# Patient Record
Sex: FEMALE | Race: WHITE | Employment: FULL TIME | URBAN - METROPOLITAN AREA
[De-identification: names, ages, dates, MRNs, and addresses within clinical notes are randomized per-mention and may not be internally consistent; named-entity substitution may affect disease eponyms.]

---

## 2017-01-17 ENCOUNTER — OFFICE VISIT (OUTPATIENT)
Dept: INTERNAL MEDICINE CLINIC | Age: 57
End: 2017-01-17

## 2017-01-17 VITALS
TEMPERATURE: 98.4 F | DIASTOLIC BLOOD PRESSURE: 80 MMHG | OXYGEN SATURATION: 98 % | BODY MASS INDEX: 29.62 KG/M2 | SYSTOLIC BLOOD PRESSURE: 132 MMHG | HEART RATE: 84 BPM | WEIGHT: 178 LBS

## 2017-01-17 DIAGNOSIS — J01.40 ACUTE NON-RECURRENT PANSINUSITIS: Primary | ICD-10-CM

## 2017-01-17 PROCEDURE — 99213 OFFICE O/P EST LOW 20 MIN: CPT | Performed by: NURSE PRACTITIONER

## 2017-01-17 RX ORDER — AMOXICILLIN 875 MG/1
875 TABLET, COATED ORAL 2 TIMES DAILY
Qty: 20 TABLET | Refills: 0 | Status: SHIPPED | OUTPATIENT
Start: 2017-01-17 | End: 2018-01-22

## 2017-01-17 ASSESSMENT — ENCOUNTER SYMPTOMS
CRAMPS: 1
RHINORRHEA: 1
SINUS PRESSURE: 1
SINUS COMPLAINT: 1

## 2017-01-27 ENCOUNTER — OFFICE VISIT (OUTPATIENT)
Dept: INTERNAL MEDICINE CLINIC | Age: 57
End: 2017-01-27

## 2017-01-27 VITALS
OXYGEN SATURATION: 98 % | HEART RATE: 85 BPM | DIASTOLIC BLOOD PRESSURE: 80 MMHG | SYSTOLIC BLOOD PRESSURE: 132 MMHG | HEIGHT: 65 IN | BODY MASS INDEX: 30.16 KG/M2 | WEIGHT: 181 LBS

## 2017-01-27 DIAGNOSIS — R30.0 DYSURIA: ICD-10-CM

## 2017-01-27 DIAGNOSIS — N30.01 ACUTE CYSTITIS WITH HEMATURIA: Primary | ICD-10-CM

## 2017-01-27 LAB
BACTERIA URINE, POC: ABNORMAL
BILIRUBIN URINE: 0 MG/DL
BLOOD, URINE: POSITIVE
CASTS URINE, POC: ABNORMAL
CLARITY: ABNORMAL
COLOR: YELLOW
CRYSTALS URINE, POC: ABNORMAL
EPI CELLS URINE, POC: ABNORMAL
GLUCOSE URINE: NEGATIVE
KETONES, URINE: NEGATIVE
LEUKOCYTE EST, POC: ABNORMAL
NITRITE, URINE: POSITIVE
PH UA: 6.5 (ref 4.5–8)
PROTEIN UA: POSITIVE
RBC URINE, POC: ABNORMAL
SPECIFIC GRAVITY UA: 1.02 (ref 1–1.03)
UROBILINOGEN, URINE: NORMAL
WBC URINE, POC: ABNORMAL
YEAST URINE, POC: ABNORMAL

## 2017-01-27 PROCEDURE — 99213 OFFICE O/P EST LOW 20 MIN: CPT | Performed by: INTERNAL MEDICINE

## 2017-01-27 PROCEDURE — 81000 URINALYSIS NONAUTO W/SCOPE: CPT | Performed by: INTERNAL MEDICINE

## 2017-01-27 RX ORDER — NITROFURANTOIN 25; 75 MG/1; MG/1
100 CAPSULE ORAL 2 TIMES DAILY
Qty: 14 CAPSULE | Refills: 0 | Status: SHIPPED | OUTPATIENT
Start: 2017-01-27 | End: 2019-02-07 | Stop reason: SDUPTHER

## 2017-01-27 RX ORDER — AMOXICILLIN 500 MG
CAPSULE ORAL
COMMUNITY
End: 2019-08-22

## 2017-01-27 RX ORDER — ASCORBIC ACID 100 MG
TABLET,CHEWABLE ORAL
COMMUNITY
End: 2019-08-22

## 2017-01-27 RX ORDER — FLUCONAZOLE 150 MG/1
150 TABLET ORAL ONCE
Qty: 3 TABLET | Refills: 0 | Status: SHIPPED | OUTPATIENT
Start: 2017-01-27 | End: 2017-01-27

## 2017-01-27 ASSESSMENT — ENCOUNTER SYMPTOMS
RESPIRATORY NEGATIVE: 1
NAUSEA: 0
CONSTIPATION: 0
DIARRHEA: 0

## 2017-01-30 LAB
ORGANISM: ABNORMAL
URINE CULTURE, ROUTINE: ABNORMAL

## 2017-04-29 ENCOUNTER — HOSPITAL ENCOUNTER (OUTPATIENT)
Dept: MAMMOGRAPHY | Age: 57
Discharge: HOME | End: 2017-04-29
Attending: FAMILY MEDICINE | Admitting: FAMILY MEDICINE

## 2017-04-29 DIAGNOSIS — Z12.31 ENCOUNTER FOR SCREENING MAMMOGRAM FOR BREAST CANCER: ICD-10-CM

## 2017-05-02 ENCOUNTER — TELEPHONE (OUTPATIENT)
Dept: INTERNAL MEDICINE CLINIC | Age: 57
End: 2017-05-02

## 2017-05-15 ENCOUNTER — TELEPHONE (OUTPATIENT)
Dept: INTERNAL MEDICINE CLINIC | Age: 57
End: 2017-05-15

## 2017-05-19 ENCOUNTER — TELEPHONE (OUTPATIENT)
Dept: INTERNAL MEDICINE CLINIC | Age: 57
End: 2017-05-19

## 2018-01-11 ENCOUNTER — OFFICE VISIT (OUTPATIENT)
Dept: ORTHOPEDIC SURGERY | Age: 58
End: 2018-01-11

## 2018-01-11 VITALS — BODY MASS INDEX: 30.16 KG/M2 | HEIGHT: 65 IN | WEIGHT: 181 LBS

## 2018-01-11 DIAGNOSIS — G89.29 CHRONIC PAIN OF RIGHT KNEE: Primary | ICD-10-CM

## 2018-01-11 DIAGNOSIS — M25.561 CHRONIC PAIN OF RIGHT KNEE: Primary | ICD-10-CM

## 2018-01-11 PROCEDURE — 73564 X-RAY EXAM KNEE 4 OR MORE: CPT | Performed by: ORTHOPAEDIC SURGERY

## 2018-01-11 PROCEDURE — 99204 OFFICE O/P NEW MOD 45 MIN: CPT | Performed by: ORTHOPAEDIC SURGERY

## 2018-01-18 ENCOUNTER — OFFICE VISIT (OUTPATIENT)
Dept: ORTHOPEDIC SURGERY | Age: 58
End: 2018-01-18

## 2018-01-18 ENCOUNTER — TELEPHONE (OUTPATIENT)
Dept: ORTHOPEDIC SURGERY | Age: 58
End: 2018-01-18

## 2018-01-18 VITALS — BODY MASS INDEX: 30.16 KG/M2 | HEIGHT: 65 IN | WEIGHT: 181 LBS

## 2018-01-18 DIAGNOSIS — S83.271D COMPLEX TEAR OF LATERAL MENISCUS OF RIGHT KNEE AS CURRENT INJURY, SUBSEQUENT ENCOUNTER: ICD-10-CM

## 2018-01-18 DIAGNOSIS — S83.231D COMPLEX TEAR OF MEDIAL MENISCUS OF RIGHT KNEE AS CURRENT INJURY, SUBSEQUENT ENCOUNTER: ICD-10-CM

## 2018-01-18 DIAGNOSIS — S83.231D COMPLEX TEAR OF MEDIAL MENISCUS OF RIGHT KNEE AS CURRENT INJURY, SUBSEQUENT ENCOUNTER: Primary | ICD-10-CM

## 2018-01-18 DIAGNOSIS — M25.561 CHRONIC PAIN OF RIGHT KNEE: Primary | ICD-10-CM

## 2018-01-18 DIAGNOSIS — G89.29 CHRONIC PAIN OF RIGHT KNEE: Primary | ICD-10-CM

## 2018-01-18 PROCEDURE — 99214 OFFICE O/P EST MOD 30 MIN: CPT | Performed by: ORTHOPAEDIC SURGERY

## 2018-01-18 RX ORDER — MELOXICAM 15 MG/1
15 TABLET ORAL DAILY
Qty: 30 TABLET | Refills: 3 | Status: SHIPPED | OUTPATIENT
Start: 2018-01-23 | End: 2018-06-04

## 2018-01-18 NOTE — PROGRESS NOTES
History of Present Illness:  Theresa Scott is a 65 y.o. female  recheck evaluation of right knee here today to discuss options    Location right Knee   Severity moderate  Duration several months  Associated sign/symptoms pain, swelling, catching, locking    Medical History  Patient's medications, allergies, past medical, surgical, social and family histories were reviewed and updated as appropriate. Review of Systems  Pertinent items are noted in HPI  Review of systems reviewed from Patient History Form dated on 1/11/18 and available in the patient's chart under the Media tab. No change in the patients medical history form. Examination:  General Exam:    Vitals: Height 5' 5\" (1.651 m), weight 181 lb (82.1 kg), not currently breastfeeding. Constitutional: Patient is adequately groomed with no evidence of malnutrition  Mental Status: The patient is alert and  oriented to time, place and person. The patient's mood and affect are appropriate. Lymphatic: The lymphatic examination bilaterally reveals all areas to be without enlargement or induration. Vascular: Examination reveals no swelling or calf tenderness. Peripheral pulses are palpable and 2+. Neurological: The patient has good coordination. There is no weakness or sensory deficit. Skin:    Head/Neck: inspection reveals no rashes, ulcerations or lesions. Trunk:  inspection reveals no rashes, ulcerations or lesions. Right Lower Extremity: inspection reveals no rashes, ulcerations or lesions. Left Lower Extremity: inspection reveals no rashes, ulcerations or lesions.                                           PHYSICAL EXAM:        Knee Examination  Inspection:  No abrasions no lacerations no signs of infection or obvious deformity mild obvious  swelling and joint effusion     Palpation:   Palpation reveals moderate  Pain medial joint line,   Mild lateral joint line pain,  mild joint effusion    Range of Motion:  0 - 150° flexion/ extension Examination of the lower back does not show any tenderness, deformity or injury. Range of motion is unremarkable. There is no gross instability. There are no rashes, ulcerations or lesions. Strength and tone are normal.    Past Surgical History:   Procedure Laterality Date     SECTION      breech    COLONOSCOPY      one polyp removed    HYSTERECTOMY      endometriosis    KNEE ARTHROSCOPY Right 13    RIGHT KNEE ARTHROSCOPE, PARTIAL MEDIAL AND LATERAL MENISCECTOMY, SYNOVECTOMY, CHONDROPLASTY OF MEDIAL FEMORAL CONDYLE    KNEE ARTHROSCOPY Left 2014    LEFT KNEE ARTHROSCOPY WITH PARTIAL MEDIAL MENISCECTOMY,    KNEE SURGERY  13    RIGHT KNEE ARTHROSCOPE, PARTIAL MEDIAL AND LATERAL    KNEE SURGERY  14     RIGHT KNEE ARTHROSCOPY WITH PARTIAL LATERAL MENISCECTOMY,    TUBAL LIGATION     . Radiology:     X-rays obtained and reviewed in office:  Views MRI multiple views  Body Part right knee  Impression medial meniscus tear with lateral meniscus tear and osteoarthritis      Assessment :  Medial meniscus tear, lateral meniscus tear, osteoarthritis    Impression: Same not improved with conservative therapy    Office Procedures:  No orders of the defined types were placed in this encounter. Previous Treatments:  Knee arthroscopy in the past, physical therapy, injections,    Possible other diagnoses:      Treatment Plan:  I spent 15+ minutes, face to face, with the patient discussing and answering questions regarding the risks, benefits, and complications of arthroscopic knee surgery. The patient realizes that there are concerns with this surgery with respect to infection, deep vein thrombosis, neurological injury, delayed  rehabilitation, the possibility of arthrofibrosis of the knee, and specifically  Hoffa's fat pad fibrosis that can potentially cause difficulties.  The patient realizes that there are also anesthetic concerns including cardiopulmonary issues, pulmonary issues,

## 2018-01-22 ENCOUNTER — TELEPHONE (OUTPATIENT)
Dept: ORTHOPEDIC SURGERY | Age: 58
End: 2018-01-22

## 2018-01-23 ENCOUNTER — HOSPITAL ENCOUNTER (OUTPATIENT)
Dept: SURGERY | Age: 58
Discharge: HOME | End: 2018-01-23
Attending: ORTHOPAEDIC SURGERY | Admitting: ORTHOPAEDIC SURGERY

## 2018-01-23 VITALS
TEMPERATURE: 98.6 F | OXYGEN SATURATION: 97 % | BODY MASS INDEX: 29.58 KG/M2 | SYSTOLIC BLOOD PRESSURE: 139 MMHG | HEART RATE: 65 BPM | DIASTOLIC BLOOD PRESSURE: 71 MMHG | RESPIRATION RATE: 16 BRPM | HEIGHT: 65 IN | WEIGHT: 177.56 LBS

## 2018-01-23 DIAGNOSIS — S83.231D COMPLEX TEAR OF MEDIAL MENISCUS OF RIGHT KNEE AS CURRENT INJURY, SUBSEQUENT ENCOUNTER: Primary | ICD-10-CM

## 2018-01-23 DIAGNOSIS — M65.9 SYNOVITIS OF KNEE: ICD-10-CM

## 2018-01-23 DIAGNOSIS — M25.461 EFFUSION OF RIGHT KNEE: ICD-10-CM

## 2018-01-23 RX ORDER — LIDOCAINE HYDROCHLORIDE 10 MG/ML
1 INJECTION, SOLUTION EPIDURAL; INFILTRATION; INTRACAUDAL; PERINEURAL
Status: CANCELLED | OUTPATIENT
Start: 2018-01-23 | End: 2018-01-23

## 2018-01-23 RX ORDER — SODIUM CHLORIDE 9 MG/ML
INJECTION, SOLUTION INTRAVENOUS CONTINUOUS
Status: DISCONTINUED | OUTPATIENT
Start: 2018-01-23 | End: 2018-01-24 | Stop reason: HOSPADM

## 2018-01-23 RX ORDER — HYDROCODONE BITARTRATE AND ACETAMINOPHEN 5; 325 MG/1; MG/1
1 TABLET ORAL EVERY 6 HOURS PRN
Qty: 28 TABLET | Refills: 0 | Status: SHIPPED | OUTPATIENT
Start: 2018-01-23 | End: 2018-01-26 | Stop reason: ALTCHOICE

## 2018-01-23 RX ORDER — CEFAZOLIN SODIUM 2 G/100ML
2 INJECTION, SOLUTION INTRAVENOUS
Status: COMPLETED | OUTPATIENT
Start: 2018-01-23 | End: 2018-01-23

## 2018-01-23 RX ORDER — HYDROMORPHONE HCL 110MG/55ML
0.25 PATIENT CONTROLLED ANALGESIA SYRINGE INTRAVENOUS EVERY 5 MIN PRN
Status: DISCONTINUED | OUTPATIENT
Start: 2018-01-23 | End: 2018-01-24 | Stop reason: HOSPADM

## 2018-01-23 RX ORDER — ACETAMINOPHEN 325 MG/1
650 TABLET ORAL EVERY 4 HOURS PRN
Status: DISCONTINUED | OUTPATIENT
Start: 2018-01-23 | End: 2018-01-24 | Stop reason: HOSPADM

## 2018-01-23 RX ORDER — HYDROCODONE BITARTRATE AND ACETAMINOPHEN 5; 325 MG/1; MG/1
1 TABLET ORAL
Status: ACTIVE | OUTPATIENT
Start: 2018-01-23 | End: 2018-01-23

## 2018-01-23 RX ORDER — DOCUSATE SODIUM 100 MG/1
100 CAPSULE, LIQUID FILLED ORAL 2 TIMES DAILY
Status: DISCONTINUED | OUTPATIENT
Start: 2018-01-23 | End: 2018-01-24 | Stop reason: HOSPADM

## 2018-01-23 RX ORDER — ONDANSETRON 2 MG/ML
4 INJECTION INTRAMUSCULAR; INTRAVENOUS EVERY 6 HOURS PRN
Status: DISCONTINUED | OUTPATIENT
Start: 2018-01-23 | End: 2018-01-24 | Stop reason: HOSPADM

## 2018-01-23 RX ORDER — HYDROMORPHONE HCL 110MG/55ML
0.5 PATIENT CONTROLLED ANALGESIA SYRINGE INTRAVENOUS EVERY 5 MIN PRN
Status: DISCONTINUED | OUTPATIENT
Start: 2018-01-23 | End: 2018-01-24 | Stop reason: HOSPADM

## 2018-01-23 RX ORDER — ONDANSETRON 2 MG/ML
4 INJECTION INTRAMUSCULAR; INTRAVENOUS
Status: ACTIVE | OUTPATIENT
Start: 2018-01-23 | End: 2018-01-23

## 2018-01-23 RX ORDER — MEPERIDINE HYDROCHLORIDE 25 MG/ML
12.5 INJECTION INTRAMUSCULAR; INTRAVENOUS; SUBCUTANEOUS EVERY 5 MIN PRN
Status: DISCONTINUED | OUTPATIENT
Start: 2018-01-23 | End: 2018-01-24 | Stop reason: HOSPADM

## 2018-01-23 RX ADMIN — CEFAZOLIN SODIUM 2 G: 2 INJECTION, SOLUTION INTRAVENOUS at 10:07

## 2018-01-23 RX ADMIN — SODIUM CHLORIDE: 9 INJECTION, SOLUTION INTRAVENOUS at 09:19

## 2018-01-23 ASSESSMENT — PAIN - FUNCTIONAL ASSESSMENT: PAIN_FUNCTIONAL_ASSESSMENT: 0-10

## 2018-01-23 ASSESSMENT — ACTIVITIES OF DAILY LIVING (ADL): EFFECT OF PAIN ON DAILY ACTIVITIES: WALKING LONG DISTANCES

## 2018-01-23 ASSESSMENT — PAIN DESCRIPTION - DESCRIPTORS: DESCRIPTORS: NAGGING;ACHING

## 2018-01-23 NOTE — ANESTHESIA POST-OP
Anesthesia Post-op Note    Patient: Jacki Raines  MRN: [id number]  YOB: 1960  Date of evaluation: 1/23/2018  Time:  3:19 PM     Procedure(s) Performed:     Last Vitals: /71   Pulse 65   Temp 98.6 °F (37 °C) (Temporal)   Resp 16   Ht 5' 5\" (1.651 m)   Wt 177 lb 9 oz (80.5 kg)   SpO2 97%   BMI 29.55 kg/m²     Elan Phase I: Elan Score: 10    Elan Phase II: Elan Score: 10    Anesthesia Post Evaluation    Final anesthesia type: MAC  Patient location during evaluation: PACU  Patient participation: complete - patient participated  Level of consciousness: awake and alert  Airway patency: patent  Nausea & Vomiting: no vomiting  Complications: no  Cardiovascular status: hemodynamically stable  Respiratory status: acceptable  Hydration status: euvolemic        Maranda Preciado MD  3:19 PM

## 2018-01-23 NOTE — ANESTHESIA PRE-OP
Department of Anesthesiology  Preprocedure Note       Name:  Husam Sanchez   Age:  65 y.o.  :  1960                                          MRN:  [id number]         Date:  2018      Surgeon:James    Procedure:knee scope  Medications prior to admission:   Prior to Admission medications    Medication Sig Start Date End Date Taking? Authorizing Provider   meloxicam (MOBIC) 15 MG tablet Take 1 tablet by mouth daily 18   Neda Sanches DO   Omega-3 Fatty Acids (FISH OIL) 1200 MG CAPS Take by mouth    Historical Provider, MD   Ascorbic Acid (VITAMIN C) 100 MG CHEW Take by mouth    Historical Provider, MD   Probiotic Product (PROBIOTIC DAILY PO) Take by mouth    Historical Provider, MD   omeprazole (PRILOSEC) 20 MG delayed release capsule Take 1 capsule by mouth 2 times daily for 14 days 16  Vijay Morris DO   Multiple Vitamins-Minerals (THERAPEUTIC MULTIVITAMIN-MINERALS) tablet Take 1 tablet by mouth daily    Historical Provider, MD   aspirin 81 MG EC tablet Take 81 mg by mouth daily. Historical Provider, MD       Current medications:    Current Outpatient Prescriptions   Medication Sig Dispense Refill    meloxicam (MOBIC) 15 MG tablet Take 1 tablet by mouth daily 30 tablet 3    Omega-3 Fatty Acids (FISH OIL) 1200 MG CAPS Take by mouth      Ascorbic Acid (VITAMIN C) 100 MG CHEW Take by mouth      Probiotic Product (PROBIOTIC DAILY PO) Take by mouth      omeprazole (PRILOSEC) 20 MG delayed release capsule Take 1 capsule by mouth 2 times daily for 14 days 28 capsule 0    Multiple Vitamins-Minerals (THERAPEUTIC MULTIVITAMIN-MINERALS) tablet Take 1 tablet by mouth daily      aspirin 81 MG EC tablet Take 81 mg by mouth daily.          Current Facility-Administered Medications   Medication Dose Route Frequency Provider Last Rate Last Dose    acetaminophen (TYLENOL) tablet 650 mg  650 mg Oral Q4H PRN Neda Sanches DO        docusate sodium (COLACE) capsule 100 mg  100 mg Oral PARTIAL MEDIAL AND LATERAL MENISCECTOMY, SYNOVECTOMY, CHONDROPLASTY OF MEDIAL FEMORAL CONDYLE    KNEE ARTHROSCOPY Left 11/28/2014    LEFT KNEE ARTHROSCOPY WITH PARTIAL MEDIAL MENISCECTOMY,    KNEE SURGERY  9-27-13    RIGHT KNEE ARTHROSCOPE, PARTIAL MEDIAL AND LATERAL    KNEE SURGERY  1-21-14     RIGHT KNEE ARTHROSCOPY WITH PARTIAL LATERAL MENISCECTOMY,    TUBAL LIGATION         Social History:    Social History   Substance Use Topics    Smoking status: Former Smoker     Packs/day: 0.25     Years: 7.00     Types: Cigarettes     Quit date: 9/24/1988    Smokeless tobacco: Never Used      Comment: quit 1988    Alcohol use No                                Counseling given: Not Answered      Vital Signs (Current):   Vitals:    01/23/18 0905 01/23/18 0913   BP:  (!) 146/86   Pulse:  78   Resp:  18   Temp:  98.5 °F (36.9 °C)   TempSrc:  Temporal   SpO2:  98%   Weight: 177 lb 9 oz (80.5 kg)    Height: 5' 5\" (1.651 m)                                               BP Readings from Last 3 Encounters:   01/23/18 (!) 146/86   01/27/17 132/80   01/17/17 132/80       NPO Status: Time of last liquid consumption: 2100                        Time of last solid consumption: 2100                        Date of last liquid consumption: 01/22/18                        Date of last solid food consumption: 01/22/18    BMI:   Wt Readings from Last 3 Encounters:   01/23/18 177 lb 9 oz (80.5 kg)   01/22/18 160 lb (72.6 kg)   01/18/18 181 lb (82.1 kg)     Body mass index is 29.55 kg/m².     Anesthesia Evaluation  Patient summary reviewed no history of anesthetic complications:   Airway: Mallampati: II  TM distance: >3 FB   Neck ROM: full  Mouth opening: > = 3 FB Dental: normal exam         Pulmonary: breath sounds clear to auscultation                            ROS comment: Former smoker   Cardiovascular:  Exercise tolerance: good (>4 METS),   (+) dysrhythmias (1990s cardiac ablation, no issues since):, hyperlipidemia    (-) CABG/stent

## 2018-01-26 ENCOUNTER — OFFICE VISIT (OUTPATIENT)
Dept: INTERNAL MEDICINE CLINIC | Age: 58
End: 2018-01-26

## 2018-01-26 VITALS
SYSTOLIC BLOOD PRESSURE: 128 MMHG | DIASTOLIC BLOOD PRESSURE: 80 MMHG | WEIGHT: 180.8 LBS | HEIGHT: 64 IN | HEART RATE: 74 BPM | BODY MASS INDEX: 30.87 KG/M2

## 2018-01-26 DIAGNOSIS — Z76.89 ENCOUNTER TO ESTABLISH CARE: Primary | ICD-10-CM

## 2018-01-26 DIAGNOSIS — Z23 NEED FOR TDAP VACCINATION: ICD-10-CM

## 2018-01-26 DIAGNOSIS — Z98.890 S/P RIGHT KNEE ARTHROSCOPY: ICD-10-CM

## 2018-01-26 DIAGNOSIS — E78.5 DYSLIPIDEMIA: ICD-10-CM

## 2018-01-26 DIAGNOSIS — S83.271A COMPLEX TEAR OF LATERAL MENISCUS OF RIGHT KNEE AS CURRENT INJURY, INITIAL ENCOUNTER: ICD-10-CM

## 2018-01-26 DIAGNOSIS — S83.231A COMPLEX TEAR OF MEDIAL MENISCUS OF RIGHT KNEE AS CURRENT INJURY, INITIAL ENCOUNTER: ICD-10-CM

## 2018-01-26 PROCEDURE — 90471 IMMUNIZATION ADMIN: CPT | Performed by: NURSE PRACTITIONER

## 2018-01-26 PROCEDURE — 99214 OFFICE O/P EST MOD 30 MIN: CPT | Performed by: NURSE PRACTITIONER

## 2018-01-26 PROCEDURE — 90715 TDAP VACCINE 7 YRS/> IM: CPT | Performed by: NURSE PRACTITIONER

## 2018-01-29 ENCOUNTER — TELEPHONE (OUTPATIENT)
Dept: ORTHOPEDIC SURGERY | Age: 58
End: 2018-01-29

## 2018-01-29 ENCOUNTER — OFFICE VISIT (OUTPATIENT)
Dept: ORTHOPEDIC SURGERY | Age: 58
End: 2018-01-29

## 2018-01-29 DIAGNOSIS — M25.561 CHRONIC PAIN OF RIGHT KNEE: Primary | ICD-10-CM

## 2018-01-29 DIAGNOSIS — M17.11 PRIMARY OSTEOARTHRITIS OF RIGHT KNEE: ICD-10-CM

## 2018-01-29 DIAGNOSIS — G89.29 CHRONIC PAIN OF RIGHT KNEE: Primary | ICD-10-CM

## 2018-01-29 PROCEDURE — 99024 POSTOP FOLLOW-UP VISIT: CPT | Performed by: ORTHOPAEDIC SURGERY

## 2018-01-29 ASSESSMENT — ENCOUNTER SYMPTOMS
GASTROINTESTINAL NEGATIVE: 1
RESPIRATORY NEGATIVE: 1

## 2018-01-29 NOTE — PROGRESS NOTES
Subjective:      Patient ID: Ramos Colón is a 65 y.o. female. CC: Establish new PCP    HPI: The patient presents to the office today to establish a new primary care provider. Her previous PCP left the system. Overall, the patient reports should that she enjoys good health. She has a history of some musculoskeletal issues, most recently medial meniscus issues with the right knee. On , she underwent knee arthroscopy per Dr. Mark Lynn. She reports she is recovering well. She is on meloxicam.    She also has a history of arthritis of other joints. She uses NSAIDs intermittently but is currently on meloxicam per orthopedics. She has a history of hyperlipidemia. She has a strong family history of this. She was placed on statin but did not tolerate it. She is currently managing this with diet. 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 She is . She cares for her 3 disabled siblings at her home. She is a former smoker. She denies use. She denies illicit drug use.       Past Medical History:   Diagnosis Date    Arthritis     RT TOES; established with podiatry    Hyperlipidemia     DIET CONTROL       Past Surgical History:   Procedure Laterality Date     SECTION      breech    COLONOSCOPY      one polyp removed    HYSTERECTOMY      endometriosis    KNEE ARTHROSCOPY Right 13    RIGHT KNEE ARTHROSCOPE, PARTIAL MEDIAL AND LATERAL MENISCECTOMY, SYNOVECTOMY, CHONDROPLASTY OF MEDIAL FEMORAL CONDYLE    KNEE ARTHROSCOPY Left 2014    LEFT KNEE ARTHROSCOPY WITH PARTIAL MEDIAL MENISCECTOMY,    KNEE ARTHROSCOPY Right 2018    ACTUAL PROCEDURE: RIGHT KNEE ARTHROSCOPY, PARTIAL MEDIAL MENISCECTOMY,CHONDROPLASTY, SYNOVECTOMY      KNEE SURGERY  13    RIGHT KNEE ARTHROSCOPE, PARTIAL MEDIAL AND LATERAL    KNEE SURGERY  14     RIGHT KNEE ARTHROSCOPY WITH PARTIAL LATERAL MENISCECTOMY,    TUBAL LIGATION         Family

## 2018-01-29 NOTE — ADDENDUM NOTE
Encounter addended by: Lili Cannon MA on: 1/29/2018 11:25 AM<BR>    Actions taken: Letter status changed

## 2018-01-31 ENCOUNTER — TELEPHONE (OUTPATIENT)
Dept: ORTHOPEDIC SURGERY | Age: 58
End: 2018-01-31

## 2018-02-05 ENCOUNTER — TELEPHONE (OUTPATIENT)
Dept: ORTHOPEDIC SURGERY | Age: 58
End: 2018-02-05

## 2018-02-13 ENCOUNTER — TELEPHONE (OUTPATIENT)
Dept: ORTHOPEDIC SURGERY | Age: 58
End: 2018-02-13

## 2018-02-15 ENCOUNTER — TELEPHONE (OUTPATIENT)
Dept: ORTHOPEDIC SURGERY | Age: 58
End: 2018-02-15

## 2018-02-21 DIAGNOSIS — G89.29 CHRONIC PAIN OF RIGHT KNEE: ICD-10-CM

## 2018-02-21 DIAGNOSIS — M25.561 CHRONIC PAIN OF RIGHT KNEE: ICD-10-CM

## 2018-02-21 DIAGNOSIS — M17.11 PRIMARY OSTEOARTHRITIS OF RIGHT KNEE: ICD-10-CM

## 2018-02-21 PROCEDURE — L1845 KO DOUBLE UPRIGHT PRE CST: HCPCS | Performed by: ORTHOPAEDIC SURGERY

## 2018-03-01 ENCOUNTER — TELEPHONE (OUTPATIENT)
Dept: ORTHOPEDIC SURGERY | Age: 58
End: 2018-03-01

## 2018-03-02 ENCOUNTER — TELEPHONE (OUTPATIENT)
Dept: ORTHOPEDIC SURGERY | Age: 58
End: 2018-03-02

## 2018-03-12 ENCOUNTER — OFFICE VISIT (OUTPATIENT)
Dept: ORTHOPEDIC SURGERY | Age: 58
End: 2018-03-12

## 2018-03-12 DIAGNOSIS — M17.11 PRIMARY OSTEOARTHRITIS OF RIGHT KNEE: ICD-10-CM

## 2018-03-12 DIAGNOSIS — S83.231D COMPLEX TEAR OF MEDIAL MENISCUS OF RIGHT KNEE AS CURRENT INJURY, SUBSEQUENT ENCOUNTER: Primary | ICD-10-CM

## 2018-03-12 PROCEDURE — 99024 POSTOP FOLLOW-UP VISIT: CPT | Performed by: ORTHOPAEDIC SURGERY

## 2018-06-04 ENCOUNTER — OFFICE VISIT (OUTPATIENT)
Dept: INTERNAL MEDICINE CLINIC | Age: 58
End: 2018-06-04

## 2018-06-04 VITALS
SYSTOLIC BLOOD PRESSURE: 136 MMHG | HEART RATE: 72 BPM | HEIGHT: 64 IN | DIASTOLIC BLOOD PRESSURE: 82 MMHG | BODY MASS INDEX: 30.9 KG/M2 | WEIGHT: 181 LBS

## 2018-06-04 DIAGNOSIS — R03.0 PRE-HYPERTENSION: Primary | ICD-10-CM

## 2018-06-04 DIAGNOSIS — T46.6X5A MYALGIA DUE TO STATIN: ICD-10-CM

## 2018-06-04 DIAGNOSIS — E78.00 PURE HYPERCHOLESTEROLEMIA: ICD-10-CM

## 2018-06-04 DIAGNOSIS — M79.10 MYALGIA DUE TO STATIN: ICD-10-CM

## 2018-06-04 PROCEDURE — 99213 OFFICE O/P EST LOW 20 MIN: CPT | Performed by: NURSE PRACTITIONER

## 2018-06-04 RX ORDER — PRAVASTATIN SODIUM 20 MG
10 TABLET ORAL EVERY EVENING
Qty: 30 TABLET | Refills: 5 | Status: SHIPPED | OUTPATIENT
Start: 2018-06-04 | End: 2019-08-22

## 2018-06-04 ASSESSMENT — ENCOUNTER SYMPTOMS
GASTROINTESTINAL NEGATIVE: 1
SHORTNESS OF BREATH: 0

## 2018-06-04 ASSESSMENT — PATIENT HEALTH QUESTIONNAIRE - PHQ9
SUM OF ALL RESPONSES TO PHQ QUESTIONS 1-9: 0
SUM OF ALL RESPONSES TO PHQ9 QUESTIONS 1 & 2: 0
2. FEELING DOWN, DEPRESSED OR HOPELESS: 0
1. LITTLE INTEREST OR PLEASURE IN DOING THINGS: 0

## 2018-08-18 ENCOUNTER — HOSPITAL ENCOUNTER (OUTPATIENT)
Dept: MAMMOGRAPHY | Age: 58
Discharge: HOME | End: 2018-08-18
Attending: NURSE PRACTITIONER | Admitting: NURSE PRACTITIONER

## 2018-08-18 DIAGNOSIS — Z12.31 ENCOUNTER FOR SCREENING MAMMOGRAM FOR BREAST CANCER: ICD-10-CM

## 2019-01-23 ENCOUNTER — TELEPHONE (OUTPATIENT)
Dept: INTERNAL MEDICINE CLINIC | Age: 59
End: 2019-01-23

## 2019-01-23 NOTE — TELEPHONE ENCOUNTER
Pt calling brought in quardianship papers back in November---for her being quardian to [address] --(also your pts) the court has contacted her and they have never received those papers --she needs to pick them up today to take to them or she will lose quardianship----please call pt cell. Thanks.

## 2019-02-06 ENCOUNTER — OFFICE VISIT (OUTPATIENT)
Dept: INTERNAL MEDICINE CLINIC | Age: 59
End: 2019-02-06
Payer: COMMERCIAL

## 2019-02-06 VITALS
WEIGHT: 180.6 LBS | HEART RATE: 96 BPM | DIASTOLIC BLOOD PRESSURE: 84 MMHG | BODY MASS INDEX: 30.83 KG/M2 | SYSTOLIC BLOOD PRESSURE: 128 MMHG | HEIGHT: 64 IN | OXYGEN SATURATION: 97 % | TEMPERATURE: 98.4 F

## 2019-02-06 DIAGNOSIS — R82.998 LEUKOCYTES IN URINE: ICD-10-CM

## 2019-02-06 DIAGNOSIS — E86.0 DEHYDRATION: ICD-10-CM

## 2019-02-06 DIAGNOSIS — R11.0 NAUSEA: ICD-10-CM

## 2019-02-06 DIAGNOSIS — R52 BODY ACHES: Primary | ICD-10-CM

## 2019-02-06 DIAGNOSIS — R05.9 COUGH: ICD-10-CM

## 2019-02-06 DIAGNOSIS — R00.0 TACHYCARDIA: ICD-10-CM

## 2019-02-06 LAB
A/G RATIO: 1.6 (ref 1.1–2.2)
ALBUMIN SERPL-MCNC: 4.2 G/DL (ref 3.4–5)
ALP BLD-CCNC: 81 U/L (ref 40–129)
ALT SERPL-CCNC: 21 U/L (ref 10–40)
ANION GAP SERPL CALCULATED.3IONS-SCNC: 15 MMOL/L (ref 3–16)
AST SERPL-CCNC: 38 U/L (ref 15–37)
BASOPHILS ABSOLUTE: 0 K/UL (ref 0–0.2)
BASOPHILS RELATIVE PERCENT: 0.4 %
BILIRUB SERPL-MCNC: 1 MG/DL (ref 0–1)
BILIRUBIN URINE: NEGATIVE
BLOOD, URINE: NEGATIVE
BUN BLDV-MCNC: 11 MG/DL (ref 7–20)
CALCIUM SERPL-MCNC: 9 MG/DL (ref 8.3–10.6)
CHLORIDE BLD-SCNC: 105 MMOL/L (ref 99–110)
CLARITY: ABNORMAL
CO2: 21 MMOL/L (ref 21–32)
COLOR: YELLOW
CREAT SERPL-MCNC: 0.5 MG/DL (ref 0.6–1.1)
EOSINOPHILS ABSOLUTE: 0 K/UL (ref 0–0.6)
EOSINOPHILS RELATIVE PERCENT: 0.4 %
EPITHELIAL CELLS, UA: 3 /HPF (ref 0–5)
GFR AFRICAN AMERICAN: >60
GFR NON-AFRICAN AMERICAN: >60
GLOBULIN: 2.6 G/DL
GLUCOSE BLD-MCNC: 106 MG/DL (ref 70–99)
GLUCOSE URINE: NEGATIVE MG/DL
HCT VFR BLD CALC: 41 % (ref 36–48)
HEMOGLOBIN: 13.7 G/DL (ref 12–16)
HYALINE CASTS: 7 /LPF (ref 0–8)
INFLUENZA A ANTIBODY: NORMAL
INFLUENZA B ANTIBODY: NORMAL
KETONES, URINE: NEGATIVE MG/DL
LEUKOCYTE ESTERASE, URINE: ABNORMAL
LIPASE: 27 U/L (ref 13–60)
LYMPHOCYTES ABSOLUTE: 0.7 K/UL (ref 1–5.1)
LYMPHOCYTES RELATIVE PERCENT: 6.4 %
MCH RBC QN AUTO: 29.2 PG (ref 26–34)
MCHC RBC AUTO-ENTMCNC: 33.3 G/DL (ref 31–36)
MCV RBC AUTO: 87.5 FL (ref 80–100)
MICROSCOPIC EXAMINATION: YES
MONOCYTES ABSOLUTE: 0.8 K/UL (ref 0–1.3)
MONOCYTES RELATIVE PERCENT: 7.2 %
NEUTROPHILS ABSOLUTE: 9.8 K/UL (ref 1.7–7.7)
NEUTROPHILS RELATIVE PERCENT: 85.6 %
NITRITE, URINE: NEGATIVE
PDW BLD-RTO: 13.3 % (ref 12.4–15.4)
PH UA: 6
PLATELET # BLD: 260 K/UL (ref 135–450)
PMV BLD AUTO: 8 FL (ref 5–10.5)
POTASSIUM SERPL-SCNC: 4.8 MMOL/L (ref 3.5–5.1)
PROTEIN UA: NEGATIVE MG/DL
RBC # BLD: 4.69 M/UL (ref 4–5.2)
RBC UA: 4 /HPF (ref 0–4)
SODIUM BLD-SCNC: 141 MMOL/L (ref 136–145)
SPECIFIC GRAVITY UA: 1.02
TOTAL PROTEIN: 6.8 G/DL (ref 6.4–8.2)
URINE TYPE: ABNORMAL
UROBILINOGEN, URINE: 0.2 E.U./DL
WBC # BLD: 11.4 K/UL (ref 4–11)
WBC UA: 11 /HPF (ref 0–5)

## 2019-02-06 PROCEDURE — 87804 INFLUENZA ASSAY W/OPTIC: CPT | Performed by: NURSE PRACTITIONER

## 2019-02-06 PROCEDURE — 96360 HYDRATION IV INFUSION INIT: CPT | Performed by: NURSE PRACTITIONER

## 2019-02-06 PROCEDURE — 96372 THER/PROPH/DIAG INJ SC/IM: CPT | Performed by: NURSE PRACTITIONER

## 2019-02-06 PROCEDURE — 81003 URINALYSIS AUTO W/O SCOPE: CPT | Performed by: NURSE PRACTITIONER

## 2019-02-06 PROCEDURE — 99214 OFFICE O/P EST MOD 30 MIN: CPT | Performed by: NURSE PRACTITIONER

## 2019-02-06 PROCEDURE — 93000 ELECTROCARDIOGRAM COMPLETE: CPT | Performed by: NURSE PRACTITIONER

## 2019-02-06 RX ORDER — ONDANSETRON 4 MG/1
4 TABLET, ORALLY DISINTEGRATING ORAL EVERY 8 HOURS PRN
Qty: 30 TABLET | Refills: 0 | Status: CANCELLED | OUTPATIENT
Start: 2019-02-06

## 2019-02-06 RX ORDER — PROMETHAZINE HYDROCHLORIDE 25 MG/ML
12.5 INJECTION, SOLUTION INTRAMUSCULAR; INTRAVENOUS ONCE
Status: CANCELLED | OUTPATIENT
Start: 2019-02-06 | End: 2019-02-06

## 2019-02-06 RX ADMIN — PROMETHAZINE HYDROCHLORIDE 12.5 MG: 25 INJECTION, SOLUTION INTRAMUSCULAR; INTRAVENOUS at 01:10

## 2019-02-06 ASSESSMENT — ENCOUNTER SYMPTOMS
SINUS PAIN: 0
ABDOMINAL PAIN: 0
WHEEZING: 0
EYE DISCHARGE: 0
EYE REDNESS: 0
SINUS PRESSURE: 1
EYE ITCHING: 0
DIARRHEA: 0
EYE PAIN: 0
VOMITING: 0
SORE THROAT: 0
RHINORRHEA: 0
CONSTIPATION: 0
TROUBLE SWALLOWING: 0
SHORTNESS OF BREATH: 0
PHOTOPHOBIA: 0
COUGH: 1
NAUSEA: 1

## 2019-02-06 ASSESSMENT — PATIENT HEALTH QUESTIONNAIRE - PHQ9
SUM OF ALL RESPONSES TO PHQ QUESTIONS 1-9: 0
SUM OF ALL RESPONSES TO PHQ9 QUESTIONS 1 & 2: 0
1. LITTLE INTEREST OR PLEASURE IN DOING THINGS: 0
SUM OF ALL RESPONSES TO PHQ QUESTIONS 1-9: 0
2. FEELING DOWN, DEPRESSED OR HOPELESS: 0

## 2019-02-07 ENCOUNTER — TELEPHONE (OUTPATIENT)
Dept: INTERNAL MEDICINE CLINIC | Age: 59
End: 2019-02-07

## 2019-02-07 RX ORDER — NITROFURANTOIN 25; 75 MG/1; MG/1
100 CAPSULE ORAL 2 TIMES DAILY
Qty: 10 CAPSULE | Refills: 0 | Status: SHIPPED | OUTPATIENT
Start: 2019-02-07 | End: 2019-02-12

## 2019-02-07 RX ORDER — ONDANSETRON 4 MG/1
4 TABLET, ORALLY DISINTEGRATING ORAL EVERY 8 HOURS PRN
Qty: 30 TABLET | Refills: 0 | Status: SHIPPED | OUTPATIENT
Start: 2019-02-07 | End: 2019-02-08

## 2019-02-07 RX ORDER — PROMETHAZINE HYDROCHLORIDE 25 MG/ML
12.5 INJECTION, SOLUTION INTRAMUSCULAR; INTRAVENOUS ONCE
Status: COMPLETED | OUTPATIENT
Start: 2019-02-06 | End: 2019-02-06

## 2019-02-08 ENCOUNTER — TELEPHONE (OUTPATIENT)
Dept: INTERNAL MEDICINE CLINIC | Age: 59
End: 2019-02-08

## 2019-02-08 ENCOUNTER — HOSPITAL ENCOUNTER (EMERGENCY)
Age: 59
Discharge: HOME | End: 2019-02-09
Attending: EMERGENCY MEDICINE
Payer: COMMERCIAL

## 2019-02-08 DIAGNOSIS — R11.2 NAUSEA VOMITING AND DIARRHEA: Primary | ICD-10-CM

## 2019-02-08 DIAGNOSIS — R19.7 NAUSEA VOMITING AND DIARRHEA: Primary | ICD-10-CM

## 2019-02-08 LAB
A/G RATIO: 1 (ref 1.1–2.2)
ALBUMIN SERPL-MCNC: 3.8 G/DL (ref 3.4–5)
ALP BLD-CCNC: 86 U/L (ref 40–129)
ALT SERPL-CCNC: 17 U/L (ref 10–40)
ANION GAP SERPL CALCULATED.3IONS-SCNC: 18 MMOL/L (ref 3–16)
AST SERPL-CCNC: 16 U/L (ref 15–37)
BASOPHILS ABSOLUTE: 0 K/UL (ref 0–0.2)
BASOPHILS RELATIVE PERCENT: 0.4 %
BILIRUB SERPL-MCNC: 1.1 MG/DL (ref 0–1)
BUN BLDV-MCNC: 10 MG/DL (ref 7–20)
CALCIUM SERPL-MCNC: 9.4 MG/DL (ref 8.3–10.6)
CHLORIDE BLD-SCNC: 96 MMOL/L (ref 99–110)
CO2: 21 MMOL/L (ref 21–32)
CREAT SERPL-MCNC: 0.6 MG/DL (ref 0.6–1.1)
EOSINOPHILS ABSOLUTE: 0.1 K/UL (ref 0–0.6)
EOSINOPHILS RELATIVE PERCENT: 1.1 %
GFR AFRICAN AMERICAN: >60
GFR NON-AFRICAN AMERICAN: >60
GLOBULIN: 4 G/DL
GLUCOSE BLD-MCNC: 109 MG/DL (ref 70–99)
HCT VFR BLD CALC: 41.5 % (ref 36–48)
HEMOGLOBIN: 14.2 G/DL (ref 12–16)
LYMPHOCYTES ABSOLUTE: 1.1 K/UL (ref 1–5.1)
LYMPHOCYTES RELATIVE PERCENT: 9.3 %
MCH RBC QN AUTO: 29.2 PG (ref 26–34)
MCHC RBC AUTO-ENTMCNC: 34.2 G/DL (ref 31–36)
MCV RBC AUTO: 85.3 FL (ref 80–100)
MONOCYTES ABSOLUTE: 1.1 K/UL (ref 0–1.3)
MONOCYTES RELATIVE PERCENT: 10 %
NEUTROPHILS ABSOLUTE: 9.1 K/UL (ref 1.7–7.7)
NEUTROPHILS RELATIVE PERCENT: 79.2 %
PDW BLD-RTO: 13.2 % (ref 12.4–15.4)
PLATELET # BLD: 245 K/UL (ref 135–450)
PMV BLD AUTO: 7.5 FL (ref 5–10.5)
POTASSIUM SERPL-SCNC: 3.4 MMOL/L (ref 3.5–5.1)
RBC # BLD: 4.86 M/UL (ref 4–5.2)
SODIUM BLD-SCNC: 135 MMOL/L (ref 136–145)
TOTAL PROTEIN: 7.8 G/DL (ref 6.4–8.2)
WBC # BLD: 11.4 K/UL (ref 4–11)

## 2019-02-08 PROCEDURE — 99283 EMERGENCY DEPT VISIT LOW MDM: CPT

## 2019-02-08 PROCEDURE — 85025 COMPLETE CBC W/AUTO DIFF WBC: CPT

## 2019-02-08 PROCEDURE — 80053 COMPREHEN METABOLIC PANEL: CPT

## 2019-02-08 RX ORDER — PROMETHAZINE HYDROCHLORIDE 12.5 MG/1
12.5 TABLET ORAL EVERY 8 HOURS PRN
Qty: 20 TABLET | Refills: 0 | Status: SHIPPED | OUTPATIENT
Start: 2019-02-08 | End: 2019-02-15

## 2019-02-08 RX ORDER — ONDANSETRON 4 MG/1
4 TABLET, FILM COATED ORAL EVERY 8 HOURS PRN
COMMUNITY
End: 2019-02-12 | Stop reason: ALTCHOICE

## 2019-02-09 VITALS
RESPIRATION RATE: 16 BRPM | SYSTOLIC BLOOD PRESSURE: 138 MMHG | TEMPERATURE: 97.5 F | OXYGEN SATURATION: 95 % | DIASTOLIC BLOOD PRESSURE: 73 MMHG | BODY MASS INDEX: 28.93 KG/M2 | WEIGHT: 180 LBS | HEIGHT: 66 IN | HEART RATE: 75 BPM

## 2019-02-09 PROCEDURE — 2580000003 HC RX 258: Performed by: EMERGENCY MEDICINE

## 2019-02-09 PROCEDURE — 96360 HYDRATION IV INFUSION INIT: CPT

## 2019-02-09 RX ORDER — 0.9 % SODIUM CHLORIDE 0.9 %
1000 INTRAVENOUS SOLUTION INTRAVENOUS ONCE
Status: COMPLETED | OUTPATIENT
Start: 2019-02-09 | End: 2019-02-09

## 2019-02-09 RX ADMIN — SODIUM CHLORIDE 1000 ML: 9 INJECTION, SOLUTION INTRAVENOUS at 00:34

## 2019-02-11 ENCOUNTER — TELEPHONE (OUTPATIENT)
Dept: INTERNAL MEDICINE CLINIC | Age: 59
End: 2019-02-11

## 2019-02-12 ENCOUNTER — OFFICE VISIT (OUTPATIENT)
Dept: INTERNAL MEDICINE CLINIC | Age: 59
End: 2019-02-12
Payer: COMMERCIAL

## 2019-02-12 VITALS
HEIGHT: 65 IN | SYSTOLIC BLOOD PRESSURE: 122 MMHG | BODY MASS INDEX: 29.02 KG/M2 | WEIGHT: 174.2 LBS | TEMPERATURE: 99.1 F | DIASTOLIC BLOOD PRESSURE: 78 MMHG | HEART RATE: 96 BPM | OXYGEN SATURATION: 95 %

## 2019-02-12 DIAGNOSIS — R06.2 WHEEZING: Primary | ICD-10-CM

## 2019-02-12 DIAGNOSIS — R05.9 COUGH: ICD-10-CM

## 2019-02-12 DIAGNOSIS — B37.0 THRUSH: ICD-10-CM

## 2019-02-12 PROCEDURE — 99213 OFFICE O/P EST LOW 20 MIN: CPT | Performed by: NURSE PRACTITIONER

## 2019-02-12 PROCEDURE — 94640 AIRWAY INHALATION TREATMENT: CPT | Performed by: NURSE PRACTITIONER

## 2019-02-12 RX ORDER — METHYLPREDNISOLONE 4 MG/1
TABLET ORAL
Qty: 1 KIT | Refills: 0 | Status: SHIPPED | OUTPATIENT
Start: 2019-02-12 | End: 2019-02-18

## 2019-02-12 RX ORDER — BENZONATATE 100 MG/1
100 CAPSULE ORAL 3 TIMES DAILY PRN
Qty: 30 CAPSULE | Refills: 0 | Status: SHIPPED | OUTPATIENT
Start: 2019-02-12 | End: 2019-02-19

## 2019-02-12 RX ORDER — ALBUTEROL SULFATE 2.5 MG/3ML
2.5 SOLUTION RESPIRATORY (INHALATION) ONCE
Status: COMPLETED | OUTPATIENT
Start: 2019-02-12 | End: 2019-02-12

## 2019-02-12 RX ORDER — ALBUTEROL SULFATE 90 UG/1
2 AEROSOL, METERED RESPIRATORY (INHALATION) EVERY 6 HOURS PRN
Qty: 1 INHALER | Refills: 3 | Status: SHIPPED | OUTPATIENT
Start: 2019-02-12 | End: 2019-08-22 | Stop reason: ALTCHOICE

## 2019-02-12 RX ADMIN — ALBUTEROL SULFATE 2.5 MG: 2.5 SOLUTION RESPIRATORY (INHALATION) at 07:44

## 2019-02-12 ASSESSMENT — ENCOUNTER SYMPTOMS
CONSTIPATION: 0
NAUSEA: 0
RHINORRHEA: 0
ABDOMINAL PAIN: 0
WHEEZING: 0
SINUS PAIN: 0
COUGH: 1
SINUS PRESSURE: 0
SHORTNESS OF BREATH: 0
SORE THROAT: 0
VOMITING: 0
BLOOD IN STOOL: 0
DIARRHEA: 0

## 2019-02-13 RX ORDER — CLOTRIMAZOLE 10 MG/1
10 LOZENGE ORAL; TOPICAL
Qty: 70 TABLET | Refills: 0 | Status: SHIPPED | OUTPATIENT
Start: 2019-02-13 | End: 2019-02-27

## 2019-02-19 ENCOUNTER — TELEPHONE (OUTPATIENT)
Dept: INTERNAL MEDICINE CLINIC | Age: 59
End: 2019-02-19

## 2019-04-04 ENCOUNTER — OFFICE VISIT (OUTPATIENT)
Dept: INTERNAL MEDICINE CLINIC | Age: 59
End: 2019-04-04
Payer: COMMERCIAL

## 2019-04-04 VITALS
SYSTOLIC BLOOD PRESSURE: 136 MMHG | HEIGHT: 65 IN | BODY MASS INDEX: 30.66 KG/M2 | DIASTOLIC BLOOD PRESSURE: 88 MMHG | HEART RATE: 88 BPM | WEIGHT: 184 LBS

## 2019-04-04 DIAGNOSIS — J02.9 ACUTE PHARYNGITIS, UNSPECIFIED ETIOLOGY: Primary | ICD-10-CM

## 2019-04-04 DIAGNOSIS — J02.9 SORE THROAT: ICD-10-CM

## 2019-04-04 LAB — S PYO AG THROAT QL: NORMAL

## 2019-04-04 PROCEDURE — 87880 STREP A ASSAY W/OPTIC: CPT | Performed by: NURSE PRACTITIONER

## 2019-04-04 PROCEDURE — 99213 OFFICE O/P EST LOW 20 MIN: CPT | Performed by: NURSE PRACTITIONER

## 2019-04-04 ASSESSMENT — ENCOUNTER SYMPTOMS
SORE THROAT: 1
COUGH: 1

## 2019-04-04 NOTE — PROGRESS NOTES
SUBJECTIVE:    Patient ID: [place] Mills is a 63 y.o. female. CC: sore throat, exposure to strep    HPI: The patient presents to the office for an acute visit. Sore throat for 3 days, worsening. She was exposed to strep at work. No fever. Treated with salt water gargles which help. Current Outpatient Medications   Medication Sig Dispense Refill    albuterol sulfate HFA (PROVENTIL HFA) 108 (90 Base) MCG/ACT inhaler Inhale 2 puffs into the lungs every 6 hours as needed for Wheezing or Shortness of Breath 1 Inhaler 3    pravastatin (PRAVACHOL) 20 MG tablet Take 0.5 tablets by mouth every evening 30 tablet 5    Omega-3 Fatty Acids (FISH OIL) 1200 MG CAPS Take by mouth      Ascorbic Acid (VITAMIN C) 100 MG CHEW Take by mouth      Probiotic Product (PROBIOTIC DAILY PO) Take by mouth      Multiple Vitamins-Minerals (THERAPEUTIC MULTIVITAMIN-MINERALS) tablet Take 1 tablet by mouth daily      aspirin 81 MG EC tablet Take 81 mg by mouth daily. No current facility-administered medications for this visit. Review of Systems   Constitutional: Negative for fever. HENT: Positive for sore throat. Respiratory: Positive for cough. OBJECTIVE:  Physical Exam   Constitutional: She appears well-developed and well-nourished. No distress. HENT:   Head: Normocephalic and atraumatic. Right Ear: Tympanic membrane normal.   Left Ear: Tympanic membrane normal.   Nose: Right sinus exhibits no maxillary sinus tenderness and no frontal sinus tenderness. Left sinus exhibits no maxillary sinus tenderness and no frontal sinus tenderness. Mouth/Throat: Oropharynx is clear and moist.   Skin: Skin is warm and dry. She is not diaphoretic.       /88   Pulse 88   Ht 5' 5\" (1.651 m)   Wt 184 lb (83.5 kg)   BMI 30.62 kg/m²      PHQ Scores 2/6/2019 6/4/2018   PHQ2 Score 0 0   PHQ9 Score 0 0     Interpretation of Total Score Depression Severity: 1-4 = Minimal depression, 5-9 = Mild depression, 10-14 = Moderate depression, 15-19 = Moderately severe depression, 20-27 =Severe depression        ASSESSMENT/PLAN:  Jacklyn Aiken was seen today for pharyngitis.   Diagnoses and all orders for this visit:    Acute pharyngitis, unspecified etiology  - 3 day history of sore throat  - Exposure to strep but strep test is negative, no fever, and examination consistent with strep  - Suspect acute viral illness  - Rest, fluids, Tylenol, saltwater gargles    Sore throat  -     POCT rapid strep A      BRISSA Burnham - CNP

## 2019-05-30 NOTE — PROGRESS NOTES
[facility]   Cardiac Consultation    Referring Provider:  BRISSA Philip CNP     Chief Complaint   Patient presents with    Chest Pain     chest pain on and off pain goes into left arm     Establish Cardiologist       History of Present Illness:   Ms Tamanna Dutta is seen today with complaints of chest pain. Her mother had CAD, had a CABG in her 50's(was a smoker)  Ms Tamanna Dutta is a nonsmoker. She has hyperlipidemia    For the past 3-4 weeks she has had chest pain, associated with sob, described as severe, burning quality radiating to her left arm, every time she walks, alleviated with rest. No N,V, palpitations or syncope. Lately sx have made her stop her activity in order to recover and occur at lower levels of activity. She has had this a couple times getting ready to go to bed, not  as severe, but occurs at rest. Lasting a few minutes. 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 Previously has  Been able to tolerate CT dye, but it did cause her to have panic attacks. She has allergy listed of prednisone, which she states causes her to be up all night. Patient is compliant  with medication and is tolerating them well without side effects    Past Medical History:   has a past medical history of Arthritis and Hyperlipidemia. Surgical History:   has a past surgical history that includes Hysterectomy;  section; Colonoscopy (); Knee arthroscopy (Right, 13); knee surgery (13); Tubal ligation; knee surgery (14); Knee arthroscopy (Left, 2014); and Knee arthroscopy (Right, 2018). Social History:   reports that she quit smoking about 30 years ago. Her smoking use included cigarettes. She has a 1.75 pack-year smoking history. She has never used smokeless tobacco. She reports that she does not drink alcohol or use drugs.      Family History:  family history includes Cancer in her brother; Diabetes in her father and mother; Heart Attack (age of onset: 48) in her mother; Heart Disease in her mother; High Blood Pressure in her mother; Stroke in her mother. Home Medications:  Outpatient Encounter Medications as of 5/31/2019   Medication Sig Dispense Refill    albuterol sulfate HFA (PROVENTIL HFA) 108 (90 Base) MCG/ACT inhaler Inhale 2 puffs into the lungs every 6 hours as needed for Wheezing or Shortness of Breath 1 Inhaler 3    pravastatin (PRAVACHOL) 20 MG tablet Take 0.5 tablets by mouth every evening 30 tablet 5    Omega-3 Fatty Acids (FISH OIL) 1200 MG CAPS Take by mouth      Ascorbic Acid (VITAMIN C) 100 MG CHEW Take by mouth      Probiotic Product (PROBIOTIC DAILY PO) Take by mouth      Multiple Vitamins-Minerals (THERAPEUTIC MULTIVITAMIN-MINERALS) tablet Take 1 tablet by mouth daily      aspirin 81 MG EC tablet Take 81 mg by mouth daily. No facility-administered encounter medications on file as of 5/31/2019. Allergies:  Latex; Codeine; Floxin [ofloxacin]; Prednisone; Statins [statins]; and Caffeine     Review of Systems:   · Constitutional: there has been no unanticipated weight loss. There's been no change in energy level, sleep pattern, or activity level. · Eyes: No visual changes or diplopia. No scleral icterus. · ENT: No Headaches, hearing loss or vertigo. No mouth sores or sore throat. · Cardiovascular: Reviewed in HPI  · Respiratory: No cough or wheezing, no sputum production. No hematemesis. · Gastrointestinal: No abdominal pain, appetite loss, blood in stools. No change in bowel or bladder habits. · Genitourinary: No dysuria, trouble voiding, or hematuria. · Musculoskeletal:  No gait disturbance, weakness or joint complaints. · Integumentary: No rash or pruritis. · Neurological: No headache, diplopia, change in muscle strength, numbness or tingling. No change in gait, balance, coordination, mood, affect, memory, mentation, behavior.   · Psychiatric: No anxiety, no depression. · Endocrine: No malaise, fatigue or temperature intolerance. No excessive thirst, fluid intake, or urination. No tremor. · Hematologic/Lymphatic: No abnormal bruising or bleeding, blood clots or swollen lymph nodes. · Allergic/Immunologic: No nasal congestion or hives. Physical Examination:    Vitals:    05/31/19 1414   BP: 136/78   Pulse:           Constitutional and General Appearance:   . NAD   SKIN:  .     Warm and dry  EYES:    .     EOMI  Neck:   . Normal carotid contour  Respiratory:  Normal excursion and expansion without use of accessory muscles  Resp Auscultation: Normal breath sounds without dullness  Cardiovascular: The apical impulses not displaced  -CWT  Heart tones are crisp and normal  Cervical veins are not engorged  Normal S1S2, No S3, No Murmur  Peripheral pulses are symmetrical and full  Extremities: There is no clubbing, cyanosis of the extremities. No edema  Femoral Arteries: 2+ and equal  Pedal Pulses: 2+ and equal   Abdomen:  No masses or tenderness  Liver/Spleen: No Abnormalities Noted  Neurological/Psychiatric:  Alert and oriented in all spheres  Moves all extremities well  Exhibits normal gait balance and coordination  No abnormalities of mood, affect, memory  ·   ·     All testing and labs listed below were personally reviewed. Assessment:   Unstable angina  Referred to cath lab for angiogram Procedure, risks, alternatives explained in detail    hchol  Tolerates pravachol  Other statins cause  myalgias      Plan: Will send to cath lab for urgent angiography with recent progression of sx that are reproducible with activity, now at low levels and at rest.    Thank you for allowing me to participate in the care of this individual.      Siobhan Garcia M.D., [facility] [place].     Scribe Attestation:  Lizbeth Liu am scribing for and in the presence of Jason Andino MD.   [facility] 05/31/19 2:44 PM   Provider Ronaldo Koenig is working as a scribe for and in the presence of susanne Messina MD). Working as a scribe, Asia Mckeon may have prepopulated components of this note with my historical  intellectual property under my direct supervision. Any additions to this intellectual property were performed in my presence and at my direction. Furthermore, the content and accuracy of this note have been reviewed by susanne Messina MD).

## 2019-05-31 ENCOUNTER — OFFICE VISIT (OUTPATIENT)
Dept: CARDIOLOGY CLINIC | Age: 59
End: 2019-05-31
Payer: COMMERCIAL

## 2019-05-31 ENCOUNTER — HOSPITAL ENCOUNTER (OUTPATIENT)
Dept: CARDIAC CATH/INVASIVE PROCEDURES | Age: 59
Discharge: HOME | End: 2019-05-31
Attending: INTERNAL MEDICINE | Admitting: INTERNAL MEDICINE
Payer: COMMERCIAL

## 2019-05-31 VITALS
SYSTOLIC BLOOD PRESSURE: 136 MMHG | HEIGHT: 65 IN | WEIGHT: 180 LBS | DIASTOLIC BLOOD PRESSURE: 78 MMHG | BODY MASS INDEX: 29.99 KG/M2 | HEART RATE: 76 BPM

## 2019-05-31 DIAGNOSIS — I20.0 UNSTABLE ANGINA (HCC): ICD-10-CM

## 2019-05-31 DIAGNOSIS — E78.5 HYPERLIPIDEMIA, UNSPECIFIED HYPERLIPIDEMIA TYPE: ICD-10-CM

## 2019-05-31 DIAGNOSIS — R07.9 CHEST PAIN, UNSPECIFIED TYPE: Primary | ICD-10-CM

## 2019-05-31 LAB
GFR AFRICAN AMERICAN: >60
GFR NON-AFRICAN AMERICAN: >60
GLUCOSE BLD-MCNC: 94 MG/DL (ref 70–99)
HEMOGLOBIN: 14.2 G/DL (ref 12–16)
LEFT VENTRICULAR EJECTION FRACTION HIGH VALUE: 60 %
LEFT VENTRICULAR EJECTION FRACTION MODE: NORMAL
PERFORMED ON: ABNORMAL
PLATELET # BLD: 271 K/UL (ref 135–450)
POC BUN: 20 MG/DL (ref 7–18)
POC CREATININE: 0.8 MG/DL (ref 0.6–1.1)
POC HEMATOCRIT: 41 % (ref 36–48)
POC POTASSIUM: 3.5 MMOL/L (ref 3.5–5.1)
POC SAMPLE TYPE: ABNORMAL
POC SODIUM: 145 MMOL/L (ref 136–145)
WBC # BLD: 6.9 K/UL (ref 4–11)

## 2019-05-31 PROCEDURE — 93005 ELECTROCARDIOGRAM TRACING: CPT | Performed by: INTERNAL MEDICINE

## 2019-05-31 PROCEDURE — 6360000004 HC RX CONTRAST MEDICATION: Performed by: INTERNAL MEDICINE

## 2019-05-31 PROCEDURE — C1769 GUIDE WIRE: HCPCS

## 2019-05-31 PROCEDURE — 84295 ASSAY OF SERUM SODIUM: CPT

## 2019-05-31 PROCEDURE — 85049 AUTOMATED PLATELET COUNT: CPT

## 2019-05-31 PROCEDURE — 99152 MOD SED SAME PHYS/QHP 5/>YRS: CPT

## 2019-05-31 PROCEDURE — 82947 ASSAY GLUCOSE BLOOD QUANT: CPT

## 2019-05-31 PROCEDURE — 85014 HEMATOCRIT: CPT

## 2019-05-31 PROCEDURE — 84132 ASSAY OF SERUM POTASSIUM: CPT

## 2019-05-31 PROCEDURE — 85048 AUTOMATED LEUKOCYTE COUNT: CPT

## 2019-05-31 PROCEDURE — 82565 ASSAY OF CREATININE: CPT

## 2019-05-31 PROCEDURE — 84520 ASSAY OF UREA NITROGEN: CPT

## 2019-05-31 PROCEDURE — 2500000003 HC RX 250 WO HCPCS

## 2019-05-31 PROCEDURE — 36415 COLL VENOUS BLD VENIPUNCTURE: CPT

## 2019-05-31 PROCEDURE — 85018 HEMOGLOBIN: CPT

## 2019-05-31 PROCEDURE — 6360000002 HC RX W HCPCS

## 2019-05-31 PROCEDURE — C1725 CATH, TRANSLUMIN NON-LASER: HCPCS

## 2019-05-31 PROCEDURE — 99244 OFF/OP CNSLTJ NEW/EST MOD 40: CPT | Performed by: INTERNAL MEDICINE

## 2019-05-31 PROCEDURE — 93458 L HRT ARTERY/VENTRICLE ANGIO: CPT

## 2019-05-31 PROCEDURE — C1894 INTRO/SHEATH, NON-LASER: HCPCS

## 2019-05-31 PROCEDURE — 99153 MOD SED SAME PHYS/QHP EA: CPT

## 2019-05-31 PROCEDURE — 93000 ELECTROCARDIOGRAM COMPLETE: CPT | Performed by: INTERNAL MEDICINE

## 2019-05-31 PROCEDURE — 2709999900 HC NON-CHARGEABLE SUPPLY

## 2019-05-31 RX ADMIN — IOPAMIDOL 69 ML: 755 INJECTION, SOLUTION INTRAVENOUS at 17:25

## 2019-05-31 NOTE — H&P
tablet Take 81 mg by mouth daily. Yes Historical Provider, MD       PHYSICAL EXAM     127/88   71    12     Gen Alert, coop, no distress Heart  RRR, no MRG, nl apical impulse   Head NC, AT, no abnorm Abd  Soft, NT, +BS, no mass, no OM   Eyes PERRLA, conj/corn clear Ext  Ext nl, AT, no C/C/E   Nose Nares nl, no drain, NT Pulse 2+ and symmetric   Throat Lips, mucosa, tongue nl Skin Color/text/turg nl, no rash/lesions   Neck S/S, TM, NT, no bruit/JVD Psych Nl mood and affect   Lung CTA-B, unlabored, no DTP Lymph   No cervical or axillary LA   Ch wall NT, no deform Neuro  Nl gross M/S exam     LABS     CBC:   Lab Results   Component Value Date    WBC 11.4 02/08/2019    RBC 4.86 02/08/2019    HGB 14.2 02/08/2019    HCT 41.5 02/08/2019    MCV 85.3 02/08/2019    RDW 13.2 02/08/2019     02/08/2019     CMP:  Lab Results   Component Value Date     02/08/2019    K 3.4 02/08/2019    CL 96 02/08/2019    CO2 21 02/08/2019    BUN 10 02/08/2019    CREATININE 0.6 02/08/2019    GFRAA >60 02/08/2019    GFRAA 115 10/04/2011    AGRATIO 1.0 02/08/2019    LABGLOM >60 02/08/2019    LABGLOM 88 11/09/2016    GLUCOSE 109 02/08/2019    GLUCOSE 82 10/04/2011    PROT 7.8 02/08/2019    PROT 6.8 10/04/2011    CALCIUM 9.4 02/08/2019    BILITOT 1.1 02/08/2019    ALKPHOS 86 02/08/2019    AST 16 02/08/2019    ALT 17 02/08/2019     PT/INR:  No results found for: PTINR  Lab Results   Component Value Date    CKTOTAL 279 (H) 10/04/2011     ASSESSMENT AND PLAN     ~CP  Referred from clinic for [facility] due to unstable angina  See Dr. Sosa Ours Note for details.

## 2019-06-01 LAB
EKG ATRIAL RATE: 76 BPM
EKG DIAGNOSIS: NORMAL
EKG P AXIS: 53 DEGREES
EKG P-R INTERVAL: 154 MS
EKG Q-T INTERVAL: 424 MS
EKG QRS DURATION: 88 MS
EKG QTC CALCULATION (BAZETT): 477 MS
EKG R AXIS: 55 DEGREES
EKG T AXIS: 58 DEGREES
EKG VENTRICULAR RATE: 76 BPM

## 2019-06-01 PROCEDURE — 93010 ELECTROCARDIOGRAM REPORT: CPT | Performed by: INTERNAL MEDICINE

## 2019-08-22 ENCOUNTER — OFFICE VISIT (OUTPATIENT)
Dept: CARDIOLOGY CLINIC | Age: 59
End: 2019-08-22
Payer: COMMERCIAL

## 2019-08-22 VITALS
WEIGHT: 185 LBS | HEIGHT: 65 IN | OXYGEN SATURATION: 94 % | DIASTOLIC BLOOD PRESSURE: 84 MMHG | HEART RATE: 75 BPM | SYSTOLIC BLOOD PRESSURE: 144 MMHG | BODY MASS INDEX: 30.82 KG/M2

## 2019-08-22 DIAGNOSIS — M79.10 MYALGIA DUE TO STATIN: ICD-10-CM

## 2019-08-22 DIAGNOSIS — R07.89 OTHER CHEST PAIN: ICD-10-CM

## 2019-08-22 DIAGNOSIS — T46.6X5A MYALGIA DUE TO STATIN: ICD-10-CM

## 2019-08-22 DIAGNOSIS — E78.00 PURE HYPERCHOLESTEROLEMIA: ICD-10-CM

## 2019-08-22 PROCEDURE — 99214 OFFICE O/P EST MOD 30 MIN: CPT | Performed by: NURSE PRACTITIONER

## 2019-08-22 RX ORDER — AMLODIPINE BESYLATE 5 MG/1
5 TABLET ORAL DAILY
Qty: 90 TABLET | Refills: 1 | Status: SHIPPED | OUTPATIENT
Start: 2019-08-22 | End: 2020-08-17

## 2019-08-22 RX ORDER — PRAVASTATIN SODIUM 20 MG
10 TABLET ORAL EVERY EVENING
Qty: 30 TABLET | Refills: 5 | Status: CANCELLED | OUTPATIENT
Start: 2019-08-22

## 2019-08-22 NOTE — PROGRESS NOTES
(THERAPEUTIC MULTIVITAMIN-MINERALS) tablet Take 1 tablet by mouth daily      aspirin 81 MG EC tablet Take 81 mg by mouth daily. No current facility-administered medications for this visit. REVIEW OF SYSTEMS:   CONSTITUTIONAL: No major weight gain or loss, fatigue, weakness, night sweats or fever. There's been no change in energy level, sleep pattern, or activity level. HEENT: No new vision difficulties or ringing in the ears. RESPIRATORY: No new SOB, PND, orthopnea or cough. CARDIOVASCULAR: See HPI  GI: No nausea, vomiting, diarrhea, constipation, abdominal pain or changes in bowel habits. : No urinary frequency, urgency, incontinence hematuria or dysuria. SKIN: No cyanosis or skin lesions. MUSCULOSKELETAL: No new muscle or joint pain. NEUROLOGICAL: No syncope or TIA-like symptoms. PSYCHIATRIC: + stressors  (takes care of 3 handicapped siblings which are her comfort people)    Objective:   PHYSICAL EXAM:    Vitals:    08/22/19 1443 08/22/19 1449 08/22/19 1521   BP: (!) 140/82 (!) 140/80 (!) 144/84   Site: Left Upper Arm Left Upper Arm    Position: Sitting Sitting    Cuff Size: Medium Adult Medium Adult    Pulse: 75     SpO2: 94%     Weight: 185 lb (83.9 kg)     Height: 5' 5\" (1.651 m)           VITALS:  BP (!) 140/80 (Site: Left Upper Arm, Position: Sitting, Cuff Size: Medium Adult)   Pulse 75   Ht 5' 5\" (1.651 m)   Wt 185 lb (83.9 kg)   SpO2 94%   BMI 30.79 kg/m²     CONSTITUTIONAL: Cooperative, no apparent distress, and appears well nourished / developed  NEUROLOGIC:  Awake and orientated to person, place and time. PSYCH: Calm affect. SKIN: Warm and dry. HEENT: Sclera non-icteric, normocephalic, neck supple, no elevation of JVP, normal carotid pulses with no bruits and thyroid normal size. LUNGS:  No increased work of breathing and clear to auscultation, no crackles or wheezing.   CARDIOVASCULAR:  Regular rate 68 and rhythm with no murmurs, gallops, rubs, or abnormal heart hypokinesis. Doppler parameters are consistent with abnormal     left ventricular relaxation (grade 1 diastolic dysfunction). 2. Mitral valve: There was mild regurgitation. 3. Right ventricle: The cavity size was normal. Wall thickness was     normal. Systolic function was normal.  4. Pulmonary arteries: Systolic pressure was at the upper limits of     normal, in the range of 30mm Hg to 35mm Hg assuming that the     right atrial pressure is 10-15 mmHg. Last Angiogram: 5/31/19  Findings:                      LM       Normal              LAD     Normal              Cx        OM1 trifurcation with superior and inferior branch 30% ostial lesions              RCA     40% mid X2               LVG     None              EDP     10 mmHg  Severe Ca++:None  Post Cath Dx:Nonobstructive CAD  Intervention:  None  Med Rec:        ASA, BB, statin      Assessment:      Diagnosis Orders   1. Other chest pain   ~unchanged   ~poss MCSK in nature as happened after lifting boxes  ~mild, non-obst cor disease by cath May '19  ~diffuse HK, EF 45-50% by echo '16  ~ASA / BB    2. Pure hypercholesterolemia   ~LDL elevated on profile from '16  ~states to had tried other statins and seen in a lipid clinic : intolerant with muscle aches; Zetia did not work effectively. Stopped taking pravastatin  Lipid Panel    Comprehensive Metabolic Panel   3. Myalgia due to statin   ~poss from low vitamin d level  ~has tried multiple agents   ~concerned taking an injectable med  VITAMIN D 25 HYDROXY       Patient  is stable since hospital discharge. Plan:  Fasting lipid profile/CMP/vitamin D levels as routine   ~will plan to start a PCSK-9 (discussed importance of lowering LDL)   Begin amlodipine 5 mg daily for BP control / chest discomfort   F/U 6-8 weeks      I have addressed the patient's cardiac risk factors and adjusted pharmacologic treatment as needed.  In addition, I have reinforced the need for patient directed risk factor

## 2019-08-28 LAB
A/G RATIO: 1.8 (CALC) (ref 1–2.5)
ALBUMIN SERPL-MCNC: 4.1 G/DL (ref 3.6–5.1)
ALP BLD-CCNC: 70 U/L (ref 33–130)
ALT SERPL-CCNC: 17 U/L (ref 6–29)
AST SERPL-CCNC: 17 U/L (ref 10–35)
BILIRUB SERPL-MCNC: 1 MG/DL (ref 0.2–1.2)
BUN / CREAT RATIO: NORMAL (CALC) (ref 6–22)
BUN BLDV-MCNC: 12 MG/DL (ref 7–25)
CALCIUM SERPL-MCNC: 9.2 MG/DL (ref 8.6–10.4)
CHLORIDE BLD-SCNC: 107 MMOL/L (ref 98–110)
CHOLESTEROL: 375 MG/DL (CALC)
CO2: 27 MMOL/L (ref 20–32)
CREAT SERPL-MCNC: 0.73 MG/DL (ref 0.5–1.05)
GFR AFRICAN AMERICAN: 105 ML/MIN/1.73M2
GFR, ESTIMATED: 91 ML/MIN/1.73M2
GLOBULIN: 2.3 G/DL (CALC) (ref 1.9–3.7)
GLUCOSE BLD-MCNC: 90 MG/DL (ref 65–99)
POTASSIUM SERPL-SCNC: 4.4 MMOL/L (ref 3.5–5.3)
SODIUM BLD-SCNC: 141 MMOL/L (ref 135–146)
TOTAL PROTEIN: 6.4 G/DL (ref 6.1–8.1)
VITAMIN D 25-HYDROXY: 26 NG/ML (ref 30–100)

## 2019-08-29 ENCOUNTER — OFFICE VISIT (OUTPATIENT)
Dept: ORTHOPEDIC SURGERY | Age: 59
End: 2019-08-29
Payer: COMMERCIAL

## 2019-08-29 VITALS
DIASTOLIC BLOOD PRESSURE: 71 MMHG | BODY MASS INDEX: 29.82 KG/M2 | SYSTOLIC BLOOD PRESSURE: 118 MMHG | RESPIRATION RATE: 16 BRPM | HEIGHT: 65 IN | WEIGHT: 179 LBS

## 2019-08-29 DIAGNOSIS — S62.639A MALLET FRACTURE, CLOSED, INITIAL ENCOUNTER: Primary | ICD-10-CM

## 2019-08-29 DIAGNOSIS — M20.019 MALLET FRACTURE, CLOSED, INITIAL ENCOUNTER: Primary | ICD-10-CM

## 2019-08-29 PROCEDURE — 99203 OFFICE O/P NEW LOW 30 MIN: CPT | Performed by: ORTHOPAEDIC SURGERY

## 2019-08-29 PROCEDURE — 26750 TREAT FINGER FRACTURE EACH: CPT | Performed by: ORTHOPAEDIC SURGERY

## 2019-08-31 ENCOUNTER — HOSPITAL ENCOUNTER (OUTPATIENT)
Dept: WOMENS IMAGING | Age: 59
Discharge: HOME | End: 2019-08-31
Payer: COMMERCIAL

## 2019-08-31 DIAGNOSIS — Z12.31 ENCOUNTER FOR SCREENING MAMMOGRAM FOR BREAST CANCER: ICD-10-CM

## 2019-08-31 PROCEDURE — 77067 SCR MAMMO BI INCL CAD: CPT

## 2019-09-03 ENCOUNTER — TELEPHONE (OUTPATIENT)
Dept: CARDIOLOGY CLINIC | Age: 59
End: 2019-09-03

## 2019-09-12 ENCOUNTER — OFFICE VISIT (OUTPATIENT)
Dept: ORTHOPEDIC SURGERY | Age: 59
End: 2019-09-12
Payer: COMMERCIAL

## 2019-09-12 VITALS — BODY MASS INDEX: 28.32 KG/M2 | WEIGHT: 170 LBS | RESPIRATION RATE: 16 BRPM | HEIGHT: 65 IN

## 2019-09-12 DIAGNOSIS — S62.639A MALLET FRACTURE, CLOSED, INITIAL ENCOUNTER: Primary | ICD-10-CM

## 2019-09-12 DIAGNOSIS — M20.019 MALLET FRACTURE, CLOSED, INITIAL ENCOUNTER: Primary | ICD-10-CM

## 2019-09-12 PROCEDURE — 99024 POSTOP FOLLOW-UP VISIT: CPT | Performed by: ORTHOPAEDIC SURGERY

## 2019-09-12 NOTE — PROGRESS NOTES
Patient returns to the office for evaluation of   Chief Complaint   Patient presents with    Finger Injury     right little mallet finger 4+wks from injury; x-ray check   The patient has had no difficulties and voices no complaints. The patient's social history, past medical history, family history, medications, allergies and review of systems have been reviewed, dated 8/29/19 and are recorded in the chart. The splint is removed. Skin is in good condition. There is mild swelling. There is no significant deformity and no tenderness is noted over the fracture site. Active extension is 95% with good strength. Range of motion is mildly limited. Xrays: AP and lateral Xrays of the right little finger done in the office today, demonstrate progressive healing of the fracture in satisfactory position. The patient is fully advised regarding activities, precautions, splint useand a home program of range of motion exercises, which is fully discussed and demonstrated. The patient is given a return appointment for 2 weeks for exam and possible Xrays and is instructed to call sooner if there are any questions or problems. That appointment may be cancelled only if the patient has regained full, painless function of their finger by that time.

## 2019-10-02 ENCOUNTER — HOSPITAL ENCOUNTER (EMERGENCY)
Age: 59
Discharge: HOME | End: 2019-10-02
Attending: EMERGENCY MEDICINE
Payer: COMMERCIAL

## 2019-10-02 ENCOUNTER — APPOINTMENT (OUTPATIENT)
Dept: CT IMAGING | Age: 59
End: 2019-10-02
Payer: COMMERCIAL

## 2019-10-02 VITALS
WEIGHT: 170 LBS | TEMPERATURE: 98.1 F | OXYGEN SATURATION: 94 % | RESPIRATION RATE: 17 BRPM | SYSTOLIC BLOOD PRESSURE: 125 MMHG | HEART RATE: 61 BPM | BODY MASS INDEX: 28.32 KG/M2 | DIASTOLIC BLOOD PRESSURE: 74 MMHG | HEIGHT: 65 IN

## 2019-10-02 DIAGNOSIS — H53.9 VISUAL DISTURBANCE: Primary | ICD-10-CM

## 2019-10-02 DIAGNOSIS — E04.1 THYROID NODULE: ICD-10-CM

## 2019-10-02 LAB
A/G RATIO: 1.4 (ref 1.1–2.2)
ALBUMIN SERPL-MCNC: 4.1 G/DL (ref 3.4–5)
ALP BLD-CCNC: 78 U/L (ref 40–129)
ALT SERPL-CCNC: 20 U/L (ref 10–40)
ANION GAP SERPL CALCULATED.3IONS-SCNC: 14 MMOL/L (ref 3–16)
AST SERPL-CCNC: 20 U/L (ref 15–37)
BASOPHILS ABSOLUTE: 0 K/UL (ref 0–0.2)
BASOPHILS RELATIVE PERCENT: 0.5 %
BILIRUB SERPL-MCNC: 0.8 MG/DL (ref 0–1)
BUN BLDV-MCNC: 20 MG/DL (ref 7–20)
CALCIUM SERPL-MCNC: 9.2 MG/DL (ref 8.3–10.6)
CHLORIDE BLD-SCNC: 103 MMOL/L (ref 99–110)
CO2: 22 MMOL/L (ref 21–32)
CREAT SERPL-MCNC: 0.6 MG/DL (ref 0.6–1.1)
EOSINOPHILS ABSOLUTE: 0.1 K/UL (ref 0–0.6)
EOSINOPHILS RELATIVE PERCENT: 2.1 %
GFR AFRICAN AMERICAN: >60
GFR NON-AFRICAN AMERICAN: >60
GLOBULIN: 3 G/DL
GLUCOSE BLD-MCNC: 102 MG/DL (ref 70–99)
HCT VFR BLD CALC: 41 % (ref 36–48)
HEMOGLOBIN: 13.7 G/DL (ref 12–16)
LYMPHOCYTES ABSOLUTE: 1.2 K/UL (ref 1–5.1)
LYMPHOCYTES RELATIVE PERCENT: 23.7 %
MCH RBC QN AUTO: 28.6 PG (ref 26–34)
MCHC RBC AUTO-ENTMCNC: 33.3 G/DL (ref 31–36)
MCV RBC AUTO: 85.8 FL (ref 80–100)
MONOCYTES ABSOLUTE: 0.5 K/UL (ref 0–1.3)
MONOCYTES RELATIVE PERCENT: 8.5 %
NEUTROPHILS ABSOLUTE: 3.4 K/UL (ref 1.7–7.7)
NEUTROPHILS RELATIVE PERCENT: 65.2 %
PDW BLD-RTO: 13.7 % (ref 12.4–15.4)
PLATELET # BLD: 254 K/UL (ref 135–450)
PMV BLD AUTO: 7.2 FL (ref 5–10.5)
POTASSIUM SERPL-SCNC: 3.8 MMOL/L (ref 3.5–5.1)
RBC # BLD: 4.78 M/UL (ref 4–5.2)
SODIUM BLD-SCNC: 139 MMOL/L (ref 136–145)
TOTAL PROTEIN: 7.1 G/DL (ref 6.4–8.2)
WBC # BLD: 5.3 K/UL (ref 4–11)

## 2019-10-02 PROCEDURE — 80053 COMPREHEN METABOLIC PANEL: CPT

## 2019-10-02 PROCEDURE — 99284 EMERGENCY DEPT VISIT MOD MDM: CPT

## 2019-10-02 PROCEDURE — 6360000004 HC RX CONTRAST MEDICATION: Performed by: NURSE PRACTITIONER

## 2019-10-02 PROCEDURE — 93005 ELECTROCARDIOGRAM TRACING: CPT | Performed by: EMERGENCY MEDICINE

## 2019-10-02 PROCEDURE — 70450 CT HEAD/BRAIN W/O DYE: CPT

## 2019-10-02 PROCEDURE — 70498 CT ANGIOGRAPHY NECK: CPT

## 2019-10-02 PROCEDURE — 85025 COMPLETE CBC W/AUTO DIFF WBC: CPT

## 2019-10-02 RX ADMIN — IOPAMIDOL 75 ML: 755 INJECTION, SOLUTION INTRAVENOUS at 07:17

## 2019-10-02 ASSESSMENT — VISUAL ACUITY
OS: 20/30
OU: 20/20
OD: 20/40

## 2019-10-02 ASSESSMENT — ENCOUNTER SYMPTOMS
NAUSEA: 1
CHEST TIGHTNESS: 0
ABDOMINAL PAIN: 0
VOMITING: 0
DIARRHEA: 0
SHORTNESS OF BREATH: 0

## 2019-10-03 LAB
EKG ATRIAL RATE: 70 BPM
EKG DIAGNOSIS: NORMAL
EKG P AXIS: 62 DEGREES
EKG P-R INTERVAL: 156 MS
EKG Q-T INTERVAL: 418 MS
EKG QRS DURATION: 84 MS
EKG QTC CALCULATION (BAZETT): 451 MS
EKG R AXIS: 23 DEGREES
EKG T AXIS: 44 DEGREES
EKG VENTRICULAR RATE: 70 BPM

## 2019-10-04 ENCOUNTER — OFFICE VISIT (OUTPATIENT)
Dept: INTERNAL MEDICINE CLINIC | Age: 59
End: 2019-10-04
Payer: COMMERCIAL

## 2019-10-04 VITALS
RESPIRATION RATE: 12 BRPM | WEIGHT: 184 LBS | HEART RATE: 70 BPM | BODY MASS INDEX: 30.62 KG/M2 | DIASTOLIC BLOOD PRESSURE: 72 MMHG | SYSTOLIC BLOOD PRESSURE: 124 MMHG

## 2019-10-04 DIAGNOSIS — E04.9 ENLARGEMENT OF THYROID: ICD-10-CM

## 2019-10-04 DIAGNOSIS — Z13.1 DIABETES MELLITUS SCREENING: ICD-10-CM

## 2019-10-04 DIAGNOSIS — Z00.00 ANNUAL PHYSICAL EXAM: Primary | ICD-10-CM

## 2019-10-04 DIAGNOSIS — H53.9 VISUAL DISTURBANCE: ICD-10-CM

## 2019-10-04 DIAGNOSIS — Z13.220 SCREENING CHOLESTEROL LEVEL: ICD-10-CM

## 2019-10-04 LAB
CHOLESTEROL, TOTAL: 432 MG/DL (ref 0–199)
HDLC SERPL-MCNC: 45 MG/DL (ref 40–60)
LDL CHOLESTEROL CALCULATED: 351 MG/DL
T3 TOTAL: 0.83 NG/ML (ref 0.8–2)
T4 FREE: 1.1 NG/DL (ref 0.9–1.8)
TRIGL SERPL-MCNC: 182 MG/DL (ref 0–150)
TSH SERPL DL<=0.05 MIU/L-ACNC: 1.34 UIU/ML (ref 0.27–4.2)
VLDLC SERPL CALC-MCNC: 36 MG/DL

## 2019-10-04 PROCEDURE — 99396 PREV VISIT EST AGE 40-64: CPT | Performed by: NURSE PRACTITIONER

## 2019-10-04 ASSESSMENT — ENCOUNTER SYMPTOMS
RESPIRATORY NEGATIVE: 1
GASTROINTESTINAL NEGATIVE: 1

## 2019-10-05 LAB
ESTIMATED AVERAGE GLUCOSE: 116.9 MG/DL
HBA1C MFR BLD: 5.7 %

## 2019-10-07 ENCOUNTER — HOSPITAL ENCOUNTER (OUTPATIENT)
Dept: ULTRASOUND IMAGING | Age: 59
Discharge: HOME | End: 2019-10-07
Payer: COMMERCIAL

## 2019-10-07 DIAGNOSIS — E04.9 ENLARGEMENT OF THYROID: ICD-10-CM

## 2019-10-07 PROCEDURE — 76536 US EXAM OF HEAD AND NECK: CPT

## 2019-10-09 DIAGNOSIS — E04.1 THYROID NODULE: Primary | ICD-10-CM

## 2019-10-10 ENCOUNTER — OFFICE VISIT (OUTPATIENT)
Dept: CARDIOLOGY CLINIC | Age: 59
End: 2019-10-10
Payer: COMMERCIAL

## 2019-10-10 VITALS
OXYGEN SATURATION: 96 % | HEIGHT: 65 IN | DIASTOLIC BLOOD PRESSURE: 72 MMHG | BODY MASS INDEX: 31.32 KG/M2 | HEART RATE: 76 BPM | SYSTOLIC BLOOD PRESSURE: 134 MMHG | WEIGHT: 188 LBS

## 2019-10-10 DIAGNOSIS — E78.00 PURE HYPERCHOLESTEROLEMIA: ICD-10-CM

## 2019-10-10 DIAGNOSIS — I25.10 CORONARY ARTERY DISEASE INVOLVING NATIVE CORONARY ARTERY OF NATIVE HEART WITHOUT ANGINA PECTORIS: ICD-10-CM

## 2019-10-10 DIAGNOSIS — I10 ESSENTIAL HYPERTENSION: Primary | ICD-10-CM

## 2019-10-10 DIAGNOSIS — E66.3 OVER WEIGHT: ICD-10-CM

## 2019-10-10 PROCEDURE — 99214 OFFICE O/P EST MOD 30 MIN: CPT | Performed by: NURSE PRACTITIONER

## 2019-10-24 ENCOUNTER — TELEPHONE (OUTPATIENT)
Dept: CARDIOLOGY CLINIC | Age: 59
End: 2019-10-24

## 2019-11-12 ENCOUNTER — OFFICE VISIT (OUTPATIENT)
Dept: ENT CLINIC | Age: 59
End: 2019-11-12
Payer: COMMERCIAL

## 2019-11-12 VITALS
BODY MASS INDEX: 31.65 KG/M2 | HEART RATE: 72 BPM | WEIGHT: 190 LBS | SYSTOLIC BLOOD PRESSURE: 135 MMHG | HEIGHT: 65 IN | DIASTOLIC BLOOD PRESSURE: 78 MMHG

## 2019-11-12 DIAGNOSIS — E04.1 THYROID NODULE: Primary | ICD-10-CM

## 2019-11-12 PROBLEM — H53.9 VISUAL DISTURBANCE: Status: ACTIVE | Noted: 2019-11-12

## 2019-11-12 PROBLEM — K63.5 COLON POLYP: Status: ACTIVE | Noted: 2017-12-27

## 2019-11-12 PROCEDURE — 99244 OFF/OP CNSLTJ NEW/EST MOD 40: CPT | Performed by: OTOLARYNGOLOGY

## 2019-11-22 ENCOUNTER — HOSPITAL ENCOUNTER (OUTPATIENT)
Dept: ULTRASOUND IMAGING | Age: 59
Discharge: HOME | End: 2019-11-22
Payer: COMMERCIAL

## 2019-11-22 VITALS
DIASTOLIC BLOOD PRESSURE: 77 MMHG | WEIGHT: 190 LBS | OXYGEN SATURATION: 97 % | TEMPERATURE: 97.4 F | SYSTOLIC BLOOD PRESSURE: 144 MMHG | RESPIRATION RATE: 16 BRPM | HEART RATE: 65 BPM | HEIGHT: 65 IN | BODY MASS INDEX: 31.65 KG/M2

## 2019-11-22 DIAGNOSIS — E04.1 THYROID NODULE: ICD-10-CM

## 2019-11-22 PROCEDURE — 10005 FNA BX W/US GDN 1ST LES: CPT

## 2019-11-22 PROCEDURE — 2500000003 HC RX 250 WO HCPCS: Performed by: OTOLARYNGOLOGY

## 2019-11-22 PROCEDURE — 88173 CYTOPATH EVAL FNA REPORT: CPT

## 2019-11-22 PROCEDURE — 88305 TISSUE EXAM BY PATHOLOGIST: CPT

## 2019-11-22 RX ORDER — LIDOCAINE HYDROCHLORIDE 10 MG/ML
5 INJECTION, SOLUTION EPIDURAL; INFILTRATION; INTRACAUDAL; PERINEURAL ONCE
Status: COMPLETED | OUTPATIENT
Start: 2019-11-22 | End: 2019-11-22

## 2019-11-22 RX ADMIN — LIDOCAINE HYDROCHLORIDE 5 ML: 10 INJECTION, SOLUTION EPIDURAL; INFILTRATION; INTRACAUDAL; PERINEURAL at 09:00

## 2019-12-10 ENCOUNTER — TELEPHONE (OUTPATIENT)
Dept: ENT CLINIC | Age: 59
End: 2019-12-10

## 2019-12-13 ENCOUNTER — TELEPHONE (OUTPATIENT)
Dept: ENT CLINIC | Age: 59
End: 2019-12-13

## 2019-12-13 ENCOUNTER — OFFICE VISIT (OUTPATIENT)
Dept: ENT CLINIC | Age: 59
End: 2019-12-13
Payer: COMMERCIAL

## 2019-12-13 VITALS
BODY MASS INDEX: 31.62 KG/M2 | HEIGHT: 65 IN | SYSTOLIC BLOOD PRESSURE: 143 MMHG | WEIGHT: 189.8 LBS | DIASTOLIC BLOOD PRESSURE: 87 MMHG | HEART RATE: 77 BPM

## 2019-12-13 DIAGNOSIS — E04.1 THYROID NODULE: Primary | ICD-10-CM

## 2019-12-13 PROCEDURE — 99214 OFFICE O/P EST MOD 30 MIN: CPT | Performed by: OTOLARYNGOLOGY

## 2020-02-24 NOTE — TELEPHONE ENCOUNTER
RX APPROVAL:      Refill:   Requested Prescriptions     Pending Prescriptions Disp Refills    metoprolol tartrate (LOPRESSOR) 25 MG tablet [Pharmacy Med Name: METOPROLOL TARTRATE 25 MG TAB] 60 tablet 6     Sig: TAKE ONE TABLET BY MOUTH TWICE A DAY      Last OV: 10/10/2019   Last EKG:   Last Labs:   Lab Results   Component Value Date    GLUCOSE 102 10/02/2019    GLUCOSE 82 10/04/2011    BUN 20 10/02/2019    CREATININE 0.6 10/02/2019    LABGLOM >60 10/02/2019    LABGLOM 88 49/66/9021    BCR NOT APPLICABLE 46/73/5815     10/02/2019    K 3.8 10/02/2019     10/02/2019    CO2 22 10/02/2019    CALCIUM 9.2 10/02/2019     Lab Results   Component Value Date     10/02/2019     10/02/2019    CO2 22 10/02/2019    ANIONGAP 14 10/02/2019    GLUCOSE 102 10/02/2019    GLUCOSE 82 10/04/2011    BUN 20 10/02/2019    CREATININE 0.6 10/02/2019    LABGLOM >60 10/02/2019    LABGLOM 88 11/09/2016    GFRAA >60 10/02/2019    GFRAA 115 10/04/2011    CALCIUM 9.2 10/02/2019    PROT 7.1 10/02/2019    PROT 6.8 10/04/2011    LABALBU 4.1 10/02/2019    BILITOT 0.8 10/02/2019    ALKPHOS 78 10/02/2019    AST 20 10/02/2019    ALT 20 10/02/2019    GLOB 3.0 10/02/2019     Lab Results   Component Value Date    ALT 20 10/02/2019    AST 20 10/02/2019     Lab Results   Component Value Date    K 3.8 10/02/2019       Plan and labs reviewed

## 2020-04-15 ENCOUNTER — VIRTUAL VISIT (OUTPATIENT)
Dept: CARDIOLOGY CLINIC | Age: 60
End: 2020-04-15
Payer: COMMERCIAL

## 2020-04-15 VITALS
SYSTOLIC BLOOD PRESSURE: 121 MMHG | HEART RATE: 84 BPM | WEIGHT: 180 LBS | HEIGHT: 65 IN | DIASTOLIC BLOOD PRESSURE: 88 MMHG | BODY MASS INDEX: 29.99 KG/M2

## 2020-04-15 PROCEDURE — 99214 OFFICE O/P EST MOD 30 MIN: CPT | Performed by: NURSE PRACTITIONER

## 2020-04-15 RX ORDER — PITAVASTATIN CALCIUM 2.09 MG/1
2 TABLET, FILM COATED ORAL NIGHTLY
Qty: 90 TABLET | Refills: 1 | Status: SHIPPED | OUTPATIENT
Start: 2020-04-15 | End: 2021-01-07

## 2020-04-15 RX ORDER — NITROGLYCERIN 0.4 MG/1
0.4 TABLET SUBLINGUAL EVERY 5 MIN PRN
Qty: 25 TABLET | Refills: 3 | Status: SHIPPED | OUTPATIENT
Start: 2020-04-15

## 2020-06-09 ENCOUNTER — OFFICE VISIT (OUTPATIENT)
Dept: ENT CLINIC | Age: 60
End: 2020-06-09
Payer: COMMERCIAL

## 2020-06-09 VITALS
SYSTOLIC BLOOD PRESSURE: 117 MMHG | HEART RATE: 87 BPM | TEMPERATURE: 97.5 F | BODY MASS INDEX: 31.45 KG/M2 | DIASTOLIC BLOOD PRESSURE: 71 MMHG | WEIGHT: 189 LBS

## 2020-06-09 PROBLEM — R49.9 CHANGE IN VOICE: Status: ACTIVE | Noted: 2020-06-09

## 2020-06-09 PROBLEM — G47.30 SLEEP APNEA IN ADULT: Status: ACTIVE | Noted: 2020-06-09

## 2020-06-09 PROBLEM — T17.308A CHOKING: Status: ACTIVE | Noted: 2020-06-09

## 2020-06-09 PROCEDURE — 99214 OFFICE O/P EST MOD 30 MIN: CPT | Performed by: OTOLARYNGOLOGY

## 2020-06-09 NOTE — PATIENT INSTRUCTIONS
1. Use Debrox 4 drops in the right ear three times a day for 5 days before returning for ear cleaning. 2. Proceed with thyroid ultrasound on or about 11/20/2020.

## 2020-06-09 NOTE — PROGRESS NOTES
9-27[birth year]13    RIGHT KNEE ARTHROSCOPE, PARTIAL MEDIAL AND LATERAL    KNEE SURGERY  1-21-14     RIGHT KNEE ARTHROSCOPY WITH PARTIAL LATERAL MENISCECTOMY,    TUBAL LIGATION            EXAMINATION:       Vitals:    06/09/20 1443   BP: 117/71   Site: Right Upper Arm   Position: Sitting   Cuff Size: Medium Adult   Pulse: 87   Temp: 97.5 °F (36.4 °C)   TempSrc: Oral   Weight: 189 lb (85.7 kg)      VITALS SIGNS: were reviewed. GENERAL APPEARANCE: Well developed, well nourished, no apparent distress, no apparent deformities. ABILITY TO COMMUNICATE/QUALITY OF VOICE:  Communicated without difficulty. Normal voice. INSPECTION, HEAD AND FACE: Normal overall appearance, with no scars, lesions or masses. SINUSES:  The maxillary and frontal sinuses were nontender, bilaterally. EXTERNAL EAR/NOSE:  Normal pinnae and mastoids. Normal external nose. EARS, OTOSCOPY: The TMs and EACs appeared to be normal bilaterally. NOSE:  The nasal septum was midline. The inferior turbinates were normal.  The nasal mucosa and secretions were normal.  No pus or polyps were seen. LIPS, TEETH AND GUMS:  Unremarkable. OROPHARYNX/ORAL CAVITY:  Oral mucosa, hard and soft palates, tongue, tonsils, and pharynx were normal.      NECK:  No masses or tenderness. No abnormal appearance, asymmetry or abnormal tracheal position. Laryngeal cartilages and hyoid bone were normal to palpation, with normal laryngeal crepitus. LYMPH NODES, CERVICAL, FACIAL AND SUPRACLAVICULAR:  No lymphadenopathy. THYROID:  Large left nodule. No nodules, enlargement, tenderness or masses. IMPRESSION / Spencer Grayson / Juan Band / PROCEDURES:       Pedro Hill was seen today for hoarse and choking. Diagnoses and all orders for this visit:    Change in voice    Choking, initial encounter    Sleep apnea in adult  -     Francesca Ng MD, Sleep Medicine, [facility]    Thyroid nodule         RECOMMENDATIONS / PLAN:    1. Sleep medicine consult. 2. See patient instructions on file for this visit, which were fully discussed with the patient. 3. Proceed with thyroid ultrasound on or about 11/20/2020.  4. Return for flexible fiberoptic nasopharyngolaryngoscopy and clean right ear. Patient Instructions   5. Use Debrox 4 drops in the right ear three times a day for 5 days before returning for ear cleaning. 6. Proceed with thyroid ultrasound on or about 11/20/2020.

## 2020-08-06 ENCOUNTER — TELEPHONE (OUTPATIENT)
Dept: PULMONOLOGY | Age: 60
End: 2020-08-06

## 2020-12-07 ENCOUNTER — TELEPHONE (OUTPATIENT)
Dept: PULMONOLOGY | Age: 60
End: 2020-12-07

## 2020-12-07 ENCOUNTER — OFFICE VISIT (OUTPATIENT)
Dept: ENT CLINIC | Age: 60
End: 2020-12-07
Payer: COMMERCIAL

## 2020-12-07 VITALS
SYSTOLIC BLOOD PRESSURE: 146 MMHG | DIASTOLIC BLOOD PRESSURE: 83 MMHG | WEIGHT: 189 LBS | HEART RATE: 69 BPM | BODY MASS INDEX: 31.45 KG/M2 | TEMPERATURE: 97.5 F

## 2020-12-07 PROCEDURE — 99214 OFFICE O/P EST MOD 30 MIN: CPT | Performed by: OTOLARYNGOLOGY

## 2020-12-07 NOTE — PROGRESS NOTES
[facility]        PCP:  BRISSA Kumar - [place]       CHIEF COMPLAINT:  Chief Complaint   Patient presents with    Thyroid Problem       HISTORY OF PRESENT ILLNESS:   Yoshi Angeles is a 62 y.o. female, here today for evaluation and treatment of a problem in the throat. The quality is a lot of coughing  daily for about 3 weeks. The severity is moderate. \"It's just bothersome. \"  The duration is 3 weeks. The timing is intermittent, comes and goes, though out the day. Modifying factors include \"I drink a lot of water, it helps. \"  Associated symptoms include excessive phlegm in throat and hoarseness with the phlegm, \"I have to clear my throat a lot and cough a lot. \"  No other symptoms. Did not see Dr. Pepper Sood for sleep medicine. Did not get thyroid ultrasound. REVIEW OF SYSTEMS:  CONSTITUTIONAL:  Denied fever and chills. Denied unexplained weight loss, over 20 pounds in the past six months. EARS, NOSE, THROAT:  (+) tinnitus, \"all my life\", no recent changes. Denied otorrhea, otalgia, hearing loss, nasal dyspnea, rhinorrhea, sore throat and chronic hoarseness.           PAST MEDICAL HISTORY:   Past Medical History:   Diagnosis Date    Arthritis     RT TOES; established with podiatry    Hyperlipidemia     DIET CONTROL        Past Surgical History:   Procedure Laterality Date     SECTION      breech    COLONOSCOPY      one polyp removed    HYSTERECTOMY      endometriosis    KNEE ARTHROSCOPY Right 13    RIGHT KNEE ARTHROSCOPE, PARTIAL MEDIAL AND LATERAL MENISCECTOMY, SYNOVECTOMY, CHONDROPLASTY OF MEDIAL FEMORAL CONDYLE    KNEE ARTHROSCOPY Left 2014    LEFT KNEE ARTHROSCOPY WITH PARTIAL MEDIAL MENISCECTOMY,    KNEE ARTHROSCOPY Right 2018    ACTUAL PROCEDURE: RIGHT KNEE ARTHROSCOPY, PARTIAL MEDIAL MENISCECTOMY,CHONDROPLASTY, SYNOVECTOMY      KNEE SURGERY  13    RIGHT KNEE ARTHROSCOPE, PARTIAL MEDIAL AND LATERAL    KNEE SURGERY  1-21-14     RIGHT KNEE ARTHROSCOPY WITH PARTIAL LATERAL MENISCECTOMY,    TUBAL LIGATION            EXAMINATION:     Vitals:    12/07/20 0953   BP: (!) 146/83   Site: Left Upper Arm   Position: Sitting   Cuff Size: Large Adult   Pulse: 69   Temp: 97.5 °F (36.4 °C)   TempSrc: Temporal   Weight: 189 lb (85.7 kg)      VITALS SIGNS: were reviewed. GENERAL APPEARANCE: Well developed, well nourished, no apparent distress, no apparent deformities. ABILITY TO COMMUNICATE/QUALITY OF VOICE:  Communicated without difficulty. Normal voice. INSPECTION, HEAD AND FACE: Normal overall appearance, with no scars, lesions or masses. EYES:  Extraocular motion was intact, bilaterally. Normal primary gaze alignment. Sclera and conjunctiva clear bilaterally. SINUSES:  The maxillary and frontal sinuses were nontender, bilaterally. SALIVARY GLANDS:  The parotid, submandibular, and sublingual glands were normal bilaterally. EXTERNAL EAR/NOSE:  Normal pinnae and mastoids. Normal external nose. (+) EARS, OTOSCOPY: CI bilaterally   (+) NOSE:  The nasal septum was deviation to left. The inferior turbinates were normal.  The nasal mucosa and secretions were normal.  No pus or polyps were seen. LIPS, TEETH AND GUMS:  Unremarkable. OROPHARYNX/ORAL CAVITY:  Oral mucosa, hard and soft palates, tongue, tonsils, and pharynx were normal.  (+) INDIRECT LARYNGOSCOPY:  Visualization was suboptimal due to a strong gag reflex and guarding. The base of tongue and epiglottis appeared to be normal.  Unable to completely visualize the vocal cords and hypopharynx, and endolarynx. Vocal cords appeared to be normally mobile bilaterally with midline approximation on phonation. The epiglottis, supraglottis, false vocal cords, true vocal cords, mobility of the larynx, base of tongue, subglottis, and piriform sinuses appeared to be normal.   NECK:  No masses or tenderness.   No abnormal appearance, asymmetry or abnormal tracheal position. Laryngeal cartilages and hyoid bone were normal to palpation, with normal laryngeal crepitus. LYMPH NODES, CERVICAL, FACIAL AND SUPRACLAVICULAR:  No lymphadenopathy. 9+) THYROID:  Right goiter/nodule. No nodules, enlargement, tenderness or masses. IMPRESSION / Remus Severin / Destinee Marquezar:       [employer] [facility] was seen today for thyroid problem. Diagnoses and all orders for this visit:    Change in voice    Thyroid nodule    Bilateral impacted cerumen    Sleep apnea in adult         RECOMMENDATIONS / PLAN:    Return in about 10 days (around 12/17/2020) for flexible fiberoptic laryngoscopy, follow up thyroid ultrasound, and clean ears after using Debrox. .          Patient Instructions   1. Use Debrox 4 drops in the right ear three times a day for 5 days before returning for ear cleaning. 2. Proceed with thyroid ultrasound. 3. Proceed with sleep medicine evaluation.

## 2020-12-07 NOTE — PATIENT INSTRUCTIONS
1. Use Debrox 4 drops in the right ear three times a day for 5 days before returning for ear cleaning. 2. Proceed with thyroid ultrasound. 3. Proceed with sleep medicine evaluation.

## 2020-12-08 ENCOUNTER — HOSPITAL ENCOUNTER (OUTPATIENT)
Dept: WOMENS IMAGING | Age: 60
Discharge: HOME | End: 2020-12-08
Payer: COMMERCIAL

## 2020-12-08 PROCEDURE — 77067 SCR MAMMO BI INCL CAD: CPT

## 2020-12-09 ENCOUNTER — HOSPITAL ENCOUNTER (OUTPATIENT)
Dept: ULTRASOUND IMAGING | Age: 60
Discharge: HOME | End: 2020-12-09
Payer: COMMERCIAL

## 2020-12-09 PROCEDURE — 76536 US EXAM OF HEAD AND NECK: CPT

## 2020-12-21 ENCOUNTER — OFFICE VISIT (OUTPATIENT)
Dept: ENT CLINIC | Age: 60
End: 2020-12-21
Payer: COMMERCIAL

## 2020-12-21 VITALS
WEIGHT: 192 LBS | SYSTOLIC BLOOD PRESSURE: 154 MMHG | BODY MASS INDEX: 31.95 KG/M2 | DIASTOLIC BLOOD PRESSURE: 79 MMHG | TEMPERATURE: 97.3 F | HEART RATE: 76 BPM

## 2020-12-21 PROCEDURE — 31575 DIAGNOSTIC LARYNGOSCOPY: CPT | Performed by: OTOLARYNGOLOGY

## 2020-12-21 PROCEDURE — 69210 REMOVE IMPACTED EAR WAX UNI: CPT | Performed by: OTOLARYNGOLOGY

## 2020-12-21 RX ORDER — OMEPRAZOLE 20 MG/1
CAPSULE, DELAYED RELEASE ORAL
Qty: 180 CAPSULE | Refills: 0 | Status: SHIPPED | OUTPATIENT
Start: 2020-12-21 | End: 2021-01-22

## 2020-12-21 NOTE — PROGRESS NOTES
oropharynx, base of tongue, hypopharynx, supraglottis, subglottis, and piriform sinuses all appeared to be normal, with no lesions. Visualization was excellent throughout the examination. DESCRIPTION OF PROCEDURE:  The right and left nasal cavity was topically anesthetized and decongested with a 50-50 mixture of 0.05% oxymetazoline solution and topical 4% lidocaine solution by nasal sprayer, twice. After about ten minutes, the flexible fiberoptic nasopharyngolaryngoscope, with camera attached, was inserted through the right nare/nasal cavity and advanced to the nasopharynx and then to the hypopharynx and larynx. [employer] video system was used. After adequate endoscopic visualization, the endoscope was removed. The patient appeared to tolerate the procedure well with no evidence of perioperative complications. PROCEDURE #2 - REMOVAL OF CERUMEN IMPACTION (41071 bilateral): Obstructing cerumen impaction occluding both of the EACs, and obscuring visualization of the TMs, was removed under otomicroscopic visualization, with instrumentation, using a suction. The EACs appeared to be normal.  The tympanic membranes appeared to be normal.  Pneumatic mobility was normal bilaterally. Erin Aaron reported improved hearing, back to usual normal level, after cerumen removal.        EXAMINATION:       Thyroid:  Right goiter versus large nodule. REVIEW OF IMAGES:       I independently reviewed the images of the thyroid ultrasound, showing normal thyroid, with no nodules.       Narrative   EXAMINATION:   THYROID ULTRASOUND       12/9/2020       COMPARISON:   None.       HISTORY:   ORDERING SYSTEM PROVIDED HISTORY: Thyroid nodule   TECHNOLOGIST PROVIDED HISTORY:   Reason for exam:->recheck thyroid nodules   Reason for Exam: nodule   Acuity: Unknown   Type of Exam: Unknown       FINDINGS:   Right thyroid lobe:       Left thyroid lobe:       Isthmus:       Thyroid Gland:  Thyroid gland demonstrates normal echotexture and vascularity.       Nodules: No thyroid nodules are present.       Cervical lymphadenopathy: No abnormal lymph nodes in the imaged portions of   the neck.           Impression   Unremarkable thyroid ultrasound.               IMPRESSION / DIAGNOSES / Leanna Damico was seen today for laryngitis. Diagnoses and all orders for this visit:    LPRD (laryngopharyngeal reflux disease)  -     omeprazole (PRILOSEC) 20 MG delayed release capsule; Take 1 capsule by mouth 2 times daily; take on an empty stomach and eat a meal or snack 45 to 60 minutes hour after each dose. Chronic laryngitis  -     omeprazole (PRILOSEC) 20 MG delayed release capsule; Take 1 capsule by mouth 2 times daily; take on an empty stomach and eat a meal or snack 45 to 60 minutes hour after each dose. Change in voice    Choking, initial encounter    Chronic throat clearing    Phlegm in throat    Impacted cerumen of both ears    Conductive hearing loss of both ears         RECOMMENDATIONS / PLAN    1. Start omeprazole twice daily LPRD regimen. 2. I will ask for repeat reading of thyroid ultrasound considering the disappearance of the large right nodule/goiter, which I still can palpate today. 3. Otherwise, will recheck the thyroid in six months and possible order another ultrasound study. 4. See Patient Instructions on file for this visit. 5. Return in about 3 months (around 3/21/2021) for repeat flexible fiberoptic throat endoscopy, recheck, follow-up and sooner if condition worsens.

## 2020-12-21 NOTE — PATIENT INSTRUCTIONS
1. Please schedule an audiogram (hearing test), if your hearing is not back to your usual ability, or if hearing loss or tinnitus (ringing or other noise) persists, despite removal of ear wax,.  2. You may use an over the counter ear wax removal kit (such as Murine, Bausch and Lomb, NeilMed, or Debrox wax removal system) for ear wax removal, as needed. 3. It may help to use Debrox (OTC) for 4 days prior to future visits for ear cleaning. This may soften your ear wax and facilitate removal of the wax. NO Q-TIPS OR OTHER INSTRUMENTS/OBJECTS IN THE EARS   You should never clean your ears with a Q-tip, cotton tipped applicator, Saskia pin, paper clip, safety pin, pen cap, or any other instrument. This will tend to push wax in deeper and pack the ear canal with wax. There is a high risk and danger of this practice, especially rupture of ear drum, dislocation or other damage to ossicles, and permanent, irreversible, and irreparable hearing loss. It may cause inflammation and irritation of the ear canal and cause itching or pain. I recommend only use of one the several ear wax removal kits available \"over the counter\" if you feel a need to try to remove ear wax. For example, Murine, Bausch and Lomb, NeilMed, or Debrox ear wax removal kits may be used for ear wax removal, as needed. No other methods should be self used for cleaning your ears.         =================================================================    Laryngopharyngeal Reflux    Laryngopharyngeal reflux (LPR) is a condition in which stomach fluid refluxes, or flows backwards, up into your throat. This affects the back area of your voice box. It happens hundreds of times a day and involves a very small amount of fluid each time. There is no sensation or awareness of this reflux, such as the heartburn, pain, or indigestion associated with a related condition, gastroesophageal reflux, or GERD.   These two conditions are distinctly different, first thing each morning and delaying your breakfast for about 45 minutes. Then, you can take the second dose of the medication 45 minutes before your dinner meal.  If you forget to take your pill before you eat dinner, wait until two hours after dinner and take your pill. Then, have a light snack or finish your dinner about 45 minutes later. Your primary physician or gastroenterologist may also be treating you for GERD. This treatment for LPR will not interfere with your GERD management. Please be aware of the possible side effects and interactions of Omeprazole (Prilosec), including, but not limited to:  allergic reaction, nausea, headache, diarrhea, increased risk of fractures wrists or hips or spine, increased risk of Clostridium difficile-associated diarrhea (CDAD), low serum magnesium, excess gas, bloating, stomach pain, constipation, dry mouth, blisters, or peeling skin. One study showed a possible association of omeprazole with increased risk of heart attack. Another study showed a possible association of omeprazole with kidney damage or disease. These potential adverse effects are considered to be very rare, if they actually occur at all secondary to these medications. A recent consensus statement of national gastroenterologist has found no increased incidence of these adverse effects with PPI medications. You should also discuss these with your primary care physician. Also, read all information given by your pharmacist regarding your medications. Please ask if you have any additional questions. Patient Education        omeprazole  Pronunciation:  oh MEP ra zol  Brand:  FIRST Omeprazole, Omeprazole + SyrSpend SF Natalia, PriLOSEC, PriLOSEC OTC  What is the most important information I should know about omeprazole? Omeprazole can cause kidney problems. Tell your doctor if you are urinating less than usual, or if you have blood in your urine.   Diarrhea may be a sign of a new infection. Call your doctor if you have diarrhea that is watery or has blood in it. Omeprazole may cause new or worsening symptoms of lupus. Tell your doctor if you have joint pain and a skin rash on your cheeks or arms that worsens in sunlight. You may be more likely to have a broken bone while taking this medicine long term or more than once per day. What is omeprazole? Omeprazole is a proton pump inhibitor that decreases the amount of acid produced in the stomach. Omeprazole is used to treat symptoms of gastroesophageal reflux disease (GERD) and other conditions caused by excess stomach acid. Omeprazole is also used to promote healing of erosive esophagitis (damage to your esophagus caused by stomach acid). Omeprazole may also be given together with antibiotics to treat gastric ulcer caused by infection with Helicobacter pylori (H. pylori). Over-the-counter (OTC) omeprazole is used in adults to help control heartburn that occurs 2 or more days per week. This medicine not for immediate relief of heartburn symptoms. OTC omeprazole must be taken on a regular basis for 14 days in a row. Omeprazole may also be used for purposes not listed in this medication guide. What should I discuss with my healthcare provider before taking omeprazole? Heartburn can mimic early symptoms of a heart attack. Get emergency medical help if you have chest pain that spreads to your jaw or shoulder and you feel sweaty or light-headed. You should not use omeprazole if you are allergic to it, or if:  · you are also allergic to medicines like omeprazole, such as esomeprazole, lansoprazole, pantoprazole, rabeprazole, Nexium, Prevacid, Protonix, and others; or  · you also take HIV medication that contains rilpivirine (such as Lucrezia Mercury, Bobbye Held).   Ask a doctor or pharmacist if this medicine is safe to use if you have:  · trouble or pain with swallowing;  · bloody or black stools, vomit that looks like blood or coffee grounds;  · heartburn that has lasted for over 3 months;  · frequent chest pain, heartburn with wheezing;  · unexplained weight loss;  · nausea or vomiting, stomach pain;  · liver disease;  · low levels of magnesium in your blood; or  · osteoporosis or low bone mineral density (osteopenia). You may be more likely to have a broken bone in your hip, wrist, or spine while taking a proton pump inhibitor long-term or more than once per day. Talk with your doctor about ways to keep your bones healthy. Ask a doctor before using this medicine if you are pregnant or breast-feeding. Do not give this medicine to a child without medical advice. How should I take omeprazole? Follow all directions on your prescription label and read all medication guides or instruction sheets. Use the medicine exactly as directed. Use Prilosec OTC (over-the-counter) exactly as directed on the label, or as prescribed by your doctor. Read and carefully follow any Instructions for Use provided with your medicine. Ask your doctor or pharmacist if you do not understand these instructions. Shake the oral suspension (liquid) before you measure a dose. Use the dosing syringe provided, or use a medicine dose-measuring device (not a kitchen spoon). If you cannot swallow a capsule whole, open it and sprinkle the medicine into a spoonful of applesauce. Swallow the mixture right away without chewing. Do not save it for later use. You must dissolve omeprazole powder in a small amount of water. This mixture can either be swallowed or given through a nasogastric (NG) feeding tube using a catheter-tipped syringe. Use this medicine for the full prescribed length of time, even if your symptoms quickly improve. OTC omeprazole should be taken for only 14 days in a row. It may take 1 to 4 days before your symptoms improve. Allow at least 4 months to pass before you start a new 14-day course of treatment.   Call your doctor if your symptoms do not improve, or if they get worse. Some conditions are treated with a combination of omeprazole and antibiotics. Use all medications as directed. This medicine can affect the results of certain medical tests. Tell any doctor who treats you that you are using omeprazole. Store at room temperature away from moisture and heat. What happens if I miss a dose? Take the medicine as soon as you can, but skip the missed dose if it is almost time for your next dose. Do not take two doses at one time. What happens if I overdose? Seek emergency medical attention or call the Poison Help line at 1-414.736.5888. What should I avoid while taking omeprazole? This medicine can cause diarrhea, which may be a sign of a new infection. If you have diarrhea that is watery or bloody, call your doctor before using anti-diarrhea medicine. What are the possible side effects of omeprazole? Get emergency medical help if you have signs of an allergic reaction: hives; difficulty breathing; swelling of your face, lips, tongue, or throat. Stop using omeprazole and call your doctor at once if you have:  · severe stomach pain, diarrhea that is watery or bloody;  · new or unusual pain in your wrist, thigh, hip, or back;  · seizure (convulsions);  · kidney problems --little or no urination, blood in your urine, swelling, rapid weight gain;  · low magnesium --dizziness, irregular heartbeats, feeling jittery, muscle cramps, muscle spasms, cough or choking feeling; or  · new or worsening symptoms of lupus --joint pain, and a skin rash on your cheeks or arms that worsens in sunlight. Taking omeprazole long-term may cause you to develop stomach growths called fundic gland polyps. Talk with your doctor about this risk. If you use omeprazole for longer than 3 years, you could develop a vitamin B-12 deficiency. Talk to your doctor about how to manage this condition if you develop it.   Common side effects may include:  · stomach pain, gas;  · nausea, vomiting, for, the expertise, skill, knowledge and judgment of healthcare practitioners. The absence of a warning for a given drug or drug combination in no way should be construed to indicate that the drug or drug combination is safe, effective or appropriate for any given patient. [facility] does not assume any responsibility for any aspect of healthcare administered with the aid of information [facility] provides. The information contained herein is not intended to cover all possible uses, directions, precautions, warnings, drug interactions, allergic reactions, or adverse effects. If you have questions about the drugs you are taking, check with your doctor, nurse or pharmacist.  Copyright 9161-3368 [address]: 20.01. Revision date: 4/11/2019. Care instructions adapted under license by [facility] ([facility]). If you have questions about a medical condition or this instruction, always ask your healthcare professional. [place] [address] any warranty or liability for your use of this information.

## 2021-01-07 ENCOUNTER — VIRTUAL VISIT (OUTPATIENT)
Dept: PULMONOLOGY | Age: 61
End: 2021-01-07
Payer: COMMERCIAL

## 2021-01-07 DIAGNOSIS — I10 HYPERTENSION, ESSENTIAL: Chronic | ICD-10-CM

## 2021-01-07 DIAGNOSIS — G47.10 HYPERSOMNIA: Primary | ICD-10-CM

## 2021-01-07 DIAGNOSIS — K21.9 GERD WITHOUT ESOPHAGITIS: Chronic | ICD-10-CM

## 2021-01-07 DIAGNOSIS — R06.83 SNORING: ICD-10-CM

## 2021-01-07 DIAGNOSIS — E66.9 NON MORBID OBESITY, UNSPECIFIED OBESITY TYPE: Chronic | ICD-10-CM

## 2021-01-07 DIAGNOSIS — I25.10 CORONARY ARTERY DISEASE INVOLVING NATIVE CORONARY ARTERY OF NATIVE HEART WITHOUT ANGINA PECTORIS: Chronic | ICD-10-CM

## 2021-01-07 PROCEDURE — 99244 OFF/OP CNSLTJ NEW/EST MOD 40: CPT | Performed by: INTERNAL MEDICINE

## 2021-01-07 ASSESSMENT — SLEEP AND FATIGUE QUESTIONNAIRES
HOW LIKELY ARE YOU TO NOD OFF OR FALL ASLEEP WHEN YOU ARE A PASSENGER IN A CAR FOR AN HOUR WITHOUT A BREAK: 0
HOW LIKELY ARE YOU TO NOD OFF OR FALL ASLEEP WHILE SITTING AND TALKING TO SOMEONE: 0
ESS TOTAL SCORE: 10
HOW LIKELY ARE YOU TO NOD OFF OR FALL ASLEEP WHILE SITTING INACTIVE IN A PUBLIC PLACE: 0
HOW LIKELY ARE YOU TO NOD OFF OR FALL ASLEEP WHILE SITTING AND READING: 2
HOW LIKELY ARE YOU TO NOD OFF OR FALL ASLEEP WHILE LYING DOWN TO REST IN THE AFTERNOON WHEN CIRCUMSTANCES PERMIT: 3

## 2021-01-07 NOTE — LETTER
[facility] Sleep Medicine  [place] [address] [address] [place] [address]  Phone: [phone]  Fax: [phone]      January 7, 2021       Patient: Pastor Pennington   MR Number: [id number]   YOB: 1960   Date of Visit: 1/7/2021     Thank you for allowing me to participate in the care of Nilsa Wu. Here is my assessment and plan. Also attached is a copy of her consult note:    ASSESSMENT:  Visit Diagnoses and Associated Orders     Hypersomnia   (New Problem)  -  Primary    needs work-up    Home Sleep Study (HST) [64719 Custom]   - Future Order         Snoring   (New Problem)      needs work-up    Home Sleep Study (HST) [[id number] Custom]   - Future Order         Coronary artery disease involving native coronary artery of native heart without angina pectoris   (Stable)           Hypertension, essential   (Stable)           GERD without esophagitis   (Stable)           Non morbid obesity, unspecified obesity type   (Stable)                 Plan:  Differential diagnosis includes but not limited to: BRAD, PLMD's, narcolepsy, parasomnias. Reviewed BRAD (highest likelihood Dx): pathophysiology, diagnosis, complications and treatment. Instructed her not to drive if drowsy. Continue medications per her PCP and other physicians. Limit caffeine use after 3pm. Standard of care is to do in-lab PSG but insurance is mandating an inferior HST. 1 wk follow up after study to review her results. The chronic medical conditions listed are directly related to the primary diagnosis listed above. The management of the primary diagnosis affects the secondary diagnosis and vice versa. Continue meds for: CAD, HTN, and GERD. Pt would medically benefit from wt loss for BRAD (diet, exercise, surgical). Orders Placed This Encounter   Procedures    Home Sleep Study (HST)         If you have questions or concerns, please do not hesitate to call me. I look forward to following Steve Page along with you. Sincerely,      Annie Choi MD    CC providers:  Andrea [place] [address] [facility] [address]  Via In [facility], APRN - [facility] [id number]  Via In [employer]

## 2021-01-07 NOTE — PROGRESS NOTES
Janneth Black MD, Samantha Daniels, [facility]  Tiffanie Kehrt CNP Cristela Members CNP [address]  [facility], [address] [facility]- [phone]   [facility] [address] Castell[address]. [address] [facility]. [address] [phone]     Video Visit- Consult    Pursuant to the emergency declaration under the [address] [place] [address] [facility] authority and the [employer] Act, this Virtual  Visit was conducted, with patient's consent, to reduce the patient's risk of exposure to COVID-19. Services were provided through a video synchronous discussion virtually to substitute for in-person clinic visit. Patient was located in their home. Assessment:      Visit Diagnoses and Associated Orders     Hypersomnia   (New Problem)  -  Primary    needs work-up    Home Sleep Study (HST) [62878 Custom]   - Future Order         Snoring   (New Problem)      needs work-up    Home Sleep Study (HST) [[id number] Custom]   - Future Order         Coronary artery disease involving native coronary artery of native heart without angina pectoris   (Stable)           Hypertension, essential   (Stable)           GERD without esophagitis   (Stable)           Non morbid obesity, unspecified obesity type   (Stable)                  Plan:      Differential diagnosis includes but not limited to: BRAD, PLMD's, narcolepsy, parasomnias. Reviewed BRAD (highest likelihood Dx): pathophysiology, diagnosis, complications and treatment. Instructed her not to drive if drowsy. Continue medications per her PCP and other physicians. Limit caffeine use after 3pm. Standard of care is to do in-lab PSG but insurance is mandating an inferior HST. 1 wk follow up after study to review her results. The chronic medical conditions listed are directly related to the primary diagnosis listed above. The management of the primary diagnosis affects the secondary diagnosis and vice versa. Continue meds for: CAD, HTN, and GERD. Pt would medically benefit from wt loss for BRAD (diet, exercise, surgical). Orders Placed This Encounter   Procedures    Home Sleep Study (HST)          Subjective:     Patient ID: Rich Aguirre is a 62 y.o. female. Chief Complaint   Patient presents with    Snoring    Fatigue       HPI:      Rich Aguirre is a 62 y.o. female referred by Estephania Elizalde MD for a sleep evaluation. She complains of: snoring, excessive daytime sleepiness , non-restorative sleep, snorting awake, AM headaches, napping, drowsiness while driving and tossing and turning at night. She denies: cataplexy and hypnagogic hallucinations. Coronary artery disease, hypertension, gastroesophageal reflux disease, and obesity: stable on meds and followed by pt's PCP and other physicians. Previous evaluation and treatment has included- none    DOT/CDL - No  FAA/'s license -No    Previous Report(s) Reviewed: historical medical records, office notes, andreferral letter(s). Pertinent data has been documented. [place] - Total score: 10    Caffeine Intake - None. Social History     Socioeconomic History    Marital status:      Spouse name: Not on file    Number of children: Not on file    Years of education: Not on file    Highest education level: Not on file   Occupational History    Occupation: maintenance      Comment: [place] Needs    Financial resource strain: Not on file    Food insecurity     Worry: Not on file     Inability: Not on file   "[employer]" needs     Medical: Not on file     Non-medical: Not on file   Tobacco Use    Smoking status: Former Smoker     Packs/day: 0.25     Years: 7.00     Pack years: 1.75     Types: Cigarettes     Quit date: 1988     Years since quittin.3    Smokeless tobacco: Never Used    Tobacco comment: quit    Substance and Sexual Activity    Alcohol use: No    Drug use:  No  Sexual activity: Yes     Partners: Male     Birth control/protection: Surgical   Lifestyle    Physical activity     Days per week: Not on file     Minutes per session: Not on file    Stress: Not on file   Relationships    Social connections     Talks on phone: Not on file     Gets together: Not on file     Attends Ep[employer] service: Not on file     Active member of club or organization: Not on file     Attends meetings of clubs or organizations: Not on file     Relationship status: Not on file    Intimate partner violence     Fear of current or ex partner: Not on file     Emotionally abused: Not on file     Physically abused: Not on file     Forced sexual activity: Not on file   Other Topics Concern    Not on file   Social History Narrative    Not on file        Current Outpatient Medications   Medication Sig Dispense Refill    omeprazole (PRILOSEC) 20 MG delayed release capsule Take 1 capsule by mouth 2 times daily; take on an empty stomach and eat a meal or snack 45 to 60 minutes hour after each dose. 180 capsule 0    amLODIPine (NORVASC) 5 MG tablet TAKE ONE TABLET BY MOUTH DAILY 60 tablet 1    nitroGLYCERIN (NITROSTAT) 0.4 MG SL tablet Place 1 tablet under the tongue every 5 minutes as needed for Chest pain 25 tablet 3    metoprolol tartrate (LOPRESSOR) 25 MG tablet TAKE ONE TABLET BY MOUTH TWICE A  tablet 2    Multiple Vitamins-Minerals (THERAPEUTIC MULTIVITAMIN-MINERALS) tablet Take 1 tablet by mouth daily      aspirin 81 MG EC tablet Take 81 mg by mouth daily. No current facility-administered medications for this visit.         Allergies as of 01/07/2021 - Review Complete 01/07/2021   Allergen Reaction Noted    Latex Rash 01/22/2018    Ampicillin  11/18/2004    Codeine Itching 10/04/2011    Floxin [ofloxacin] Other (See Comments) 10/04/2011    Prednisone  01/21/2014    Quinolones Other (See Comments) 11/18/2004    Statins Other (See Comments) 09/27/2013  Caffeine Palpitations and Other (See Comments) 10/04/2011       Patient Active Problem List   Diagnosis    Complex tear of medial meniscus of right knee as current injury    Complex tear of lateral meniscus of right knee as current injury    Knee effusion    Synovitis of knee    Patellofemoral syndrome    Knee pain    Tear of medial cartilage or meniscus of knee, current    Arthritis of knee    Helicobacter pylori antibody positive    Primary osteoarthritis of right knee    Pure hypercholesterolemia    Mallet fracture, closed, initial encounter    Colon polyp    Hypersomnia    Other dyspnea and respiratory abnormality    Thyroid nodule    Visual disturbance    Change in voice    Choking    Sleep apnea in adult    Coronary artery disease involving native coronary artery of native heart without angina pectoris    Hypertension, essential       Past Medical History:   Diagnosis Date    Arthritis     RT TOES; established with podiatry    Coronary artery disease involving native coronary artery of native heart without angina pectoris 2021    Hyperlipidemia     DIET CONTROL    Hypertension, essential 2021       Past Surgical History:   Procedure Laterality Date     SECTION      breech    COLONOSCOPY      one polyp removed    HYSTERECTOMY      endometriosis    KNEE ARTHROSCOPY Right 13    RIGHT KNEE ARTHROSCOPE, PARTIAL MEDIAL AND LATERAL MENISCECTOMY, SYNOVECTOMY, CHONDROPLASTY OF MEDIAL FEMORAL CONDYLE    KNEE ARTHROSCOPY Left 2014    LEFT KNEE ARTHROSCOPY WITH PARTIAL MEDIAL MENISCECTOMY,    KNEE ARTHROSCOPY Right 2018    ACTUAL PROCEDURE: RIGHT KNEE ARTHROSCOPY, PARTIAL MEDIAL MENISCECTOMY,CHONDROPLASTY, SYNOVECTOMY      KNEE SURGERY  14    RIGHT KNEE ARTHROSCOPE, PARTIAL MEDIAL AND LATERAL    KNEE SURGERY  15     RIGHT KNEE ARTHROSCOPY WITH PARTIAL LATERAL MENISCECTOMY,    TUBAL LIGATION         Family History Problem Relation Age of Onset    Heart Disease Mother     Stroke Mother         x2    High Blood Pressure Mother     Diabetes Mother     Heart Attack Mother 48    Diabetes Father     Cancer Brother         colon       Objective:     Vitals:  Patient reported Height and Weight Calculated BMI   Patient-Reported Vitals 1/7/2021   Patient-Reported Weight 180#   Patient-Reported Height 5'5\"       30     Due to COVID-19 this was a virtual visit and physical exam was deferred.     Electronically signed by Heriberto Pope MD on1/7/2021 at 3:26 PM

## 2021-01-08 ENCOUNTER — TELEPHONE (OUTPATIENT)
Dept: SLEEP CENTER | Age: 61
End: 2021-01-08

## 2021-01-10 NOTE — TELEPHONE ENCOUNTER
Received refill request for Metorpolol from [facility].      Last OV: 04/15/2020 w/ NPDIONI ; 05/31/2019 w/ JOSELYN     Last Refill: 02/24/2020 #180 w/ 2 refills by [facility]    Next Appointment: none at this time

## 2021-01-20 ENCOUNTER — HOSPITAL ENCOUNTER (OUTPATIENT)
Dept: SLEEP CENTER | Age: 61
Discharge: HOME | End: 2021-01-20
Payer: COMMERCIAL

## 2021-01-20 DIAGNOSIS — G47.10 HYPERSOMNIA: ICD-10-CM

## 2021-01-20 DIAGNOSIS — R06.83 SNORING: ICD-10-CM

## 2021-01-20 PROCEDURE — 95806 SLEEP STUDY UNATT&RESP EFFT: CPT | Performed by: INTERNAL MEDICINE

## 2021-01-20 PROCEDURE — 95806 SLEEP STUDY UNATT&RESP EFFT: CPT

## 2021-01-22 ENCOUNTER — OFFICE VISIT (OUTPATIENT)
Dept: INTERNAL MEDICINE CLINIC | Age: 61
End: 2021-01-22
Payer: COMMERCIAL

## 2021-01-22 VITALS
BODY MASS INDEX: 32.28 KG/M2 | SYSTOLIC BLOOD PRESSURE: 120 MMHG | TEMPERATURE: 97.1 F | HEART RATE: 68 BPM | WEIGHT: 194 LBS | OXYGEN SATURATION: 98 % | DIASTOLIC BLOOD PRESSURE: 80 MMHG

## 2021-01-22 DIAGNOSIS — E55.9 VITAMIN D DEFICIENCY: ICD-10-CM

## 2021-01-22 DIAGNOSIS — R73.01 IMPAIRED FASTING GLUCOSE: ICD-10-CM

## 2021-01-22 DIAGNOSIS — E04.1 THYROID NODULE: ICD-10-CM

## 2021-01-22 DIAGNOSIS — E78.2 MIXED HYPERCHOLESTEROLEMIA AND HYPERTRIGLYCERIDEMIA: ICD-10-CM

## 2021-01-22 DIAGNOSIS — I10 HYPERTENSION, ESSENTIAL: Chronic | ICD-10-CM

## 2021-01-22 DIAGNOSIS — G47.10 HYPERSOMNIA: ICD-10-CM

## 2021-01-22 DIAGNOSIS — Z00.00 ANNUAL PHYSICAL EXAM: Primary | ICD-10-CM

## 2021-01-22 DIAGNOSIS — Z78.9 STATIN INTOLERANCE: ICD-10-CM

## 2021-01-22 LAB
A/G RATIO: 1.7 (ref 1.1–2.2)
ALBUMIN SERPL-MCNC: 4.4 G/DL (ref 3.4–5)
ALP BLD-CCNC: 92 U/L (ref 40–129)
ALT SERPL-CCNC: 19 U/L (ref 10–40)
ANION GAP SERPL CALCULATED.3IONS-SCNC: 12 MMOL/L (ref 3–16)
AST SERPL-CCNC: 21 U/L (ref 15–37)
BILIRUB SERPL-MCNC: 0.8 MG/DL (ref 0–1)
BUN BLDV-MCNC: 17 MG/DL (ref 7–20)
CALCIUM SERPL-MCNC: 9.6 MG/DL (ref 8.3–10.6)
CHLORIDE BLD-SCNC: 104 MMOL/L (ref 99–110)
CHOLESTEROL, TOTAL: 413 MG/DL (ref 0–199)
CO2: 26 MMOL/L (ref 21–32)
CREAT SERPL-MCNC: 0.7 MG/DL (ref 0.6–1.1)
GFR AFRICAN AMERICAN: >60
GFR NON-AFRICAN AMERICAN: >60
GLOBULIN: 2.6 G/DL
GLUCOSE BLD-MCNC: 94 MG/DL (ref 70–99)
HDLC SERPL-MCNC: 44 MG/DL (ref 40–60)
LDL CHOLESTEROL CALCULATED: 343 MG/DL
POTASSIUM SERPL-SCNC: 4.4 MMOL/L (ref 3.5–5.1)
SODIUM BLD-SCNC: 142 MMOL/L (ref 136–145)
TOTAL PROTEIN: 7 G/DL (ref 6.4–8.2)
TRIGL SERPL-MCNC: 131 MG/DL (ref 0–150)
TSH REFLEX: 0.71 UIU/ML (ref 0.27–4.2)
VITAMIN D 25-HYDROXY: 30.4 NG/ML
VLDLC SERPL CALC-MCNC: 26 MG/DL

## 2021-01-22 PROCEDURE — 99396 PREV VISIT EST AGE 40-64: CPT | Performed by: NURSE PRACTITIONER

## 2021-01-22 RX ORDER — PITAVASTATIN CALCIUM 2.09 MG/1
2 TABLET, FILM COATED ORAL NIGHTLY
Qty: 90 TABLET | Refills: 3 | Status: SHIPPED
Start: 2021-01-22 | End: 2021-01-25 | Stop reason: DRUGHIGH

## 2021-01-22 ASSESSMENT — PATIENT HEALTH QUESTIONNAIRE - PHQ9
SUM OF ALL RESPONSES TO PHQ QUESTIONS 1-9: 0
SUM OF ALL RESPONSES TO PHQ QUESTIONS 1-9: 0

## 2021-01-22 NOTE — PROGRESS NOTES
SUBJECTIVE:    Patient ID: Gigi Drew is a 62 y.o. female. CC: Annual physical, hypertension, hypercholesterolemia with statin intolerance, prediabetes    HPI: The patient presents to the office today for annual physical examination and follow-up of chronic medical conditions. She has no specific acute complaint today. She has a history of hypertension and dyslipidemia. She denies any chest pain or shortness of breath. She has had statin intolerance with multiple drug intolerances. More recently, she was started on Livalo and reports she is tolerating the medication. She has a history of impaired fasting glucose with most recent A1c 5.7. She has a history of vitamin D deficiency. She is on repletion. She has a history of thyroid nodule. Her TSH has been normal.      Past Medical History:   Diagnosis Date    Arthritis     RT TOES; established with podiatry    Coronary artery disease involving native coronary artery of native heart without angina pectoris 1/7/2021    Hyperlipidemia     DIET CONTROL    Hypertension, essential 1/7/2021        Current Outpatient Medications   Medication Sig Dispense Refill    metoprolol tartrate (LOPRESSOR) 25 MG tablet TAKE ONE TABLET BY MOUTH TWICE A DAY 60 tablet 3    amLODIPine (NORVASC) 5 MG tablet TAKE ONE TABLET BY MOUTH DAILY 60 tablet 1    Multiple Vitamins-Minerals (THERAPEUTIC MULTIVITAMIN-MINERALS) tablet Take 1 tablet by mouth daily      aspirin 81 MG EC tablet Take 81 mg by mouth daily.  nitroGLYCERIN (NITROSTAT) 0.4 MG SL tablet Place 1 tablet under the tongue every 5 minutes as needed for Chest pain 25 tablet 3     No current facility-administered medications for this visit. Review of Systems   Constitutional: Negative. Respiratory: Negative. Cardiovascular: Negative. Gastrointestinal: Negative. Genitourinary: Negative. Musculoskeletal: Negative. Neurological: Negative.     Psychiatric/Behavioral: Negative. All other systems reviewed and are negative. OBJECTIVE:  Physical Exam  Vitals signs reviewed. Constitutional:       General: She is not in acute distress. Appearance: She is well-developed. She is not diaphoretic. HENT:      Head: Normocephalic and atraumatic. Eyes:      General: No scleral icterus. Conjunctiva/sclera: Conjunctivae normal.   Neck:      Musculoskeletal: Neck supple. Vascular: No JVD. Cardiovascular:      Rate and Rhythm: Normal rate and regular rhythm. Pulmonary:      Effort: Pulmonary effort is normal. No respiratory distress. Breath sounds: Normal breath sounds. No wheezing. Abdominal:      General: There is no distension. Palpations: Abdomen is soft. Tenderness: There is no abdominal tenderness. There is no guarding or rebound. Musculoskeletal: Normal range of motion. Skin:     General: Skin is warm and dry. Neurological:      Mental Status: She is alert and oriented to person, place, and time. Psychiatric:         Behavior: Behavior normal.         Thought Content: Thought content normal.        /80   Pulse 68   Temp 97.1 °F (36.2 °C)   Wt 194 lb (88 kg)   SpO2 98%   BMI 32.28 kg/m²      PHQ Scores 1/22/2021 2/6/2019 6/4/2018   PHQ2 Score 0 0 0   PHQ9 Score 0 0 0     Interpretation of Total Score Depression Severity: 1-4 = Minimal depression, 5-9 = Mild depression, 10-14 = Moderate depression, 15-19 = Moderately severe depression, 20-27 =Severe depression      ASSESSMENT/PLAN:  Santa Murray was seen today for annual exam, hypertension and other. Diagnoses and all orders for this visit:    Annual physical exam  -     COMPREHENSIVE METABOLIC PANEL; Future  -     LIPID PANEL; Future  -     Hemoglobin A1C; Future    Hypertension, essential  -     COMPREHENSIVE METABOLIC PANEL; Future  -     LIPID PANEL; Future  -     Hemoglobin A1C; Future    Statin intolerance  -     pitavastatin (LIVALO) 2 MG TABS tablet;  Take 1 tablet by mouth nightly    Thyroid nodule  -     TSH with Reflex; Future    Mixed hypercholesterolemia and hypertriglyceridemia  -     COMPREHENSIVE METABOLIC PANEL; Future  -     LIPID PANEL; Future  -     Hemoglobin A1C; Future  -     pitavastatin (LIVALO) 2 MG TABS tablet; Take 1 tablet by mouth nightly    Impaired fasting glucose  -     COMPREHENSIVE METABOLIC PANEL; Future  -     LIPID PANEL; Future  -     Hemoglobin A1C; Future    Hypersomnia    Vitamin D deficiency  -     Vitamin D 25 Hydroxy;  Future        Nelda Kocher, APRN - CNP

## 2021-01-23 LAB
ESTIMATED AVERAGE GLUCOSE: 114 MG/DL
HBA1C MFR BLD: 5.6 %

## 2021-01-25 ENCOUNTER — TELEPHONE (OUTPATIENT)
Dept: PULMONOLOGY | Age: 61
End: 2021-01-25

## 2021-01-25 DIAGNOSIS — Z78.9 STATIN INTOLERANCE: ICD-10-CM

## 2021-01-25 DIAGNOSIS — E78.2 MIXED HYPERCHOLESTEROLEMIA AND HYPERTRIGLYCERIDEMIA: ICD-10-CM

## 2021-01-25 RX ORDER — PITAVASTATIN CALCIUM 4.18 MG/1
4 TABLET, FILM COATED ORAL NIGHTLY
Qty: 90 TABLET | Refills: 3 | Status: SHIPPED
Start: 2021-01-25 | End: 2021-06-15 | Stop reason: RX

## 2021-01-25 ASSESSMENT — ENCOUNTER SYMPTOMS
RESPIRATORY NEGATIVE: 1
GASTROINTESTINAL NEGATIVE: 1

## 2021-01-25 NOTE — PROGRESS NOTES
Naoma Prim         : 1960  A-1    Diagnosis: [x] BRAD (G47.33) [] CSA (G47.31) [] Apnea (G47.30)   Length of Need: [x] 12 Months [] 99 Months [] Other:    Machine (VICKY!): [x] Respironics Dream Station      Auto [] ResMed AirSense     Auto [] Other:     [x]  CPAP () [] Bilevel ()   Mode: [x] Auto [] Spontaneous    Mode: [] Auto [] Spontaneous           P min 6 cmH2O  P max 16 cmH2O                 Comfort Settings:   - Ramp Pressure: 4 cmH2O                                        - Ramp time: 15 min                                     -  Flex/EPR - 3 full time                                    - For ResMed Bilevel (TiMax-4 sec   TiMin- 0.2 sec)     Humidifier: [x] Heated ()        [x] Water chamber replacement ()/ 1 per 6 months        Mask:   [x] Nasal () /1 per 3 months [] Full Face () /1 per 3 months   [x] Patient choice -Size and fit mask [] Patient Choice - Size and fit mask   [] Dispense:  [] Dispense:    [x] Headgear () / 1 per 3 months [] Headgear () / 1 per 3 months   [x] Replacement Nasal Cushion ()/2 per month [] Interface Replacement ()/1 per month   [] Replacement Nasal Pillows ()/2 per month         Tubing: [x] Heated ()/1 per 3 months    [] Standard ()/1 per 3 months [] Other:           Filters: [x] Non-disposable ()/1 per 6 months     [x] Ultra-Fine, Disposable ()/2 per month        Miscellaneous: [] Chin Strap ()/ 1 per 6 months [] O2 bleed-in:       LPM   [] Oximetry on CPAP/Bilevel []  Other:    [x] Modem: ()         Start Order Date: 21    MEDICAL JUSTIFICATION:  I, the undersigned, certify that the above prescribed supplies are medically necessary for this patients wellbeing. In my opinion, the supplies are both reasonable and necessary in reference to accepted standards of medicalpractice in treatment of this patients condition.     Riva Hodgkins, MD      NPI: [id number]       Order Signed Date: 21    Electronically signed by Marcela Rodgers MD on 2021 at 11:31 AM    Mindy Kehr  1960  [address]  [phone] (home)   [phone] (mobile)      Insurance Info (confirm with patient if correct):  Payor/Plan Subscr  Sex Relation Sub.  Ins. ID Effective Group Num

## 2021-01-25 NOTE — PROGRESS NOTES
Date: 21    Electronically signed by Steven Doyle MD on 2021 at 11:35 AM    Gigi Drew  1960  [address] [facility] [address] [place] [address]  [phone] (home)   [phone] (mobile)      Insurance Info (confirm with patient if correct):  Payor/Plan Subscr  Sex Relation Sub.  Ins. ID Effective Group Num

## 2021-02-01 ENCOUNTER — TELEPHONE (OUTPATIENT)
Dept: ADMINISTRATIVE | Age: 61
End: 2021-02-01

## 2021-03-30 ENCOUNTER — VIRTUAL VISIT (OUTPATIENT)
Dept: PULMONOLOGY | Age: 61
End: 2021-03-30

## 2021-03-30 DIAGNOSIS — I25.10 CORONARY ARTERY DISEASE INVOLVING NATIVE CORONARY ARTERY OF NATIVE HEART WITHOUT ANGINA PECTORIS: Chronic | ICD-10-CM

## 2021-03-30 DIAGNOSIS — I10 HYPERTENSION, ESSENTIAL: Chronic | ICD-10-CM

## 2021-03-30 DIAGNOSIS — E66.09 CLASS 1 OBESITY DUE TO EXCESS CALORIES WITHOUT SERIOUS COMORBIDITY WITH BODY MASS INDEX (BMI) OF 32.0 TO 32.9 IN ADULT: ICD-10-CM

## 2021-03-30 DIAGNOSIS — G47.30 SLEEP APNEA IN ADULT: Primary | ICD-10-CM

## 2021-03-30 PROBLEM — E66.9 CLASS 1 OBESITY WITHOUT SERIOUS COMORBIDITY WITH BODY MASS INDEX (BMI) OF 32.0 TO 32.9 IN ADULT: Status: ACTIVE | Noted: 2021-03-30

## 2021-03-30 PROBLEM — E66.811 CLASS 1 OBESITY WITHOUT SERIOUS COMORBIDITY WITH BODY MASS INDEX (BMI) OF 32.0 TO 32.9 IN ADULT: Status: ACTIVE | Noted: 2021-03-30

## 2021-03-30 PROCEDURE — 99214 OFFICE O/P EST MOD 30 MIN: CPT | Performed by: NURSE PRACTITIONER

## 2021-03-30 ASSESSMENT — SLEEP AND FATIGUE QUESTIONNAIRES
HOW LIKELY ARE YOU TO NOD OFF OR FALL ASLEEP WHILE LYING DOWN TO REST IN THE AFTERNOON WHEN CIRCUMSTANCES PERMIT: 3
HOW LIKELY ARE YOU TO NOD OFF OR FALL ASLEEP WHILE SITTING QUIETLY AFTER LUNCH WITHOUT ALCOHOL: 0
HOW LIKELY ARE YOU TO NOD OFF OR FALL ASLEEP WHILE SITTING INACTIVE IN A PUBLIC PLACE: 0
HOW LIKELY ARE YOU TO NOD OFF OR FALL ASLEEP WHILE WATCHING TV: 0
HOW LIKELY ARE YOU TO NOD OFF OR FALL ASLEEP WHEN YOU ARE A PASSENGER IN A CAR FOR AN HOUR WITHOUT A BREAK: 1

## 2021-03-30 NOTE — PROGRESS NOTES
Janneth Black MD, FAASM, [facility]  Claude Lock, MSN, RN, [id number] [address]  Dept: [phone]  Dept Fax: [phone]  Loc: [phone]    Subjective:     Patient ID: Poncho Martínez is a 62 y.o. female. Chief Complaint   Patient presents with    Sleep Apnea       HPI:      Sleep Medicine Video Visit    Pursuant to the emergency declaration under the [address] [place] [address], [facility] waiver authority and the [employer] Act this Telephone Visit was insisted, with patient's consent, to reduce the patient's risk of exposure to COVID-19 and provide continuity of care for an established patient. Services were provided through a synchronous discussion over a telephone and/or Video chat to substitute for in-person clinic visit, and coded as such. While patient is at home. Machine Modem/Download Info:  Compliance (hours/night): 5.75 hrs/night  Download AHI (/hour): 0.4 /HR  Average CPAP Pressure : 7.5 cmH2O           APAP - Settings  Pressure Min: 6 cmH2O  Pressure Max: 16 cmH2O                 Comfort Settings  Humidity Level (0-8): 3  Flex/EPR (0-3): 3 PAP Mask  Clinically Relevant Leak: Yes     [place] - Total score: 4    Follow-up :     Last Visit : January 2021    Had study Insurance mandated HST done on 1/12/21 which showed an RHI - 8.5/hr with Low SaO2 - 79% and time below 90% of 34.4min.   This is consistent with mild BRAD (327.23)    Subjective Health Changes: None      Over Night Oximetry: [] Yes  [] No  [x] NA [] WNL   Using O2: [] Yes  [] No  [x] NA   Patient is compliant with the machine  [x] Yes  [] No [] Per patient   Feeling rested when using the machine   [x] Yes  [] No     Pressure is comfortable with inspiration and expiration  [x] Yes  [] No   ([x] NA   [] Feeling of suffocation  [] Feeling like not enough air    [] To much pressure)     Noticed changes in pressure  [x] NA  [] Yes    [] No     Mask is fitting well  [x] Yes  [] No   Noting Mask Air Leak  [] Yes  [x] No   Having painful Aerophagia  [] Yes  [x] No   Nocturia   1  per night. Having  HA upon waking  [] Yes  [x] No   Dry mouth upon waking   Dry Nose  Dry Eyes  [] Yes  [x] No   Congestion upon waking   [x] Yes  [] No    Nose Bleeds  [] Yes  [x] No   Using Sleep Aides  [x] NA  [] OTC  [] Per our office   [] Per another provider   Understands how to change humidification and/or tubing temperature for comfort while at home  [x] Yes  [] No     Difficulties falling asleep  [] Yes  [x] No   Difficulties staying asleep  [] Yes  [x] No   Approximate time to bed  8:30-9:30am   Approximate wake time  4am   Taking Naps  no   If taking naps usual length  [x] NA   If taking naps using the machine [x] NA  [] Yes    [] No    [] With and With out    Drowsy when driving  [] Yes  [x] No     Does patient carry a DOT/CDL  [] Yes  [x] No     Does patient carry FAA/P[employer] License   [] Yes  [x] No      Any concerns noted with the machine at this time  [] Yes  [x] No       Assessment/Plan:   1. Sleep apnea in adult  Assessment & Plan:  New with treatment  After downloading data and reviewing  Reviewed compliance download with pt. Supplies and parts as needed for his machine. These are medically necessary. Continue medications per his PCP and other physicians. Limit caffeine use after 3pm.    The chronic medical conditions listed are directly related to the primary diagnosis listed above. The management of the primary diagnosis affects the secondary diagnosis and vice vers    2. Hypertension, essential  Assessment & Plan:  Chronic- stable. After speaking with patient:    Agree with current plan, and would agree to continue this plan per prescribing and managing physician.      3. Coronary artery disease involving native coronary artery of native heart without angina pectoris Assessment & Plan:  Chronic- stable. After speaking with patient:    Agree with current plan, and would agree to continue this plan per prescribing and managing physician. 4. Class 1 obesity due to excess calories without serious comorbidity with body mass index (BMI) of 32.0 to 32.9 in adult  Assessment & Plan:  Patient encouraged to work on maintaining a healthy weight per height. Achievable with diet restriction/modifications and exercise (may consult primary care if unsure of any restrictions or concerns). The chronic medical conditions listed are directly related to the primary diagnosis listed above. The management of the primary diagnosis affects the secondary diagnosis and vice versa. - After pulling data and reviewing it   - Reviewed compliance download with patient    -Medically necessary supplies and parts as needed for her machine.   - Continue medications per his primary care provider and other physicians.   - Encouraged to limit caffeine use after 3pm.    -  Encouraged her to work on weight loss through diet and exercise  - Educated not to drive when feeling sleepy   - Patient using [employer]  - Tube Temperature  Humidifier  (if you are dry turn it high)  Over night oximetry that I will order order today   Compliance  Office locations  Diana jelly for nasal soreness   Nasal Saline usage       The chronic medical conditions listed are directly related to the primary diagnosis listed above. The management of the primary diagnosis affects the secondary diagnosis and vice versa.     - Will follow up in off in 6 months

## 2021-03-30 NOTE — ASSESSMENT & PLAN NOTE
New with treatment  After downloading data and reviewing  Reviewed compliance download with pt. Supplies and parts as needed for his machine. These are medically necessary. Continue medications per his PCP and other physicians. Limit caffeine use after 3pm.    The chronic medical conditions listed are directly related to the primary diagnosis listed above.     The management of the primary diagnosis affects the secondary diagnosis and vice vers

## 2021-04-15 ENCOUNTER — TELEPHONE (OUTPATIENT)
Dept: PULMONOLOGY | Age: 61
End: 2021-04-15

## 2021-04-15 NOTE — TELEPHONE ENCOUNTER
Please see overnight oximetry results for patient. Compliance is not yet available for date of testing.

## 2021-04-16 RX ORDER — AMLODIPINE BESYLATE 5 MG/1
TABLET ORAL
Qty: 30 TABLET | Refills: 0 | OUTPATIENT
Start: 2021-04-16

## 2021-05-24 DIAGNOSIS — E78.2 MIXED HYPERCHOLESTEROLEMIA AND HYPERTRIGLYCERIDEMIA: ICD-10-CM

## 2021-05-24 DIAGNOSIS — Z78.9 STATIN INTOLERANCE: ICD-10-CM

## 2021-05-24 RX ORDER — PITAVASTATIN CALCIUM 4.18 MG/1
4 TABLET, FILM COATED ORAL NIGHTLY
Qty: 90 TABLET | Refills: 3 | Status: CANCELLED | OUTPATIENT
Start: 2021-05-24

## 2021-05-24 NOTE — TELEPHONE ENCOUNTER
Med refill  Next appt is 7/22    metoprolol tartrate (LOPRESSOR) 25 MG tablet   60 tablet  TAKE ONE TABLET BY MOUTH TWICE A DAY    pitavastatin (LIVALO) 4 MG TABS tablet 90 tablet  Take 1 tablet by mouth nightly  Take 2 mg by mouth nightly     Shanda LUNDYWakeMed North HospitalALEJANDRO [address], [address] [phone] Sally Englishy [phone] [address], [address]   Phone:  [phone]  Fax:  [phone]

## 2021-06-04 RX ORDER — AMLODIPINE BESYLATE 5 MG/1
TABLET ORAL
Qty: 30 TABLET | Refills: 0 | Status: SHIPPED | OUTPATIENT
Start: 2021-06-04 | End: 2021-07-22 | Stop reason: SDUPTHER

## 2021-06-08 RX ORDER — AMLODIPINE BESYLATE 5 MG/1
TABLET ORAL
Qty: 30 TABLET | Refills: 0 | OUTPATIENT
Start: 2021-06-08

## 2021-06-15 ENCOUNTER — OFFICE VISIT (OUTPATIENT)
Dept: CARDIOLOGY CLINIC | Age: 61
End: 2021-06-15
Payer: COMMERCIAL

## 2021-06-15 VITALS
DIASTOLIC BLOOD PRESSURE: 72 MMHG | BODY MASS INDEX: 32.69 KG/M2 | SYSTOLIC BLOOD PRESSURE: 140 MMHG | WEIGHT: 196.2 LBS | OXYGEN SATURATION: 97 % | HEIGHT: 65 IN | HEART RATE: 62 BPM

## 2021-06-15 DIAGNOSIS — I10 HYPERTENSION, ESSENTIAL: ICD-10-CM

## 2021-06-15 DIAGNOSIS — I25.10 CORONARY ARTERY DISEASE INVOLVING NATIVE CORONARY ARTERY OF NATIVE HEART WITHOUT ANGINA PECTORIS: Primary | ICD-10-CM

## 2021-06-15 DIAGNOSIS — E78.2 MIXED HYPERLIPIDEMIA: ICD-10-CM

## 2021-06-15 PROCEDURE — 93000 ELECTROCARDIOGRAM COMPLETE: CPT | Performed by: NURSE PRACTITIONER

## 2021-06-15 PROCEDURE — 99214 OFFICE O/P EST MOD 30 MIN: CPT | Performed by: NURSE PRACTITIONER

## 2021-06-15 RX ORDER — PITAVASTATIN CALCIUM 2.09 MG/1
2 TABLET, FILM COATED ORAL NIGHTLY
COMMUNITY
End: 2021-07-22 | Stop reason: SDUPTHER

## 2021-06-15 RX ORDER — BEMPEDOIC ACID 180 MG/1
180 TABLET, FILM COATED ORAL DAILY
Qty: 30 TABLET | Refills: 5 | Status: SHIPPED | OUTPATIENT
Start: 2021-06-15 | End: 2022-06-02

## 2021-06-15 NOTE — PATIENT INSTRUCTIONS
Check your BP weekly for 4 weeks then call the office with your readings    Cholesterol levels today    Begin nexletol 180 mg daily to help lower your cholesterol  ~repeat fasting cholesterol levels after 4 weeks     myoview stress test since you've had discomfort    appt in six months / depending on your test results

## 2021-06-15 NOTE — PROGRESS NOTES
[address]     Outpatient Follow Up Note    Jose Roberto Galvez is 62 y.o. female who presents today with a history of non-obst CAD, HTN and hyperlipidemia      CHIEF COMPLAINT / HPI:  Follow Up secondary to coronary artery disease    Subjective:   She c/o chest discomfort if walking too fast, tarsha up hill. There is SOTO : over exertion and when around smokers. The patient denies orthopnea/PND. She uses a CPAP. The patient does not have swelling. The patients weight is going up : ~ 16# / one year (size 12 to 14 pant size). The patient is not experiencing palpitations or dizziness. She takes CoQ 10 along with Livalo which has helped lessened the cramping. She decreased amlodipine to 2.5 mg daily; 5 mg was causing HAs  She had more discomfort during the 10 days without her medicines. These symptoms show no change since the last OV. With regard to medication therapy the patient has been compliant with prescribed regimen. They have tolerated therapy to date.      Past Medical History:   Diagnosis Date    Arthritis     RT TOES; established with podiatry    Coronary artery disease involving native coronary artery of native heart without angina pectoris 2021    Hyperlipidemia     DIET CONTROL    Hypertension, essential 2021     Social History:    Social History     Tobacco Use   Smoking Status Former Smoker    Packs/day: 0.25    Years: 7.00    Pack years: 1.75    Types: Cigarettes    Quit date: 1988    Years since quittin.7   Smokeless Tobacco Never Used   Tobacco Comment    quit      Current Medications:  Current Outpatient Medications   Medication Sig Dispense Refill    CPAP Machine MISC by Does not apply route      pitavastatin (LIVALO) 2 MG TABS tablet Take 2 mg by mouth nightly      amLODIPine (NORVASC) 5 MG tablet TAKE ONE TABLET BY MOUTH DAILY (Patient taking differently: 2.5 mg ) 30 tablet 0    metoprolol tartrate (LOPRESSOR) 25 MG tablet TAKE ONE TABLET BY MOUTH TWICE A DAY 60 tablet 0    Coenzyme Q10 (COQ10 PO) Take 100 mg by mouth       Multiple Vitamins-Minerals (THERAPEUTIC MULTIVITAMIN-MINERALS) tablet Take 1 tablet by mouth daily      aspirin 81 MG EC tablet Take 81 mg by mouth daily.  nitroGLYCERIN (NITROSTAT) 0.4 MG SL tablet Place 1 tablet under the tongue every 5 minutes as needed for Chest pain (Patient not taking: Reported on 6/15/2021) 25 tablet 3     No current facility-administered medications for this visit. REVIEW OF SYSTEMS:    CONSTITUTIONAL: + major weight gain; fatigue, weakness, night sweats or fever. HEENT: No new vision difficulties or ringing in the ears. RESPIRATORY: No new SOB, PND, orthopnea or cough. CARDIOVASCULAR: See HPI  GI: No nausea, vomiting, diarrhea, constipation, abdominal pain or changes in bowel habits. : No urinary frequency, urgency, incontinence hematuria or dysuria. SKIN: No cyanosis or skin lesions. MUSCULOSKELETAL: No new muscle or joint pain. NEUROLOGICAL: No syncope or TIA-like symptoms. PSYCHIATRIC: No anxiety, pain, insomnia or depression    Objective:   PHYSICAL EXAM:       Vitals:    06/15/21 0853 06/15/21 0922   BP: (!) 150/80 (!) 140/72   Site: Left Upper Arm Left Upper Arm   Position: Sitting    Cuff Size: Large Adult Large Adult   Pulse: 62    SpO2: 97%    Weight: 196 lb 3.2 oz (89 kg)    Height: 5' 5\" (1.651 m)         VITALS:  BP (!) 150/80 (Site: Left Upper Arm, Position: Sitting, Cuff Size: Large Adult)   Pulse 62   Ht 5' 5\" (1.651 m)   Wt 196 lb 3.2 oz (89 kg)   SpO2 97%   BMI 32.65 kg/m²   CONSTITUTIONAL: Cooperative, no apparent distress, and appears well nourished / developed  NEUROLOGIC:  Awake and orientated to person, place and time. PSYCH: Calm affect. SKIN: Warm and dry. HEENT: Sclera non-icteric, normocephalic, neck supple, no elevation of JVP, normal carotid pulses with no bruits and thyroid normal size.   LUNGS:  No increased work of breathing and clear to auscultation, no crackles or wheezing  CARDIOVASCULAR:  Regular rate and rhythm with no murmurs, gallops, rubs, or abnormal heart sounds, normal PMI. The apical impulses not displaced  JVP less than 8 cm H2O  Heart tones are crisp and normal  Cervical veins are not engorged  The carotid upstroke is normal in amplitude and contour without delay or bruit  JVP is not elevated  ABDOMEN:  Normal bowel sounds, non-distended and non-tender to palpation  EXT: No edema, no calf tenderness. Pulses are present bilaterally. DATA:    Lab Results   Component Value Date    ALT 19 01/22/2021    AST 21 01/22/2021    ALKPHOS 92 01/22/2021    BILITOT 0.8 01/22/2021     Lab Results   Component Value Date    CREATININE 0.7 01/22/2021    BUN 17 01/22/2021     01/22/2021    K 4.4 01/22/2021     01/22/2021    CO2 26 01/22/2021     Lab Results   Component Value Date    TSH 1.34 10/04/2019    D6JHZNU 0.83 10/04/2019     Lab Results   Component Value Date    WBC 5.3 10/02/2019    HGB 13.7 10/02/2019    HCT 41.0 10/02/2019    MCV 85.8 10/02/2019     10/02/2019     No components found for: CHLPL  Lab Results   Component Value Date    TRIG 131 01/22/2021    TRIG 182 (H) 10/04/2019    TRIG 88 11/09/2016     Lab Results   Component Value Date    HDL 44 01/22/2021    HDL 45 10/04/2019    HDL 56 11/09/2016     Lab Results   Component Value Date    LDLCALC 343 (H) 01/22/2021    LDLCALC 351 (H) 10/04/2019    LDLCALC >350 (H) 11/09/2016     Lab Results   Component Value Date    LABVLDL 26 01/22/2021    LABVLDL 36 10/04/2019    LABVLDL 13 03/23/2015     Radiology Review:  Pertinent images / reports were reviewed as a part of this visit and reveals the following:    Echo: May '16:  1. Left ventricle: The cavity size was normal. Wall thickness was     normal. Systolic function was mildly reduced. The estimated     ejection fraction was in the range of 45% to 50%. Diffuse     hypokinesis.  Doppler parameters are consistent with abnormal     left ventricular relaxation (grade 1 diastolic dysfunction). 2. Mitral valve: There was mild regurgitation. 3. Right ventricle: The cavity size was normal. Wall thickness was     normal. Systolic function was normal.  4. Pulmonary arteries: Systolic pressure was at the upper limits of     normal, in the range of 30mm Hg to 35mm Hg assuming that the     right atrial pressure is 10-15 mmHg.      Angiogram: 5/31/19  Findings:                      LM       Normal              LAD     Normal              Cx        OM1 trifurcation with superior and inferior branch 30% ostial lesions              RCA     40% mid X2               LVG     None              EDP     10 mmHg              EF 60%    Assessment:      Diagnosis Orders   1. Coronary artery disease involving native coronary artery of native heart without angina pectoris   ~c/o chest discomfort when walking briskly  ~known hx of non-obst disease of the RCA and OM-1  ~ASA / BB / CCB / Livalo EKG 12 lead    Stress test, myoview   2. Hypertension, essential   ~reasonable but would like to see better  ~will monitor     3. Mixed hyperlipidemia   ~LDL elevated   ~tolerating Livalo 2 mg daily complimented with CoQ 10  ~decline PCSK-9 ; intolerant to multiple statins including Welchol and Zetia Lipid, Fasting    Comprehensive Metabolic Panel    Lipid, Fasting    Hepatic Function Panel         I had the opportunity to review the clinical symptoms and presentation of Lauren Wooten. Plan:     1. Exercise myoview stress d/t episodes of chest discomfort  2. Fasting lipid profile / CMP today as routine   Begin Nexletol 180 mg daily then repeat lipid profile/LFTs after 4 weeks  3.  F/U in six months / test dependent    She will check her BP weekly and call after four weeks with her readings as will monitor during stress test    Overall the patient is stable from CV standpoint    I have addresed the patient's cardiac risk factors and adjusted pharmacologic treatment as needed. In addition, I have reinforced the need for patient directed risk factor modification. Further evaluation will be based upon the patient's clinical course and testing results. All questions and concerns were addressed to the patient. Alternatives to my treatment were discussed. The patient is not currently smoking: quit 33 yrs ago. The risks related to smoking were reviewed with the patient. Recommend maintaining a smoke-free lifestyle. Patient is on a beta-blocker  Patient is on an ace-i/ARB  Patient is on a statin    Antiplatelet therapy has been recommended / prescribed for this patient. Education conducted on adverse reactions including bleeding was discussed. The patient verbalizes understanding not to stop medications without discussing with us. Discussed exercise: 30-60 minutes 7 days/week ; tries to get out with daughter, 20 minutes nightly  Discussed Low saturated fat diet. Thank you for allowing to us to participate in the care of Jez Hernandez.     BRISSA Tracy    Documentation of today's visit sent to PCP

## 2021-06-16 ENCOUNTER — HOSPITAL ENCOUNTER (OUTPATIENT)
Age: 61
Discharge: HOME | End: 2021-06-16
Payer: COMMERCIAL

## 2021-06-16 DIAGNOSIS — E78.2 MIXED HYPERLIPIDEMIA: ICD-10-CM

## 2021-06-16 LAB
A/G RATIO: 1.6 (ref 1.1–2.2)
ALBUMIN SERPL-MCNC: 4.2 G/DL (ref 3.4–5)
ALP BLD-CCNC: 78 U/L (ref 40–129)
ALT SERPL-CCNC: 19 U/L (ref 10–40)
ANION GAP SERPL CALCULATED.3IONS-SCNC: 9 MMOL/L (ref 3–16)
AST SERPL-CCNC: 19 U/L (ref 15–37)
BILIRUB SERPL-MCNC: 0.7 MG/DL (ref 0–1)
BUN BLDV-MCNC: 14 MG/DL (ref 7–20)
CALCIUM SERPL-MCNC: 9 MG/DL (ref 8.3–10.6)
CHLORIDE BLD-SCNC: 105 MMOL/L (ref 99–110)
CHOLESTEROL, FASTING: 315 MG/DL (ref 0–199)
CO2: 26 MMOL/L (ref 21–32)
CREAT SERPL-MCNC: 0.6 MG/DL (ref 0.6–1.2)
GFR AFRICAN AMERICAN: >60
GFR NON-AFRICAN AMERICAN: >60
GLOBULIN: 2.7 G/DL
GLUCOSE BLD-MCNC: 93 MG/DL (ref 70–99)
HDLC SERPL-MCNC: 48 MG/DL (ref 40–60)
LDL CHOLESTEROL CALCULATED: 247 MG/DL
POTASSIUM SERPL-SCNC: 4.2 MMOL/L (ref 3.5–5.1)
SODIUM BLD-SCNC: 140 MMOL/L (ref 136–145)
TOTAL PROTEIN: 6.9 G/DL (ref 6.4–8.2)
TRIGLYCERIDE, FASTING: 101 MG/DL (ref 0–150)
VLDLC SERPL CALC-MCNC: 20 MG/DL

## 2021-06-16 PROCEDURE — 80061 LIPID PANEL: CPT

## 2021-06-16 PROCEDURE — 80053 COMPREHEN METABOLIC PANEL: CPT

## 2021-06-16 PROCEDURE — 36415 COLL VENOUS BLD VENIPUNCTURE: CPT

## 2021-06-17 ENCOUNTER — TELEPHONE (OUTPATIENT)
Dept: CARDIOLOGY CLINIC | Age: 61
End: 2021-06-17

## 2021-06-17 NOTE — TELEPHONE ENCOUNTER
----- Message from BRISSA Love CNP sent at 6/16/2021  4:01 PM EDT -----  LDL better compared to 4 months ago but remains high.  She started nexletol yesterday and will repeat labs after 4 weeks

## 2021-06-24 ENCOUNTER — HOSPITAL ENCOUNTER (OUTPATIENT)
Dept: NON INVASIVE DIAGNOSTICS | Age: 61
Discharge: HOME | End: 2021-06-24
Payer: COMMERCIAL

## 2021-06-24 LAB
LV EF: 69 %
LVEF MODALITY: NORMAL

## 2021-06-24 PROCEDURE — 3430000000 HC RX DIAGNOSTIC RADIOPHARMACEUTICAL: Performed by: NURSE PRACTITIONER

## 2021-06-24 PROCEDURE — A9502 TC99M TETROFOSMIN: HCPCS | Performed by: NURSE PRACTITIONER

## 2021-06-24 PROCEDURE — 78452 HT MUSCLE IMAGE SPECT MULT: CPT

## 2021-06-24 PROCEDURE — 93017 CV STRESS TEST TRACING ONLY: CPT | Performed by: INTERNAL MEDICINE

## 2021-06-24 RX ADMIN — TETROFOSMIN 30 MILLICURIE: 1.38 INJECTION, POWDER, LYOPHILIZED, FOR SOLUTION INTRAVENOUS at 08:15

## 2021-06-24 RX ADMIN — TETROFOSMIN 10 MILLICURIE: 1.38 INJECTION, POWDER, LYOPHILIZED, FOR SOLUTION INTRAVENOUS at 07:19

## 2021-06-24 NOTE — PROGRESS NOTES
Patient instructed on Issa Protocol Stress Test Procedure including possible side effects and adverse reactions. Verbalizes knowledge and understanding and denies having any questions.

## 2021-07-15 ENCOUNTER — TELEPHONE (OUTPATIENT)
Dept: CARDIOLOGY CLINIC | Age: 61
End: 2021-07-15

## 2021-07-15 NOTE — TELEPHONE ENCOUNTER
Called patient she has not done this yet because she was busy at work. She will check it and call us back. BRISSA London - CNP   7/14/2021  3:55 PM EDT       BP high during test. She was to call with her weekly BP readings (please call and see what she's been getting. Thanks     Patient remembered when we were talking that she did take a walk with her daughter and he bp was 157/80 about a week ago and resting a few minutes later was 137/80. She will be on vacation this week and will rest and will monitor bp's when she takes her daily walks with her daughter with exercise and rest.

## 2021-07-22 ENCOUNTER — OFFICE VISIT (OUTPATIENT)
Dept: INTERNAL MEDICINE CLINIC | Age: 61
End: 2021-07-22
Payer: COMMERCIAL

## 2021-07-22 VITALS
DIASTOLIC BLOOD PRESSURE: 80 MMHG | SYSTOLIC BLOOD PRESSURE: 138 MMHG | BODY MASS INDEX: 32.45 KG/M2 | OXYGEN SATURATION: 98 % | WEIGHT: 195 LBS | HEART RATE: 64 BPM

## 2021-07-22 DIAGNOSIS — E78.2 MIXED HYPERCHOLESTEROLEMIA AND HYPERTRIGLYCERIDEMIA: ICD-10-CM

## 2021-07-22 DIAGNOSIS — R73.01 IMPAIRED FASTING GLUCOSE: ICD-10-CM

## 2021-07-22 DIAGNOSIS — Z78.9 STATIN INTOLERANCE: ICD-10-CM

## 2021-07-22 DIAGNOSIS — I10 HYPERTENSION, ESSENTIAL: Primary | ICD-10-CM

## 2021-07-22 DIAGNOSIS — I25.10 CORONARY ARTERY DISEASE INVOLVING NATIVE CORONARY ARTERY OF NATIVE HEART WITHOUT ANGINA PECTORIS: Chronic | ICD-10-CM

## 2021-07-22 PROBLEM — H53.9 VISUAL DISTURBANCE: Status: RESOLVED | Noted: 2019-11-12 | Resolved: 2021-07-22

## 2021-07-22 PROBLEM — M20.019 MALLET FRACTURE, CLOSED, INITIAL ENCOUNTER: Status: RESOLVED | Noted: 2019-08-29 | Resolved: 2021-07-22

## 2021-07-22 PROBLEM — S62.639A MALLET FRACTURE, CLOSED, INITIAL ENCOUNTER: Status: RESOLVED | Noted: 2019-08-29 | Resolved: 2021-07-22

## 2021-07-22 PROBLEM — R49.9 CHANGE IN VOICE: Status: RESOLVED | Noted: 2020-06-09 | Resolved: 2021-07-22

## 2021-07-22 PROBLEM — T17.308A CHOKING: Status: RESOLVED | Noted: 2020-06-09 | Resolved: 2021-07-22

## 2021-07-22 PROCEDURE — 99214 OFFICE O/P EST MOD 30 MIN: CPT | Performed by: NURSE PRACTITIONER

## 2021-07-22 RX ORDER — AMLODIPINE BESYLATE 5 MG/1
2.5 TABLET ORAL DAILY
Qty: 45 TABLET | Refills: 3 | Status: SHIPPED | OUTPATIENT
Start: 2021-07-22 | End: 2022-09-01

## 2021-07-22 RX ORDER — PITAVASTATIN CALCIUM 2.09 MG/1
2 TABLET, FILM COATED ORAL NIGHTLY
Qty: 90 TABLET | Refills: 3 | Status: SHIPPED | OUTPATIENT
Start: 2021-07-22 | End: 2021-08-30 | Stop reason: SDUPTHER

## 2021-07-22 ASSESSMENT — ENCOUNTER SYMPTOMS
GASTROINTESTINAL NEGATIVE: 1
RESPIRATORY NEGATIVE: 1

## 2021-07-22 NOTE — PROGRESS NOTES
tablet Take 81 mg by mouth daily. No current facility-administered medications for this visit. Review of Systems   Constitutional: Negative. HENT: Negative. Respiratory: Negative. Cardiovascular: Negative. Gastrointestinal: Negative. Genitourinary: Negative. Musculoskeletal: Negative. Neurological: Negative. Psychiatric/Behavioral: Negative. All other systems reviewed and are negative. OBJECTIVE:  Physical Exam  Vitals reviewed. Constitutional:       General: She is not in acute distress. Appearance: She is well-developed. She is not diaphoretic. HENT:      Head: Normocephalic and atraumatic. Eyes:      General: No scleral icterus. Conjunctiva/sclera: Conjunctivae normal.   Neck:      Vascular: No JVD. Cardiovascular:      Rate and Rhythm: Normal rate and regular rhythm. Pulmonary:      Effort: Pulmonary effort is normal. No respiratory distress. Breath sounds: Normal breath sounds. No wheezing. Abdominal:      General: There is no distension. Palpations: Abdomen is soft. Tenderness: There is no abdominal tenderness. There is no guarding or rebound. Musculoskeletal:         General: Normal range of motion. Cervical back: Neck supple. Skin:     General: Skin is warm and dry. Neurological:      Mental Status: She is alert and oriented to person, place, and time. Psychiatric:         Behavior: Behavior normal.         Thought Content: Thought content normal.        /80   Pulse 64   Wt 195 lb (88.5 kg)   SpO2 98%   BMI 32.45 kg/m²      PHQ Scores 1/22/2021 2/6/2019 6/4/2018   PHQ2 Score 0 0 0   PHQ9 Score 0 0 0     Interpretation of Total Score Depression Severity: 1-4 = Minimal depression, 5-9 = Mild depression, 10-14 = Moderate depression, 15-19 = Moderately severe depression, 20-27 =Severe depression      ASSESSMENT/PLAN:  Konrad Rojas was seen today for hypertension.   Diagnoses and all orders for this visit:    Hypertension, essential  - Normotensive  - she has met JNC standards.  - Continue current regimen.  - Reports headache with higher dose of amlodipine. -     metoprolol tartrate (LOPRESSOR) 25 MG tablet; Take 1 tablet by mouth 2 times daily  -     amLODIPine (NORVASC) 5 MG tablet; Take 0.5 tablets by mouth daily    Mixed hypercholesterolemia and hypertriglyceridemia  -     pitavastatin (LIVALO) 2 MG TABS tablet; Take 1 tablet by mouth nightly    Statin intolerance  - Some cramping but not as severe on Livalo. LDL remains elevated but she is hesitant to take a higher dose due to tolerance  -     pitavastatin (LIVALO) 2 MG TABS tablet; Take 1 tablet by mouth nightly    Coronary artery disease involving native coronary artery of native heart without angina pectoris  -     pitavastatin (LIVALO) 2 MG TABS tablet; Take 1 tablet by mouth nightly  -     metoprolol tartrate (LOPRESSOR) 25 MG tablet;  Take 1 tablet by mouth 2 times daily    Impaired fasting glucose  -Glucose 93  -Monitor    Recommended shingles vaccination but she declined        BRISSA Bailey - CNP

## 2021-08-18 ENCOUNTER — TELEPHONE (OUTPATIENT)
Dept: INTERNAL MEDICINE CLINIC | Age: 61
End: 2021-08-18

## 2021-08-18 NOTE — TELEPHONE ENCOUNTER
----- Message from Markel Guerra sent at 8/18/2021  1:26 PM EDT -----  Subject: Message to Provider    QUESTIONS  Information for Provider? pt told me that her insurance company will not   refill her cholesterol medicine. Please give the pt a call back   ---------------------------------------------------------------------------  --------------  CALL BACK INFO  What is the best way for the office to contact you? OK to leave message on   voicemail, OK to respond with electronic message via [employer] portal (only   for patients who have registered [employer] account)  Preferred Call Back Phone Number? [phone]  ---------------------------------------------------------------------------  --------------  SCRIPT ANSWERS  Relationship to Patient?  Self

## 2021-08-18 NOTE — TELEPHONE ENCOUNTER
Tried to call pharmacy and phone rang and was not able to speak to anyone.  Someone will have to call tomorrow because I show that the script was sent to the pharmacy

## 2021-08-19 NOTE — TELEPHONE ENCOUNTER
Prior auth info sent to pool at [facility] to do on cover my meds -- could not verify elegibility ?!

## 2021-08-19 NOTE — TELEPHONE ENCOUNTER
[facility] probably needs prior auth     Called ti --left  msg for them to send us the reaon not filled   ie prior [employer]

## 2021-08-20 NOTE — TELEPHONE ENCOUNTER
Was able to get verification of Livalo 2MG tablets. It is a plan exclusion and is not covered by the plan. Will scan letter when received. Please notify patient, thank you.

## 2021-08-23 NOTE — TELEPHONE ENCOUNTER
Frustrating because she does not tolerate statins. I am not sure if this was ordered by cardiology if that would allow it to go through with her insurance or if they have samples?

## 2021-08-24 ENCOUNTER — TELEPHONE (OUTPATIENT)
Dept: INTERNAL MEDICINE CLINIC | Age: 61
End: 2021-08-24

## 2021-08-24 NOTE — TELEPHONE ENCOUNTER
Frustrating because she does not tolerate statins.     I am not sure if this was ordered by cardiology if that would allow it to go through with her insurance or if they have samples? Was able to get verification of Livalo 2MG tablets. It is a plan exclusion and is not covered by the plan. Will scan letter when received.     Please notify patient, thank you.

## 2021-08-25 ENCOUNTER — TELEPHONE (OUTPATIENT)
Dept: INTERNAL MEDICINE CLINIC | Age: 61
End: 2021-08-25

## 2021-08-25 NOTE — TELEPHONE ENCOUNTER
----- Message from rGaememiryam Roshan sent at 8/25/2021  1:49 PM EDT -----  Subject: Medication Problem    QUESTIONS  Name of Medication? pitavastatin (LIVALO) 2 MG TABS tablet  Patient-reported dosage and instructions? Take 1 tablet by mouth nightly  What question or problem do you have with the medication? Pt. called   stating her insurance will only pay for 1 month supply of the medicine at   one time versus 3 months. Preferred Pharmacy? [facility] [address] [place]  [address] [facility] phone number (if available)? [phone]  Additional Information for Provider?   ---------------------------------------------------------------------------  --------------  CALL BACK INFO  What is the best way for the office to contact you? OK to leave message on   voicemail  Preferred Call Back Phone Number? [phone]  ---------------------------------------------------------------------------  --------------  SCRIPT ANSWERS  Relationship to Patient?  Self

## 2021-08-25 NOTE — TELEPHONE ENCOUNTER
Spoke to the pt-she will  four sample boxes of Livalo 2 mg, Lot #[id number], Exp. 2/'23, at the [place] office. The Livalo is not available on her insurance plan.

## 2021-08-25 NOTE — TELEPHONE ENCOUNTER
Called to advise. She can contact the pharmacy to let them know she will be able to get the 30 day and they will just hold the refills for her.  Lm on vmail

## 2021-08-30 ENCOUNTER — TELEPHONE (OUTPATIENT)
Dept: INTERNAL MEDICINE CLINIC | Age: 61
End: 2021-08-30

## 2021-08-30 DIAGNOSIS — Z78.9 STATIN INTOLERANCE: ICD-10-CM

## 2021-08-30 DIAGNOSIS — E78.2 MIXED HYPERCHOLESTEROLEMIA AND HYPERTRIGLYCERIDEMIA: ICD-10-CM

## 2021-08-30 DIAGNOSIS — I25.10 CORONARY ARTERY DISEASE INVOLVING NATIVE CORONARY ARTERY OF NATIVE HEART WITHOUT ANGINA PECTORIS: Chronic | ICD-10-CM

## 2021-08-30 RX ORDER — PITAVASTATIN CALCIUM 2.09 MG/1
2 TABLET, FILM COATED ORAL NIGHTLY
Qty: 30 TABLET | Refills: 5 | Status: SHIPPED | OUTPATIENT
Start: 2021-08-30 | End: 2021-11-18 | Stop reason: SDUPTHER

## 2021-08-30 NOTE — TELEPHONE ENCOUNTER
Pt would like pitavastatin (LIVALO) 2 MG TABS tablet to be changed back to monthly refills. Her insurance told her they will fill it for 30 days at a time for free but will not cover it for 90 day supply. Send new script to [facility] [address] [facility] supply please and advise pt when done.

## 2021-10-03 ENCOUNTER — TELEPHONE (OUTPATIENT)
Dept: ENT CLINIC | Age: 61
End: 2021-10-03

## 2021-10-05 ENCOUNTER — VIRTUAL VISIT (OUTPATIENT)
Dept: PULMONOLOGY | Age: 61
End: 2021-10-05
Payer: COMMERCIAL

## 2021-10-05 DIAGNOSIS — G47.30 SLEEP APNEA IN ADULT: Primary | ICD-10-CM

## 2021-10-05 DIAGNOSIS — E66.09 CLASS 1 OBESITY DUE TO EXCESS CALORIES WITHOUT SERIOUS COMORBIDITY WITH BODY MASS INDEX (BMI) OF 32.0 TO 32.9 IN ADULT: ICD-10-CM

## 2021-10-05 DIAGNOSIS — I10 HYPERTENSION, ESSENTIAL: Chronic | ICD-10-CM

## 2021-10-05 PROCEDURE — 99214 OFFICE O/P EST MOD 30 MIN: CPT | Performed by: NURSE PRACTITIONER

## 2021-10-05 ASSESSMENT — SLEEP AND FATIGUE QUESTIONNAIRES
HOW LIKELY ARE YOU TO NOD OFF OR FALL ASLEEP WHILE SITTING QUIETLY AFTER LUNCH WITHOUT ALCOHOL: 0
HOW LIKELY ARE YOU TO NOD OFF OR FALL ASLEEP WHEN YOU ARE A PASSENGER IN A CAR FOR AN HOUR WITHOUT A BREAK: 0
HOW LIKELY ARE YOU TO NOD OFF OR FALL ASLEEP IN A CAR, WHILE STOPPED FOR A FEW MINUTES IN TRAFFIC: 0
HOW LIKELY ARE YOU TO NOD OFF OR FALL ASLEEP WHILE LYING DOWN TO REST IN THE AFTERNOON WHEN CIRCUMSTANCES PERMIT: 2
HOW LIKELY ARE YOU TO NOD OFF OR FALL ASLEEP WHILE SITTING AND TALKING TO SOMEONE: 0
HOW LIKELY ARE YOU TO NOD OFF OR FALL ASLEEP WHILE WATCHING TV: 0
HOW LIKELY ARE YOU TO NOD OFF OR FALL ASLEEP WHILE SITTING AND READING: 0
ESS TOTAL SCORE: 2
HOW LIKELY ARE YOU TO NOD OFF OR FALL ASLEEP WHILE SITTING INACTIVE IN A PUBLIC PLACE: 0

## 2021-10-05 NOTE — PROGRESS NOTES
Margarita Hoskins MD, FAASM, [facility]  Miguel Ledezma, MSN, RN, [address] [address]  Dept: [phone]  Dept Fax: [phone]  Loc: [phone]    Subjective:     Patient ID: Charli Alegre is a 62 y.o. female. Chief Complaint   Patient presents with    Sleep Apnea       HPI:      Sleep Medicine Video Visit    Pursuant to the emergency declaration under the [address] [place] [address] waiver authority and the [employer] Act this Telephone Visit was insisted, with patient's consent, to reduce the patient's risk of exposure to COVID-19 and provide continuity of care for an established patient. Services were provided through a synchronous discussion over a telephone and/or Video chat to substitute for in-person clinic visit, and coded as such. While patient is at home.     Machine Modem/Download Info:  Compliance (hours/night): 3.6 hrs/night  Download AHI (/hour): 0.4 /HR  Average CPAP Pressure : 7 cmH2O           APAP - Settings  Pressure Min: 6 cmH2O  Pressure Max: 16 cmH2O                 Comfort Settings  Humidity Level (0-8): 3  Flex/EPR (0-3): 3 PAP Mask  Mask Type: Nasal mask  Clinically Relevant Leak: No     [place] - Total score: 2    Follow-up :     Last Visit : March 2021      Subjective Health Changes: None      Over Night Oximetry: [x] Yes  [] No  [] NA [x] WNL   Using O2: [] Yes  [x] No  [] NA   Patient is compliant with the machine  [] Yes  [x] No [] Per patient   Feeling rested when using the machine   [x] Yes  [] No     Pressure is comfortable with inspiration and expiration  [x] Yes  [] No   ([x] NA   [] Feeling of suffocation  [] Feeling like not enough air    [] To much pressure)     Noticed changes in pressure  [x] NA  [] Yes    [] No     Mask is fitting well  [x] Yes  [] No   Noting Mask Air Leak  [] Yes  [x] No Having painful Aerophagia  [] Yes  [x] No   Nocturia   0-1  per night. Having  HA upon waking  [] Yes  [x] No   Dry mouth upon waking   Dry Nose  Dry Eyes  [x] Yes  [] No   Congestion upon waking   [x] Yes  [] No    Nose Bleeds  [] Yes  [x] No   Using Sleep Aides  [x] NA  [] OTC  [] Per our office   [] Per another provider   Understands how to change humidification and/or tubing temperature for comfort while at home  [x] Yes  [] No     Difficulties falling asleep  [] Yes  [x] No   Difficulties staying asleep  [] Yes  [x] No   Approximate time to bed  9-9:30pm   Approximate wake time  4:15-4:30am   Taking Naps  no   If taking naps usual length  [x] NA   If taking naps using the machine [x] NA  [] Yes    [] No    [] With and With out    Drowsy when driving  [] Yes  [x] No     Does patient carry a DOT/CDL  [] Yes  [x] No     Does patient carry FAA/[employer] License   [] Yes  [x] No      Any concerns noted with the machine at this time  [] Yes  [x] No       Assessment/Plan:     1. Sleep apnea in adult  Assessment & Plan:  After downloading data and reviewing  Reviewed compliance download with pt. Supplies and parts as needed for his machine. These are medically necessary. Continue medications per his PCP and other physicians. Limit caffeine use after 3pm.    The chronic medical conditions listed are directly related to the primary diagnosis listed above. The management of the primary diagnosis affects the secondary diagnosis and vice versa    Patient is NOT compliant at this time. Increasing the risk of heart attacks, strokes, car accidents, and/or death from uncontrolled Obstructive Sleep Apnea      2. Hypertension, essential  Assessment & Plan:  Chronic- stable. After speaking with patient:    Agree with current plan, and would agree to continue this plan per prescribing and managing physician.       3. Class 1 obesity due to excess calories without serious comorbidity with body mass index (BMI) of 32.0 to 32.9 in adult  Assessment & Plan:  Patient encouraged to work on maintaining a healthy weight per height. Achievable with diet restriction/modifications and exercise (may consult primary care if unsure of any restrictions or concerns). Weight management directly correlates to risk of control and maintenance of Obstructive Sleep Apnea. The chronic medical conditions listed are directly related to the primary diagnosis listed above. The management of the primary diagnosis affects the secondary diagnosis and vice versa. - After pulling data and reviewing it   - Educated patient and reviewed down load from mode with the patient   - Continue medications per her PCP and other physicians.   - she instructed not to drive unless had 4 hrs of effective therapy for her BRAD the night before. - Did review the risks of under or untreated BRAD including, but not limited to, higher risks of motor vehicle accidents, stroke, heart attacks, and death. - she understands and accepts all these risks.     - The patient is advised to use the machine every night.   - Patient using  [employer] for supplies  - Patient educated on   Tube Temperature  Humidifier  (if you are dry turn it high)  Napping with the machine  Removing the mask if not enough pressure or humidity   Contacting the DME for ordering issues/Mask refit  Rain Out   Office locations  Compliance  Follow up  The risk of undercontrolling Obstructive sleep apnea  Recall Information and DME contact    -Will follow up in office in 6 months      Electronically signed by BRISSA Reich CNP on 10/5/2021 at 4:17 PM

## 2021-11-18 ENCOUNTER — PATIENT MESSAGE (OUTPATIENT)
Dept: CARDIOLOGY CLINIC | Age: 61
End: 2021-11-18

## 2021-11-18 DIAGNOSIS — I25.10 CORONARY ARTERY DISEASE INVOLVING NATIVE CORONARY ARTERY OF NATIVE HEART WITHOUT ANGINA PECTORIS: Chronic | ICD-10-CM

## 2021-11-18 DIAGNOSIS — E78.2 MIXED HYPERCHOLESTEROLEMIA AND HYPERTRIGLYCERIDEMIA: ICD-10-CM

## 2021-11-18 DIAGNOSIS — Z78.9 STATIN INTOLERANCE: ICD-10-CM

## 2021-11-18 RX ORDER — PITAVASTATIN CALCIUM 2.09 MG/1
2 TABLET, FILM COATED ORAL NIGHTLY
Qty: 30 TABLET | Refills: 5 | Status: SHIPPED | OUTPATIENT
Start: 2021-11-18 | End: 2022-07-26

## 2021-11-18 NOTE — TELEPHONE ENCOUNTER
From: Rush Trejo  To: Víctor Lainez  Sent: 11/18/2021 9:21 AM EST  Subject: Prescription Question    Ernst Reeder will not feel my cholesterol medicine is there a way that you can send that to [facility]/ [facility] [address]

## 2021-11-18 NOTE — TELEPHONE ENCOUNTER
Received refill request for livalo from D[facility].     Last ov:6/15/2021 NPTS    Last labs:lipid, cmp 6/16/2021    Last Refill:8/30/2021 #30 with 5 refills to [facility] to [facility]    Next appointment:12/15/2021 NPTS

## 2021-11-22 ENCOUNTER — TELEPHONE (OUTPATIENT)
Dept: CARDIOLOGY CLINIC | Age: 61
End: 2021-11-22

## 2021-11-22 NOTE — TELEPHONE ENCOUNTER
Called patient did a cover my meds PA for livalo 2 mg qd not covered. Called [facility] to ask why its not covered. ,they stated its not covered because its a formulary change and they could not tell me which drug is preferred. She will call Insurance to see what they tell her and My chart Lynn a message.

## 2021-12-07 ENCOUNTER — TELEPHONE (OUTPATIENT)
Dept: PULMONOLOGY | Age: 61
End: 2021-12-07

## 2021-12-07 DIAGNOSIS — G47.30 SLEEP APNEA IN ADULT: Primary | ICD-10-CM

## 2021-12-13 ENCOUNTER — APPOINTMENT (OUTPATIENT)
Dept: GENERAL RADIOLOGY | Age: 61
DRG: 871 | End: 2021-12-13
Payer: COMMERCIAL

## 2021-12-13 ENCOUNTER — HOSPITAL ENCOUNTER (INPATIENT)
Age: 61
LOS: 15 days | Discharge: HOME | DRG: 871 | End: 2021-12-28
Attending: INTERNAL MEDICINE | Admitting: INTERNAL MEDICINE
Payer: COMMERCIAL

## 2021-12-13 ENCOUNTER — NURSE TRIAGE (OUTPATIENT)
Dept: OTHER | Facility: CLINIC | Age: 61
End: 2021-12-13

## 2021-12-13 DIAGNOSIS — E87.20 LACTIC ACIDOSIS: ICD-10-CM

## 2021-12-13 DIAGNOSIS — U07.1 PNEUMONIA DUE TO COVID-19 VIRUS: ICD-10-CM

## 2021-12-13 DIAGNOSIS — J18.9 MULTIFOCAL PNEUMONIA: ICD-10-CM

## 2021-12-13 DIAGNOSIS — D72.829 LEUKOCYTOSIS, UNSPECIFIED TYPE: ICD-10-CM

## 2021-12-13 DIAGNOSIS — J12.82 PNEUMONIA DUE TO COVID-19 VIRUS: ICD-10-CM

## 2021-12-13 DIAGNOSIS — U07.1 ACUTE HYPOXEMIC RESPIRATORY FAILURE DUE TO COVID-19 (HCC): Primary | ICD-10-CM

## 2021-12-13 DIAGNOSIS — J96.01 ACUTE HYPOXEMIC RESPIRATORY FAILURE DUE TO COVID-19 (HCC): Primary | ICD-10-CM

## 2021-12-13 DIAGNOSIS — E87.1 HYPONATREMIA: ICD-10-CM

## 2021-12-13 LAB
A/G RATIO: 1.2 (ref 1.1–2.2)
ALBUMIN SERPL-MCNC: 4 G/DL (ref 3.4–5)
ALP BLD-CCNC: 67 U/L (ref 40–129)
ALT SERPL-CCNC: 20 U/L (ref 10–40)
ANION GAP SERPL CALCULATED.3IONS-SCNC: 14 MMOL/L (ref 3–16)
AST SERPL-CCNC: 29 U/L (ref 15–37)
BACTERIA: ABNORMAL /HPF
BASE EXCESS VENOUS: -0.3 MMOL/L (ref -3–3)
BASOPHILS ABSOLUTE: 0 K/UL (ref 0–0.2)
BASOPHILS RELATIVE PERCENT: 0.5 %
BILIRUB SERPL-MCNC: 0.7 MG/DL (ref 0–1)
BILIRUBIN URINE: NEGATIVE
BLOOD, URINE: NEGATIVE
BUN BLDV-MCNC: 15 MG/DL (ref 7–20)
C-REACTIVE PROTEIN: 64.3 MG/L (ref 0–5.1)
CALCIUM SERPL-MCNC: 8.5 MG/DL (ref 8.3–10.6)
CARBOXYHEMOGLOBIN: 2.9 % (ref 0–1.5)
CHLORIDE BLD-SCNC: 96 MMOL/L (ref 99–110)
CLARITY: CLEAR
CO2: 21 MMOL/L (ref 21–32)
COLOR: YELLOW
CREAT SERPL-MCNC: 0.7 MG/DL (ref 0.6–1.2)
D DIMER: 419 NG/ML DDU (ref 0–229)
EOSINOPHILS ABSOLUTE: 0 K/UL (ref 0–0.6)
EOSINOPHILS RELATIVE PERCENT: 0 %
GFR AFRICAN AMERICAN: >60
GFR NON-AFRICAN AMERICAN: >60
GLUCOSE BLD-MCNC: 135 MG/DL (ref 70–99)
GLUCOSE URINE: NEGATIVE MG/DL
HCO3 VENOUS: 22 MMOL/L (ref 23–29)
HCT VFR BLD CALC: 42.2 % (ref 36–48)
HEMOGLOBIN: 14.4 G/DL (ref 12–16)
KETONES, URINE: NEGATIVE MG/DL
LACTIC ACID, SEPSIS: 1.1 MMOL/L (ref 0.4–1.9)
LACTIC ACID, SEPSIS: ABNORMAL MMOL/L (ref 0.4–1.9)
LEUKOCYTE ESTERASE, URINE: ABNORMAL
LYMPHOCYTES ABSOLUTE: 0.8 K/UL (ref 1–5.1)
LYMPHOCYTES RELATIVE PERCENT: 8.7 %
MAGNESIUM: 1.8 MG/DL (ref 1.8–2.4)
MCH RBC QN AUTO: 28.2 PG (ref 26–34)
MCHC RBC AUTO-ENTMCNC: 34.2 G/DL (ref 31–36)
MCV RBC AUTO: 82.7 FL (ref 80–100)
METHEMOGLOBIN VENOUS: 0.4 %
MICROSCOPIC EXAMINATION: YES
MONOCYTES ABSOLUTE: 0.5 K/UL (ref 0–1.3)
MONOCYTES RELATIVE PERCENT: 6 %
NEUTROPHILS ABSOLUTE: 7.5 K/UL (ref 1.7–7.7)
NEUTROPHILS RELATIVE PERCENT: 84.8 %
NITRITE, URINE: NEGATIVE
O2 CONTENT, VEN: 21 VOL %
O2 SAT, VEN: 99 %
O2 THERAPY: ABNORMAL
PCO2, VEN: 29.4 MMHG (ref 40–50)
PDW BLD-RTO: 14 % (ref 12.4–15.4)
PH UA: 6 (ref 5–8)
PH VENOUS: 7.48 (ref 7.35–7.45)
PLATELET # BLD: 135 K/UL (ref 135–450)
PLATELET SLIDE REVIEW: ADEQUATE
PMV BLD AUTO: 7.7 FL (ref 5–10.5)
PO2, VEN: 130 MMHG (ref 25–40)
POTASSIUM REFLEX MAGNESIUM: 3.5 MMOL/L (ref 3.5–5.1)
PRO-BNP: 55 PG/ML (ref 0–124)
PROCALCITONIN: 0.15 NG/ML (ref 0–0.15)
PROTEIN UA: ABNORMAL MG/DL
RBC # BLD: 5.1 M/UL (ref 4–5.2)
RBC UA: ABNORMAL /HPF (ref 0–4)
REASON FOR REJECTION: NORMAL
REJECTED TEST: NORMAL
SLIDE REVIEW: ABNORMAL
SODIUM BLD-SCNC: 131 MMOL/L (ref 136–145)
SPECIFIC GRAVITY UA: 1.01 (ref 1–1.03)
TCO2 CALC VENOUS: 51 MMOL/L
TOTAL PROTEIN: 7.3 G/DL (ref 6.4–8.2)
TROPONIN: <0.01 NG/ML
URINE REFLEX TO CULTURE: ABNORMAL
URINE TYPE: ABNORMAL
UROBILINOGEN, URINE: 1 E.U./DL
WBC # BLD: 8.9 K/UL (ref 4–11)
WBC UA: ABNORMAL /HPF (ref 0–5)

## 2021-12-13 PROCEDURE — 80053 COMPREHEN METABOLIC PANEL: CPT

## 2021-12-13 PROCEDURE — 85025 COMPLETE CBC W/AUTO DIFF WBC: CPT

## 2021-12-13 PROCEDURE — 6360000002 HC RX W HCPCS: Performed by: PHYSICIAN ASSISTANT

## 2021-12-13 PROCEDURE — 82803 BLOOD GASES ANY COMBINATION: CPT

## 2021-12-13 PROCEDURE — 83880 ASSAY OF NATRIURETIC PEPTIDE: CPT

## 2021-12-13 PROCEDURE — 6370000000 HC RX 637 (ALT 250 FOR IP): Performed by: PHYSICIAN ASSISTANT

## 2021-12-13 PROCEDURE — 1200000000 HC SEMI PRIVATE

## 2021-12-13 PROCEDURE — 85379 FIBRIN DEGRADATION QUANT: CPT

## 2021-12-13 PROCEDURE — 71045 X-RAY EXAM CHEST 1 VIEW: CPT

## 2021-12-13 PROCEDURE — 86140 C-REACTIVE PROTEIN: CPT

## 2021-12-13 PROCEDURE — 84484 ASSAY OF TROPONIN QUANT: CPT

## 2021-12-13 PROCEDURE — 2580000003 HC RX 258: Performed by: PHYSICIAN ASSISTANT

## 2021-12-13 PROCEDURE — 36415 COLL VENOUS BLD VENIPUNCTURE: CPT

## 2021-12-13 PROCEDURE — 96365 THER/PROPH/DIAG IV INF INIT: CPT

## 2021-12-13 PROCEDURE — 96375 TX/PRO/DX INJ NEW DRUG ADDON: CPT

## 2021-12-13 PROCEDURE — 84145 PROCALCITONIN (PCT): CPT

## 2021-12-13 PROCEDURE — 83605 ASSAY OF LACTIC ACID: CPT

## 2021-12-13 PROCEDURE — 83735 ASSAY OF MAGNESIUM: CPT

## 2021-12-13 PROCEDURE — 2580000003 HC RX 258: Performed by: INTERNAL MEDICINE

## 2021-12-13 PROCEDURE — 93005 ELECTROCARDIOGRAM TRACING: CPT | Performed by: PHYSICIAN ASSISTANT

## 2021-12-13 PROCEDURE — 81001 URINALYSIS AUTO W/SCOPE: CPT

## 2021-12-13 PROCEDURE — 99284 EMERGENCY DEPT VISIT MOD MDM: CPT

## 2021-12-13 PROCEDURE — 96361 HYDRATE IV INFUSION ADD-ON: CPT

## 2021-12-13 RX ORDER — SODIUM CHLORIDE, SODIUM LACTATE, POTASSIUM CHLORIDE, CALCIUM CHLORIDE 600; 310; 30; 20 MG/100ML; MG/100ML; MG/100ML; MG/100ML
1000 INJECTION, SOLUTION INTRAVENOUS ONCE
Status: COMPLETED | OUTPATIENT
Start: 2021-12-13 | End: 2021-12-13

## 2021-12-13 RX ORDER — SODIUM CHLORIDE 9 MG/ML
25 INJECTION, SOLUTION INTRAVENOUS PRN
Status: DISCONTINUED | OUTPATIENT
Start: 2021-12-13 | End: 2021-12-28 | Stop reason: HOSPADM

## 2021-12-13 RX ORDER — DEXAMETHASONE SODIUM PHOSPHATE 10 MG/ML
6 INJECTION, SOLUTION INTRAMUSCULAR; INTRAVENOUS ONCE
Status: COMPLETED | OUTPATIENT
Start: 2021-12-13 | End: 2021-12-13

## 2021-12-13 RX ORDER — AMLODIPINE BESYLATE 5 MG/1
2.5 TABLET ORAL DAILY
Status: DISCONTINUED | OUTPATIENT
Start: 2021-12-13 | End: 2021-12-28 | Stop reason: HOSPADM

## 2021-12-13 RX ORDER — DEXAMETHASONE SODIUM PHOSPHATE 10 MG/ML
10 INJECTION, SOLUTION INTRAMUSCULAR; INTRAVENOUS EVERY 24 HOURS
Status: DISCONTINUED | OUTPATIENT
Start: 2021-12-18 | End: 2021-12-17

## 2021-12-13 RX ORDER — ATORVASTATIN CALCIUM 10 MG/1
10 TABLET, FILM COATED ORAL DAILY
Status: DISCONTINUED | OUTPATIENT
Start: 2021-12-13 | End: 2021-12-13

## 2021-12-13 RX ORDER — ASPIRIN 81 MG/1
81 TABLET ORAL DAILY
Status: DISCONTINUED | OUTPATIENT
Start: 2021-12-13 | End: 2021-12-28 | Stop reason: HOSPADM

## 2021-12-13 RX ORDER — SODIUM CHLORIDE 0.9 % (FLUSH) 0.9 %
5-40 SYRINGE (ML) INJECTION EVERY 12 HOURS SCHEDULED
Status: DISCONTINUED | OUTPATIENT
Start: 2021-12-13 | End: 2021-12-28 | Stop reason: HOSPADM

## 2021-12-13 RX ORDER — FAMOTIDINE 20 MG/1
20 TABLET, FILM COATED ORAL 2 TIMES DAILY
Status: DISCONTINUED | OUTPATIENT
Start: 2021-12-13 | End: 2021-12-28 | Stop reason: HOSPADM

## 2021-12-13 RX ORDER — ACETAMINOPHEN 500 MG
1000 TABLET ORAL ONCE
Status: COMPLETED | OUTPATIENT
Start: 2021-12-13 | End: 2021-12-13

## 2021-12-13 RX ORDER — ACETAMINOPHEN 325 MG/1
650 TABLET ORAL EVERY 6 HOURS PRN
Status: DISCONTINUED | OUTPATIENT
Start: 2021-12-13 | End: 2021-12-28 | Stop reason: HOSPADM

## 2021-12-13 RX ORDER — NITROGLYCERIN 0.4 MG/1
0.4 TABLET SUBLINGUAL EVERY 5 MIN PRN
Status: DISCONTINUED | OUTPATIENT
Start: 2021-12-13 | End: 2021-12-28 | Stop reason: HOSPADM

## 2021-12-13 RX ORDER — ONDANSETRON 4 MG/1
4 TABLET, ORALLY DISINTEGRATING ORAL EVERY 8 HOURS PRN
Status: DISCONTINUED | OUTPATIENT
Start: 2021-12-13 | End: 2021-12-28 | Stop reason: HOSPADM

## 2021-12-13 RX ORDER — SODIUM CHLORIDE 0.9 % (FLUSH) 0.9 %
5-40 SYRINGE (ML) INJECTION PRN
Status: DISCONTINUED | OUTPATIENT
Start: 2021-12-13 | End: 2021-12-28 | Stop reason: HOSPADM

## 2021-12-13 RX ORDER — VITAMIN B COMPLEX
2000 TABLET ORAL DAILY
Status: DISCONTINUED | OUTPATIENT
Start: 2021-12-20 | End: 2021-12-28 | Stop reason: HOSPADM

## 2021-12-13 RX ORDER — ONDANSETRON 2 MG/ML
4 INJECTION INTRAMUSCULAR; INTRAVENOUS EVERY 6 HOURS PRN
Status: DISCONTINUED | OUTPATIENT
Start: 2021-12-13 | End: 2021-12-28 | Stop reason: HOSPADM

## 2021-12-13 RX ORDER — ACETAMINOPHEN 650 MG/1
650 SUPPOSITORY RECTAL EVERY 6 HOURS PRN
Status: DISCONTINUED | OUTPATIENT
Start: 2021-12-13 | End: 2021-12-28 | Stop reason: HOSPADM

## 2021-12-13 RX ORDER — DEXAMETHASONE 4 MG/1
6 TABLET ORAL DAILY
Status: DISCONTINUED | OUTPATIENT
Start: 2021-12-13 | End: 2021-12-13

## 2021-12-13 RX ORDER — POLYETHYLENE GLYCOL 3350 17 G/17G
17 POWDER, FOR SOLUTION ORAL DAILY PRN
Status: DISCONTINUED | OUTPATIENT
Start: 2021-12-13 | End: 2021-12-28 | Stop reason: HOSPADM

## 2021-12-13 RX ORDER — GUAIFENESIN/DEXTROMETHORPHAN 100-10MG/5
5 SYRUP ORAL EVERY 4 HOURS PRN
Status: DISCONTINUED | OUTPATIENT
Start: 2021-12-13 | End: 2021-12-28 | Stop reason: HOSPADM

## 2021-12-13 RX ADMIN — AZITHROMYCIN MONOHYDRATE 500 MG: 500 INJECTION, POWDER, LYOPHILIZED, FOR SOLUTION INTRAVENOUS at 16:26

## 2021-12-13 RX ADMIN — ACETAMINOPHEN 1000 MG: 500 TABLET ORAL at 14:19

## 2021-12-13 RX ADMIN — Medication 1000 MG: at 16:21

## 2021-12-13 RX ADMIN — Medication 10 ML: at 21:00

## 2021-12-13 RX ADMIN — DEXAMETHASONE SODIUM PHOSPHATE 6 MG: 10 INJECTION, SOLUTION INTRAMUSCULAR; INTRAVENOUS at 15:00

## 2021-12-13 RX ADMIN — SODIUM CHLORIDE, POTASSIUM CHLORIDE, SODIUM LACTATE AND CALCIUM CHLORIDE 1000 ML: 600; 310; 30; 20 INJECTION, SOLUTION INTRAVENOUS at 15:00

## 2021-12-13 ASSESSMENT — ENCOUNTER SYMPTOMS
SHORTNESS OF BREATH: 1
COUGH: 1
CHEST TIGHTNESS: 0
NAUSEA: 0
ABDOMINAL PAIN: 0
VOMITING: 0
DIARRHEA: 1

## 2021-12-13 ASSESSMENT — PAIN SCALES - GENERAL
PAINLEVEL_OUTOF10: 0
PAINLEVEL_OUTOF10: 0
PAINLEVEL_OUTOF10: 7

## 2021-12-13 ASSESSMENT — PAIN DESCRIPTION - LOCATION: LOCATION: GENERALIZED

## 2021-12-13 ASSESSMENT — PAIN DESCRIPTION - PAIN TYPE: TYPE: ACUTE PAIN

## 2021-12-13 NOTE — ED NOTES
Bed: 21  Expected date:   Expected time:   Means of arrival:   Comments:  Giovanna Sin RN  12/13/21 [id number]

## 2021-12-13 NOTE — TELEPHONE ENCOUNTER
Received call from Belinda Moctezuma 894 at [facility] with Red Flag Complaint. Brief description of triage: Patient Covid + as of 12/8/21. Patient feeling better today than she has in last 4 days, but having shortness of breath with walking that started today. Patient pulse ox reading 80-85%. Triage indicates for patient to Go to Office Now. Writer concerned with low pulse ox reading. Decision to do 2nd level triage. 2nd level triage completed with Subha Mckeon, office staff at [facility]. Saeed Phillips stated that they are not seeing any Covid + patients in the office and that patient should be directed to ED. Writer relayed information to patient and advised patient to be seen in ED. Patient agreeable,  will drive patient. Care advice provided, patient verbalizes understanding; denies any other questions or concerns; instructed to call back for any new or worsening symptoms. Attention Provider: Thank you for allowing me to participate in the care of your patient. The patient was connected to triage in response to information provided to the [facility]/PSC. Please do not respond through this encounter as the response is not directed to a shared pool. Reason for Disposition   Oxygen level (e.g., pulse oximetry) 91 to 94 percent    Answer Assessment - Initial Assessment Questions  1. COVID-19 ONSET: \"When did the symptoms of COVID-19 first start?\"      12/8/21    2. DIAGNOSIS CONFIRMATION: \"How were you diagnosed? \" (e.g., COVID-19 oral or nasal viral test; COVID-19 antibody test; doctor visit)      Nasal swab at [facility]    3. MAIN SYMPTOM:  Nestora Dakin is your main concern or symptom right now? \" (e.g., breathing difficulty, cough, fatigue. loss of smell)      When she gets up to walk around she has shortness of breath. Not wheezing. When she tries to breathe in she coughs. 4. SYMPTOM ONSET: \"When did the breathing difficulty  start? \"      Today    5.  BETTER-SAME-WORSE: \"Are you getting better, staying the same, or getting worse over the last 1 to 2 weeks? \"      She feels better than she has in the last four days. 6. RECENT MEDICAL VISIT: Danae Ramos you been seen by a healthcare provider (doctor, NP, PA) for these persisting COVID-19 symptoms? \" If Yes, ask: \"When were you seen? \" (e.g., date)      No    7. COUGH: \"Do you have a cough? \" If Yes, ask: \"How bad is the cough? \"        Yes    8. FEVER: \"Do you have a fever? \" If Yes, ask: \"What is your temperature, how was it measured, and when did it start? \"      Denies     9. BREATHING DIFFICULTY: Valeen Bame you having any trouble breathing? \" If Yes, ask: \"How bad is your breathing? \" (e.g., mild, moderate, severe)     - MILD: No SOB at rest, mild SOB with walking, speaks normally in sentences, can lay down, no retractions, pulse < 100.     - MODERATE: SOB at rest, SOB with minimal exertion and prefers to sit, cannot lie down flat, speaks in phrases, mild retractions, audible wheezing, pulse 100-120.     - SEVERE: Very SOB at rest, speaks in single words, struggling to breathe, sitting hunched forward, retractions, pulse > 120        No difficulty breathing at rest. Only when she walks around. 10. HIGH RISK DISEASE: \"Do you have any chronic medical problems? \" (e.g., asthma, heart or lung disease, weak immune system, obesity, etc.)        A blockage in heart, currently being treated for it with medication. 11. PREGNANCY: \"Is there any chance you are pregnant? \" \"When was your last menstrual period? \"        NA  12. OTHER SYMPTOMS: \"Do you have any other symptoms? \"  (e.g., fatigue, headache, muscle pain, weakness)        Diarrhea, nausea, cough, and SOB with exertion.     Protocols used: COVID-19 - PERSISTING SYMPTOMS FOLLOW-UP CALL-ADULT-[place]

## 2021-12-13 NOTE — PROGRESS NOTES
4 Eyes Skin Assessment     NAME:  Miguel Kaye  YOB: 1960  MEDICAL RECORD NUMBER:  [id number]    The patient is being assess for  Admission    I agree that 2 RN's have performed a thorough Head to Toe Skin Assessment on the patient. ALL assessment sites listed below have been assessed. Areas assessed by both nurses:    Head, Face, Ears, Shoulders, Back, Chest, Arms, Elbows, Hands, Sacrum. Buttock, Coccyx, Ischium and Legs. Feet and Heels        Does the Patient have a Wound?  No noted wound(s)       Gutirerez Prevention initiated:  No   Wound Care Orders initiated:  No    Pressure Injury (Stage 3,4, Unstageable, DTI, NWPT, and Complex wounds) if present place consult order under [de-identified] n/a    New and Established Ostomies if present place consult order under : N/a    Nurse 1 eSignature: Electronically signed by Francisco Shen RN on 12/13/21 at 6:58 PM EST    **SHARE this note so that the co-signing nurse is able to place an eSignature**    Nurse 2 eSignature: Electronically signed by Franco Alvarado RN on 12/13/21 at 7:31 PM EST

## 2021-12-13 NOTE — ED PROVIDER NOTES
[facility]        Pt Name: Salvador Buckley  MRN: [id number]  [place] 1960  Date of evaluation: 12/13/2021  Provider: Debra Fan PA-C  PCP: BRISSA Smith CNP  Note Started: 2:05 PM EST       ZARA. I have evaluated this patient. My supervising physician was available for consultation. CHIEF COMPLAINT       Chief Complaint   Patient presents with    Positive For Covid-19     DX w 12/8. States her oxygen saturation was 85% @ home. HISTORY OF PRESENT ILLNESS   (Location, Timing/Onset, Context/Setting, Quality, Duration, Modifying Factors, Severity, Associated Signs and Symptoms)  Note limiting factors. Chief Complaint: Covid hypoxia    Salvador Buckley is a 62 y.o. female who presents to the emergency department as it pertains to difficulties with Covid hypoxia. Patient has tested positive at [facility] urgent care on December 8 for Covid. She has been having difficulties as a pertains to some symptoms. She is been able to check pulse oximetry is in the home environment which today is the lowest she seen in the range of 85. She has had a fever and she has had a cough that is relatively nonproductive. She states she does have all over body aches. Her current level of body aches rates to be 7 out of 10. She denies that she is experiencing chest pain or palpitations. She denies that she is having abdominal or flank pain but has had some diarrhea. She is had no nausea or vomiting. She goes on to report she has not had any medication for her fever today. She denies unilateral leg pain or swelling denies a history of DVT and or PE and has no additional risk factors other than the possibility of COVID for thromboembolic events. Patient presents to the ED for evaluation and treatment in light of the above-mentioned.     Nursing Notes were all reviewed and agreed with or any disagreements were addressed in the HPI. REVIEW OF SYSTEMS    (2-9 systems for level 4, 10 or more for level 5)     Review of Systems   Constitutional: Positive for fatigue and fever. Negative for activity change and chills. Respiratory: Positive for cough and shortness of breath. Negative for chest tightness. Cardiovascular: Negative for chest pain. Gastrointestinal: Positive for diarrhea. Negative for abdominal pain, nausea and vomiting. Genitourinary: Negative for dysuria and flank pain. Musculoskeletal: Positive for myalgias. Skin: Negative for rash and wound. Neurological: Negative for seizures. All other systems reviewed and are negative. Positives and Pertinent negatives as per HPI. Except as noted above in the ROS, all other systems were reviewed and negative.        PAST MEDICAL HISTORY     Past Medical History:   Diagnosis Date    Arthritis     RT TOES; established with podiatry    Complex tear of lateral meniscus of right knee as current injury 2013    Complex tear of medial meniscus of right knee as current injury 2013    Coronary artery disease involving native coronary artery of native heart without angina pectoris 3/8/3756    Helicobacter pylori antibody positive 2015    Hyperlipidemia     DIET CONTROL    Hypertension, essential 2021    Mallet fracture, closed, initial encounter 2019    Patellofemoral syndrome 2014    Synovitis of knee 2014    Tear of medial cartilage or meniscus of knee, current 2014         SURGICAL HISTORY     Past Surgical History:   Procedure Laterality Date     SECTION      breech    COLONOSCOPY      one polyp removed    HYSTERECTOMY      endometriosis    KNEE ARTHROSCOPY Right 13    RIGHT KNEE ARTHROSCOPE, PARTIAL MEDIAL AND LATERAL MENISCECTOMY, SYNOVECTOMY, CHONDROPLASTY OF MEDIAL FEMORAL CONDYLE    KNEE ARTHROSCOPY Left 2014    LEFT KNEE ARTHROSCOPY WITH PARTIAL MEDIAL MENISCECTOMY,    KNEE ARTHROSCOPY Right 2018    ACTUAL PROCEDURE: RIGHT KNEE ARTHROSCOPY, PARTIAL MEDIAL MENISCECTOMY,CHONDROPLASTY, SYNOVECTOMY      KNEE SURGERY  13    RIGHT KNEE ARTHROSCOPE, PARTIAL MEDIAL AND LATERAL    KNEE SURGERY  14     RIGHT KNEE ARTHROSCOPY WITH PARTIAL LATERAL MENISCECTOMY,    TUBAL LIGATION           CURRENTMEDICATIONS       Previous Medications    AMLODIPINE (NORVASC) 5 MG TABLET    Take 0.5 tablets by mouth daily    ASPIRIN 81 MG EC TABLET    Take 81 mg by mouth daily.       BEMPEDOIC ACID (NEXLETOL) 180 MG TABS    Take 180 mg by mouth daily    COENZYME Q10 (COQ10 PO)    Take 100 mg by mouth     CPAP MACHINE MISC    by Does not apply route    METOPROLOL TARTRATE (LOPRESSOR) 25 MG TABLET    Take 1 tablet by mouth 2 times daily    MULTIPLE VITAMINS-MINERALS (THERAPEUTIC MULTIVITAMIN-MINERALS) TABLET    Take 1 tablet by mouth daily    NITROGLYCERIN (NITROSTAT) 0.4 MG SL TABLET    Place 1 tablet under the tongue every 5 minutes as needed for Chest pain    PITAVASTATIN (LIVALO) 2 MG TABS TABLET    Take 1 tablet by mouth nightly         ALLERGIES     Latex, Ampicillin, Codeine, Floxin [ofloxacin], Prednisone, Quinolones, Statins, and Caffeine    FAMILYHISTORY       Family History   Problem Relation Age of Onset    Heart Disease Mother     Stroke Mother         x2    High Blood Pressure Mother     Diabetes Mother     Heart Attack Mother 48    Diabetes Father     Cancer Brother         colon          SOCIAL HISTORY       Social History     Tobacco Use    Smoking status: Former Smoker     Packs/day: 0.25     Years: 7.00     Pack years: 1.75     Types: Cigarettes     Quit date: 1988     Years since quittin.2    Smokeless tobacco: Never Used    Tobacco comment: quit    Vaping Use    Vaping Use: Never used   Substance Use Topics    Alcohol use: No    Drug use: No       SCREENINGS             PHYSICAL EXAM    (up to 7 for level 4, 8 or more for level 5)     ED Triage Vitals [21 1354]   BP Temp Temp Source Pulse Resp SpO2 Height Weight   (!) 148/100 102.4 °F (39.1 °C) Oral 128 22 (!) 86 % 5' 5\" (1.651 m) 187 lb (84.8 kg)       Physical Exam  Vitals and nursing note reviewed. Constitutional:       General: She is awake. She is not in acute distress. Appearance: Normal appearance. She is well-developed. She is not diaphoretic. Interventions: Nasal cannula in place. Comments: Intermittent dry nonproductive cough noted. HENT:      Head: Normocephalic and atraumatic. No raccoon eyes, Townsend's sign or laceration. Right Ear: Hearing and external ear normal.      Left Ear: Hearing and external ear normal.      Nose: Nose normal.   Eyes:      General: No scleral icterus. Right eye: No discharge. Left eye: No discharge. Conjunctiva/sclera: Conjunctivae normal.   Neck:      Vascular: No JVD. Cardiovascular:      Rate and Rhythm: Regular rhythm. Tachycardia present. Heart sounds: No murmur heard. No friction rub. No gallop. Pulmonary:      Effort: Pulmonary effort is normal. No accessory muscle usage or respiratory distress. Breath sounds: Normal breath sounds. No wheezing, rhonchi or rales. Abdominal:      General: There is no distension. Palpations: Abdomen is soft. Abdomen is not rigid. There is no mass. Tenderness: There is no abdominal tenderness. There is no guarding or rebound. Musculoskeletal:      Cervical back: Normal range of motion. Skin:     General: Skin is warm and dry. Neurological:      Mental Status: She is alert and oriented to person, place, and time. GCS: GCS eye subscore is 4. GCS verbal subscore is 5. GCS motor subscore is 6. Cranial Nerves: No cranial nerve deficit. Sensory: No sensory deficit. Coordination: Coordination normal.   Psychiatric:         Behavior: Behavior normal. Behavior is cooperative.          DIAGNOSTIC RESULTS   LABS:    Labs Reviewed   CBC WITH AUTO DIFFERENTIAL - Abnormal; Notable for the following components:       Result Value    Lymphocytes Absolute 0.8 (*)     All other components within normal limits    Narrative:     Performed at:  [facility] [address]   Phone [phone]   COMPREHENSIVE METABOLIC PANEL W/ REFLEX TO MG FOR LOW K - Abnormal; Notable for the following components:    Sodium 131 (*)     Chloride 96 (*)     Glucose 135 (*)     All other components within normal limits    Narrative:     Performed at:  [facility] [address] [facility] [place] [employer]   Phone [phone]   LACTATE, SEPSIS - Abnormal; Notable for the following components:    Lactic Acid, Sepsis 3.5 (*)     All other components within normal limits    Narrative:     Performed at:  [facility] [address] [facility] [place] [address]   Phone [phone]   BLOOD GAS, VENOUS - Abnormal; Notable for the following components:    pH, David 7.483 (*)     pCO2, David 29.4 (*)     pO2, David 130.0 (*)     HCO3, Venous 22.0 (*)     Carboxyhemoglobin 2.9 (*)     All other components within normal limits    Narrative:     Performed at:  [facility] [address] [place]   Phone [phone]   TROPONIN    Narrative:     Performed at:  [facility] [address] [facility] [place] [address]   Phone [phone]     Narrative:     Performed at:  [facility] [address] [facility] [place] [employer], [place]   Phone [phone]   LACTATE, SEPSIS    Narrative:     Performed at:  [facility] [address] [place] [address]   Phone [phone]   MAGNESIUM    Narrative:     Performed at:  [facility] [address]   Phone [phone]   URINE RT REFLEX TO Vitals:    12/13/21 1354 12/13/21 1500 12/13/21 1515 12/13/21 1545   BP: (!) 148/100 123/79 119/72 134/76   Pulse: 128 114 108 101   Resp: 22 23 30 19   Temp: 102.4 °F (39.1 °C)      TempSrc: Oral      SpO2: (!) 86% 92% 93% 92%   Weight: 187 lb (84.8 kg)      Height: 5' 5\" (1.651 m)          Patient was given the following medications:  Medications   cefTRIAXone (ROCEPHIN) 1000 mg in sterile water 10 mL IV syringe (has no administration in time range)     And   azithromycin (ZITHROMAX) 500 mg in D5W 250ml Vial Mate (has no administration in time range)   dexamethasone (PF) (DECADRON) injection 6 mg (6 mg IntraVENous Given 12/13/21 1500)   acetaminophen (TYLENOL) tablet 1,000 mg (1,000 mg Oral Given 12/13/21 1419)   lactated ringers infusion 1,000 mL (1,000 mLs IntraVENous New Bag 12/13/21 1500)           The patient's detailed history of present illness is documented as above. Upon arrival to the emergency department the patient's vital signs are as documented. The patient is noted to be hemodynamically stable and afebrile. Physical examination findings are as above. Unfortunate the patient was hypoxic on arrival at 86 and was placed on 2 L by nasal cannula. Laboratory testing and work-up was indicated. Decadron administered after discussion with the patient in regards to prednisone colic causing mouth sores and I believe that the risk-benefit ratio of proceeding with dexamethasone outweighs potential side effects. EKG demonstrates a sinus tachycardia with a rate of 128. Normal axis and interval.  There is no evidence of ST elevation. There is evidence of some nonspecific ST depression which is likely demand related primarily in V4 and V5. T wave inversions in the 2 appear old. Please see attending physician details for further EKG interpretation and comparison from October 2, 2019. Symptomatology was treated as above. CBC demonstrates no evidence of leukocytosis anemia and/or thrombocytopenia.   There is

## 2021-12-14 LAB
A/G RATIO: 1.2 (ref 1.1–2.2)
ALBUMIN SERPL-MCNC: 3.6 G/DL (ref 3.4–5)
ALP BLD-CCNC: 60 U/L (ref 40–129)
ALT SERPL-CCNC: 19 U/L (ref 10–40)
ANION GAP SERPL CALCULATED.3IONS-SCNC: 10 MMOL/L (ref 3–16)
AST SERPL-CCNC: 30 U/L (ref 15–37)
BASOPHILS ABSOLUTE: 0 K/UL (ref 0–0.2)
BASOPHILS RELATIVE PERCENT: 0.2 %
BILIRUB SERPL-MCNC: 0.6 MG/DL (ref 0–1)
BUN BLDV-MCNC: 17 MG/DL (ref 7–20)
C DIFF TOXIN/ANTIGEN: NORMAL
C-REACTIVE PROTEIN: 71.8 MG/L (ref 0–5.1)
CALCIUM SERPL-MCNC: 8.7 MG/DL (ref 8.3–10.6)
CHLORIDE BLD-SCNC: 104 MMOL/L (ref 99–110)
CO2: 26 MMOL/L (ref 21–32)
CREAT SERPL-MCNC: 0.6 MG/DL (ref 0.6–1.2)
D DIMER: 430 NG/ML DDU (ref 0–229)
EKG ATRIAL RATE: 128 BPM
EKG DIAGNOSIS: NORMAL
EKG P AXIS: 55 DEGREES
EKG P-R INTERVAL: 142 MS
EKG Q-T INTERVAL: 304 MS
EKG QRS DURATION: 78 MS
EKG QTC CALCULATION (BAZETT): 443 MS
EKG R AXIS: 34 DEGREES
EKG T AXIS: 31 DEGREES
EKG VENTRICULAR RATE: 128 BPM
EOSINOPHILS ABSOLUTE: 0 K/UL (ref 0–0.6)
EOSINOPHILS RELATIVE PERCENT: 0 %
GFR AFRICAN AMERICAN: >60
GFR NON-AFRICAN AMERICAN: >60
GLUCOSE BLD-MCNC: 146 MG/DL (ref 70–99)
HCT VFR BLD CALC: 39.4 % (ref 36–48)
HEMOGLOBIN: 13.5 G/DL (ref 12–16)
LACTIC ACID: 1.2 MMOL/L (ref 0.4–2)
LYMPHOCYTES ABSOLUTE: 0.6 K/UL (ref 1–5.1)
LYMPHOCYTES RELATIVE PERCENT: 10.8 %
MCH RBC QN AUTO: 28.5 PG (ref 26–34)
MCHC RBC AUTO-ENTMCNC: 34.2 G/DL (ref 31–36)
MCV RBC AUTO: 83.2 FL (ref 80–100)
MONOCYTES ABSOLUTE: 0.4 K/UL (ref 0–1.3)
MONOCYTES RELATIVE PERCENT: 7.3 %
NEUTROPHILS ABSOLUTE: 4.5 K/UL (ref 1.7–7.7)
NEUTROPHILS RELATIVE PERCENT: 81.7 %
PDW BLD-RTO: 13.9 % (ref 12.4–15.4)
PLATELET # BLD: 167 K/UL (ref 135–450)
PMV BLD AUTO: 7.8 FL (ref 5–10.5)
POTASSIUM REFLEX MAGNESIUM: 4.2 MMOL/L (ref 3.5–5.1)
RBC # BLD: 4.74 M/UL (ref 4–5.2)
SODIUM BLD-SCNC: 140 MMOL/L (ref 136–145)
TOTAL PROTEIN: 6.5 G/DL (ref 6.4–8.2)
WBC # BLD: 5.5 K/UL (ref 4–11)

## 2021-12-14 PROCEDURE — 2580000003 HC RX 258: Performed by: INTERNAL MEDICINE

## 2021-12-14 PROCEDURE — 87324 CLOSTRIDIUM AG IA: CPT

## 2021-12-14 PROCEDURE — 1200000000 HC SEMI PRIVATE

## 2021-12-14 PROCEDURE — 86140 C-REACTIVE PROTEIN: CPT

## 2021-12-14 PROCEDURE — U0003 INFECTIOUS AGENT DETECTION BY NUCLEIC ACID (DNA OR RNA); SEVERE ACUTE RESPIRATORY SYNDROME CORONAVIRUS 2 (SARS-COV-2) (CORONAVIRUS DISEASE [COVID-19]), AMPLIFIED PROBE TECHNIQUE, MAKING USE OF HIGH THROUGHPUT TECHNOLOGIES AS DESCRIBED BY CMS-2020-01-R: HCPCS

## 2021-12-14 PROCEDURE — XW033E5 INTRODUCTION OF REMDESIVIR ANTI-INFECTIVE INTO PERIPHERAL VEIN, PERCUTANEOUS APPROACH, NEW TECHNOLOGY GROUP 5: ICD-10-PCS | Performed by: INTERNAL MEDICINE

## 2021-12-14 PROCEDURE — 87449 NOS EACH ORGANISM AG IA: CPT

## 2021-12-14 PROCEDURE — 83605 ASSAY OF LACTIC ACID: CPT

## 2021-12-14 PROCEDURE — 36415 COLL VENOUS BLD VENIPUNCTURE: CPT

## 2021-12-14 PROCEDURE — U0005 INFEC AGEN DETEC AMPLI PROBE: HCPCS

## 2021-12-14 PROCEDURE — 2500000003 HC RX 250 WO HCPCS: Performed by: INTERNAL MEDICINE

## 2021-12-14 PROCEDURE — 85379 FIBRIN DEGRADATION QUANT: CPT

## 2021-12-14 PROCEDURE — 93010 ELECTROCARDIOGRAM REPORT: CPT | Performed by: INTERNAL MEDICINE

## 2021-12-14 PROCEDURE — 85025 COMPLETE CBC W/AUTO DIFF WBC: CPT

## 2021-12-14 PROCEDURE — 94761 N-INVAS EAR/PLS OXIMETRY MLT: CPT

## 2021-12-14 PROCEDURE — XW033H5 INTRODUCTION OF TOCILIZUMAB INTO PERIPHERAL VEIN, PERCUTANEOUS APPROACH, NEW TECHNOLOGY GROUP 5: ICD-10-PCS | Performed by: INTERNAL MEDICINE

## 2021-12-14 PROCEDURE — 2700000000 HC OXYGEN THERAPY PER DAY

## 2021-12-14 PROCEDURE — 6360000002 HC RX W HCPCS: Performed by: INTERNAL MEDICINE

## 2021-12-14 PROCEDURE — 6370000000 HC RX 637 (ALT 250 FOR IP): Performed by: INTERNAL MEDICINE

## 2021-12-14 PROCEDURE — 80053 COMPREHEN METABOLIC PANEL: CPT

## 2021-12-14 RX ORDER — LOPERAMIDE HYDROCHLORIDE 2 MG/1
2 CAPSULE ORAL 4 TIMES DAILY PRN
Status: DISCONTINUED | OUTPATIENT
Start: 2021-12-14 | End: 2021-12-28 | Stop reason: HOSPADM

## 2021-12-14 RX ADMIN — FAMOTIDINE 20 MG: 20 TABLET ORAL at 22:20

## 2021-12-14 RX ADMIN — METOPROLOL TARTRATE 25 MG: 25 TABLET, FILM COATED ORAL at 22:21

## 2021-12-14 RX ADMIN — FAMOTIDINE 20 MG: 20 TABLET ORAL at 08:07

## 2021-12-14 RX ADMIN — CHOLECALCIFEROL TAB 125 MCG (5000 UNIT) 5000 UNITS: 125 TAB at 08:06

## 2021-12-14 RX ADMIN — GUAIFENESIN SYRUP AND DEXTROMETHORPHAN 5 ML: 100; 10 SYRUP ORAL at 01:08

## 2021-12-14 RX ADMIN — ENOXAPARIN SODIUM 30 MG: 100 INJECTION SUBCUTANEOUS at 01:08

## 2021-12-14 RX ADMIN — REMDESIVIR 200 MG: 100 INJECTION, POWDER, LYOPHILIZED, FOR SOLUTION INTRAVENOUS at 01:21

## 2021-12-14 RX ADMIN — ASPIRIN 81 MG: 81 TABLET, COATED ORAL at 01:07

## 2021-12-14 RX ADMIN — DEXAMETHASONE SODIUM PHOSPHATE 14 MG: 4 INJECTION, SOLUTION INTRA-ARTICULAR; INTRALESIONAL; INTRAMUSCULAR; INTRAVENOUS; SOFT TISSUE at 04:08

## 2021-12-14 RX ADMIN — ENOXAPARIN SODIUM 30 MG: 100 INJECTION SUBCUTANEOUS at 22:21

## 2021-12-14 RX ADMIN — ENOXAPARIN SODIUM 30 MG: 100 INJECTION SUBCUTANEOUS at 08:05

## 2021-12-14 RX ADMIN — TOCILIZUMAB 700 MG: 20 INJECTION, SOLUTION, CONCENTRATE INTRAVENOUS at 10:41

## 2021-12-14 RX ADMIN — Medication 10 ML: at 22:24

## 2021-12-14 RX ADMIN — REMDESIVIR 100 MG: 100 INJECTION, POWDER, LYOPHILIZED, FOR SOLUTION INTRAVENOUS at 13:03

## 2021-12-14 RX ADMIN — ASPIRIN 81 MG: 81 TABLET, COATED ORAL at 08:06

## 2021-12-14 RX ADMIN — METOPROLOL TARTRATE 25 MG: 25 TABLET, FILM COATED ORAL at 08:07

## 2021-12-14 RX ADMIN — AMLODIPINE BESYLATE 2.5 MG: 5 TABLET ORAL at 01:08

## 2021-12-14 RX ADMIN — LOPERAMIDE HYDROCHLORIDE 2 MG: 2 CAPSULE ORAL at 16:48

## 2021-12-14 RX ADMIN — Medication 10 ML: at 11:58

## 2021-12-14 RX ADMIN — GUAIFENESIN SYRUP AND DEXTROMETHORPHAN 5 ML: 100; 10 SYRUP ORAL at 22:21

## 2021-12-14 RX ADMIN — GUAIFENESIN SYRUP AND DEXTROMETHORPHAN 5 ML: 100; 10 SYRUP ORAL at 08:05

## 2021-12-14 RX ADMIN — CHOLECALCIFEROL TAB 125 MCG (5000 UNIT) 5000 UNITS: 125 TAB at 01:07

## 2021-12-14 RX ADMIN — DEXAMETHASONE SODIUM PHOSPHATE 20 MG: 4 INJECTION, SOLUTION INTRA-ARTICULAR; INTRALESIONAL; INTRAMUSCULAR; INTRAVENOUS; SOFT TISSUE at 11:53

## 2021-12-14 RX ADMIN — AMLODIPINE BESYLATE 2.5 MG: 5 TABLET ORAL at 08:05

## 2021-12-14 RX ADMIN — METOPROLOL TARTRATE 25 MG: 25 TABLET, FILM COATED ORAL at 01:08

## 2021-12-14 ASSESSMENT — PAIN SCALES - GENERAL
PAINLEVEL_OUTOF10: 0

## 2021-12-14 NOTE — CARE COORDINATION
CM reviewed chart at this time. Pt alert, oriented, independent and from home. Pt was covid + before coming to hospital. covid test pending here. Started on Remdesivir today. Pt currently on 12 liters of oxygen. No therapy ordered at this time. CM will monitor pt's progress and possible needs for discharge.       Beny Arauz RN, BSN  [phone]

## 2021-12-14 NOTE — PROGRESS NOTES
Hospitalist Progress Note      PCP: BRISSA Colmenares CNP    Date of Admission: 12/13/2021    Chief Complaint: Dyspnea    Hospital Course: 62 y.o. female with past medical history of hypertension, hyperlipidemia, coronary artery disease, presents from home with dyspnea. Patient states that she went to urgent care on December 8. She has been having URI symptoms for several days prior to that and thought she had the flu. She was tested positive for COVID-19 infection December 8. She has been doing better however over the last 2 to 3 days she has been getting progressively dyspneic. She reports nonproductive cough and pleuritic chest pain if she coughs. She has also been having nausea and diarrhea. At the ED she was found to be hypoxemic at 86% on room air and currently requires 5 L per nasal cannula to maintain saturations of 9192%. Bilateral multifocal pneumonia is evident on chest x-ray. Subjective: Patient seen and examined at bedside. She states she is feeling well today.   She is currently on 11 L per high flow nasal cannula      Medications:  Reviewed    Infusion Medications    sodium chloride       Scheduled Medications    amLODIPine  2.5 mg Oral Daily    aspirin  81 mg Oral Daily    metoprolol tartrate  25 mg Oral BID    sodium chloride flush  5-40 mL IntraVENous 2 times per day    enoxaparin  30 mg SubCUTAneous BID    famotidine  20 mg Oral BID    vitamin D3  5,000 Units Oral Daily    Followed by   Jo Sparks ON 12/20/2021] Vitamin D  2,000 Units Oral Daily    remdesivir IVPB  100 mg IntraVENous Q24H    dexamethasone (DECADRON) IVPB  20 mg IntraVENous Daily    Followed by   Jo Sparks ON 12/18/2021] dexamethasone  10 mg IntraVENous Q24H     PRN Meds: loperamide, nitroGLYCERIN, sodium chloride flush, sodium chloride, ondansetron **OR** ondansetron, polyethylene glycol, acetaminophen **OR** acetaminophen, guaiFENesin-dextromethorphan    No intake or output data in the 24 hours ending 12/14/21 1151    Physical Exam Performed:    /68   Pulse 84   Temp 98.3 °F (36.8 °C) (Oral)   Resp 18   Ht 5' 5\" (1.651 m)   Wt 187 lb (84.8 kg)   SpO2 90%   BMI 31.12 kg/m²     General appearance: No apparent distress, appears stated age and cooperative. HEENT: Pupils equal, round, and reactive to light. Conjunctivae/corneas clear. Neck: Supple, with full range of motion. No jugular venous distention. Trachea midline. Respiratory:  Normal respiratory effort. Clear to auscultation, bilaterally without Rales/Wheezes/Rhonchi. Cardiovascular: Regular rate and rhythm with normal S1/S2 without murmurs, rubs or gallops. Abdomen: Soft, non-tender, non-distended with normal bowel sounds. Musculoskeletal: No clubbing, cyanosis or edema bilaterally. Full range of motion without deformity. Skin: Skin color, texture, turgor normal.  No rashes or lesions. Neurologic:  Neurovascularly intact without any focal sensory/motor deficits. Cranial nerves: II-XII intact, grossly non-focal.  Psychiatric: Alert and oriented, thought content appropriate, normal insight  Capillary Refill: Brisk,< 3 seconds   Peripheral Pulses: +2 palpable, equal bilaterally       Labs:   Recent Labs     12/13/21  1425 12/14/21  0502   WBC 8.9 5.5   HGB 14.4 13.5   HCT 42.2 39.4    167     Recent Labs     12/13/21  1425 12/14/21  0503   * 140   K 3.5 4.2   CL 96* 104   CO2 21 26   BUN 15 17   CREATININE 0.7 0.6   CALCIUM 8.5 8.7     Recent Labs     12/13/21  1425 12/14/21  0503   AST 29 30   ALT 20 19   BILITOT 0.7 0.6   ALKPHOS 67 60     No results for input(s): INR in the last 72 hours.   Recent Labs     12/13/21 1425   TROPONINI <0.01       Urinalysis:      Lab Results   Component Value Date    NITRU Negative 12/13/2021    WBCUA 3-5 12/13/2021    BACTERIA Rare 12/13/2021    RBCUA None seen 12/13/2021    BLOODU Negative 12/13/2021    SPECGRAV 1.008 12/13/2021    GLUCOSEU Negative 12/13/2021       Radiology:  XR CHEST PORTABLE   Final Result   Bilateral airspace disease, consistent with COVID pneumonia. Superimposed   atelectasis or typical pneumonia are other considerations, given greater   consolidation in the left lower lobe. Assessment/Plan:    Active Hospital Problems    Diagnosis     Pneumonia due to COVID-19 virus [U07.1, J12.82]      COVID-19 pneumonia  Patient is unvaccinated  Currently on 11 L per nasal cannula  Continue supportive measures  Decadron  Remdesivir  Elevated CRP, dose of Actemra given today     History of CAD  Stable    DVT Prophylaxis: Lovenox  Diet: ADULT DIET;  Regular  Code Status: Full Code        Electronically signed by Vanessa Gomez MD on 12/14/2021 at 11:51 AM

## 2021-12-14 NOTE — PROGRESS NOTES
4 Eyes Skin Assessment     NAME:  Adam Santiago  YOB: 1960  MEDICAL RECORD NUMBER:  [id number]    The patient is being assess for  Admission    I agree that 2 RN's have performed a thorough Head to Toe Skin Assessment on the patient. ALL assessment sites listed below have been assessed. Areas assessed by both nurses:    Head, Face, Ears, Shoulders, Back, Chest, Arms, Elbows, Hands, Sacrum. Buttock, Coccyx, Ischium and Legs. Feet and Heels        Does the Patient have a Wound?  No noted wound(s)       Gutierrez Prevention initiated:  No   Wound Care Orders initiated:  No    Pressure Injury (Stage 3,4, Unstageable, DTI, NWPT, and Complex wounds) if present place consult order under [de-identified] NA    New and Established Ostomies if present place consult order under : NA      Nurse 1 eSignature: Electronically signed by Bella Desouza RN on 12/14/21 at 5:14 AM EST    **SHARE this note so that the co-signing nurse is able to place an eSignature**    Nurse 2 eSignature: Electronically signed by Amrita Kirk RN on 12/14/21 at 5:15 AM EST

## 2021-12-14 NOTE — H&P
Hospital Medicine History & Physical      PCP: BRISSA Patel CNP    Date of Admission: 2021    Date of Service: Pt seen/examined on 2021 and Admitted to Inpatient with expected LOS greater than two midnights due to medical therapy. Chief Complaint: Dyspnea      History Of Present Illness: 62 y.o. female with past medical history of hypertension, hyperlipidemia, coronary artery disease, presents from home with dyspnea. Patient states that she went to urgent care on . She has been having URI symptoms for several days prior to that and thought she had the flu. She was tested positive for COVID-19 infection . She has been doing better however over the last 2 to 3 days she has been getting progressively dyspneic. She reports nonproductive cough and pleuritic chest pain if she coughs. She has also been having nausea and diarrhea. At the ED she was found to be hypoxemic at 86% on room air and currently requires 5 L per nasal cannula to maintain saturations of 9192%. Bilateral multifocal pneumonia is evident on chest x-ray. Hospitalist consulted for admission.       Past Medical History:          Diagnosis Date    Arthritis     RT TOES; established with podiatry    Complex tear of lateral meniscus of right knee as current injury 2013    Complex tear of medial meniscus of right knee as current injury 2013    Coronary artery disease involving native coronary artery of native heart without angina pectoris 9138    Helicobacter pylori antibody positive 2015    Hyperlipidemia     DIET CONTROL    Hypertension, essential 2021    Mallet fracture, closed, initial encounter 2019    Patellofemoral syndrome 2014    Synovitis of knee 2014    Tear of medial cartilage or meniscus of knee, current 2014       Past Surgical History:          Procedure Laterality Date     SECTION      breech    COLONOSCOPY  2012    one polyp removed    HYSTERECTOMY      endometriosis    KNEE ARTHROSCOPY Right 9-27-13    RIGHT KNEE ARTHROSCOPE, PARTIAL MEDIAL AND LATERAL MENISCECTOMY, SYNOVECTOMY, CHONDROPLASTY OF MEDIAL FEMORAL CONDYLE    KNEE ARTHROSCOPY Left 11/28/2014    LEFT KNEE ARTHROSCOPY WITH PARTIAL MEDIAL MENISCECTOMY,    KNEE ARTHROSCOPY Right 01/23/2018    ACTUAL PROCEDURE: RIGHT KNEE ARTHROSCOPY, PARTIAL MEDIAL MENISCECTOMY,CHONDROPLASTY, SYNOVECTOMY      KNEE SURGERY  9-27-13    RIGHT KNEE ARTHROSCOPE, PARTIAL MEDIAL AND LATERAL    KNEE SURGERY  1-21-14     RIGHT KNEE ARTHROSCOPY WITH PARTIAL LATERAL MENISCECTOMY,    TUBAL LIGATION         Medications Prior to Admission:      Prior to Admission medications    Medication Sig Start Date End Date Taking? Authorizing Provider   pitavastatin (LIVALO) 2 MG TABS tablet Take 1 tablet by mouth nightly 11/18/21   BRISSA Townsend CNP   metoprolol tartrate (LOPRESSOR) 25 MG tablet Take 1 tablet by mouth 2 times daily 7/22/21   BRISSA Bangura CNP   amLODIPine (NORVASC) 5 MG tablet Take 0.5 tablets by mouth daily 7/22/21   BRISSA Bangura CNP   CPAP Machine MISC by Does not apply route    Historical Provider, MD   Bempedoic Acid (NEXLETOL) 180 MG TABS Take 180 mg by mouth daily 6/15/21   BRISSA Townsend CNP   Coenzyme Q10 (COQ10 PO) Take 100 mg by mouth     Historical Provider, MD   nitroGLYCERIN (NITROSTAT) 0.4 MG SL tablet Place 1 tablet under the tongue every 5 minutes as needed for Chest pain 4/15/20   Hedy Flank, APRN - CNP   Multiple Vitamins-Minerals (THERAPEUTIC MULTIVITAMIN-MINERALS) tablet Take 1 tablet by mouth daily    Historical Provider, MD   aspirin 81 MG EC tablet Take 81 mg by mouth daily.       Historical Provider, MD       Allergies:  Latex, Ampicillin, Codeine, Floxin [ofloxacin], Prednisone, Quinolones, Statins, and Caffeine    Social History:      The patient currently lives home    TOBACCO:   reports that she quit smoking about 33 years ago. Her smoking use included cigarettes. She has a 1.75 pack-year smoking history. She has never used smokeless tobacco.  ETOH:   reports no history of alcohol use. E-Cigarettes/Vaping Use     Questions Responses    E-Cigarette/Vaping Use Never User    Start Date     Passive Exposure     Quit Date     Counseling Given     Comments             Family History:       Reviewed in detail and negative for DM, CAD, Cancer, CVA. Positive as follows:        Problem Relation Age of Onset    Heart Disease Mother     Stroke Mother         x2    High Blood Pressure Mother     Diabetes Mother     Heart Attack Mother 48    Diabetes Father     Cancer Brother         colon       REVIEW OF SYSTEMS:   Pertinent positives as noted in the HPI. All other systems reviewed and negative. PHYSICAL EXAM PERFORMED:    BP (!) 152/86   Pulse 111   Temp 98.6 °F (37 °C) (Oral)   Resp 22   Ht 5' 5\" (1.651 m)   Wt 187 lb (84.8 kg)   SpO2 92%   BMI 31.12 kg/m²     General appearance:  No apparent distress, appears stated age and cooperative. HEENT:  Normal cephalic, atraumatic without obvious deformity. Pupils equal, round, and reactive to light. Extra ocular muscles intact. Conjunctivae/corneas clear. Neck: Supple, with full range of motion. No jugular venous distention. Trachea midline. Respiratory:  Normal respiratory effort. Clear to auscultation, bilaterally without Rales/Wheezes/Rhonchi. Cardiovascular:  Regular rate and rhythm with normal S1/S2 without murmurs, rubs or gallops. Abdomen: Soft, non-tender, non-distended with normal bowel sounds. Musculoskeletal:  No clubbing, cyanosis or edema bilaterally. Full range of motion without deformity. Skin: Skin color, texture, turgor normal.  No rashes or lesions. Neurologic:  Neurovascularly intact without any focal sensory/motor deficits.  Cranial nerves: II-XII intact, grossly non-focal.  Psychiatric:  Alert and oriented, thought content appropriate, normal insight  Capillary Refill: Brisk,< 3 seconds   Peripheral Pulses: +2 palpable, equal bilaterally       Labs:     Recent Labs     12/13/21  1425   WBC 8.9   HGB 14.4   HCT 42.2        Recent Labs     12/13/21  1425   *   K 3.5   CL 96*   CO2 21   BUN 15   CREATININE 0.7   CALCIUM 8.5     Recent Labs     12/13/21  1425   AST 29   ALT 20   BILITOT 0.7   ALKPHOS 67     No results for input(s): INR in the last 72 hours. Recent Labs     12/13/21  1425   TROPONINI <0.01       Urinalysis:      Lab Results   Component Value Date    NITRU Negative 12/13/2021    WBCUA 11 02/06/2019    RBCUA 4 02/06/2019    BLOODU Negative 12/13/2021    SPECGRAV 1.008 12/13/2021    GLUCOSEU Negative 12/13/2021       Radiology:     CXR: I have reviewed the CXR with the following interpretation: Bilateral multifocal pneumonia  EKG:  I have reviewed the EKG with the following interpretation: Sinus tachycardia, nonspecific T wave abnormality    XR CHEST PORTABLE   Final Result   Bilateral airspace disease, consistent with COVID pneumonia. Superimposed   atelectasis or typical pneumonia are other considerations, given greater   consolidation in the left lower lobe. ASSESSMENT:    Active Hospital Problems    Diagnosis Date Noted    Pneumonia due to COVID-19 virus [U07.1, J12.82] 12/13/2021         PLAN:    COVID-19 pneumonia  Patient is unvaccinated  Currently on 5 L per nasal cannula  Continue supportive measures  Decadron  Remdesivir  Follow-up inflammatory factors    History of CAD  Stable      DVT Prophylaxis: Lovenox  Diet: ADULT DIET;  Regular  Code Status: Full Code      Electronically signed by Aida Buckley MD on 12/13/2021 at 7:12 PM

## 2021-12-15 LAB
ANION GAP SERPL CALCULATED.3IONS-SCNC: 13 MMOL/L (ref 3–16)
BASOPHILS ABSOLUTE: 0 K/UL (ref 0–0.2)
BASOPHILS RELATIVE PERCENT: 0.2 %
BUN BLDV-MCNC: 19 MG/DL (ref 7–20)
C-REACTIVE PROTEIN: 37.8 MG/L (ref 0–5.1)
CALCIUM SERPL-MCNC: 8.8 MG/DL (ref 8.3–10.6)
CHLORIDE BLD-SCNC: 102 MMOL/L (ref 99–110)
CO2: 24 MMOL/L (ref 21–32)
CREAT SERPL-MCNC: 0.6 MG/DL (ref 0.6–1.2)
D DIMER: 448 NG/ML DDU (ref 0–229)
EOSINOPHILS ABSOLUTE: 0 K/UL (ref 0–0.6)
EOSINOPHILS RELATIVE PERCENT: 0 %
GFR AFRICAN AMERICAN: >60
GFR NON-AFRICAN AMERICAN: >60
GLUCOSE BLD-MCNC: 155 MG/DL (ref 70–99)
HCT VFR BLD CALC: 42 % (ref 36–48)
HEMOGLOBIN: 14.3 G/DL (ref 12–16)
LYMPHOCYTES ABSOLUTE: 0.7 K/UL (ref 1–5.1)
LYMPHOCYTES RELATIVE PERCENT: 7.4 %
MAGNESIUM: 2.2 MG/DL (ref 1.8–2.4)
MCH RBC QN AUTO: 28.8 PG (ref 26–34)
MCHC RBC AUTO-ENTMCNC: 34.1 G/DL (ref 31–36)
MCV RBC AUTO: 84.3 FL (ref 80–100)
MONOCYTES ABSOLUTE: 0.5 K/UL (ref 0–1.3)
MONOCYTES RELATIVE PERCENT: 5.6 %
NEUTROPHILS ABSOLUTE: 8.2 K/UL (ref 1.7–7.7)
NEUTROPHILS RELATIVE PERCENT: 86.8 %
PDW BLD-RTO: 14 % (ref 12.4–15.4)
PHOSPHORUS: 4.2 MG/DL (ref 2.5–4.9)
PLATELET # BLD: 220 K/UL (ref 135–450)
PMV BLD AUTO: 7.9 FL (ref 5–10.5)
POTASSIUM SERPL-SCNC: 3.5 MMOL/L (ref 3.5–5.1)
PROCALCITONIN: 0.09 NG/ML (ref 0–0.15)
RBC # BLD: 4.98 M/UL (ref 4–5.2)
SARS-COV-2: DETECTED
SODIUM BLD-SCNC: 139 MMOL/L (ref 136–145)
WBC # BLD: 9.5 K/UL (ref 4–11)

## 2021-12-15 PROCEDURE — 1200000000 HC SEMI PRIVATE

## 2021-12-15 PROCEDURE — 84100 ASSAY OF PHOSPHORUS: CPT

## 2021-12-15 PROCEDURE — 85379 FIBRIN DEGRADATION QUANT: CPT

## 2021-12-15 PROCEDURE — 2580000003 HC RX 258: Performed by: INTERNAL MEDICINE

## 2021-12-15 PROCEDURE — 86140 C-REACTIVE PROTEIN: CPT

## 2021-12-15 PROCEDURE — 83735 ASSAY OF MAGNESIUM: CPT

## 2021-12-15 PROCEDURE — 85025 COMPLETE CBC W/AUTO DIFF WBC: CPT

## 2021-12-15 PROCEDURE — 6370000000 HC RX 637 (ALT 250 FOR IP): Performed by: INTERNAL MEDICINE

## 2021-12-15 PROCEDURE — 6360000002 HC RX W HCPCS: Performed by: INTERNAL MEDICINE

## 2021-12-15 PROCEDURE — 2060000000 HC ICU INTERMEDIATE R&B

## 2021-12-15 PROCEDURE — 84145 PROCALCITONIN (PCT): CPT

## 2021-12-15 PROCEDURE — 36415 COLL VENOUS BLD VENIPUNCTURE: CPT

## 2021-12-15 PROCEDURE — 2500000003 HC RX 250 WO HCPCS: Performed by: INTERNAL MEDICINE

## 2021-12-15 PROCEDURE — 80048 BASIC METABOLIC PNL TOTAL CA: CPT

## 2021-12-15 RX ADMIN — SODIUM CHLORIDE 25 ML: 9 INJECTION, SOLUTION INTRAVENOUS at 09:34

## 2021-12-15 RX ADMIN — Medication 10 ML: at 09:28

## 2021-12-15 RX ADMIN — ENOXAPARIN SODIUM 30 MG: 100 INJECTION SUBCUTANEOUS at 20:35

## 2021-12-15 RX ADMIN — GUAIFENESIN SYRUP AND DEXTROMETHORPHAN 5 ML: 100; 10 SYRUP ORAL at 09:46

## 2021-12-15 RX ADMIN — METOPROLOL TARTRATE 25 MG: 25 TABLET, FILM COATED ORAL at 09:26

## 2021-12-15 RX ADMIN — ASPIRIN 81 MG: 81 TABLET, COATED ORAL at 09:26

## 2021-12-15 RX ADMIN — CHOLECALCIFEROL TAB 125 MCG (5000 UNIT) 5000 UNITS: 125 TAB at 09:26

## 2021-12-15 RX ADMIN — AMLODIPINE BESYLATE 2.5 MG: 5 TABLET ORAL at 09:27

## 2021-12-15 RX ADMIN — REMDESIVIR 100 MG: 100 INJECTION, POWDER, LYOPHILIZED, FOR SOLUTION INTRAVENOUS at 09:38

## 2021-12-15 RX ADMIN — METOPROLOL TARTRATE 25 MG: 25 TABLET, FILM COATED ORAL at 20:34

## 2021-12-15 RX ADMIN — DEXAMETHASONE SODIUM PHOSPHATE 20 MG: 4 INJECTION, SOLUTION INTRA-ARTICULAR; INTRALESIONAL; INTRAMUSCULAR; INTRAVENOUS; SOFT TISSUE at 12:00

## 2021-12-15 RX ADMIN — ENOXAPARIN SODIUM 30 MG: 100 INJECTION SUBCUTANEOUS at 09:26

## 2021-12-15 ASSESSMENT — PAIN SCALES - GENERAL
PAINLEVEL_OUTOF10: 0

## 2021-12-15 NOTE — PROGRESS NOTES
Pt spO2 84% on 15L HFNC sitting up in chair. Pt moved to bed and is now proning. SpO2 now 92% on 15L HFNC.

## 2021-12-15 NOTE — PROGRESS NOTES
Hospitalist Progress Note      PCP: BRISSA Villasenor CNP    Date of Admission: 12/13/2021    Chief Complaint: Dyspnea    Hospital Course: 62 y.o. female with past medical history of hypertension, hyperlipidemia, coronary artery disease, presents from home with dyspnea. Patient states that she went to urgent care on December 8. She has been having URI symptoms for several days prior to that and thought she had the flu. She was tested positive for COVID-19 infection December 8. She has been doing better however over the last 2 to 3 days she has been getting progressively dyspneic. She reports nonproductive cough and pleuritic chest pain if she coughs. She has also been having nausea and diarrhea. At the ED she was found to be hypoxemic at 86% on room air and currently requires 5 L per nasal cannula to maintain saturations of 9192%. Bilateral multifocal pneumonia is evident on chest x-ray. Subjective: Patient seen and examined at bedside. She states she is feeling well today.   She is currently on 15 L per high flow nasal cannula      Medications:  Reviewed    Infusion Medications    sodium chloride 25 mL (12/15/21 0934)     Scheduled Medications    amLODIPine  2.5 mg Oral Daily    aspirin  81 mg Oral Daily    metoprolol tartrate  25 mg Oral BID    sodium chloride flush  5-40 mL IntraVENous 2 times per day    enoxaparin  30 mg SubCUTAneous BID    famotidine  20 mg Oral BID    vitamin D3  5,000 Units Oral Daily    Followed by   Stone Earl ON 12/20/2021] Vitamin D  2,000 Units Oral Daily    remdesivir IVPB  100 mg IntraVENous Q24H    dexamethasone (DECADRON) IVPB  20 mg IntraVENous Daily    Followed by   Stone Earl ON 12/18/2021] dexamethasone  10 mg IntraVENous Q24H     PRN Meds: loperamide, nitroGLYCERIN, sodium chloride flush, sodium chloride, ondansetron **OR** ondansetron, polyethylene glycol, acetaminophen **OR** acetaminophen, guaiFENesin-dextromethorphan    No intake or output data in the 24 hours ending 12/15/21 1528    Physical Exam Performed:    /70   Pulse 75   Temp 98.1 °F (36.7 °C) (Oral)   Resp 16   Ht 5' 5\" (1.651 m)   Wt 187 lb (84.8 kg)   SpO2 90%   BMI 31.12 kg/m²     General appearance: No apparent distress, appears stated age and cooperative. HEENT: Pupils equal, round, and reactive to light. Conjunctivae/corneas clear. Neck: Supple, with full range of motion. No jugular venous distention. Trachea midline. Respiratory:  Normal respiratory effort. Clear to auscultation, bilaterally without Rales/Wheezes/Rhonchi. Cardiovascular: Regular rate and rhythm with normal S1/S2 without murmurs, rubs or gallops. Abdomen: Soft, non-tender, non-distended with normal bowel sounds. Musculoskeletal: No clubbing, cyanosis or edema bilaterally. Full range of motion without deformity. Skin: Skin color, texture, turgor normal.  No rashes or lesions. Neurologic:  Neurovascularly intact without any focal sensory/motor deficits. Cranial nerves: II-XII intact, grossly non-focal.  Psychiatric: Alert and oriented, thought content appropriate, normal insight  Capillary Refill: Brisk,< 3 seconds   Peripheral Pulses: +2 palpable, equal bilaterally       Labs:   Recent Labs     12/13/21  1425 12/14/21  0502 12/15/21  0537   WBC 8.9 5.5 9.5   HGB 14.4 13.5 14.3   HCT 42.2 39.4 42.0    167 220     Recent Labs     12/13/21  1425 12/14/21  0503 12/15/21  0537   * 140 139   K 3.5 4.2 3.5   CL 96* 104 102   CO2 21 26 24   BUN 15 17 19   CREATININE 0.7 0.6 0.6   CALCIUM 8.5 8.7 8.8   PHOS  --   --  4.2     Recent Labs     12/13/21  1425 12/14/21  0503   AST 29 30   ALT 20 19   BILITOT 0.7 0.6   ALKPHOS 67 60     No results for input(s): INR in the last 72 hours.   Recent Labs     12/13/21 1425   TROPONINI <0.01       Urinalysis:      Lab Results   Component Value Date    NITRU Negative 12/13/2021    WBCUA 3-5 12/13/2021    BACTERIA Rare 12/13/2021    RBCUA None seen 12/13/2021 BLOODU Negative 12/13/2021    SPECGRAV 1.008 12/13/2021    GLUCOSEU Negative 12/13/2021       Radiology:  XR CHEST PORTABLE   Final Result   Bilateral airspace disease, consistent with COVID pneumonia. Superimposed   atelectasis or typical pneumonia are other considerations, given greater   consolidation in the left lower lobe. Assessment/Plan:    Active Hospital Problems    Diagnosis     Pneumonia due to COVID-19 virus [U07.1, J12.82]      COVID-19 pneumonia  Patient is unvaccinated  Currently on 15 L per nasal cannula  Continue supportive measures  Decadron  Remdesivir  Elevated CRP, dose of Actemra given today     History of CAD  Stable    DVT Prophylaxis: Lovenox  Diet: ADULT DIET;  Regular  Code Status: Full Code        Electronically signed by Sera Rosales MD on 12/15/2021 at 3:28 PM

## 2021-12-15 NOTE — PROGRESS NOTES
Assessment complete. VSS. Pt on 12L HFNC, spo2 86%. Increased to 13L. Patient resting in chair. Respirations even and easy. Frequent cough, PRN robitussin given. Pt doing IS. Call light in reach. No needs expressed at this time.

## 2021-12-16 LAB
ANION GAP SERPL CALCULATED.3IONS-SCNC: 13 MMOL/L (ref 3–16)
BASOPHILS ABSOLUTE: 0 K/UL (ref 0–0.2)
BASOPHILS RELATIVE PERCENT: 0.1 %
BUN BLDV-MCNC: 16 MG/DL (ref 7–20)
CALCIUM SERPL-MCNC: 8.4 MG/DL (ref 8.3–10.6)
CHLORIDE BLD-SCNC: 103 MMOL/L (ref 99–110)
CO2: 25 MMOL/L (ref 21–32)
CREAT SERPL-MCNC: <0.5 MG/DL (ref 0.6–1.2)
EOSINOPHILS ABSOLUTE: 0 K/UL (ref 0–0.6)
EOSINOPHILS RELATIVE PERCENT: 0 %
GFR AFRICAN AMERICAN: >60
GFR NON-AFRICAN AMERICAN: >60
GLUCOSE BLD-MCNC: 166 MG/DL (ref 70–99)
HCT VFR BLD CALC: 41.9 % (ref 36–48)
HEMOGLOBIN: 14.3 G/DL (ref 12–16)
LYMPHOCYTES ABSOLUTE: 0.6 K/UL (ref 1–5.1)
LYMPHOCYTES RELATIVE PERCENT: 5.8 %
MAGNESIUM: 2.2 MG/DL (ref 1.8–2.4)
MCH RBC QN AUTO: 28.7 PG (ref 26–34)
MCHC RBC AUTO-ENTMCNC: 34.2 G/DL (ref 31–36)
MCV RBC AUTO: 83.8 FL (ref 80–100)
MONOCYTES ABSOLUTE: 0.6 K/UL (ref 0–1.3)
MONOCYTES RELATIVE PERCENT: 5.6 %
NEUTROPHILS ABSOLUTE: 9.4 K/UL (ref 1.7–7.7)
NEUTROPHILS RELATIVE PERCENT: 88.5 %
PDW BLD-RTO: 13.8 % (ref 12.4–15.4)
PHOSPHORUS: 3.4 MG/DL (ref 2.5–4.9)
PLATELET # BLD: 237 K/UL (ref 135–450)
PMV BLD AUTO: 8 FL (ref 5–10.5)
POTASSIUM SERPL-SCNC: 4.1 MMOL/L (ref 3.5–5.1)
RBC # BLD: 4.99 M/UL (ref 4–5.2)
SODIUM BLD-SCNC: 141 MMOL/L (ref 136–145)
WBC # BLD: 10.7 K/UL (ref 4–11)

## 2021-12-16 PROCEDURE — 85025 COMPLETE CBC W/AUTO DIFF WBC: CPT

## 2021-12-16 PROCEDURE — 6370000000 HC RX 637 (ALT 250 FOR IP): Performed by: INTERNAL MEDICINE

## 2021-12-16 PROCEDURE — 83735 ASSAY OF MAGNESIUM: CPT

## 2021-12-16 PROCEDURE — 6360000002 HC RX W HCPCS: Performed by: INTERNAL MEDICINE

## 2021-12-16 PROCEDURE — 2000000000 HC ICU R&B

## 2021-12-16 PROCEDURE — 94660 CPAP INITIATION&MGMT: CPT

## 2021-12-16 PROCEDURE — 80048 BASIC METABOLIC PNL TOTAL CA: CPT

## 2021-12-16 PROCEDURE — 94761 N-INVAS EAR/PLS OXIMETRY MLT: CPT

## 2021-12-16 PROCEDURE — 36415 COLL VENOUS BLD VENIPUNCTURE: CPT

## 2021-12-16 PROCEDURE — 2500000003 HC RX 250 WO HCPCS: Performed by: INTERNAL MEDICINE

## 2021-12-16 PROCEDURE — 2580000003 HC RX 258: Performed by: INTERNAL MEDICINE

## 2021-12-16 PROCEDURE — 2700000000 HC OXYGEN THERAPY PER DAY

## 2021-12-16 PROCEDURE — 84100 ASSAY OF PHOSPHORUS: CPT

## 2021-12-16 RX ADMIN — METOPROLOL TARTRATE 25 MG: 25 TABLET, FILM COATED ORAL at 10:30

## 2021-12-16 RX ADMIN — DEXAMETHASONE SODIUM PHOSPHATE 20 MG: 4 INJECTION, SOLUTION INTRA-ARTICULAR; INTRALESIONAL; INTRAMUSCULAR; INTRAVENOUS; SOFT TISSUE at 10:40

## 2021-12-16 RX ADMIN — ENOXAPARIN SODIUM 30 MG: 100 INJECTION SUBCUTANEOUS at 21:39

## 2021-12-16 RX ADMIN — SODIUM CHLORIDE 25 ML: 9 INJECTION, SOLUTION INTRAVENOUS at 12:20

## 2021-12-16 RX ADMIN — METOPROLOL TARTRATE 25 MG: 25 TABLET, FILM COATED ORAL at 21:39

## 2021-12-16 RX ADMIN — ASPIRIN 81 MG: 81 TABLET, COATED ORAL at 10:31

## 2021-12-16 RX ADMIN — REMDESIVIR 100 MG: 100 INJECTION, POWDER, LYOPHILIZED, FOR SOLUTION INTRAVENOUS at 12:21

## 2021-12-16 RX ADMIN — GUAIFENESIN SYRUP AND DEXTROMETHORPHAN 5 ML: 100; 10 SYRUP ORAL at 10:30

## 2021-12-16 RX ADMIN — SODIUM CHLORIDE 25 ML: 9 INJECTION, SOLUTION INTRAVENOUS at 10:39

## 2021-12-16 RX ADMIN — AMLODIPINE BESYLATE 2.5 MG: 5 TABLET ORAL at 10:31

## 2021-12-16 RX ADMIN — CHOLECALCIFEROL TAB 125 MCG (5000 UNIT) 5000 UNITS: 125 TAB at 10:31

## 2021-12-16 RX ADMIN — Medication 10 ML: at 10:31

## 2021-12-16 RX ADMIN — ENOXAPARIN SODIUM 30 MG: 100 INJECTION SUBCUTANEOUS at 10:30

## 2021-12-16 ASSESSMENT — PAIN SCALES - GENERAL
PAINLEVEL_OUTOF10: 0

## 2021-12-16 NOTE — PLAN OF CARE
Problem: Pain:  Goal: Pain level will decrease  Description: Pain level will decrease  Outcome: Ongoing  Note: No pain reported  Goal: Control of acute pain  Description: Control of acute pain  Outcome: Ongoing  Goal: Control of chronic pain  Description: Control of chronic pain  Outcome: Ongoing     Problem: Airway Clearance - Ineffective  Goal: Achieve or maintain patent airway  Outcome: Ongoing     Problem: Gas Exchange - Impaired  Goal: Absence of hypoxia  Outcome: Ongoing  Note: Pt. Currently on 15L HFNC, and Non-re-breather, O2 (88-91%)  Goal: Promote optimal lung function  Outcome: Ongoing     Problem: Breathing Pattern - Ineffective  Goal: Ability to achieve and maintain a regular respiratory rate  Outcome: Ongoing     Problem:  Body Temperature -  Risk of, Imbalanced  Goal: Ability to maintain a body temperature within defined limits  Outcome: Ongoing  Goal: Will regain or maintain usual level of consciousness  Outcome: Ongoing  Goal: Complications related to the disease process, condition or treatment will be avoided or minimized  Outcome: Ongoing     Problem: Isolation Precautions - Risk of Spread of Infection  Goal: Prevent transmission of infection  Outcome: Ongoing     Problem: Nutrition Deficits  Goal: Optimize nutritional status  Outcome: Ongoing     Problem: Risk for Fluid Volume Deficit  Goal: Maintain normal heart rhythm  Outcome: Ongoing  Goal: Maintain absence of muscle cramping  Outcome: Ongoing  Goal: Maintain normal serum potassium, sodium, calcium, phosphorus, and pH  Outcome: Ongoing     Problem: Loneliness or Risk for Loneliness  Goal: Demonstrate positive use of time alone when socialization is not possible  Outcome: Ongoing     Problem: Fatigue  Goal: Verbalize increase energy and improved vitality  Outcome: Ongoing     Problem: Patient Education: Go to Patient Education Activity  Goal: Patient/Family Education  Outcome: Ongoing

## 2021-12-16 NOTE — CARE COORDINATION
CM [phone] and she will follow pt. CM called and spoke to pt and discussed possible d/c plans: select vs covid snf vs hc and she verbalized understanding. Pt is currently on 15 liters of oxygen. Remdesivir end date is 12/18/21. No therapy ordered yet. CM will monitor for d/c plan and needs.     Skylar Centeno RN, BSN  [phone]

## 2021-12-16 NOTE — PROGRESS NOTES
Hospitalist Progress Note      PCP: Ivania Tao, APRN - CNP    Date of Admission: 12/13/2021    Chief Complaint: Dyspnea    Hospital Course: 62 y.o. female with past medical history of hypertension, hyperlipidemia, coronary artery disease, presents from home with dyspnea. Patient states that she went to urgent care on December 8. She has been having URI symptoms for several days prior to that and thought she had the flu. She was tested positive for COVID-19 infection December 8. She has been doing better however over the last 2 to 3 days she has been getting progressively dyspneic. She reports nonproductive cough and pleuritic chest pain if she coughs. She has also been having nausea and diarrhea. At the ED she was found to be hypoxemic at 86% on room air and currently requires 5 L per nasal cannula to maintain saturations of 9192%. Bilateral multifocal pneumonia is evident on chest x-ray. Subjective: Patient seen and examined at bedside. She states she is feeling well today.   She is currently on 15 L per high flow nasal cannula      Medications:  Reviewed    Infusion Medications    sodium chloride 25 mL (12/16/21 1220)     Scheduled Medications    amLODIPine  2.5 mg Oral Daily    aspirin  81 mg Oral Daily    metoprolol tartrate  25 mg Oral BID    sodium chloride flush  5-40 mL IntraVENous 2 times per day    enoxaparin  30 mg SubCUTAneous BID    famotidine  20 mg Oral BID    vitamin D3  5,000 Units Oral Daily    Followed by   Jetty Porch ON 12/20/2021] Vitamin D  2,000 Units Oral Daily    remdesivir IVPB  100 mg IntraVENous Q24H    dexamethasone (DECADRON) IVPB  20 mg IntraVENous Daily    Followed by   Jetty Porch ON 12/18/2021] dexamethasone  10 mg IntraVENous Q24H     PRN Meds: loperamide, nitroGLYCERIN, sodium chloride flush, sodium chloride, ondansetron **OR** ondansetron, polyethylene glycol, acetaminophen **OR** acetaminophen, guaiFENesin-dextromethorphan    No intake or output data in the 24 hours ending 12/16/21 7250    Physical Exam Performed:    BP (!) 157/79   Pulse 75   Temp 97.7 °F (36.5 °C) (Oral)   Resp 16   Ht 5' 5\" (1.651 m)   Wt 187 lb (84.8 kg)   SpO2 93%   BMI 31.12 kg/m²     General appearance: No apparent distress, appears stated age and cooperative. HEENT: Pupils equal, round, and reactive to light. Conjunctivae/corneas clear. Neck: Supple, with full range of motion. No jugular venous distention. Trachea midline. Respiratory:  Normal respiratory effort. Clear to auscultation, bilaterally without Rales/Wheezes/Rhonchi. Cardiovascular: Regular rate and rhythm with normal S1/S2 without murmurs, rubs or gallops. Abdomen: Soft, non-tender, non-distended with normal bowel sounds. Musculoskeletal: No clubbing, cyanosis or edema bilaterally. Full range of motion without deformity. Skin: Skin color, texture, turgor normal.  No rashes or lesions. Neurologic:  Neurovascularly intact without any focal sensory/motor deficits. Cranial nerves: II-XII intact, grossly non-focal.  Psychiatric: Alert and oriented, thought content appropriate, normal insight  Capillary Refill: Brisk,< 3 seconds   Peripheral Pulses: +2 palpable, equal bilaterally       Labs:   Recent Labs     12/14/21  0502 12/15/21  0537 12/16/21  0342   WBC 5.5 9.5 10.7   HGB 13.5 14.3 14.3   HCT 39.4 42.0 41.9    220 237     Recent Labs     12/14/21  0503 12/15/21  0537 12/16/21  0342    139 141   K 4.2 3.5 4.1    102 103   CO2 26 24 25   BUN 17 19 16   CREATININE 0.6 0.6 <0.5*   CALCIUM 8.7 8.8 8.4   PHOS  --  4.2 3.4     Recent Labs     12/14/21  0503   AST 30   ALT 19   BILITOT 0.6   ALKPHOS 60     No results for input(s): INR in the last 72 hours. No results for input(s): Magdalene Carrion in the last 72 hours.     Urinalysis:      Lab Results   Component Value Date    NITRU Negative 12/13/2021    WBCUA 3-5 12/13/2021    BACTERIA Rare 12/13/2021    RBCUA None seen 12/13/2021    BLOODU Negative 12/13/2021    SPECGRAV 1.008 12/13/2021    GLUCOSEU Negative 12/13/2021       Radiology:  XR CHEST PORTABLE   Final Result   Bilateral airspace disease, consistent with COVID pneumonia. Superimposed   atelectasis or typical pneumonia are other considerations, given greater   consolidation in the left lower lobe. Assessment/Plan:    Active Hospital Problems    Diagnosis     Pneumonia due to COVID-19 virus [U07.1, J12.82]      COVID-19 pneumonia  Patient is unvaccinated  Currently on 15 L per nasal cannula  Continue supportive measures  Decadron  Remdesivir  Elevated CRP, dose of Actemra given today     History of CAD  Stable    DVT Prophylaxis: Lovenox  Diet: ADULT DIET;  Regular  Code Status: Full Code        Electronically signed by Maddie Doe MD on 12/16/2021 at 3:55 PM

## 2021-12-17 ENCOUNTER — APPOINTMENT (OUTPATIENT)
Dept: GENERAL RADIOLOGY | Age: 61
DRG: 871 | End: 2021-12-17
Payer: COMMERCIAL

## 2021-12-17 LAB
ANION GAP SERPL CALCULATED.3IONS-SCNC: 10 MMOL/L (ref 3–16)
BASOPHILS ABSOLUTE: 0 K/UL (ref 0–0.2)
BASOPHILS RELATIVE PERCENT: 0 %
BUN BLDV-MCNC: 17 MG/DL (ref 7–20)
CALCIUM SERPL-MCNC: 8.8 MG/DL (ref 8.3–10.6)
CHLORIDE BLD-SCNC: 101 MMOL/L (ref 99–110)
CO2: 29 MMOL/L (ref 21–32)
CREAT SERPL-MCNC: 0.7 MG/DL (ref 0.6–1.2)
EOSINOPHILS ABSOLUTE: 0 K/UL (ref 0–0.6)
EOSINOPHILS RELATIVE PERCENT: 0 %
GFR AFRICAN AMERICAN: >60
GFR NON-AFRICAN AMERICAN: >60
GLUCOSE BLD-MCNC: 200 MG/DL (ref 70–99)
HCT VFR BLD CALC: 41.7 % (ref 36–48)
HEMOGLOBIN: 14.1 G/DL (ref 12–16)
LYMPHOCYTES ABSOLUTE: 0.3 K/UL (ref 1–5.1)
LYMPHOCYTES RELATIVE PERCENT: 3 %
MAGNESIUM: 2.2 MG/DL (ref 1.8–2.4)
MCH RBC QN AUTO: 28.2 PG (ref 26–34)
MCHC RBC AUTO-ENTMCNC: 33.9 G/DL (ref 31–36)
MCV RBC AUTO: 83.1 FL (ref 80–100)
MONOCYTES ABSOLUTE: 0.7 K/UL (ref 0–1.3)
MONOCYTES RELATIVE PERCENT: 6 %
MYELOCYTE PERCENT: 1 %
NEUTROPHILS ABSOLUTE: 10.6 K/UL (ref 1.7–7.7)
NEUTROPHILS RELATIVE PERCENT: 90 %
PDW BLD-RTO: 13.5 % (ref 12.4–15.4)
PHOSPHORUS: 3.8 MG/DL (ref 2.5–4.9)
PLATELET # BLD: 300 K/UL (ref 135–450)
PLATELET SLIDE REVIEW: ADEQUATE
PMV BLD AUTO: 7.6 FL (ref 5–10.5)
POTASSIUM SERPL-SCNC: 4.3 MMOL/L (ref 3.5–5.1)
RBC # BLD: 5.01 M/UL (ref 4–5.2)
RBC # BLD: NORMAL 10*6/UL
SLIDE REVIEW: ABNORMAL
SODIUM BLD-SCNC: 140 MMOL/L (ref 136–145)
WBC # BLD: 11.6 K/UL (ref 4–11)

## 2021-12-17 PROCEDURE — 71045 X-RAY EXAM CHEST 1 VIEW: CPT

## 2021-12-17 PROCEDURE — 99223 1ST HOSP IP/OBS HIGH 75: CPT | Performed by: INTERNAL MEDICINE

## 2021-12-17 PROCEDURE — 94761 N-INVAS EAR/PLS OXIMETRY MLT: CPT

## 2021-12-17 PROCEDURE — 6360000002 HC RX W HCPCS: Performed by: INTERNAL MEDICINE

## 2021-12-17 PROCEDURE — 2000000000 HC ICU R&B

## 2021-12-17 PROCEDURE — 94660 CPAP INITIATION&MGMT: CPT

## 2021-12-17 PROCEDURE — 2500000003 HC RX 250 WO HCPCS: Performed by: INTERNAL MEDICINE

## 2021-12-17 PROCEDURE — 85025 COMPLETE CBC W/AUTO DIFF WBC: CPT

## 2021-12-17 PROCEDURE — 83735 ASSAY OF MAGNESIUM: CPT

## 2021-12-17 PROCEDURE — 80048 BASIC METABOLIC PNL TOTAL CA: CPT

## 2021-12-17 PROCEDURE — 2580000003 HC RX 258: Performed by: INTERNAL MEDICINE

## 2021-12-17 PROCEDURE — 6370000000 HC RX 637 (ALT 250 FOR IP): Performed by: INTERNAL MEDICINE

## 2021-12-17 PROCEDURE — 2700000000 HC OXYGEN THERAPY PER DAY

## 2021-12-17 PROCEDURE — 36415 COLL VENOUS BLD VENIPUNCTURE: CPT

## 2021-12-17 PROCEDURE — 84100 ASSAY OF PHOSPHORUS: CPT

## 2021-12-17 RX ORDER — DEXMEDETOMIDINE HYDROCHLORIDE 4 UG/ML
.2-1.4 INJECTION, SOLUTION INTRAVENOUS CONTINUOUS
Status: DISCONTINUED | OUTPATIENT
Start: 2021-12-17 | End: 2021-12-22

## 2021-12-17 RX ADMIN — CHOLECALCIFEROL TAB 125 MCG (5000 UNIT) 5000 UNITS: 125 TAB at 08:43

## 2021-12-17 RX ADMIN — Medication 10 ML: at 04:46

## 2021-12-17 RX ADMIN — FAMOTIDINE 20 MG: 20 TABLET ORAL at 08:43

## 2021-12-17 RX ADMIN — REMDESIVIR 100 MG: 100 INJECTION, POWDER, LYOPHILIZED, FOR SOLUTION INTRAVENOUS at 10:13

## 2021-12-17 RX ADMIN — GUAIFENESIN SYRUP AND DEXTROMETHORPHAN 5 ML: 100; 10 SYRUP ORAL at 08:43

## 2021-12-17 RX ADMIN — DEXMEDETOMIDINE HYDROCHLORIDE 0.2 MCG/KG/HR: 4 INJECTION, SOLUTION INTRAVENOUS at 12:23

## 2021-12-17 RX ADMIN — METOPROLOL TARTRATE 25 MG: 25 TABLET, FILM COATED ORAL at 08:43

## 2021-12-17 RX ADMIN — AMLODIPINE BESYLATE 2.5 MG: 5 TABLET ORAL at 08:43

## 2021-12-17 RX ADMIN — ENOXAPARIN SODIUM 30 MG: 100 INJECTION SUBCUTANEOUS at 20:41

## 2021-12-17 RX ADMIN — ENOXAPARIN SODIUM 30 MG: 100 INJECTION SUBCUTANEOUS at 08:43

## 2021-12-17 RX ADMIN — Medication 10 ML: at 20:40

## 2021-12-17 RX ADMIN — GUAIFENESIN SYRUP AND DEXTROMETHORPHAN 5 ML: 100; 10 SYRUP ORAL at 15:33

## 2021-12-17 RX ADMIN — METOPROLOL TARTRATE 25 MG: 25 TABLET, FILM COATED ORAL at 20:40

## 2021-12-17 RX ADMIN — DEXAMETHASONE SODIUM PHOSPHATE 20 MG: 4 INJECTION, SOLUTION INTRA-ARTICULAR; INTRALESIONAL; INTRAMUSCULAR; INTRAVENOUS; SOFT TISSUE at 10:55

## 2021-12-17 RX ADMIN — ASPIRIN 81 MG: 81 TABLET, COATED ORAL at 08:43

## 2021-12-17 RX ADMIN — Medication 10 ML: at 08:43

## 2021-12-17 ASSESSMENT — PAIN SCALES - GENERAL
PAINLEVEL_OUTOF10: 0

## 2021-12-17 NOTE — CONSULTS
[facility] Pulmonary and Critical Care   Consult Note      Reason for Consult: COVID-19 pneumonia  Requesting Physician: Christian Posey    Subjective:   CHIEF COMPLAINT: Shortness of breath     HPI: Patient states that her symptoms started about 8 days prior to hospitalization-flulike symptoms particularly nausea and diarrhea. Has had some fevers and feeling generally unwell with fatigue. Then progressively she noticed that she was more short of breath. States that all her family members have been diagnosed with COVID-19-she has been the sickest of all. Presented to the hospital with shortness of breath-progressively worse O2 needs, currently requiring continuous BiPAP with FiO2 100%. Transferred to ICU for close monitoring. History of coronary artery disease, no prior history of stenting. Former smoker. No prior history of asthma or COPD. Unvaccinated for COVID-19.        The patient is a 62 y.o. female with significant past medical history of:      Diagnosis Date    Arthritis     RT TOES; established with podiatry    Complex tear of lateral meniscus of right knee as current injury 2013    Complex tear of medial meniscus of right knee as current injury 2013    Coronary artery disease involving native coronary artery of native heart without angina pectoris 3/0/5965    Helicobacter pylori antibody positive 2015    Hyperlipidemia     DIET CONTROL    Hypertension, essential 2021    Mallet fracture, closed, initial encounter 2019    Patellofemoral syndrome 2014    Synovitis of knee 2014    Tear of medial cartilage or meniscus of knee, current 2014        Past Surgical History:        Procedure Laterality Date     SECTION      breech    COLONOSCOPY      one polyp removed    HYSTERECTOMY      endometriosis    KNEE ARTHROSCOPY Right 13    RIGHT KNEE ARTHROSCOPE, PARTIAL MEDIAL AND LATERAL MENISCECTOMY, SYNOVECTOMY, CHONDROPLASTY OF MEDIAL FEMORAL CONDYLE    KNEE ARTHROSCOPY Left 11/28/2014    LEFT KNEE ARTHROSCOPY WITH PARTIAL MEDIAL MENISCECTOMY,    KNEE ARTHROSCOPY Right 01/23/2018    ACTUAL PROCEDURE: RIGHT KNEE ARTHROSCOPY, PARTIAL MEDIAL MENISCECTOMY,CHONDROPLASTY, SYNOVECTOMY      KNEE SURGERY  9-27-13    RIGHT KNEE ARTHROSCOPE, PARTIAL MEDIAL AND LATERAL    KNEE SURGERY  1-21-14     RIGHT KNEE ARTHROSCOPY WITH PARTIAL LATERAL MENISCECTOMY,    TUBAL LIGATION       Current Medications:    Current Facility-Administered Medications: [START ON 12/18/2021] dexamethasone (DECADRON) 20 mg in sodium chloride 0.9 % IVPB, 20 mg, IntraVENous, Daily  dexmedetomidine (PRECEDEX) 400 mcg in sodium chloride 0.9 % 100 mL infusion, 0.2-1.4 mcg/kg/hr, IntraVENous, Continuous  loperamide (IMODIUM) capsule 2 mg, 2 mg, Oral, 4x Daily PRN  amLODIPine (NORVASC) tablet 2.5 mg, 2.5 mg, Oral, Daily  aspirin EC tablet 81 mg, 81 mg, Oral, Daily  metoprolol tartrate (LOPRESSOR) tablet 25 mg, 25 mg, Oral, BID  nitroGLYCERIN (NITROSTAT) SL tablet 0.4 mg, 0.4 mg, SubLINGual, Q5 Min PRN  sodium chloride flush 0.9 % injection 5-40 mL, 5-40 mL, IntraVENous, 2 times per day  sodium chloride flush 0.9 % injection 5-40 mL, 5-40 mL, IntraVENous, PRN  0.9 % sodium chloride infusion, 25 mL, IntraVENous, PRN  enoxaparin (LOVENOX) injection 30 mg, 30 mg, SubCUTAneous, BID  ondansetron (ZOFRAN-ODT) disintegrating tablet 4 mg, 4 mg, Oral, Q8H PRN **OR** ondansetron (ZOFRAN) injection 4 mg, 4 mg, IntraVENous, Q6H PRN  polyethylene glycol (GLYCOLAX) packet 17 g, 17 g, Oral, Daily PRN  acetaminophen (TYLENOL) tablet 650 mg, 650 mg, Oral, Q6H PRN **OR** acetaminophen (TYLENOL) suppository 650 mg, 650 mg, Rectal, Q6H PRN  famotidine (PEPCID) tablet 20 mg, 20 mg, Oral, BID  guaiFENesin-dextromethorphan (ROBITUSSIN DM) 100-10 MG/5ML syrup 5 mL, 5 mL, Oral, Q4H PRN  vitamin D3 (CHOLECALCIFEROL) tablet 5,000 Units, 5,000 Units, Oral, Daily **FOLLOWED BY** [START ON 12/20/2021] Vitamin D (CHOLECALCIFEROL) tablet 2,000 Units, 2,000 Units, Oral, Daily    Allergies   Allergen Reactions    Latex Rash    Ampicillin     Codeine Itching    Floxin [Ofloxacin] Other (See Comments)     Becomes angry    Prednisone      Causes sores in mouth      Quinolones Other (See Comments)    Statins Other (See Comments)     MUSCLE CRAMPS    Welchol; crestor ; lipitor ; zocor ; zetia    Caffeine Palpitations and Other (See Comments)     Chest pain       Social History:    TOBACCO:   reports that she quit smoking about 33 years ago. Her smoking use included cigarettes. She has a 1.75 pack-year smoking history. She has never used smokeless tobacco.  ETOH:   reports no history of alcohol use.   Patient currently lives independently    Family History:       Problem Relation Age of Onset    Heart Disease Mother     Stroke Mother         x2    High Blood Pressure Mother     Diabetes Mother     Heart Attack Mother 48    Diabetes Father     Cancer Brother         colon       REVIEW OF SYSTEMS:    Constitutional: Fatigue and fevers  Ears, nose, mouth, throat: negative for ear drainage, epistaxis, hoarseness, nasal congestion, sore throat and voice change  Respiratory: negative except for cough and shortness of breath  Cardiovascular: negative for chest pain, chest pressure/discomfort, irregular heart beat, lower extremity edema and palpitations  Gastrointestinal: negative for abdominal pain, constipation, diarrhea, jaundice, melena, odynophagia, reflux symptoms and vomiting  Hematologic/lymphatic: negative for bleeding, easy bruising, lymphadenopathy and petechiae  Musculoskeletal:negative for arthralgias, bone pain, muscle weakness, neck pain and stiff joints  Neurological: negative for dizziness, gait problems, headaches, seizures, speech problems, tremors and weakness  Behavioral/Psych: negative for anxiety, behavior problems, depression, fatigue and sleep disturbance  Endocrine: negative for diabetic symptoms including none, neuropathy, polyphagia, polyuria, polydipsia, vomiting and diarrhea and temperature intolerance  Allergic/Immunologic: negative for anaphylaxis, angioedema, hay fever and urticaria      Objective:     Patient Vitals for the past 8 hrs:   BP Temp Pulse Resp SpO2   12/17/21 1200 (!) 145/71 98.4 °F (36.9 °C) 62 29 98 %   12/17/21 1157 -- -- -- 24 97 %   12/17/21 1100 128/79 -- 73 28 --   12/17/21 0900 138/78 -- 73 28 --   12/17/21 0842 125/71 -- 67 25 --   12/17/21 0800 120/69 97.1 °F (36.2 °C) 66 27 91 %   12/17/21 0742 -- -- -- (!) 32 (!) 89 %   12/17/21 0700 (!) 140/81 -- 86 28 --     I/O last 3 completed shifts:   In: 10 [I.V.:10]  Out: 400 [Urine:400]  I/O this shift:  In: 300 [P.O.:300]  Out: 300 [Urine:300]    Physical Exam:  General Appearance: alert and oriented to person, place and time, well developed and well- nourished, in no acute distress  Skin: warm and dry, no rash or erythema  Head: normocephalic and atraumatic  Eyes: pupils equal, round, and reactive to light, extraocular eye movements intact, conjunctivae normal  ENT: external ear and ear canal normal bilaterally, nose without deformity, nasal mucosa and turbinates normal  Neck: supple and non-tender without mass, no cervical lymphadenopathy  Pulmonary/Chest: rales present-bilateral  Cardiovascular: normal rate, regular rhythm,  no murmurs, rubs, distal pulses intact, no carotid bruits  Abdomen: soft, non-tender, non-distended, normal bowel sounds, no masses or organomegaly  Lymph Nodes: Cervical, supraclavicular normal  Extremities: no cyanosis, clubbing or edema  Musculoskeletal: normal range of motion, no joint swelling, deformity or tenderness  Neurologic: alert, no focal neurologic deficits    Data Review:  CBC:   Lab Results   Component Value Date    WBC 11.6 12/17/2021    RBC 5.01 12/17/2021     BMP:   Lab Results   Component Value Date    GLUCOSE 200 12/17/2021    GLUCOSE 82 10/04/2011    CO2 29 12/17/2021    BUN 17 12/17/2021    CREATININE 0.7 12/17/2021    CALCIUM 8.8 12/17/2021     ABG: No results found for: LQV8FHV, BEART, E3GZRWOC, PHART, THGBART, ULH2PUI, PO2ART, MTW7BKX    Radiology: All pertinent images / reports were reviewed as a part of this visit. Narrative   EXAMINATION:   ONE XRAY VIEW OF THE CHEST       12/13/2021 2:27 pm       COMPARISON:   None.       HISTORY:   ORDERING SYSTEM PROVIDED HISTORY: COVID and hypoxia   TECHNOLOGIST PROVIDED HISTORY:   Reason for exam:->COVID and hypoxia   Reason for Exam: Positive For Covid-19 (DX w 12/8. States her oxygen   saturation was 85% @ home. )       FINDINGS:   The cardiac silhouette and mediastinal contours are normal.       The lung volumes are low.  There is airspace disease in the mid and lower   lungs in acinar pattern.  There is greater consolidation in the left base. No pleural effusion or pneumothorax is identified.           Impression   Bilateral airspace disease, consistent with COVID pneumonia.  Superimposed   atelectasis or typical pneumonia are other considerations, given greater   consolidation in the left lower lobe. Problem List:   COVID-19 pneumonia  Acute hypoxic respiratory failure  ARDS  Assessment/Plan:     Reviewed patient's chest x-ray which is consistent with multifocal infiltrates in keeping with a diagnosis of COVID-19 pneumonia. Repeat chest x-ray today. Unfortunately O2 requirements have rapidly escalated and now requiring BiPAP, O2 saturations in the low 90s on current BiPAP settings 18/10, 100%. Continue to monitor. Awake and alert and appears to have good respiratory reserve. Hold off on intubation at this time. Start Precedex drip for anxiety. CRP 37, D-dimer 448. Procalcitonin low at 0.09. No indication for antibiotics. Continue with Decadron 20 mg IV daily, completing course of remdesivir and patient already received Actemra. Prophylactic Lovenox. Closely monitor patient in ICU, since at risk for intubation.     Nayla Ellis MD

## 2021-12-17 NOTE — PROGRESS NOTES
Hospitalist Progress Note      PCP: BRISSA So CNP    Date of Admission: 12/13/2021    Chief Complaint: Dyspnea    Hospital Course: 62 y.o. female with past medical history of hypertension, hyperlipidemia, coronary artery disease, presents from home with dyspnea. Patient states that she went to urgent care on December 8. She has been having URI symptoms for several days prior to that and thought she had the flu. She was tested positive for COVID-19 infection December 8. She has been doing better however over the last 2 to 3 days she has been getting progressively dyspneic. She reports nonproductive cough and pleuritic chest pain if she coughs. She has also been having nausea and diarrhea. At the ED she was found to be hypoxemic at 86% on room air and currently requires 5 L per nasal cannula to maintain saturations of 9192%. Bilateral multifocal pneumonia is evident on chest x-ray. Subjective: Patient seen and examined at bedside. She states she is feeling well today.   She is currently on 100% FiO2 via noninvasive ventilator    Medications:  Reviewed    Infusion Medications    dexmedetomidine 0.2 mcg/kg/hr (12/17/21 1223)    sodium chloride 25 mL (12/16/21 1220)     Scheduled Medications    [START ON 12/18/2021] dexamethasone (DECADRON) IVPB  20 mg IntraVENous Daily    amLODIPine  2.5 mg Oral Daily    aspirin  81 mg Oral Daily    metoprolol tartrate  25 mg Oral BID    sodium chloride flush  5-40 mL IntraVENous 2 times per day    enoxaparin  30 mg SubCUTAneous BID    famotidine  20 mg Oral BID    vitamin D3  5,000 Units Oral Daily    Followed by   Collin  ON 12/20/2021] Vitamin D  2,000 Units Oral Daily     PRN Meds: loperamide, nitroGLYCERIN, sodium chloride flush, sodium chloride, ondansetron **OR** ondansetron, polyethylene glycol, acetaminophen **OR** acetaminophen, guaiFENesin-dextromethorphan      Intake/Output Summary (Last 24 hours) at 12/17/2021 7399  Last data

## 2021-12-17 NOTE — PROGRESS NOTES
Patient's  Sharon Barcenas called in for an update [phone]. Informed of room change and condition.

## 2021-12-17 NOTE — PLAN OF CARE
Patient has no complaints of pain at this time and is able to express needs. Airway is being maintained at this time and will continue to support her to maintain appropriate O2 saturation.

## 2021-12-17 NOTE — PROGRESS NOTES
Patient placed on BiPAP at midnight last night. Tolerated well. SOB with exertion. Patient's sats decreases in the mid 70's with any type of activity. Bedside commode for toileting - one assist. 400ml of staci colored urine. Patient has frequent occurrences of what she states are \"panic attacks\". Placed a cool towel on forehead and repositioned for comfort.

## 2021-12-17 NOTE — PROGRESS NOTES
Shift assessment complete, see flowsheet for details. Patient is currently on Bipap, did take off Bipap so that she could take medications and have water. She did express a high level of anxiety, plan to bring it up during rounds. VS are WNL for her diagnosis and she is afebrile. No complaints of pain at this time. Will continue to support breathing and O2 saturation with either airvow or Bipap. Patient was able to speak with her  on the phone for a few minutes. MD are currently rounding on the unit. Patient expressed no needs at this time but has call light within reach. Will continue to monitor.

## 2021-12-18 LAB
ANION GAP SERPL CALCULATED.3IONS-SCNC: 11 MMOL/L (ref 3–16)
BUN BLDV-MCNC: 24 MG/DL (ref 7–20)
CALCIUM SERPL-MCNC: 8.8 MG/DL (ref 8.3–10.6)
CHLORIDE BLD-SCNC: 102 MMOL/L (ref 99–110)
CO2: 28 MMOL/L (ref 21–32)
CREAT SERPL-MCNC: 0.7 MG/DL (ref 0.6–1.2)
GFR AFRICAN AMERICAN: >60
GFR NON-AFRICAN AMERICAN: >60
GLUCOSE BLD-MCNC: 156 MG/DL (ref 70–99)
MAGNESIUM: 2.4 MG/DL (ref 1.8–2.4)
PHOSPHORUS: 4 MG/DL (ref 2.5–4.9)
POTASSIUM SERPL-SCNC: 3.9 MMOL/L (ref 3.5–5.1)
SODIUM BLD-SCNC: 141 MMOL/L (ref 136–145)

## 2021-12-18 PROCEDURE — 6360000002 HC RX W HCPCS: Performed by: INTERNAL MEDICINE

## 2021-12-18 PROCEDURE — 2580000003 HC RX 258: Performed by: INTERNAL MEDICINE

## 2021-12-18 PROCEDURE — 83735 ASSAY OF MAGNESIUM: CPT

## 2021-12-18 PROCEDURE — 6370000000 HC RX 637 (ALT 250 FOR IP): Performed by: INTERNAL MEDICINE

## 2021-12-18 PROCEDURE — 2700000000 HC OXYGEN THERAPY PER DAY

## 2021-12-18 PROCEDURE — 80048 BASIC METABOLIC PNL TOTAL CA: CPT

## 2021-12-18 PROCEDURE — 94761 N-INVAS EAR/PLS OXIMETRY MLT: CPT

## 2021-12-18 PROCEDURE — 36415 COLL VENOUS BLD VENIPUNCTURE: CPT

## 2021-12-18 PROCEDURE — 2500000003 HC RX 250 WO HCPCS: Performed by: INTERNAL MEDICINE

## 2021-12-18 PROCEDURE — 99233 SBSQ HOSP IP/OBS HIGH 50: CPT | Performed by: INTERNAL MEDICINE

## 2021-12-18 PROCEDURE — 84100 ASSAY OF PHOSPHORUS: CPT

## 2021-12-18 PROCEDURE — 85025 COMPLETE CBC W/AUTO DIFF WBC: CPT

## 2021-12-18 PROCEDURE — 2000000000 HC ICU R&B

## 2021-12-18 RX ADMIN — Medication 10 ML: at 09:28

## 2021-12-18 RX ADMIN — ASPIRIN 81 MG: 81 TABLET, COATED ORAL at 09:28

## 2021-12-18 RX ADMIN — CHOLECALCIFEROL TAB 125 MCG (5000 UNIT) 5000 UNITS: 125 TAB at 09:28

## 2021-12-18 RX ADMIN — ENOXAPARIN SODIUM 30 MG: 100 INJECTION SUBCUTANEOUS at 09:28

## 2021-12-18 RX ADMIN — METOPROLOL TARTRATE 25 MG: 25 TABLET, FILM COATED ORAL at 09:36

## 2021-12-18 RX ADMIN — DEXAMETHASONE SODIUM PHOSPHATE 20 MG: 4 INJECTION, SOLUTION INTRA-ARTICULAR; INTRALESIONAL; INTRAMUSCULAR; INTRAVENOUS; SOFT TISSUE at 09:54

## 2021-12-18 RX ADMIN — Medication 10 ML: at 20:29

## 2021-12-18 RX ADMIN — DEXMEDETOMIDINE HYDROCHLORIDE 0.1 MCG/KG/HR: 4 INJECTION, SOLUTION INTRAVENOUS at 20:42

## 2021-12-18 RX ADMIN — AMLODIPINE BESYLATE 2.5 MG: 5 TABLET ORAL at 09:28

## 2021-12-18 RX ADMIN — FAMOTIDINE 20 MG: 20 TABLET ORAL at 09:28

## 2021-12-18 RX ADMIN — ENOXAPARIN SODIUM 30 MG: 100 INJECTION SUBCUTANEOUS at 20:30

## 2021-12-18 RX ADMIN — METOPROLOL TARTRATE 25 MG: 25 TABLET, FILM COATED ORAL at 20:30

## 2021-12-18 ASSESSMENT — PAIN SCALES - GENERAL
PAINLEVEL_OUTOF10: 0

## 2021-12-18 NOTE — PROGRESS NOTES
Hospitalist Progress Note      PCP: BRISSA Fletcher CNP    Date of Admission: 12/13/2021    Chief Complaint: Dyspnea    Hospital Course: 62 y.o. female with past medical history of hypertension, hyperlipidemia, coronary artery disease, presents from home with dyspnea. Patient states that she went to urgent care on December 8. She has been having URI symptoms for several days prior to that and thought she had the flu. She was tested positive for COVID-19 infection December 8. She has been doing better however over the last 2 to 3 days she has been getting progressively dyspneic. She reports nonproductive cough and pleuritic chest pain if she coughs. She has also been having nausea and diarrhea. At the ED she was found to be hypoxemic at 86% on room air and currently requires 5 L per nasal cannula to maintain saturations of 9192%. Bilateral multifocal pneumonia is evident on chest x-ray. Subjective: Patient seen and examined at bedside. She states she is feeling well today.   She is currently on 100% FiO2 via noninvasive ventilator    Medications:  Reviewed    Infusion Medications    dexmedetomidine 0.1 mcg/kg/hr (12/18/21 0600)    sodium chloride 25 mL (12/16/21 1220)     Scheduled Medications    dexamethasone (DECADRON) IVPB  20 mg IntraVENous Daily    amLODIPine  2.5 mg Oral Daily    aspirin  81 mg Oral Daily    metoprolol tartrate  25 mg Oral BID    sodium chloride flush  5-40 mL IntraVENous 2 times per day    enoxaparin  30 mg SubCUTAneous BID    famotidine  20 mg Oral BID    vitamin D3  5,000 Units Oral Daily    Followed by   Gavino Wallace ON 12/20/2021] Vitamin D  2,000 Units Oral Daily     PRN Meds: loperamide, nitroGLYCERIN, sodium chloride flush, sodium chloride, ondansetron **OR** ondansetron, polyethylene glycol, acetaminophen **OR** acetaminophen, guaiFENesin-dextromethorphan      Intake/Output Summary (Last 24 hours) at 12/18/2021 1528  Last data filed at 12/18/2021 0600  Gross per 24 hour   Intake 531.2 ml   Output 500 ml   Net 31.2 ml       Physical Exam Performed:    BP (!) 160/81   Pulse 88   Temp 97.8 °F (36.6 °C)   Resp 29   Ht 5' 5\" (1.651 m)   Wt 197 lb 14.4 oz (89.8 kg)   SpO2 93%   BMI 32.93 kg/m²     General appearance: No apparent distress, appears stated age and cooperative. HEENT: Pupils equal, round, and reactive to light. Conjunctivae/corneas clear. Neck: Supple, with full range of motion. No jugular venous distention. Trachea midline. Respiratory:  Normal respiratory effort. Clear to auscultation, bilaterally without Rales/Wheezes/Rhonchi. Cardiovascular: Regular rate and rhythm with normal S1/S2 without murmurs, rubs or gallops. Abdomen: Soft, non-tender, non-distended with normal bowel sounds. Musculoskeletal: No clubbing, cyanosis or edema bilaterally. Full range of motion without deformity. Skin: Skin color, texture, turgor normal.  No rashes or lesions. Neurologic:  Neurovascularly intact without any focal sensory/motor deficits. Cranial nerves: II-XII intact, grossly non-focal.  Psychiatric: Alert and oriented, thought content appropriate, normal insight  Capillary Refill: Brisk,< 3 seconds   Peripheral Pulses: +2 palpable, equal bilaterally       Labs:   Recent Labs     12/16/21  0342 12/17/21  0258 12/18/21  0638   WBC 10.7 11.6* 14.8*   HGB 14.3 14.1 14.4   HCT 41.9 41.7 43.2    300 351     Recent Labs     12/16/21  0342 12/17/21  0258 12/18/21  0638    140 141   K 4.1 4.3 3.9    101 102   CO2 25 29 28   BUN 16 17 24*   CREATININE <0.5* 0.7 0.7   CALCIUM 8.4 8.8 8.8   PHOS 3.4 3.8 4.0     No results for input(s): AST, ALT, BILIDIR, BILITOT, ALKPHOS in the last 72 hours. No results for input(s): INR in the last 72 hours. No results for input(s): Kaleen Hope in the last 72 hours.     Urinalysis:      Lab Results   Component Value Date    NITRU Negative 12/13/2021    WBCUA 3-5 12/13/2021    BACTERIA Rare 12/13/2021    RBCUA None seen 12/13/2021    BLOODU Negative 12/13/2021    SPECGRAV 1.008 12/13/2021    GLUCOSEU Negative 12/13/2021       Radiology:  XR CHEST PORTABLE   Final Result   Bilateral airspace disease, with mild progression on the left, consistent   with pneumonia. Pattern is common for COVID. XR CHEST PORTABLE   Final Result   Bilateral airspace disease, consistent with COVID pneumonia. Superimposed   atelectasis or typical pneumonia are other considerations, given greater   consolidation in the left lower lobe. Assessment/Plan:    Active Hospital Problems    Diagnosis     Pneumonia due to COVID-19 virus [U07.1, J12.82]      COVID-19 pneumonia  Patient is unvaccinated  100% FiO2  Continue supportive measures  Decadron  Remdesivir  Elevated CRP, dose of Actemra given today     History of CAD  Stable    DVT Prophylaxis: Lovenox  Diet: ADULT DIET;  Regular  Code Status: Full Code        Electronically signed by Griselda Kathleen MD on 12/18/2021 at 3:28 PM

## 2021-12-18 NOTE — PROGRESS NOTES
Patient resting comfortable on her left side. Denies pain at this time. HR in the mid 50 to high 40's. Metoprolol 25mg was given at scheduled dose prior to the decrease in HR. Precedex decreased to 0.1mcg/kg/hr.

## 2021-12-18 NOTE — PROGRESS NOTES
Patient up to the bedside commode. Initially steady on her feet with one assist. No change in oxygen demand. Patient instructed to use Incentive spironometer while awake. Patient becomes tachypneic with any exertion.

## 2021-12-18 NOTE — PROGRESS NOTES
[facility] Pulmonary and Critical Care    Follow Up Note    Subjective:   CHIEF COMPLAINT / HPI:   Chief Complaint   Patient presents with    Positive For Covid-19     DX w 12/8. States her oxygen saturation was 85% @ home. Interval history: Patient admitted 12/13/2021 with acute hypoxemic respiratory failure related to COVID-19 pneumonia. Acute hypoxemic respiratory failure worsened and she is now requiring heated high flow oxygen +100% nonrebreather. She has required BiPAP mask ventilation at times as well. This morning she does not complain of respiratory distress. She is alert and oriented. She is requiring some Precedex to help with anxiety. Past Medical History:    Reviewed; no changes    Social History:    Reviewed; no changes    REVIEW OF SYSTEMS:    CONSTITUTIONAL:  negative for fevers and chills  RESPIRATORY:  See HPI  CARDIOVASCULAR:  negative for chest pain, palpitations, edema  GASTROINTESTINAL:  negative for nausea, vomiting, diarrhea, constipation and abdominal pain    Objective:   PHYSICAL EXAM:        VITALS:  /64   Pulse 68   Temp 97.8 °F (36.6 °C)   Resp 27   Ht 5' 5\" (1.651 m)   Wt 197 lb 14.4 oz (89.8 kg)   SpO2 (!) 86%   BMI 32.93 kg/m²  on heated high flow flow oxygen 100% / 60 L/min +100% nonrebreather mask   24HR INTAKE/OUTPUT:      Intake/Output Summary (Last 24 hours) at 12/18/2021 1055  Last data filed at 12/18/2021 0600  Gross per 24 hour   Intake 781.2 ml   Output 800 ml   Net -18.8 ml       CONSTITUTIONAL:  awake, alert,  no apparent distress, and appears stated age  LUNGS:  No increased work of breathing and goals to auscultation  CARDIOVASCULAR: S1 and S2 and no JVD  ABDOMEN:  normal bowel sounds, non-distended and non-tender to palpation  EXT: No edema, no calf tenderness. Pulses are present bilaterally. NEUROLOGIC:  Mental Status Exam:  Level of Alertness:   awake  Orientation:   person, place, time.  Non focal  SKIN:  normal skin color, texture, turgor, no redness, warmth, or swelling at IV sites    DATA:    CBC:  Recent Labs     12/16/21  0342 12/17/21  0258 12/18/21  0638   WBC 10.7 11.6* 14.8*   RBC 4.99 5.01 5.21*   HGB 14.3 14.1 14.4   HCT 41.9 41.7 43.2    300 351   MCV 83.8 83.1 82.9   MCH 28.7 28.2 27.6   MCHC 34.2 33.9 33.3   RDW 13.8 13.5 13.8      BMP:  Recent Labs     12/16/21  0342 12/17/21  0258 12/18/21  0638    140 141   K 4.1 4.3 3.9    101 102   CO2 25 29 28   BUN 16 17 24*   CREATININE <0.5* 0.7 0.7   CALCIUM 8.4 8.8 8.8   GLUCOSE 166* 200* 156*      ABG:  No results for input(s): PHART, CNM6WDZ, PO2ART, LTN8ZYP, Q8NOAOIV, BEART in the last 72 hours. Procalcitonin  No results for input(s): PROCAL in the last 72 hours. Radiology Review:  Pertinent images / reports were reviewed as a part of this visit. Assessment:     1. Acute hypoxemic respiratory failure  2. COVID-19 pneumonia    Plan:     1. I have reviewed laboratories, medical records and images for this visit  2. Chest imaging revealed patchy bilateral airspace disease consistent with COVID-19 pneumonia  3. She has completed a 5-day course of remdesivir  4. Completed Actemra, 1 dose  5. On Decadron a milligrams per day with CRP pending. 6. Continues to have acute hypoxemic respiratory failure requiring heated high flow oxygen +100% nonrebreather mask to maintain saturations in the low 90s. She has also required BiPAP mask ventilation at times as well. 7. She is at risk to need intubation and mechanical ventilation.

## 2021-12-19 LAB
ANION GAP SERPL CALCULATED.3IONS-SCNC: 13 MMOL/L (ref 3–16)
BANDED NEUTROPHILS RELATIVE PERCENT: 2 % (ref 0–7)
BASOPHILS ABSOLUTE: 0 K/UL (ref 0–0.2)
BASOPHILS ABSOLUTE: 0 K/UL (ref 0–0.2)
BASOPHILS RELATIVE PERCENT: 0 %
BASOPHILS RELATIVE PERCENT: 0 %
BUN BLDV-MCNC: 20 MG/DL (ref 7–20)
C-REACTIVE PROTEIN: 3.1 MG/L (ref 0–5.1)
CALCIUM SERPL-MCNC: 8.6 MG/DL (ref 8.3–10.6)
CHLORIDE BLD-SCNC: 101 MMOL/L (ref 99–110)
CO2: 25 MMOL/L (ref 21–32)
CREAT SERPL-MCNC: 0.7 MG/DL (ref 0.6–1.2)
EOSINOPHILS ABSOLUTE: 0 K/UL (ref 0–0.6)
EOSINOPHILS ABSOLUTE: 0 K/UL (ref 0–0.6)
EOSINOPHILS RELATIVE PERCENT: 0 %
EOSINOPHILS RELATIVE PERCENT: 0 %
GFR AFRICAN AMERICAN: >60
GFR NON-AFRICAN AMERICAN: >60
GLUCOSE BLD-MCNC: 192 MG/DL (ref 70–99)
HCT VFR BLD CALC: 42.9 % (ref 36–48)
HCT VFR BLD CALC: 43.2 % (ref 36–48)
HEMATOLOGY PATH CONSULT: YES
HEMOGLOBIN: 14.4 G/DL (ref 12–16)
HEMOGLOBIN: 14.6 G/DL (ref 12–16)
LYMPHOCYTES ABSOLUTE: 0.4 K/UL (ref 1–5.1)
LYMPHOCYTES ABSOLUTE: 1 K/UL (ref 1–5.1)
LYMPHOCYTES RELATIVE PERCENT: 3 %
LYMPHOCYTES RELATIVE PERCENT: 6 %
MAGNESIUM: 2.4 MG/DL (ref 1.8–2.4)
MCH RBC QN AUTO: 27.6 PG (ref 26–34)
MCH RBC QN AUTO: 27.9 PG (ref 26–34)
MCHC RBC AUTO-ENTMCNC: 33.3 G/DL (ref 31–36)
MCHC RBC AUTO-ENTMCNC: 33.9 G/DL (ref 31–36)
MCV RBC AUTO: 82.4 FL (ref 80–100)
MCV RBC AUTO: 82.9 FL (ref 80–100)
METAMYELOCYTES RELATIVE PERCENT: 1 %
MONOCYTES ABSOLUTE: 0.7 K/UL (ref 0–1.3)
MONOCYTES ABSOLUTE: 1.6 K/UL (ref 0–1.3)
MONOCYTES RELATIVE PERCENT: 11 %
MONOCYTES RELATIVE PERCENT: 4 %
NEUTROPHILS ABSOLUTE: 12.7 K/UL (ref 1.7–7.7)
NEUTROPHILS ABSOLUTE: 14.8 K/UL (ref 1.7–7.7)
NEUTROPHILS RELATIVE PERCENT: 85 %
NEUTROPHILS RELATIVE PERCENT: 88 %
PDW BLD-RTO: 13.8 % (ref 12.4–15.4)
PDW BLD-RTO: 13.8 % (ref 12.4–15.4)
PHOSPHORUS: 4.1 MG/DL (ref 2.5–4.9)
PLATELET # BLD: 351 K/UL (ref 135–450)
PLATELET # BLD: 389 K/UL (ref 135–450)
PLATELET SLIDE REVIEW: ADEQUATE
PMV BLD AUTO: 7.4 FL (ref 5–10.5)
PMV BLD AUTO: 7.4 FL (ref 5–10.5)
POTASSIUM SERPL-SCNC: 4.1 MMOL/L (ref 3.5–5.1)
RBC # BLD: 5.21 M/UL (ref 4–5.2)
RBC # BLD: 5.21 M/UL (ref 4–5.2)
RBC # BLD: NORMAL 10*6/UL
SLIDE REVIEW: ABNORMAL
SODIUM BLD-SCNC: 139 MMOL/L (ref 136–145)
WBC # BLD: 14.8 K/UL (ref 4–11)
WBC # BLD: 16.4 K/UL (ref 4–11)

## 2021-12-19 PROCEDURE — 94660 CPAP INITIATION&MGMT: CPT

## 2021-12-19 PROCEDURE — 2580000003 HC RX 258: Performed by: INTERNAL MEDICINE

## 2021-12-19 PROCEDURE — 94761 N-INVAS EAR/PLS OXIMETRY MLT: CPT

## 2021-12-19 PROCEDURE — 2000000000 HC ICU R&B

## 2021-12-19 PROCEDURE — 6360000002 HC RX W HCPCS: Performed by: INTERNAL MEDICINE

## 2021-12-19 PROCEDURE — 6370000000 HC RX 637 (ALT 250 FOR IP): Performed by: INTERNAL MEDICINE

## 2021-12-19 PROCEDURE — 80048 BASIC METABOLIC PNL TOTAL CA: CPT

## 2021-12-19 PROCEDURE — 36415 COLL VENOUS BLD VENIPUNCTURE: CPT

## 2021-12-19 PROCEDURE — 83735 ASSAY OF MAGNESIUM: CPT

## 2021-12-19 PROCEDURE — 85025 COMPLETE CBC W/AUTO DIFF WBC: CPT

## 2021-12-19 PROCEDURE — 99233 SBSQ HOSP IP/OBS HIGH 50: CPT | Performed by: INTERNAL MEDICINE

## 2021-12-19 PROCEDURE — 84100 ASSAY OF PHOSPHORUS: CPT

## 2021-12-19 PROCEDURE — 2700000000 HC OXYGEN THERAPY PER DAY

## 2021-12-19 PROCEDURE — 86140 C-REACTIVE PROTEIN: CPT

## 2021-12-19 RX ORDER — DEXAMETHASONE SODIUM PHOSPHATE 10 MG/ML
10 INJECTION, SOLUTION INTRAMUSCULAR; INTRAVENOUS DAILY
Status: DISCONTINUED | OUTPATIENT
Start: 2021-12-20 | End: 2021-12-22

## 2021-12-19 RX ADMIN — CHOLECALCIFEROL TAB 125 MCG (5000 UNIT) 5000 UNITS: 125 TAB at 09:38

## 2021-12-19 RX ADMIN — FAMOTIDINE 20 MG: 20 TABLET ORAL at 09:38

## 2021-12-19 RX ADMIN — ENOXAPARIN SODIUM 30 MG: 100 INJECTION SUBCUTANEOUS at 20:39

## 2021-12-19 RX ADMIN — ASPIRIN 81 MG: 81 TABLET, COATED ORAL at 09:39

## 2021-12-19 RX ADMIN — DEXAMETHASONE SODIUM PHOSPHATE 20 MG: 4 INJECTION, SOLUTION INTRA-ARTICULAR; INTRALESIONAL; INTRAMUSCULAR; INTRAVENOUS; SOFT TISSUE at 09:47

## 2021-12-19 RX ADMIN — FAMOTIDINE 20 MG: 20 TABLET ORAL at 20:39

## 2021-12-19 RX ADMIN — SODIUM CHLORIDE 25 ML: 9 INJECTION, SOLUTION INTRAVENOUS at 09:46

## 2021-12-19 RX ADMIN — METOPROLOL TARTRATE 25 MG: 25 TABLET, FILM COATED ORAL at 20:39

## 2021-12-19 RX ADMIN — ENOXAPARIN SODIUM 30 MG: 100 INJECTION SUBCUTANEOUS at 09:39

## 2021-12-19 RX ADMIN — Medication 10 ML: at 20:39

## 2021-12-19 ASSESSMENT — PAIN SCALES - GENERAL
PAINLEVEL_OUTOF10: 0

## 2021-12-19 NOTE — PROGRESS NOTES
P Pulmonary and Critical Care    Follow Up Note    Subjective:   CHIEF COMPLAINT / HPI:   Chief Complaint   Patient presents with    Positive For Covid-19     DX w 12/8. States her oxygen saturation was 85% @ home. Interval history: Patient admitted 12/13/2021 with acute hypoxemic respiratory failure related to COVID-19 pneumonia. Oxygen saturations are increasing. She is still on heated high flow oxygen 100% / 60 L/min. Uses BiPAP overnight. Over, does not need the 100% nonrebreather mask to overlay the heated high flow oxygen at this point. She is trying to lay mostly on her side. Past Medical History:    Reviewed; no changes    Social History:    Reviewed; no changes    REVIEW OF SYSTEMS:    CONSTITUTIONAL:  negative for fevers and chills  RESPIRATORY:  See HPI  CARDIOVASCULAR:  negative for chest pain, palpitations, edema  GASTROINTESTINAL:  negative for nausea, vomiting, diarrhea, constipation and abdominal pain    Objective:   PHYSICAL EXAM:        VITALS:  /60   Pulse 61   Temp 96.8 °F (36 °C) (Temporal)   Resp 13   Ht 5' 5\" (1.651 m)   Wt 197 lb 14.4 oz (89.8 kg)   SpO2 91%   BMI 32.93 kg/m²  on heated high flow flow oxygen 100% / 60 L/min +100% nonrebreather mask   24HR INTAKE/OUTPUT:      Intake/Output Summary (Last 24 hours) at 12/19/2021 0950  Last data filed at 12/19/2021 4934  Gross per 24 hour   Intake 653.02 ml   Output 300 ml   Net 353.02 ml       CONSTITUTIONAL:  awake, alert,  no apparent distress, and appears stated age  LUNGS:  No increased work of breathing and goals to auscultation  CARDIOVASCULAR: S1 and S2 and no JVD  ABDOMEN:  normal bowel sounds, non-distended and non-tender to palpation  EXT: No edema, no calf tenderness. Pulses are present bilaterally. NEUROLOGIC:  Mental Status Exam:  Level of Alertness:   awake  Orientation:   person, place, time.  Non focal  SKIN:  normal skin color, texture, turgor, no redness, warmth, or swelling at IV sites    DATA: CBC:  Recent Labs     12/17/21  0258 12/18/21  0638 12/19/21  0257   WBC 11.6* 14.8* 16.4*   RBC 5.01 5.21* 5.21*   HGB 14.1 14.4 14.6   HCT 41.7 43.2 42.9    351 389   MCV 83.1 82.9 82.4   MCH 28.2 27.6 27.9   MCHC 33.9 33.3 33.9   RDW 13.5 13.8 13.8   BANDSPCT  --   --  2      BMP:  Recent Labs     12/17/21  0258 12/18/21  0638 12/19/21  0257    141 139   K 4.3 3.9 4.1    102 101   CO2 29 28 25   BUN 17 24* 20   CREATININE 0.7 0.7 0.7   CALCIUM 8.8 8.8 8.6   GLUCOSE 200* 156* 192*      ABG:  No results for input(s): PHART, BSQ7LAB, PO2ART, NZH0HMQ, K4RFJAOR, BEART in the last 72 hours. Procalcitonin  No results for input(s): PROCAL in the last 72 hours. Radiology Review:  Pertinent images / reports were reviewed as a part of this visit. Assessment:     1. Acute hypoxemic respiratory failure  2. COVID-19 pneumonia    Plan:     1. I have reviewed laboratories, medical records and images for this visit  2. Chest imaging revealed patchy bilateral airspace disease consistent with COVID-19 pneumonia  3. She has completed a 5-day course of remdesivir  4. Completed Actemra, 1 dose  5. Reduce Decadron to 10 mg daily with CRP downtrending  6. Continues to have acute hypoxemic respiratory failure but now tolerating heated high flow oxygen 100% / 60 L/min without overlying nonrebreather mask  7. Hopefully we can begin to decrease FiO2 of her saturations remain in the mid 90s as they are now  8. Using BiPAP to sleep with at night  .

## 2021-12-19 NOTE — PROGRESS NOTES
Patient slept on BIPAP throughout the night. Remains SOB with exertion. Up to BR x1. VSS, SR on monitor. Denies pain at this time.

## 2021-12-19 NOTE — PROGRESS NOTES
Shift assessment completed, VS, scheduled mediations given per MAR, amlodipine and metoprolol held due to low BP and HR, will monitor throughout shift, pt off of NRB and on Airvo 60L, 100%, pt side lying to promote better oxygenation, denies any pain at this time, no questions or concerns at this time as well. Call light within reach, brakes locked and bed in lowest position.

## 2021-12-19 NOTE — PROGRESS NOTES
Hospitalist Progress Note      PCP: BRISSA Bruno CNP    Date of Admission: 12/13/2021    Chief Complaint: Dyspnea    Hospital Course: 62 y.o. female with past medical history of hypertension, hyperlipidemia, coronary artery disease, presents from home with dyspnea. Patient states that she went to urgent care on December 8. She has been having URI symptoms for several days prior to that and thought she had the flu. She was tested positive for COVID-19 infection December 8. She has been doing better however over the last 2 to 3 days she has been getting progressively dyspneic. She reports nonproductive cough and pleuritic chest pain if she coughs. She has also been having nausea and diarrhea. At the ED she was found to be hypoxemic at 86% on room air and currently requires 5 L per nasal cannula to maintain saturations of 9192%. Bilateral multifocal pneumonia is evident on chest x-ray. Subjective: Patient seen and examined at bedside. She states she is feeling well today.   She is currently on 100% FiO2 via noninvasive ventilator    Medications:  Reviewed    Infusion Medications    dexmedetomidine 0.1 mcg/kg/hr (12/19/21 0600)    sodium chloride 25 mL (12/19/21 0946)     Scheduled Medications    [START ON 12/20/2021] dexamethasone (PF)  10 mg IntraVENous Daily    amLODIPine  2.5 mg Oral Daily    aspirin  81 mg Oral Daily    metoprolol tartrate  25 mg Oral BID    sodium chloride flush  5-40 mL IntraVENous 2 times per day    enoxaparin  30 mg SubCUTAneous BID    famotidine  20 mg Oral BID    [START ON 12/20/2021] Vitamin D  2,000 Units Oral Daily     PRN Meds: loperamide, nitroGLYCERIN, sodium chloride flush, sodium chloride, ondansetron **OR** ondansetron, polyethylene glycol, acetaminophen **OR** acetaminophen, guaiFENesin-dextromethorphan      Intake/Output Summary (Last 24 hours) at 12/19/2021 1609  Last data filed at 12/19/2021 1400  Gross per 24 hour   Intake 1133.02 ml Output 650 ml   Net 483.02 ml       Physical Exam Performed:    /69   Pulse 64   Temp 97.4 °F (36.3 °C) (Temporal)   Resp 19   Ht 5' 5\" (1.651 m)   Wt 197 lb 14.4 oz (89.8 kg)   SpO2 90%   BMI 32.93 kg/m²     General appearance: No apparent distress, appears stated age and cooperative. HEENT: Pupils equal, round, and reactive to light. Conjunctivae/corneas clear. Neck: Supple, with full range of motion. No jugular venous distention. Trachea midline. Respiratory:  Normal respiratory effort. Clear to auscultation, bilaterally without Rales/Wheezes/Rhonchi. Cardiovascular: Regular rate and rhythm with normal S1/S2 without murmurs, rubs or gallops. Abdomen: Soft, non-tender, non-distended with normal bowel sounds. Musculoskeletal: No clubbing, cyanosis or edema bilaterally. Full range of motion without deformity. Skin: Skin color, texture, turgor normal.  No rashes or lesions. Neurologic:  Neurovascularly intact without any focal sensory/motor deficits. Cranial nerves: II-XII intact, grossly non-focal.  Psychiatric: Alert and oriented, thought content appropriate, normal insight  Capillary Refill: Brisk,< 3 seconds   Peripheral Pulses: +2 palpable, equal bilaterally       Labs:   Recent Labs     12/17/21  0258 12/18/21  0638 12/19/21  0257   WBC 11.6* 14.8* 16.4*   HGB 14.1 14.4 14.6   HCT 41.7 43.2 42.9    351 389     Recent Labs     12/17/21  0258 12/18/21  0638 12/19/21  0257    141 139   K 4.3 3.9 4.1    102 101   CO2 29 28 25   BUN 17 24* 20   CREATININE 0.7 0.7 0.7   CALCIUM 8.8 8.8 8.6   PHOS 3.8 4.0 4.1     No results for input(s): AST, ALT, BILIDIR, BILITOT, ALKPHOS in the last 72 hours. No results for input(s): INR in the last 72 hours. No results for input(s): Burton Massed in the last 72 hours.     Urinalysis:      Lab Results   Component Value Date    NITRU Negative 12/13/2021    WBCUA 3-5 12/13/2021    BACTERIA Rare 12/13/2021    RBCUA None seen 12/13/2021 BLOODU Negative 12/13/2021    SPECGRAV 1.008 12/13/2021    GLUCOSEU Negative 12/13/2021       Radiology:  XR CHEST PORTABLE   Final Result   Bilateral airspace disease, with mild progression on the left, consistent   with pneumonia. Pattern is common for COVID. XR CHEST PORTABLE   Final Result   Bilateral airspace disease, consistent with COVID pneumonia. Superimposed   atelectasis or typical pneumonia are other considerations, given greater   consolidation in the left lower lobe. Assessment/Plan:    Active Hospital Problems    Diagnosis     Pneumonia due to COVID-19 virus [U07.1, J12.82]      COVID-19 pneumonia  Patient is unvaccinated  100% FiO2  Continue supportive measures  Decadron  Remdesivir  Elevated CRP, dose of Actemra given today     History of CAD  Stable    DVT Prophylaxis: Lovenox  Diet: ADULT DIET;  Regular  Code Status: Full Code        Electronically signed by Bijan Marquez MD on 12/19/2021 at 4:09 PM

## 2021-12-20 LAB
ANION GAP SERPL CALCULATED.3IONS-SCNC: 10 MMOL/L (ref 3–16)
BANDED NEUTROPHILS RELATIVE PERCENT: 6 % (ref 0–7)
BASOPHILS ABSOLUTE: 0 K/UL (ref 0–0.2)
BASOPHILS RELATIVE PERCENT: 0 %
BUN BLDV-MCNC: 18 MG/DL (ref 7–20)
CALCIUM SERPL-MCNC: 8.6 MG/DL (ref 8.3–10.6)
CHLORIDE BLD-SCNC: 102 MMOL/L (ref 99–110)
CO2: 25 MMOL/L (ref 21–32)
CREAT SERPL-MCNC: 0.7 MG/DL (ref 0.6–1.2)
EOSINOPHILS ABSOLUTE: 0 K/UL (ref 0–0.6)
EOSINOPHILS RELATIVE PERCENT: 0 %
GFR AFRICAN AMERICAN: >60
GFR NON-AFRICAN AMERICAN: >60
GLUCOSE BLD-MCNC: 169 MG/DL (ref 70–99)
HCT VFR BLD CALC: 42.6 % (ref 36–48)
HEMATOLOGY PATH CONSULT: NORMAL
HEMOGLOBIN: 14.3 G/DL (ref 12–16)
LYMPHOCYTES ABSOLUTE: 1.3 K/UL (ref 1–5.1)
LYMPHOCYTES RELATIVE PERCENT: 8 %
MAGNESIUM: 2.3 MG/DL (ref 1.8–2.4)
MCH RBC QN AUTO: 27.8 PG (ref 26–34)
MCHC RBC AUTO-ENTMCNC: 33.5 G/DL (ref 31–36)
MCV RBC AUTO: 82.8 FL (ref 80–100)
MONOCYTES ABSOLUTE: 0.8 K/UL (ref 0–1.3)
MONOCYTES RELATIVE PERCENT: 5 %
NEUTROPHILS ABSOLUTE: 14.2 K/UL (ref 1.7–7.7)
NEUTROPHILS RELATIVE PERCENT: 81 %
PDW BLD-RTO: 13.4 % (ref 12.4–15.4)
PHOSPHORUS: 3.3 MG/DL (ref 2.5–4.9)
PLATELET # BLD: 353 K/UL (ref 135–450)
PLATELET SLIDE REVIEW: ADEQUATE
PMV BLD AUTO: 7.1 FL (ref 5–10.5)
POLYCHROMASIA: ABNORMAL
POTASSIUM SERPL-SCNC: 4.2 MMOL/L (ref 3.5–5.1)
RBC # BLD: 5.15 M/UL (ref 4–5.2)
SLIDE REVIEW: ABNORMAL
SODIUM BLD-SCNC: 137 MMOL/L (ref 136–145)
WBC # BLD: 16.3 K/UL (ref 4–11)

## 2021-12-20 PROCEDURE — 99233 SBSQ HOSP IP/OBS HIGH 50: CPT | Performed by: INTERNAL MEDICINE

## 2021-12-20 PROCEDURE — 80048 BASIC METABOLIC PNL TOTAL CA: CPT

## 2021-12-20 PROCEDURE — 36415 COLL VENOUS BLD VENIPUNCTURE: CPT

## 2021-12-20 PROCEDURE — 2700000000 HC OXYGEN THERAPY PER DAY

## 2021-12-20 PROCEDURE — 2580000003 HC RX 258: Performed by: INTERNAL MEDICINE

## 2021-12-20 PROCEDURE — 94660 CPAP INITIATION&MGMT: CPT

## 2021-12-20 PROCEDURE — 94761 N-INVAS EAR/PLS OXIMETRY MLT: CPT

## 2021-12-20 PROCEDURE — 2000000000 HC ICU R&B

## 2021-12-20 PROCEDURE — 6360000002 HC RX W HCPCS: Performed by: INTERNAL MEDICINE

## 2021-12-20 PROCEDURE — 2500000003 HC RX 250 WO HCPCS: Performed by: INTERNAL MEDICINE

## 2021-12-20 PROCEDURE — 85025 COMPLETE CBC W/AUTO DIFF WBC: CPT

## 2021-12-20 PROCEDURE — 84100 ASSAY OF PHOSPHORUS: CPT

## 2021-12-20 PROCEDURE — 6370000000 HC RX 637 (ALT 250 FOR IP): Performed by: INTERNAL MEDICINE

## 2021-12-20 PROCEDURE — 83735 ASSAY OF MAGNESIUM: CPT

## 2021-12-20 RX ADMIN — Medication 10 ML: at 09:22

## 2021-12-20 RX ADMIN — Medication 10 ML: at 21:49

## 2021-12-20 RX ADMIN — ENOXAPARIN SODIUM 30 MG: 100 INJECTION SUBCUTANEOUS at 09:22

## 2021-12-20 RX ADMIN — Medication 2000 UNITS: at 09:22

## 2021-12-20 RX ADMIN — METOPROLOL TARTRATE 25 MG: 25 TABLET, FILM COATED ORAL at 09:23

## 2021-12-20 RX ADMIN — ASPIRIN 81 MG: 81 TABLET, COATED ORAL at 09:22

## 2021-12-20 RX ADMIN — AMLODIPINE BESYLATE 2.5 MG: 5 TABLET ORAL at 09:23

## 2021-12-20 RX ADMIN — ENOXAPARIN SODIUM 40 MG: 100 INJECTION SUBCUTANEOUS at 21:48

## 2021-12-20 RX ADMIN — GUAIFENESIN SYRUP AND DEXTROMETHORPHAN 5 ML: 100; 10 SYRUP ORAL at 10:40

## 2021-12-20 RX ADMIN — DEXMEDETOMIDINE HYDROCHLORIDE 0.1 MCG/KG/HR: 4 INJECTION, SOLUTION INTRAVENOUS at 05:21

## 2021-12-20 RX ADMIN — FAMOTIDINE 20 MG: 20 TABLET ORAL at 21:48

## 2021-12-20 RX ADMIN — METOPROLOL TARTRATE 25 MG: 25 TABLET, FILM COATED ORAL at 21:48

## 2021-12-20 RX ADMIN — DEXAMETHASONE SODIUM PHOSPHATE 10 MG: 10 INJECTION, SOLUTION INTRAMUSCULAR; INTRAVENOUS at 09:22

## 2021-12-20 RX ADMIN — FAMOTIDINE 20 MG: 20 TABLET ORAL at 09:22

## 2021-12-20 ASSESSMENT — PAIN SCALES - GENERAL
PAINLEVEL_OUTOF10: 0

## 2021-12-20 NOTE — PROGRESS NOTES
Physician Progress Note      Matt Crooks  CSN #:                  [id number]  :                       1960  ADMIT DATE:       2021 1:51 PM  [address] [place] DATE:  RESPONDING  PROVIDER #:        Juliette Pennington MD          QUERY TEXT:    Pt admitted with COVID-19 and noted to have a fever of 102.4 128 22 86%, and   lactic acid 3.5. If possible, please document in progress notes and discharge   summary if you are evaluating and/or treating: The medical record reflects the following:  Risk Factors: Covid PNA  Clinical Indicators: Pt admitted with COVID-19 and noted to have a fever of   102.4 128 22 86%, and lactic acid 3.5. Treatment:  Rocephin Zithromax, IVF, Remdesivir  Options provided:  -- Sepsis present on admission due to COVID-19 pneumonia  -- Covid-19 pneumonia without sepsis  -- Other - I will add my own diagnosis  -- Disagree - Not applicable / Not valid  -- Disagree - Clinically unable to determine / Unknown  -- Refer to Clinical Documentation Reviewer    PROVIDER RESPONSE TEXT:    This patient has sepsis which was present on admission due to COVID-19   pneumonia.     Query created by: Baltazar Zhu on 2021 6:30 AM      Electronically signed by:  Juliette Pennington MD 2021 10:47 AM

## 2021-12-20 NOTE — PROGRESS NOTES
Pt assessment done by Victor Hugo Hsu. VSS. Meds given per MAR. Pt currently on 0.1mcg/kg/hr Precedex and doing well, Pt expresses that her anxiety is doing okay and will notify RN if it gets worse. No other complaints or concerns at this time. Discussed with Pt about O2 requirements and Pt given detail on what improvements staff is looking for. Call light within reach, will continue to monitor.

## 2021-12-20 NOTE — PROGRESS NOTES
[facility] Pulmonary and Critical Care    Follow Up Note    Subjective:   CHIEF COMPLAINT / HPI:   Chief Complaint   Patient presents with    Positive For Covid-19     DX w 12/8. States her oxygen saturation was 85% @ home. Interval history: Patient admitted 12/13/2021 with acute hypoxemic respiratory failure related to COVID-19 pneumonia. She continues to require heated high flow oxygen 100% and 60 L/min. Using BiPAP overnight. Past Medical History:    Reviewed; no changes    Social History:    Reviewed; no changes    REVIEW OF SYSTEMS:    CONSTITUTIONAL:  negative for fevers and chills  RESPIRATORY:  See HPI  CARDIOVASCULAR:  negative for chest pain, palpitations, edema  GASTROINTESTINAL:  negative for nausea, vomiting, diarrhea, constipation and abdominal pain    Objective:   PHYSICAL EXAM:        VITALS:  /67   Pulse 76   Temp 95.8 °F (35.4 °C) (Temporal)   Resp 26   Ht 5' 5\" (1.651 m)   Wt 200 lb 8 oz (90.9 kg)   SpO2 99%   BMI 33.36 kg/m²  on heated high flow flow oxygen 100% / 60 L/min +100% nonrebreather mask   24HR INTAKE/OUTPUT:      Intake/Output Summary (Last 24 hours) at 12/20/2021 1031  Last data filed at 12/20/2021 1000  Gross per 24 hour   Intake 1158.74 ml   Output 350 ml   Net 808.74 ml       CONSTITUTIONAL:  awake, alert,  no apparent distress, and appears stated age  LUNGS:  No increased work of breathing and goals to auscultation  CARDIOVASCULAR: S1 and S2 and no JVD  ABDOMEN:  normal bowel sounds, non-distended and non-tender to palpation  EXT: No edema, no calf tenderness. Pulses are present bilaterally. NEUROLOGIC:  Mental Status Exam:  Level of Alertness:   awake  Orientation:   person, place, time.  Non focal  SKIN:  normal skin color, texture, turgor, no redness, warmth, or swelling at IV sites    DATA:    CBC:  Recent Labs     12/18/21  0638 12/19/21  0257 12/20/21  0300   WBC 14.8* 16.4* 16.3*   RBC 5.21* 5.21* 5.15   HGB 14.4 14.6 14.3   HCT 43.2 42.9 42.6    389 353   MCV 82.9 82.4 82.8   MCH 27.6 27.9 27.8   MCHC 33.3 33.9 33.5   RDW 13.8 13.8 13.4   BANDSPCT  --  2 6      BMP:  Recent Labs     12/18/21  0638 12/19/21  0257 12/20/21  0300    139 137   K 3.9 4.1 4.2    101 102   CO2 28 25 25   BUN 24* 20 18   CREATININE 0.7 0.7 0.7   CALCIUM 8.8 8.6 8.6   GLUCOSE 156* 192* 169*      ABG:  No results for input(s): PHART, UQD2ZUG, PO2ART, HLS8ZEU, Y1JBLEAO, BEART in the last 72 hours. Procalcitonin  No results for input(s): PROCAL in the last 72 hours. Radiology Review:  Pertinent images / reports were reviewed as a part of this visit. Assessment:     1. Acute hypoxemic respiratory failure  2. COVID-19 pneumonia    Plan:     1. I have reviewed laboratories, medical records and images for this visit  2. Chest imaging revealed patchy bilateral airspace disease consistent with COVID-19 pneumonia  3. Repeat her chest x-ray. 4. She has completed a 5-day course of remdesivir  5. Completed Actemra, 1 dose  6. Reduced Decadron to 10 mg daily with CRP downtrending  7. Continues to have acute hypoxemic respiratory failure but now tolerating heated high flow oxygen 100% / 60 L/min without overlying nonrebreather mask  8. Hopefully we can begin to decrease FiO2 of her saturations remain in the mid 90s as they are now  9. Using BiPAP to sleep with at night  .

## 2021-12-20 NOTE — PROGRESS NOTES
Nutrition Assessment     Type and Reason for Visit: Initial (LOS)    Nutrition Recommendations/Plan:   Encourage po intake of at least 50%    Nutrition Assessment:  Pt is in isolation for COVID 19.  PO intake 26-75% of meals. Pt requires heated high flow O2 with Bipap overnight. No wt loss per hx. If po intake less than 50% of meals, will recommend ONS. No nutrition needs @ this time. Malnutrition Assessment:  Malnutrition Status: No malnutrition    Nutrition Related Findings: LBM 12/18; no edema noted      Current Nutrition Therapies:    ADULT DIET; Regular    Anthropometric Measures:  · Height: 5' 5\" (165.1 cm)  · Current Body Wt: 200 lb (90.7 kg)   · BMI: 33.3    Nutrition Diagnosis:   No nutrition diagnosis at this time    Nutrition Interventions:   Food and/or Nutrient Delivery:  Continue Current Diet  Nutrition Education/Counseling:  Education not indicated   Coordination of Nutrition Care:       Goals:  po intake at least 50% of meals       Nutrition Monitoring and Evaluation:   Behavioral-Environmental Outcomes:  None Identified   Food/Nutrient Intake Outcomes:  Food and Nutrient Intake  Physical Signs/Symptoms Outcomes:  Weight     Discharge Planning:     Too soon to determine     Electronically signed by Tracey Gutierrez RD, LD on 12/20/21 at 1:42 PM EST    Contact: [phone]

## 2021-12-20 NOTE — PROGRESS NOTES
Exam Performed:    /65   Pulse 66   Temp 95.8 °F (35.4 °C) (Temporal)   Resp 24   Ht 5' 5\" (1.651 m)   Wt 200 lb 8 oz (90.9 kg)   SpO2 97%   BMI 33.36 kg/m²     General appearance: No apparent distress, appears stated age and cooperative. HEENT: Pupils equal, round, and reactive to light. Conjunctivae/corneas clear. Neck: Supple, with full range of motion. No jugular venous distention. Trachea midline. Respiratory:  Normal respiratory effort. Clear to auscultation, bilaterally without Rales/Wheezes/Rhonchi. Cardiovascular: Regular rate and rhythm with normal S1/S2 without murmurs, rubs or gallops. Abdomen: Soft, non-tender, non-distended with normal bowel sounds. Musculoskeletal: No clubbing, cyanosis or edema bilaterally. Full range of motion without deformity. Skin: Skin color, texture, turgor normal.  No rashes or lesions. Neurologic:  Neurovascularly intact without any focal sensory/motor deficits. Cranial nerves: II-XII intact, grossly non-focal.  Psychiatric: Alert and oriented, thought content appropriate, normal insight  Capillary Refill: Brisk,< 3 seconds   Peripheral Pulses: +2 palpable, equal bilaterally       Labs:   Recent Labs     12/18/21  0638 12/19/21  0257 12/20/21  0300   WBC 14.8* 16.4* 16.3*   HGB 14.4 14.6 14.3   HCT 43.2 42.9 42.6    389 353     Recent Labs     12/18/21  0638 12/19/21  0257 12/20/21  0300    139 137   K 3.9 4.1 4.2    101 102   CO2 28 25 25   BUN 24* 20 18   CREATININE 0.7 0.7 0.7   CALCIUM 8.8 8.6 8.6   PHOS 4.0 4.1 3.3     No results for input(s): AST, ALT, BILIDIR, BILITOT, ALKPHOS in the last 72 hours. No results for input(s): INR in the last 72 hours. No results for input(s): Adonica Hoose in the last 72 hours.     Urinalysis:      Lab Results   Component Value Date    NITRU Negative 12/13/2021    WBCUA 3-5 12/13/2021    BACTERIA Rare 12/13/2021    RBCUA None seen 12/13/2021    BLOODU Negative 12/13/2021    SPECGRAV 1.008 12/13/2021    GLUCOSEU Negative 12/13/2021       Radiology:  XR CHEST PORTABLE   Final Result   Bilateral airspace disease, with mild progression on the left, consistent   with pneumonia. Pattern is common for COVID. XR CHEST PORTABLE   Final Result   Bilateral airspace disease, consistent with COVID pneumonia. Superimposed   atelectasis or typical pneumonia are other considerations, given greater   consolidation in the left lower lobe. XR CHEST PORTABLE    (Results Pending)           Assessment/Plan:    Active Hospital Problems    Diagnosis     Pneumonia due to COVID-19 virus [U07.1, J12.82]      COVID-19 pneumonia  Patient is unvaccinated  100% FiO2  Continue supportive measures  Decadron  Remdesivir  Status post Actemra     History of CAD  Stable    DVT Prophylaxis: Lovenox  Diet: ADULT DIET;  Regular  Code Status: Full Code        Electronically signed by Merlinda Clay, MD on 12/20/2021 at 1:50 PM

## 2021-12-20 NOTE — PLAN OF CARE
Problem: Pain:  Goal: Pain level will decrease  Description: Pain level will decrease  12/20/2021 1024 by Levy Don RN  Outcome: Ongoing  12/20/2021 0143 by Marty Kamara RN  Outcome: Ongoing  Goal: Control of acute pain  Description: Control of acute pain  12/20/2021 1024 by Levy Don RN  Outcome: Ongoing  12/20/2021 0143 by Marty Kamara RN  Outcome: Ongoing  Goal: Control of chronic pain  Description: Control of chronic pain  12/20/2021 1024 by Levy Don RN  Outcome: Ongoing  12/20/2021 0143 by Marty Kamara RN  Outcome: Ongoing     Problem: Airway Clearance - Ineffective  Goal: Achieve or maintain patent airway  12/20/2021 1024 by Levy Don RN  Outcome: Ongoing  12/20/2021 0143 by Marty Kamara RN  Outcome: Ongoing     Problem: Gas Exchange - Impaired  Goal: Absence of hypoxia  12/20/2021 1024 by Levy Don RN  Outcome: Ongoing  12/20/2021 0143 by Marty Kamara RN  Outcome: Ongoing  Goal: Promote optimal lung function  12/20/2021 1024 by Levy Don RN  Outcome: Ongoing  12/20/2021 0143 by Marty Kamara RN  Outcome: Ongoing     Problem: Breathing Pattern - Ineffective  Goal: Ability to achieve and maintain a regular respiratory rate  12/20/2021 1024 by Levy Don RN  Outcome: Ongoing  12/20/2021 0143 by Marty Kamara RN  Outcome: Ongoing     Problem:  Body Temperature -  Risk of, Imbalanced  Goal: Ability to maintain a body temperature within defined limits  12/20/2021 1024 by Levy Don RN  Outcome: Ongoing  12/20/2021 0143 by Marty Kamara RN  Outcome: Ongoing  Goal: Will regain or maintain usual level of consciousness  12/20/2021 1024 by Levy Don RN  Outcome: Ongoing  12/20/2021 0143 by Marty Kamara RN  Outcome: Ongoing  Goal: Complications related to the disease process, condition or treatment will be avoided or minimized  12/20/2021 1024 by Levy Don RN  Outcome: Ongoing  12/20/2021 0143 by Marquita Washington Bandar Ash RN  Outcome: Ongoing     Problem: Isolation Precautions - Risk of Spread of Infection  Goal: Prevent transmission of infection  12/20/2021 1024 by Christopher King RN  Outcome: Ongoing  12/20/2021 0143 by Beulah Rincon RN  Outcome: Ongoing     Problem: Nutrition Deficits  Goal: Optimize nutritional status  12/20/2021 1024 by Christopher King RN  Outcome: Ongoing  12/20/2021 0143 by Beulah Rincon RN  Outcome: Ongoing     Problem: Risk for Fluid Volume Deficit  Goal: Maintain normal heart rhythm  12/20/2021 1024 by Christopher King RN  Outcome: Ongoing  12/20/2021 0143 by Beulah Rincon RN  Outcome: Ongoing  Goal: Maintain absence of muscle cramping  12/20/2021 1024 by Christopher King RN  Outcome: Ongoing  12/20/2021 0143 by Beulah Rincon RN  Outcome: Ongoing  Goal: Maintain normal serum potassium, sodium, calcium, phosphorus, and pH  12/20/2021 1024 by Christopher King RN  Outcome: Ongoing  12/20/2021 0143 by Beulah Rincon RN  Outcome: Ongoing     Problem: Loneliness or Risk for Loneliness  Goal: Demonstrate positive use of time alone when socialization is not possible  12/20/2021 1024 by Christopher King RN  Outcome: Ongoing  12/20/2021 0143 by Beulah Rincon RN  Outcome: Ongoing     Problem: Fatigue  Goal: Verbalize increase energy and improved vitality  12/20/2021 1024 by Christopher King RN  Outcome: Ongoing  12/20/2021 0143 by Beulah Rincon RN  Outcome: Ongoing     Problem: Patient Education: Go to Patient Education Activity  Goal: Patient/Family Education  12/20/2021 1024 by Christopher King RN  Outcome: Ongoing  12/20/2021 0143 by Beulah Rincon RN  Outcome: Ongoing

## 2021-12-20 NOTE — CARE COORDINATION
Chart reviewed for discharge planning. Barrier(s) to discharge-  Oxygen demand, COVID-19      Oxygen - BiPap 100%      Testing - none planned/pending      Labs - WBC 16.3      Other - elevated CRP, Actemra 12/19, cont on IV Decadron, Precedex GTT for anxiety, Finished Remdesivir    Tentative discharge plan- Home, needs TBD    Tentative discharge date-  Undetermined     *Case management will continue to follow progress and update discharge plan as needed.     Electronically signed by Silva Goldberg, RN on 12/20/2021 at 10:38 AM

## 2021-12-21 ENCOUNTER — APPOINTMENT (OUTPATIENT)
Dept: GENERAL RADIOLOGY | Age: 61
DRG: 871 | End: 2021-12-21
Payer: COMMERCIAL

## 2021-12-21 LAB
ANION GAP SERPL CALCULATED.3IONS-SCNC: 13 MMOL/L (ref 3–16)
ATYPICAL LYMPHOCYTE RELATIVE PERCENT: 1 % (ref 0–6)
BASOPHILS ABSOLUTE: 0 K/UL (ref 0–0.2)
BASOPHILS RELATIVE PERCENT: 0 %
BUN BLDV-MCNC: 18 MG/DL (ref 7–20)
CALCIUM SERPL-MCNC: 8.4 MG/DL (ref 8.3–10.6)
CHLORIDE BLD-SCNC: 100 MMOL/L (ref 99–110)
CO2: 22 MMOL/L (ref 21–32)
CREAT SERPL-MCNC: 0.6 MG/DL (ref 0.6–1.2)
EOSINOPHILS ABSOLUTE: 0 K/UL (ref 0–0.6)
EOSINOPHILS RELATIVE PERCENT: 0 %
GFR AFRICAN AMERICAN: >60
GFR NON-AFRICAN AMERICAN: >60
GLUCOSE BLD-MCNC: 172 MG/DL (ref 70–99)
HCT VFR BLD CALC: 46.8 % (ref 36–48)
HEMOGLOBIN: 15.4 G/DL (ref 12–16)
LYMPHOCYTES ABSOLUTE: 0.3 K/UL (ref 1–5.1)
LYMPHOCYTES RELATIVE PERCENT: 1 %
MAGNESIUM: 2.1 MG/DL (ref 1.8–2.4)
MCH RBC QN AUTO: 27.9 PG (ref 26–34)
MCHC RBC AUTO-ENTMCNC: 32.9 G/DL (ref 31–36)
MCV RBC AUTO: 84.7 FL (ref 80–100)
METAMYELOCYTES RELATIVE PERCENT: 1 %
MONOCYTES ABSOLUTE: 0.7 K/UL (ref 0–1.3)
MONOCYTES RELATIVE PERCENT: 5 %
NEUTROPHILS ABSOLUTE: 13.9 K/UL (ref 1.7–7.7)
NEUTROPHILS RELATIVE PERCENT: 92 %
PDW BLD-RTO: 13.4 % (ref 12.4–15.4)
PHOSPHORUS: 3.2 MG/DL (ref 2.5–4.9)
PLATELET # BLD: 268 K/UL (ref 135–450)
PLATELET SLIDE REVIEW: ADEQUATE
PMV BLD AUTO: 7.9 FL (ref 5–10.5)
POTASSIUM SERPL-SCNC: 4.4 MMOL/L (ref 3.5–5.1)
RBC # BLD: 5.53 M/UL (ref 4–5.2)
RBC # BLD: NORMAL 10*6/UL
SLIDE REVIEW: ABNORMAL
SODIUM BLD-SCNC: 135 MMOL/L (ref 136–145)
WBC # BLD: 14.9 K/UL (ref 4–11)

## 2021-12-21 PROCEDURE — 6360000002 HC RX W HCPCS: Performed by: INTERNAL MEDICINE

## 2021-12-21 PROCEDURE — 2580000003 HC RX 258: Performed by: INTERNAL MEDICINE

## 2021-12-21 PROCEDURE — 2700000000 HC OXYGEN THERAPY PER DAY

## 2021-12-21 PROCEDURE — 36415 COLL VENOUS BLD VENIPUNCTURE: CPT

## 2021-12-21 PROCEDURE — 71045 X-RAY EXAM CHEST 1 VIEW: CPT

## 2021-12-21 PROCEDURE — 85025 COMPLETE CBC W/AUTO DIFF WBC: CPT

## 2021-12-21 PROCEDURE — 94761 N-INVAS EAR/PLS OXIMETRY MLT: CPT

## 2021-12-21 PROCEDURE — 2000000000 HC ICU R&B

## 2021-12-21 PROCEDURE — 6370000000 HC RX 637 (ALT 250 FOR IP): Performed by: INTERNAL MEDICINE

## 2021-12-21 PROCEDURE — 99233 SBSQ HOSP IP/OBS HIGH 50: CPT | Performed by: INTERNAL MEDICINE

## 2021-12-21 PROCEDURE — 83735 ASSAY OF MAGNESIUM: CPT

## 2021-12-21 PROCEDURE — 80048 BASIC METABOLIC PNL TOTAL CA: CPT

## 2021-12-21 PROCEDURE — 84100 ASSAY OF PHOSPHORUS: CPT

## 2021-12-21 RX ADMIN — METOPROLOL TARTRATE 25 MG: 25 TABLET, FILM COATED ORAL at 20:53

## 2021-12-21 RX ADMIN — Medication 2000 UNITS: at 09:50

## 2021-12-21 RX ADMIN — ASPIRIN 81 MG: 81 TABLET, COATED ORAL at 09:50

## 2021-12-21 RX ADMIN — METOPROLOL TARTRATE 25 MG: 25 TABLET, FILM COATED ORAL at 09:50

## 2021-12-21 RX ADMIN — FAMOTIDINE 20 MG: 20 TABLET ORAL at 20:53

## 2021-12-21 RX ADMIN — AMLODIPINE BESYLATE 2.5 MG: 5 TABLET ORAL at 09:50

## 2021-12-21 RX ADMIN — GUAIFENESIN SYRUP AND DEXTROMETHORPHAN 5 ML: 100; 10 SYRUP ORAL at 04:19

## 2021-12-21 RX ADMIN — Medication 10 ML: at 09:51

## 2021-12-21 RX ADMIN — GUAIFENESIN SYRUP AND DEXTROMETHORPHAN 5 ML: 100; 10 SYRUP ORAL at 12:05

## 2021-12-21 RX ADMIN — ENOXAPARIN SODIUM 40 MG: 100 INJECTION SUBCUTANEOUS at 20:53

## 2021-12-21 RX ADMIN — ENOXAPARIN SODIUM 40 MG: 100 INJECTION SUBCUTANEOUS at 09:50

## 2021-12-21 RX ADMIN — FAMOTIDINE 20 MG: 20 TABLET ORAL at 09:50

## 2021-12-21 RX ADMIN — DEXAMETHASONE SODIUM PHOSPHATE 10 MG: 10 INJECTION, SOLUTION INTRAMUSCULAR; INTRAVENOUS at 09:50

## 2021-12-21 ASSESSMENT — PAIN SCALES - GENERAL
PAINLEVEL_OUTOF10: 0

## 2021-12-21 NOTE — PROGRESS NOTES
Patient desated to 76s while transferring to bedside commode. Airvo increased to 100%. Patient slow to recover but has sats above 90%. Will continue to monitor.

## 2021-12-21 NOTE — PROGRESS NOTES
Patient assessed throughout the day, VSS, remains on Airvo. Patient anxious during shift assessment but calm throughout day. Patient educated on steps to wean down on oxygen, verbalized frustration. Patient's  called for update, RN answered questions and provided education on weaning oxygen. Call light within reach, calls out appropriately.

## 2021-12-21 NOTE — PROGRESS NOTES
Alerted by monitor watcher to check on pt as she was unable to contact primary RN. Found pt cutting bkfst; Sats 81%; O2 was on 45liters. Increase to 50liters; Sats increased to 86%. Increased to 55liters.  Pt slowly arrived to Sats 95%

## 2021-12-21 NOTE — PLAN OF CARE
Problem: Pain:  Goal: Pain level will decrease  Description: Pain level will decrease  12/20/2021 2249 by Mily Galvin RN  Outcome: Ongoing  12/20/2021 1024 by Randolph Estrella RN  Outcome: Ongoing  Goal: Control of acute pain  Description: Control of acute pain  12/20/2021 2249 by Mily Galvin RN  Outcome: Ongoing  12/20/2021 1024 by Randolph Estrella RN  Outcome: Ongoing  Goal: Control of chronic pain  Description: Control of chronic pain  12/20/2021 2249 by Mily Galvin RN  Outcome: Ongoing  12/20/2021 1024 by Randolph Estrella RN  Outcome: Ongoing     Problem: Airway Clearance - Ineffective  Goal: Achieve or maintain patent airway  12/20/2021 2249 by Mily Galvin RN  Outcome: Ongoing  12/20/2021 1024 by Randolph Estrella RN  Outcome: Ongoing     Problem: Gas Exchange - Impaired  Goal: Absence of hypoxia  12/20/2021 2249 by Mily Galvin RN  Outcome: Ongoing  12/20/2021 1024 by Randolph Estrella RN  Outcome: Ongoing  Goal: Promote optimal lung function  12/20/2021 2249 by Mily Galvin RN  Outcome: Ongoing  12/20/2021 1024 by Randolph Estrella RN  Outcome: Ongoing     Problem: Breathing Pattern - Ineffective  Goal: Ability to achieve and maintain a regular respiratory rate  12/20/2021 2249 by Mily Galvin RN  Outcome: Ongoing  12/20/2021 1024 by Randolph Estrella RN  Outcome: Ongoing     Problem:  Body Temperature -  Risk of, Imbalanced  Goal: Ability to maintain a body temperature within defined limits  12/20/2021 2249 by Mily Galvin RN  Outcome: Ongoing  12/20/2021 1024 by Randolph Estrella RN  Outcome: Ongoing  Goal: Will regain or maintain usual level of consciousness  12/20/2021 2249 by Mily Galvin RN  Outcome: Ongoing  12/20/2021 1024 by Randolph Estrella RN  Outcome: Ongoing  Goal: Complications related to the disease process, condition or treatment will be avoided or minimized  12/20/2021 2249 by Mily Galvin RN  Outcome: Ongoing  12/20/2021 1024 by Randolph Estrella RN  Outcome: Ongoing     Problem: Isolation Precautions - Risk of Spread of Infection  Goal: Prevent transmission of infection  12/20/2021 2249 by Andrew Staley RN  Outcome: Ongoing  12/20/2021 1024 by Onofre Rivera RN  Outcome: Ongoing     Problem: Nutrition Deficits  Goal: Optimize nutritional status  12/20/2021 2249 by Andrew Staley RN  Outcome: Ongoing  12/20/2021 1024 by Onofre Rivera RN  Outcome: Ongoing     Problem: Risk for Fluid Volume Deficit  Goal: Maintain normal heart rhythm  12/20/2021 2249 by Andrew Staley RN  Outcome: Ongoing  12/20/2021 1024 by Onofre Rivera RN  Outcome: Ongoing  Goal: Maintain absence of muscle cramping  12/20/2021 2249 by Andrew Staley RN  Outcome: Ongoing  12/20/2021 1024 by Onofre Rivera RN  Outcome: Ongoing  Goal: Maintain normal serum potassium, sodium, calcium, phosphorus, and pH  12/20/2021 2249 by Andrew Staley RN  Outcome: Ongoing  12/20/2021 1024 by Onofre Rivera RN  Outcome: Ongoing     Problem: Loneliness or Risk for Loneliness  Goal: Demonstrate positive use of time alone when socialization is not possible  12/20/2021 2249 by Andrew Staley RN  Outcome: Ongoing  12/20/2021 1024 by Onofre Rivera RN  Outcome: Ongoing     Problem: Fatigue  Goal: Verbalize increase energy and improved vitality  12/20/2021 2249 by Andrew Staley RN  Outcome: Ongoing  12/20/2021 1024 by Onofre Rivera RN  Outcome: Ongoing     Problem: Patient Education: Go to Patient Education Activity  Goal: Patient/Family Education  12/20/2021 2249 by Andrew Staley RN  Outcome: Ongoing  12/20/2021 1024 by Onofre Rivera RN  Outcome: Ongoing

## 2021-12-21 NOTE — PROGRESS NOTES
[facility] Pulmonary and Critical Care    Follow Up Note    Subjective:   CHIEF COMPLAINT / HPI:   Chief Complaint   Patient presents with    Positive For Covid-19     DX w 12/8. States her oxygen saturation was 85% @ home. Interval history: Patient admitted 12/13/2021 with acute hypoxemic respiratory failure related to COVID-19 pneumonia. She continues to require heated high flow oxygen 88% and 55 L/min using BiPAP overnight. Past Medical History:    Reviewed; no changes    Social History:    Reviewed; no changes    REVIEW OF SYSTEMS:    CONSTITUTIONAL:  negative for fevers and chills  RESPIRATORY:  See HPI  CARDIOVASCULAR:  negative for chest pain, palpitations, edema  GASTROINTESTINAL:  negative for nausea, vomiting, diarrhea, constipation and abdominal pain    Objective:   PHYSICAL EXAM:        VITALS:  /70   Pulse 74   Temp 98.3 °F (36.8 °C) (Temporal)   Resp 20   Ht 5' 5\" (1.651 m)   Wt 192 lb 6.4 oz (87.3 kg)   SpO2 95%   BMI 32.02 kg/m²  on heated high flow flow oxygen 100% / 60 L/min +100% nonrebreather mask   24HR INTAKE/OUTPUT:      Intake/Output Summary (Last 24 hours) at 12/21/2021 1027  Last data filed at 12/21/2021 3037  Gross per 24 hour   Intake 517.04 ml   Output 1050 ml   Net -532.96 ml       CONSTITUTIONAL:  awake, alert,  no apparent distress, and appears stated age  LUNGS:  No increased work of breathing and goals to auscultation  CARDIOVASCULAR: S1 and S2 and no JVD  ABDOMEN:  normal bowel sounds, non-distended and non-tender to palpation  EXT: No edema, no calf tenderness. Pulses are present bilaterally. NEUROLOGIC:  Mental Status Exam:  Level of Alertness:   awake  Orientation:   person, place, time.  Non focal  SKIN:  normal skin color, texture, turgor, no redness, warmth, or swelling at IV sites    DATA:    CBC:  Recent Labs     12/19/21  0257 12/20/21  0300 12/21/21  0251   WBC 16.4* 16.3* 14.9*   RBC 5.21* 5.15 5.53*   HGB 14.6 14.3 15.4   HCT 42.9 42.6 46.8    353 268   MCV 82.4 82.8 84.7   MCH 27.9 27.8 27.9   MCHC 33.9 33.5 32.9   RDW 13.8 13.4 13.4   BANDSPCT 2 6  --       BMP:  Recent Labs     12/19/21  0257 12/20/21  0300 12/21/21  0251    137 135*   K 4.1 4.2 4.4    102 100   CO2 25 25 22   BUN 20 18 18   CREATININE 0.7 0.7 0.6   CALCIUM 8.6 8.6 8.4   GLUCOSE 192* 169* 172*      ABG:  No results for input(s): PHART, UWK5SPE, PO2ART, BGB3SQB, I5YOAXZM, BEART in the last 72 hours. Procalcitonin  No results for input(s): PROCAL in the last 72 hours. Radiology Review:  Pertinent images / reports were reviewed as a part of this visit. Assessment:     1. Acute hypoxemic respiratory failure  2. COVID-19 pneumonia    Plan:     1. I have reviewed laboratories, medical records and images for this visit  2. I reviewed chest imaging from today. This continues to reveal patchy bilateral airspace disease similar to previous. 3. She has completed a 5-day course of remdesivir  4. Completed Actemra, 1 dose  5. Reduced Decadron to 10 mg daily with CRP downtrending  6. Continues to have acute hypoxemic respiratory failure but now tolerating heated high flow oxygen 88% / 55 L/min. 7. When I was in the room oxygen saturations in the mid 90s. 8. Continue to wean oxygen  9. Using BiPAP to sleep with at night  .

## 2021-12-21 NOTE — PROGRESS NOTES
Reassessment completed. VSS. O2 at 97% on airvo at 55L/90%. No complaints or requests at this time. Safety and isolation precautions maintained. Call light within reach. Will continue to monitor.

## 2021-12-21 NOTE — PROGRESS NOTES
Hospitalist Progress Note      PCP: BRISSA Bonilla - CNP    Date of Admission: 12/13/2021    Chief Complaint: Dyspnea    Hospital Course: This 62 y.o. female with PMHx of hypertension, hyperlipidemia, coronary artery disease presented with dyspnea. Patient states that she went to urgent care on December 8. She has been having URI symptoms for several days prior to that and thought she had the flu. She was tested positive for COVID-19 infection December 8. She has been doing better however over the last 2 to 3 days she has been getting progressively dyspneic. She reports nonproductive cough and pleuritic chest pain if she coughs. She has also been having nausea and diarrhea. At the ED she was found to be hypoxemic at 86% on room air and currently requires 5 L per nasal cannula to maintain saturations of 91%. Bilateral multifocal pneumonia is evident on chest x-ray. Interval history:    Pt seen and examined today. Overnight events noted, interval ancillary notes and labs reviewed. On airvo; FiO2 90% and 55 L/min satting around 95%  Afebrile overnight, WBCs down to 14.9  Reported shortness of breath on minimal exertion and cough but denied chest pain, nausea, vomiting or abdominal pain      Assessment/Plan:      COVID-19 pneumonia  Patient is unvaccinated: Alternate with pain BiPAP and high flow  Status post 5-day course of remdesivir and 1 dose of Actemra  CRP trended down  Decadron dose reduced to 10 mg daily   Continue droplet plus precautions  Continue Lovenox for hypercoagulable state  Continue supplemental sedation oxygen as needed to keep sats above 92      Acute hypoxic respiratory failure secondary to above    History of CAD:Stable  Continue aspirin statins and beta-blockers    DVT Prophylaxis: Lovenox  Diet: ADULT DIET;  Regular  Code Status: Full Code    Medications:  Reviewed    Infusion Medications    dexmedetomidine 0.1 mcg/kg/hr (12/20/21 6608)    sodium chloride Stopped (12/19/21 1023)     Scheduled Medications    enoxaparin  40 mg SubCUTAneous BID    dexamethasone (PF)  10 mg IntraVENous Daily    amLODIPine  2.5 mg Oral Daily    aspirin  81 mg Oral Daily    metoprolol tartrate  25 mg Oral BID    sodium chloride flush  5-40 mL IntraVENous 2 times per day    famotidine  20 mg Oral BID    Vitamin D  2,000 Units Oral Daily     PRN Meds: loperamide, nitroGLYCERIN, sodium chloride flush, sodium chloride, ondansetron **OR** ondansetron, polyethylene glycol, acetaminophen **OR** acetaminophen, guaiFENesin-dextromethorphan      Intake/Output Summary (Last 24 hours) at 12/21/2021 0951  Last data filed at 12/21/2021 0925  Gross per 24 hour   Intake 997.04 ml   Output 1050 ml   Net -52.96 ml       Physical Exam Performed:    /70   Pulse 74   Temp 98.3 °F (36.8 °C) (Temporal)   Resp 20   Ht 5' 5\" (1.651 m)   Wt 192 lb 6.4 oz (87.3 kg)   SpO2 95%   BMI 32.02 kg/m²     General appearance: No apparent distress, appears stated age and cooperative. HEENT: Pupils equal, round, and reactive to light. Conjunctivae/corneas clear. Neck: Supple, with full range of motion. No jugular venous distention. Trachea midline. Respiratory:  Normal respiratory effort. Clear to auscultation, bilaterally without Rales/Wheezes/Rhonchi. Cardiovascular: Regular rate and rhythm with normal S1/S2 without murmurs, rubs or gallops. Abdomen: Soft, non-tender, non-distended with normal bowel sounds. Musculoskeletal: No clubbing, cyanosis or edema bilaterally. Full range of motion without deformity. Skin: Skin color, texture, turgor normal.  No rashes or lesions. Neurologic:  Neurovascularly intact without any focal sensory/motor deficits.  Cranial nerves: II-XII intact, grossly non-focal.  Psychiatric: Alert and oriented, thought content appropriate, normal insight  Capillary Refill: Brisk,< 3 seconds   Peripheral Pulses: +2 palpable, equal bilaterally       Labs:   Recent Labs     12/19/21  2076 12/20/21  0300 12/21/21  0251   WBC 16.4* 16.3* 14.9*   HGB 14.6 14.3 15.4   HCT 42.9 42.6 46.8    353 268     Recent Labs     12/19/21  0257 12/20/21  0300 12/21/21  0251    137 135*   K 4.1 4.2 4.4    102 100   CO2 25 25 22   BUN 20 18 18   CREATININE 0.7 0.7 0.6   CALCIUM 8.6 8.6 8.4   PHOS 4.1 3.3 3.2     No results for input(s): AST, ALT, BILIDIR, BILITOT, ALKPHOS in the last 72 hours. No results for input(s): INR in the last 72 hours. No results for input(s): [place] in the last 72 hours. Urinalysis:      Lab Results   Component Value Date    NITRU Negative 12/13/2021    WBCUA 3-5 12/13/2021    BACTERIA Rare 12/13/2021    RBCUA None seen 12/13/2021    BLOODU Negative 12/13/2021    SPECGRAV 1.008 12/13/2021    GLUCOSEU Negative 12/13/2021       Radiology:  XR CHEST PORTABLE   Final Result   Bilateral asymmetric multifocal pneumonia, generally similar to 12/17/2021. No pleural fluid or pneumothorax detected. XR CHEST PORTABLE   Final Result   Bilateral airspace disease, with mild progression on the left, consistent   with pneumonia. Pattern is common for COVID. XR CHEST PORTABLE   Final Result   Bilateral airspace disease, consistent with COVID pneumonia. Superimposed   atelectasis or typical pneumonia are other considerations, given greater   consolidation in the left lower lobe.                      Active Hospital Problems    Diagnosis     Pneumonia due to COVID-19 virus [U07.1, J12.82]            Electronically signed by Akin Pearson MD on 12/21/2021 at 9:51 AM

## 2021-12-21 NOTE — PROGRESS NOTES
Assessment completed. VSS. O2 at 96% on airvo at 60L/100%. Patient awake, alert, and in bed. Medications given per MAR. Precedex drip continued. RASS at 0. Safety and isolation precautions maintained. Call light within reach. Will continue to monitor.

## 2021-12-22 LAB
ANION GAP SERPL CALCULATED.3IONS-SCNC: 11 MMOL/L (ref 3–16)
BASOPHILS ABSOLUTE: 0 K/UL (ref 0–0.2)
BASOPHILS RELATIVE PERCENT: 0.1 %
BUN BLDV-MCNC: 14 MG/DL (ref 7–20)
CALCIUM SERPL-MCNC: 8.4 MG/DL (ref 8.3–10.6)
CHLORIDE BLD-SCNC: 103 MMOL/L (ref 99–110)
CO2: 22 MMOL/L (ref 21–32)
CREAT SERPL-MCNC: 0.6 MG/DL (ref 0.6–1.2)
EOSINOPHILS ABSOLUTE: 0 K/UL (ref 0–0.6)
EOSINOPHILS RELATIVE PERCENT: 0.1 %
GFR AFRICAN AMERICAN: >60
GFR NON-AFRICAN AMERICAN: >60
GLUCOSE BLD-MCNC: 151 MG/DL (ref 70–99)
HCT VFR BLD CALC: 44.2 % (ref 36–48)
HEMOGLOBIN: 14.8 G/DL (ref 12–16)
LYMPHOCYTES ABSOLUTE: 0.6 K/UL (ref 1–5.1)
LYMPHOCYTES RELATIVE PERCENT: 3.5 %
MAGNESIUM: 2 MG/DL (ref 1.8–2.4)
MCH RBC QN AUTO: 27.8 PG (ref 26–34)
MCHC RBC AUTO-ENTMCNC: 33.5 G/DL (ref 31–36)
MCV RBC AUTO: 83.1 FL (ref 80–100)
MONOCYTES ABSOLUTE: 0.7 K/UL (ref 0–1.3)
MONOCYTES RELATIVE PERCENT: 4 %
NEUTROPHILS ABSOLUTE: 16.5 K/UL (ref 1.7–7.7)
NEUTROPHILS RELATIVE PERCENT: 92.3 %
PDW BLD-RTO: 13.6 % (ref 12.4–15.4)
PHOSPHORUS: 3.3 MG/DL (ref 2.5–4.9)
PLATELET # BLD: 255 K/UL (ref 135–450)
PMV BLD AUTO: 7.7 FL (ref 5–10.5)
POTASSIUM SERPL-SCNC: 3.9 MMOL/L (ref 3.5–5.1)
PROCALCITONIN: 0.04 NG/ML (ref 0–0.15)
RBC # BLD: 5.32 M/UL (ref 4–5.2)
SODIUM BLD-SCNC: 136 MMOL/L (ref 136–145)
WBC # BLD: 17.9 K/UL (ref 4–11)

## 2021-12-22 PROCEDURE — 6360000002 HC RX W HCPCS: Performed by: INTERNAL MEDICINE

## 2021-12-22 PROCEDURE — 94761 N-INVAS EAR/PLS OXIMETRY MLT: CPT

## 2021-12-22 PROCEDURE — 36415 COLL VENOUS BLD VENIPUNCTURE: CPT

## 2021-12-22 PROCEDURE — 80048 BASIC METABOLIC PNL TOTAL CA: CPT

## 2021-12-22 PROCEDURE — 85025 COMPLETE CBC W/AUTO DIFF WBC: CPT

## 2021-12-22 PROCEDURE — 83735 ASSAY OF MAGNESIUM: CPT

## 2021-12-22 PROCEDURE — 84100 ASSAY OF PHOSPHORUS: CPT

## 2021-12-22 PROCEDURE — 87040 BLOOD CULTURE FOR BACTERIA: CPT

## 2021-12-22 PROCEDURE — 2000000000 HC ICU R&B

## 2021-12-22 PROCEDURE — 2580000003 HC RX 258: Performed by: INTERNAL MEDICINE

## 2021-12-22 PROCEDURE — 2700000000 HC OXYGEN THERAPY PER DAY

## 2021-12-22 PROCEDURE — 84145 PROCALCITONIN (PCT): CPT

## 2021-12-22 PROCEDURE — 99233 SBSQ HOSP IP/OBS HIGH 50: CPT | Performed by: INTERNAL MEDICINE

## 2021-12-22 PROCEDURE — 80061 LIPID PANEL: CPT

## 2021-12-22 PROCEDURE — 6370000000 HC RX 637 (ALT 250 FOR IP): Performed by: INTERNAL MEDICINE

## 2021-12-22 RX ORDER — DEXAMETHASONE SODIUM PHOSPHATE 10 MG/ML
6 INJECTION, SOLUTION INTRAMUSCULAR; INTRAVENOUS DAILY
Status: DISCONTINUED | OUTPATIENT
Start: 2021-12-23 | End: 2021-12-28

## 2021-12-22 RX ADMIN — AMLODIPINE BESYLATE 2.5 MG: 5 TABLET ORAL at 08:22

## 2021-12-22 RX ADMIN — ASPIRIN 81 MG: 81 TABLET, COATED ORAL at 08:22

## 2021-12-22 RX ADMIN — Medication 10 ML: at 08:25

## 2021-12-22 RX ADMIN — Medication 2000 UNITS: at 08:22

## 2021-12-22 RX ADMIN — GUAIFENESIN SYRUP AND DEXTROMETHORPHAN 5 ML: 100; 10 SYRUP ORAL at 18:20

## 2021-12-22 RX ADMIN — Medication 10 ML: at 20:04

## 2021-12-22 RX ADMIN — ENOXAPARIN SODIUM 40 MG: 100 INJECTION SUBCUTANEOUS at 08:22

## 2021-12-22 RX ADMIN — ENOXAPARIN SODIUM 40 MG: 100 INJECTION SUBCUTANEOUS at 20:00

## 2021-12-22 RX ADMIN — FAMOTIDINE 20 MG: 20 TABLET ORAL at 08:21

## 2021-12-22 RX ADMIN — GUAIFENESIN SYRUP AND DEXTROMETHORPHAN 5 ML: 100; 10 SYRUP ORAL at 08:22

## 2021-12-22 RX ADMIN — DEXAMETHASONE SODIUM PHOSPHATE 10 MG: 10 INJECTION, SOLUTION INTRAMUSCULAR; INTRAVENOUS at 08:21

## 2021-12-22 RX ADMIN — METOPROLOL TARTRATE 25 MG: 25 TABLET, FILM COATED ORAL at 08:22

## 2021-12-22 RX ADMIN — FAMOTIDINE 20 MG: 20 TABLET ORAL at 20:01

## 2021-12-22 RX ADMIN — METOPROLOL TARTRATE 25 MG: 25 TABLET, FILM COATED ORAL at 20:01

## 2021-12-22 ASSESSMENT — PAIN SCALES - GENERAL
PAINLEVEL_OUTOF10: 0

## 2021-12-22 NOTE — PROGRESS NOTES
[facility] Pulmonary and Critical Care    Follow Up Note    Subjective:   CHIEF COMPLAINT / HPI:   Chief Complaint   Patient presents with    Positive For Covid-19     DX w 12/8. States her oxygen saturation was 85% @ home. Interval history: Patient admitted 12/13/2021 with acute hypoxemic respiratory failure related to COVID-19 pneumonia. Oxygen requirements are downtrending but she is still on heated high flow oxygen 60% and 50 L/min. .    Past Medical History:    Reviewed; no changes    Social History:    Reviewed; no changes    REVIEW OF SYSTEMS:    CONSTITUTIONAL:  negative for fevers and chills  RESPIRATORY:  See HPI  CARDIOVASCULAR:  negative for chest pain, palpitations, edema  GASTROINTESTINAL:  negative for nausea, vomiting, diarrhea, constipation and abdominal pain    Objective:   PHYSICAL EXAM:        VITALS:  BP (!) 160/107   Pulse 65   Temp 98.2 °F (36.8 °C) (Temporal)   Resp 21   Ht 5' 5\" (1.651 m)   Wt 195 lb 6.4 oz (88.6 kg)   SpO2 92%   BMI 32.52 kg/m²  on heated high flow flow oxygen 100% / 60 L/min +100% nonrebreather mask   24HR INTAKE/OUTPUT:      Intake/Output Summary (Last 24 hours) at 12/22/2021 1031  Last data filed at 12/22/2021 0800  Gross per 24 hour   Intake 516.1 ml   Output 850 ml   Net -333.9 ml       CONSTITUTIONAL:  awake, alert,  no apparent distress, and appears stated age  LUNGS:  No increased work of breathing and goals to auscultation  CARDIOVASCULAR: S1 and S2 and no JVD  ABDOMEN:  normal bowel sounds, non-distended and non-tender to palpation  EXT: No edema, no calf tenderness. Pulses are present bilaterally. NEUROLOGIC:  Mental Status Exam:  Level of Alertness:   awake  Orientation:   person, place, time.  Non focal  SKIN:  normal skin color, texture, turgor, no redness, warmth, or swelling at IV sites    DATA:    CBC:  Recent Labs     12/20/21  0300 12/21/21  0251 12/22/21  0428   WBC 16.3* 14.9* 17.9*   RBC 5.15 5.53* 5.32*   HGB 14.3 15.4 14.8   HCT 42.6 46.8 44.2    268 255   MCV 82.8 84.7 83.1   MCH 27.8 27.9 27.8   MCHC 33.5 32.9 33.5   RDW 13.4 13.4 13.6   BANDSPCT 6  --   --       BMP:  Recent Labs     12/20/21  0300 12/21/21  0251 12/22/21  0428    135* 136   K 4.2 4.4 3.9    100 103   CO2 25 22 22   BUN 18 18 14   CREATININE 0.7 0.6 0.6   CALCIUM 8.6 8.4 8.4   GLUCOSE 169* 172* 151*      ABG:  No results for input(s): PHART, ZCW2TLK, PO2ART, JCF6ZEE, P0CGWSXS, BEART in the last 72 hours. Procalcitonin  Recent Labs     12/22/21  0428   PROCAL 0.04           Radiology Review:  Pertinent images / reports were reviewed as a part of this visit. Assessment:     1. Acute hypoxemic respiratory failure  2. COVID-19 pneumonia    Plan:     1. I have reviewed laboratories, medical records and images for this visit  2. I reviewed chest imaging from today. This continues to reveal patchy bilateral airspace disease similar to previous. 3. She has completed a 5-day course of remdesivir  4. Completed Actemra, 1 dose  5. Reduced Decadron to 10 mg daily with CRP downtrending  6. Change Decadron to 6 mg daily  7. Continues to have acute hypoxemic respiratory failure but now tolerating heated high flow oxygen 60% and 3 L/min  8. She is sitting up in the chair at the moment and has oxygen saturations in the high 80s while brushing her teeth. 9. Continue to wean oxygen  10. Using BiPAP to sleep with at night  .

## 2021-12-22 NOTE — PROGRESS NOTES
Reassessment completed. VSS. No changes or complaints at this time. Call light within reach. Will continue to monitor.

## 2021-12-22 NOTE — PROGRESS NOTES
Assessment completed. VSS. Patient awake, alert, and in bed. O2 at 90% on airvo at 50L/66%. Medications given per MAR. Safety and isolation precautions maintained. Call light and belongings within reach. Will continue to monitor.

## 2021-12-22 NOTE — CARE COORDINATION
COOPER spoke to Amelia at [employer] regarding initiating pre-cert and was informed that [facility] [employer] [address] requires that pt is on 60% FiO2 for a consistent 72 hours. Dennis Echeverria is going to see if she can start the precert tomorrow with the upcoming holiday but did mention concern that [employer] [employer] [facility] may decline early pre-cert initiation because pt has refused BiPAP overnight. Pt currently at 66% FiO2. Trino puentes f/u.      Electronically signed by Silva Goldberg, RN on 12/22/2021 at 11:01 AM

## 2021-12-22 NOTE — CARE COORDINATION
CM spoke with pt spouse regarding legal paperwork. Pt's sister  and pt is legal guardian.  home needs signatures from pt for EOL care and cremation. CM explained to spouse that documents can be brought to the hospital for pt to sign but d/t isolation status, only RN can enter room. Pt is aware of sister passing. Spouse will work with  home to get paperwork to pt.     Electronically signed by Crissy Henry RN on 2021 at 2:20 PM

## 2021-12-22 NOTE — PLAN OF CARE
Problem: Pain:  Goal: Pain level will decrease  Description: Pain level will decrease  12/21/2021 2108 by Horacio Jose RN  Outcome: Ongoing  12/21/2021 1901 by Yulissa Barth RN  Outcome: Ongoing  Goal: Control of acute pain  Description: Control of acute pain  12/21/2021 2108 by Horacio Jose RN  Outcome: Ongoing  12/21/2021 1901 by Yulissa Barth RN  Outcome: Ongoing  Goal: Control of chronic pain  Description: Control of chronic pain  12/21/2021 2108 by Horacio Jose RN  Outcome: Ongoing  12/21/2021 1901 by Yulissa Barth RN  Outcome: Ongoing     Problem: Airway Clearance - Ineffective  Goal: Achieve or maintain patent airway  12/21/2021 2108 by Horacio Jose RN  Outcome: Ongoing  12/21/2021 1901 by Yulissa Barth RN  Outcome: Ongoing     Problem: Gas Exchange - Impaired  Goal: Absence of hypoxia  12/21/2021 2108 by Horacio Jose RN  Outcome: Ongoing  12/21/2021 1901 by Yulissa Barth RN  Outcome: Ongoing  Goal: Promote optimal lung function  12/21/2021 2108 by Horacio Jose RN  Outcome: Ongoing  12/21/2021 1901 by Yulissa Barth RN  Outcome: Ongoing     Problem: Breathing Pattern - Ineffective  Goal: Ability to achieve and maintain a regular respiratory rate  12/21/2021 2108 by Horacio Jose RN  Outcome: Ongoing  12/21/2021 1901 by Yulissa Barth RN  Outcome: Ongoing     Problem:  Body Temperature -  Risk of, Imbalanced  Goal: Ability to maintain a body temperature within defined limits  12/21/2021 2108 by Horacio Jose RN  Outcome: Ongoing  12/21/2021 1901 by Yulissa Barth, RN  Outcome: Ongoing  Goal: Will regain or maintain usual level of consciousness  12/21/2021 2108 by Horacio Jose RN  Outcome: Ongoing  12/21/2021 1901 by Yulissa Barth RN  Outcome: Ongoing  Goal: Complications related to the disease process, condition or treatment will be avoided or minimized  12/21/2021 2108 by Horacio Jose RN  Outcome: Ongoing  12/21/2021 1901 by Yulissa Barth RN  Outcome: Ongoing Problem: Isolation Precautions - Risk of Spread of Infection  Goal: Prevent transmission of infection  12/21/2021 2108 by Shun Stanford RN  Outcome: Ongoing  12/21/2021 1901 by Livia Spencer RN  Outcome: Ongoing     Problem: Nutrition Deficits  Goal: Optimize nutritional status  12/21/2021 2108 by Shun Stanford RN  Outcome: Ongoing  12/21/2021 1901 by Livia Spencer RN  Outcome: Ongoing     Problem: Risk for Fluid Volume Deficit  Goal: Maintain normal heart rhythm  12/21/2021 2108 by Shun Stanford RN  Outcome: Ongoing  12/21/2021 1901 by Livia Spencer RN  Outcome: Ongoing  Goal: Maintain absence of muscle cramping  12/21/2021 2108 by Shun Stanford RN  Outcome: Ongoing  12/21/2021 1901 by Livia Spencer RN  Outcome: Ongoing  Goal: Maintain normal serum potassium, sodium, calcium, phosphorus, and pH  12/21/2021 2108 by Shun Stanford RN  Outcome: Ongoing  12/21/2021 1901 by Livia Spencer RN  Outcome: Ongoing     Problem: Loneliness or Risk for Loneliness  Goal: Demonstrate positive use of time alone when socialization is not possible  12/21/2021 2108 by Shun Stanford RN  Outcome: Ongoing  12/21/2021 1901 by Lviia Spencer RN  Outcome: Ongoing     Problem: Patient Education: Go to Patient Education Activity  Goal: Patient/Family Education  12/21/2021 2108 by Shun Stanford RN  Outcome: Ongoing  12/21/2021 1901 by Livia Spencer RN  Outcome: Ongoing

## 2021-12-22 NOTE — PROGRESS NOTES
Hospitalist Progress Note      PCP: Atul Hutchison, APRN - CNP    Date of Admission: 12/13/2021    Chief Complaint: Dyspnea    Hospital Course: This 62 y.o. female with PMHx of hypertension, hyperlipidemia, coronary artery disease presented with dyspnea. Patient states that she went to urgent care on December 8. She has been having URI symptoms for several days prior to that and thought she had the flu. She was tested positive for COVID-19 infection December 8. She has been doing better however over the last 2 to 3 days she has been getting progressively dyspneic. She reports nonproductive cough and pleuritic chest pain if she coughs. She has also been having nausea and diarrhea. At the ED she was found to be hypoxemic at 86% on room air and currently requires 5 L per nasal cannula to maintain saturations of 91%. Bilateral multifocal pneumonia is evident on chest x-ray. Interval history:    Pt seen and examined today. Overnight events noted, interval ancillary notes and labs reviewed. On Airvo; FiO2 66% at 50 L/min satting around 92%; Afebrile overnight, WBCs elevated at 17.9 likely secondary to steroids; steroid dose reduced to 6 mg daily  Procalcitonin within normal limits inflammatory markers are trending down      Assessment/Plan:      COVID-19 pneumonia  Patient is unvaccinated: Alternate with pain BiPAP and high flow  Status post 5-day course of remdesivir and 1 dose of Actemra  CRP trended down  Decadron dose reduced to 6 mg daily on 12/22/2021  Continue droplet plus precautions  Continue Lovenox for hypercoagulable state  Continue supplemental sedation oxygen as needed to keep sats above 92      Acute hypoxic respiratory failure secondary to above    History of CAD:Stable  Continue aspirin statins and beta-blockers    DVT Prophylaxis: Lovenox  Diet: ADULT DIET;  Regular  Code Status: Full Code    Medications:  Reviewed    Infusion Medications    dexmedetomidine 0.1 mcg/kg/hr (12/20/21 1828)    sodium chloride Stopped (12/19/21 1023)     Scheduled Medications    enoxaparin  40 mg SubCUTAneous BID    dexamethasone (PF)  10 mg IntraVENous Daily    amLODIPine  2.5 mg Oral Daily    aspirin  81 mg Oral Daily    metoprolol tartrate  25 mg Oral BID    sodium chloride flush  5-40 mL IntraVENous 2 times per day    famotidine  20 mg Oral BID    Vitamin D  2,000 Units Oral Daily     PRN Meds: loperamide, nitroGLYCERIN, sodium chloride flush, sodium chloride, ondansetron **OR** ondansetron, polyethylene glycol, acetaminophen **OR** acetaminophen, guaiFENesin-dextromethorphan      Intake/Output Summary (Last 24 hours) at 12/22/2021 0953  Last data filed at 12/22/2021 0800  Gross per 24 hour   Intake 756.1 ml   Output 850 ml   Net -93.9 ml       Physical Exam Performed:    BP (!) 160/107   Pulse 65   Temp 98.2 °F (36.8 °C) (Temporal)   Resp 21   Ht 5' 5\" (1.651 m)   Wt 195 lb 6.4 oz (88.6 kg)   SpO2 92%   BMI 32.52 kg/m²     General appearance: No apparent distress, appears stated age and cooperative. HEENT: Pupils equal, round, and reactive to light. Conjunctivae/corneas clear. Neck: Supple, with full range of motion. No jugular venous distention. Trachea midline. Respiratory:  Normal respiratory effort. Clear to auscultation, bilaterally without Rales/Wheezes/Rhonchi. Cardiovascular: Regular rate and rhythm with normal S1/S2 without murmurs, rubs or gallops. Abdomen: Soft, non-tender, non-distended with normal bowel sounds. Musculoskeletal: No clubbing, cyanosis or edema bilaterally. Full range of motion without deformity. Skin: Skin color, texture, turgor normal.  No rashes or lesions. Neurologic:  Neurovascularly intact without any focal sensory/motor deficits.  Cranial nerves: II-XII intact, grossly non-focal.  Psychiatric: Alert and oriented, thought content appropriate, normal insight  Capillary Refill: Brisk,< 3 seconds   Peripheral Pulses: +2 palpable, equal bilaterally       Labs:   Recent Labs     12/20/21  0300 12/21/21  0251 12/22/21  0428   WBC 16.3* 14.9* 17.9*   HGB 14.3 15.4 14.8   HCT 42.6 46.8 44.2    268 255     Recent Labs     12/20/21  0300 12/21/21  0251 12/22/21  0428    135* 136   K 4.2 4.4 3.9    100 103   CO2 25 22 22   BUN 18 18 14   CREATININE 0.7 0.6 0.6   CALCIUM 8.6 8.4 8.4   PHOS 3.3 3.2 3.3     No results for input(s): AST, ALT, BILIDIR, BILITOT, ALKPHOS in the last 72 hours. No results for input(s): INR in the last 72 hours. No results for input(s): [place]urlie Lat in the last 72 hours. Urinalysis:      Lab Results   Component Value Date    NITRU Negative 12/13/2021    WBCUA 3-5 12/13/2021    BACTERIA Rare 12/13/2021    RBCUA None seen 12/13/2021    BLOODU Negative 12/13/2021    SPECGRAV 1.008 12/13/2021    GLUCOSEU Negative 12/13/2021       Radiology:  XR CHEST PORTABLE   Final Result   Bilateral asymmetric multifocal pneumonia, generally similar to 12/17/2021. No pleural fluid or pneumothorax detected. XR CHEST PORTABLE   Final Result   Bilateral airspace disease, with mild progression on the left, consistent   with pneumonia. Pattern is common for COVID. XR CHEST PORTABLE   Final Result   Bilateral airspace disease, consistent with COVID pneumonia. Superimposed   atelectasis or typical pneumonia are other considerations, given greater   consolidation in the left lower lobe.                      Active Hospital Problems    Diagnosis     Pneumonia due to COVID-19 virus [U07.1, J12.82]            Electronically signed by Ramandeep Olivares MD on 12/22/2021 at 9:53 AM

## 2021-12-22 NOTE — PLAN OF CARE
Patient is maintaining her airway and is maintaining her O2 saturation via Airvo. She denies pain at this moment will continue to monitor.

## 2021-12-23 LAB
ANION GAP SERPL CALCULATED.3IONS-SCNC: 7 MMOL/L (ref 3–16)
BANDED NEUTROPHILS RELATIVE PERCENT: 5 % (ref 0–7)
BASOPHILS ABSOLUTE: 0 K/UL (ref 0–0.2)
BASOPHILS RELATIVE PERCENT: 0 %
BILIRUBIN URINE: NEGATIVE
BLOOD, URINE: NEGATIVE
BUN BLDV-MCNC: 17 MG/DL (ref 7–20)
CALCIUM SERPL-MCNC: 8.6 MG/DL (ref 8.3–10.6)
CHLORIDE BLD-SCNC: 102 MMOL/L (ref 99–110)
CLARITY: CLEAR
CO2: 27 MMOL/L (ref 21–32)
COLOR: YELLOW
CREAT SERPL-MCNC: 0.6 MG/DL (ref 0.6–1.2)
EOSINOPHILS ABSOLUTE: 0 K/UL (ref 0–0.6)
EOSINOPHILS RELATIVE PERCENT: 0 %
GFR AFRICAN AMERICAN: >60
GFR NON-AFRICAN AMERICAN: >60
GLUCOSE BLD-MCNC: 161 MG/DL (ref 70–99)
GLUCOSE URINE: >=1000 MG/DL
HCT VFR BLD CALC: 44.3 % (ref 36–48)
HEMOGLOBIN: 14.7 G/DL (ref 12–16)
KETONES, URINE: NEGATIVE MG/DL
LEUKOCYTE ESTERASE, URINE: NEGATIVE
LYMPHOCYTES ABSOLUTE: 0.7 K/UL (ref 1–5.1)
LYMPHOCYTES RELATIVE PERCENT: 4 %
MAGNESIUM: 2 MG/DL (ref 1.8–2.4)
MCH RBC QN AUTO: 28 PG (ref 26–34)
MCHC RBC AUTO-ENTMCNC: 33.1 G/DL (ref 31–36)
MCV RBC AUTO: 84.7 FL (ref 80–100)
MICROSCOPIC EXAMINATION: ABNORMAL
MONOCYTES ABSOLUTE: 1.1 K/UL (ref 0–1.3)
MONOCYTES RELATIVE PERCENT: 6 %
NEUTROPHILS ABSOLUTE: 16.3 K/UL (ref 1.7–7.7)
NEUTROPHILS RELATIVE PERCENT: 85 %
NITRITE, URINE: NEGATIVE
PDW BLD-RTO: 13.8 % (ref 12.4–15.4)
PH UA: 5 (ref 5–8)
PHOSPHORUS: 3.5 MG/DL (ref 2.5–4.9)
PLATELET # BLD: 192 K/UL (ref 135–450)
PLATELET SLIDE REVIEW: ADEQUATE
PMV BLD AUTO: 8.1 FL (ref 5–10.5)
POTASSIUM SERPL-SCNC: 4.4 MMOL/L (ref 3.5–5.1)
PROTEIN UA: NEGATIVE MG/DL
RBC # BLD: 5.23 M/UL (ref 4–5.2)
RBC # BLD: NORMAL 10*6/UL
SLIDE REVIEW: ABNORMAL
SODIUM BLD-SCNC: 136 MMOL/L (ref 136–145)
SPECIFIC GRAVITY UA: 1.03 (ref 1–1.03)
URINE TYPE: ABNORMAL
UROBILINOGEN, URINE: 1 E.U./DL
WBC # BLD: 18.1 K/UL (ref 4–11)

## 2021-12-23 PROCEDURE — 36415 COLL VENOUS BLD VENIPUNCTURE: CPT

## 2021-12-23 PROCEDURE — 6360000002 HC RX W HCPCS: Performed by: INTERNAL MEDICINE

## 2021-12-23 PROCEDURE — 2000000000 HC ICU R&B

## 2021-12-23 PROCEDURE — 81003 URINALYSIS AUTO W/O SCOPE: CPT

## 2021-12-23 PROCEDURE — 2580000003 HC RX 258: Performed by: INTERNAL MEDICINE

## 2021-12-23 PROCEDURE — 6370000000 HC RX 637 (ALT 250 FOR IP): Performed by: INTERNAL MEDICINE

## 2021-12-23 PROCEDURE — 99233 SBSQ HOSP IP/OBS HIGH 50: CPT | Performed by: INTERNAL MEDICINE

## 2021-12-23 PROCEDURE — 83735 ASSAY OF MAGNESIUM: CPT

## 2021-12-23 PROCEDURE — 84100 ASSAY OF PHOSPHORUS: CPT

## 2021-12-23 PROCEDURE — 80048 BASIC METABOLIC PNL TOTAL CA: CPT

## 2021-12-23 PROCEDURE — 85025 COMPLETE CBC W/AUTO DIFF WBC: CPT

## 2021-12-23 PROCEDURE — 94761 N-INVAS EAR/PLS OXIMETRY MLT: CPT

## 2021-12-23 PROCEDURE — 2700000000 HC OXYGEN THERAPY PER DAY

## 2021-12-23 RX ADMIN — FAMOTIDINE 20 MG: 20 TABLET ORAL at 09:17

## 2021-12-23 RX ADMIN — Medication 10 ML: at 09:17

## 2021-12-23 RX ADMIN — DEXAMETHASONE SODIUM PHOSPHATE 6 MG: 10 INJECTION, SOLUTION INTRAMUSCULAR; INTRAVENOUS at 09:17

## 2021-12-23 RX ADMIN — GUAIFENESIN SYRUP AND DEXTROMETHORPHAN 5 ML: 100; 10 SYRUP ORAL at 09:17

## 2021-12-23 RX ADMIN — ENOXAPARIN SODIUM 40 MG: 100 INJECTION SUBCUTANEOUS at 20:28

## 2021-12-23 RX ADMIN — Medication 10 ML: at 20:27

## 2021-12-23 RX ADMIN — METOPROLOL TARTRATE 25 MG: 25 TABLET, FILM COATED ORAL at 09:17

## 2021-12-23 RX ADMIN — METOPROLOL TARTRATE 25 MG: 25 TABLET, FILM COATED ORAL at 20:25

## 2021-12-23 RX ADMIN — AMLODIPINE BESYLATE 2.5 MG: 5 TABLET ORAL at 09:17

## 2021-12-23 RX ADMIN — GUAIFENESIN SYRUP AND DEXTROMETHORPHAN 5 ML: 100; 10 SYRUP ORAL at 20:31

## 2021-12-23 RX ADMIN — ENOXAPARIN SODIUM 40 MG: 100 INJECTION SUBCUTANEOUS at 09:17

## 2021-12-23 RX ADMIN — ASPIRIN 81 MG: 81 TABLET, COATED ORAL at 09:17

## 2021-12-23 RX ADMIN — Medication 2000 UNITS: at 09:17

## 2021-12-23 RX ADMIN — FAMOTIDINE 20 MG: 20 TABLET ORAL at 20:25

## 2021-12-23 ASSESSMENT — PAIN SCALES - GENERAL
PAINLEVEL_OUTOF10: 0

## 2021-12-23 NOTE — CARE COORDINATION
CM spoke to [facility] [place],  to Debby Yeung, who is in room 2687. Debby Yeung is the sister and POA for pt [facility] (Rm [id number]) and pt Elaina Favre (Rm [id number]). Both Deepika Chin and Yusuf rae have intellectual/developmental disabilities. Pt sister Toñito Freeman, passed away in the ER a few days ago. ALMASMarshall REJIMarshall Jenna, brother in law to Yusuf rae and Deepika Chin, is the acting emergency contact while Debby Yeung is hospitalized.      Electronically signed by Feng Vora RN on 12/23/2021 at 3:36 PM

## 2021-12-23 NOTE — PROGRESS NOTES
sodium chloride Stopped (12/19/21 1023)     Scheduled Medications    dexamethasone (PF)  6 mg IntraVENous Daily    enoxaparin  40 mg SubCUTAneous BID    amLODIPine  2.5 mg Oral Daily    aspirin  81 mg Oral Daily    metoprolol tartrate  25 mg Oral BID    sodium chloride flush  5-40 mL IntraVENous 2 times per day    famotidine  20 mg Oral BID    Vitamin D  2,000 Units Oral Daily     PRN Meds: loperamide, nitroGLYCERIN, sodium chloride flush, sodium chloride, ondansetron **OR** ondansetron, polyethylene glycol, acetaminophen **OR** acetaminophen, guaiFENesin-dextromethorphan      Intake/Output Summary (Last 24 hours) at 12/23/2021 0944  Last data filed at 12/22/2021 1700  Gross per 24 hour   Intake 500 ml   Output --   Net 500 ml       Physical Exam Performed:    BP (!) 117/56   Pulse 60   Temp 97.5 °F (36.4 °C) (Temporal)   Resp 23   Ht 5' 5\" (1.651 m)   Wt 201 lb (91.2 kg)   SpO2 90%   BMI 33.45 kg/m²     General appearance: No apparent distress, appears stated age and cooperative. HEENT: Pupils equal, round, and reactive to light. Conjunctivae/corneas clear. Neck: Supple, with full range of motion. No jugular venous distention. Trachea midline. Respiratory:  Normal respiratory effort. Clear to auscultation, bilaterally without Rales/Wheezes/Rhonchi. Cardiovascular: Regular rate and rhythm with normal S1/S2 without murmurs, rubs or gallops. Abdomen: Soft, non-tender, non-distended with normal bowel sounds. Musculoskeletal: No clubbing, cyanosis or edema bilaterally. Full range of motion without deformity. Skin: Skin color, texture, turgor normal.  No rashes or lesions. Neurologic:  Neurovascularly intact without any focal sensory/motor deficits.  Cranial nerves: II-XII intact, grossly non-focal.  Psychiatric: Alert and oriented, thought content appropriate, normal insight  Capillary Refill: Brisk,< 3 seconds   Peripheral Pulses: +2 palpable, equal bilaterally       Labs:   Recent Labs 12/21/21  0251 12/22/21  0428 12/23/21  0301   WBC 14.9* 17.9* 18.1*   HGB 15.4 14.8 14.7   HCT 46.8 44.2 44.3    255 192     Recent Labs     12/21/21  0251 12/22/21  0428 12/23/21  0301   * 136 136   K 4.4 3.9 4.4    103 102   CO2 22 22 27   BUN 18 14 17   CREATININE 0.6 0.6 0.6   CALCIUM 8.4 8.4 8.6   PHOS 3.2 3.3 3.5     No results for input(s): AST, ALT, BILIDIR, BILITOT, ALKPHOS in the last 72 hours. No results for input(s): INR in the last 72 hours. No results for input(s): [facility] in the last 72 hours. Urinalysis:      Lab Results   Component Value Date    NITRU Negative 12/13/2021    WBCUA 3-5 12/13/2021    BACTERIA Rare 12/13/2021    RBCUA None seen 12/13/2021    BLOODU Negative 12/13/2021    SPECGRAV 1.008 12/13/2021    GLUCOSEU Negative 12/13/2021       Radiology:  XR CHEST PORTABLE   Final Result   Bilateral asymmetric multifocal pneumonia, generally similar to 12/17/2021. No pleural fluid or pneumothorax detected. XR CHEST PORTABLE   Final Result   Bilateral airspace disease, with mild progression on the left, consistent   with pneumonia. Pattern is common for COVID. XR CHEST PORTABLE   Final Result   Bilateral airspace disease, consistent with COVID pneumonia. Superimposed   atelectasis or typical pneumonia are other considerations, given greater   consolidation in the left lower lobe.                      Active Hospital Problems    Diagnosis     Pneumonia due to COVID-19 virus [U07.1, J12.82]            Electronically signed by Janis Mustafa MD on 12/23/2021 at 9:44 AM

## 2021-12-23 NOTE — PROGRESS NOTES
Patient refusing bipap at this time. Patient spo2 98% not in distress. bipap in room on stand by.  Will continue to monitor

## 2021-12-23 NOTE — PLAN OF CARE
Patient has no complaints of pain. Is able to maintain her airway and O2 saturation via Airvo. She also uses her IS a couple times a shift to help with deep breathing. Will continue to monitor.

## 2021-12-23 NOTE — PROGRESS NOTES
[facility] Pulmonary and Critical Care    Follow Up Note    Subjective:   CHIEF COMPLAINT / HPI:   Chief Complaint   Patient presents with    Positive For Covid-19     DX w 12/8. States her oxygen saturation was 85% @ home. Interval history: Patient admitted 12/13/2021 with acute hypoxemic respiratory failure related to COVID-19 pneumonia. Oxygen requirements have been level. Past Medical History:    Reviewed; no changes    Social History:    Reviewed; no changes    REVIEW OF SYSTEMS:    CONSTITUTIONAL:  negative for fevers and chills  RESPIRATORY:  See HPI  CARDIOVASCULAR:  negative for chest pain, palpitations, edema  GASTROINTESTINAL:  negative for nausea, vomiting, diarrhea, constipation and abdominal pain    Objective:   PHYSICAL EXAM:        VITALS:  /87   Pulse 82   Temp 97.1 °F (36.2 °C) (Tympanic)   Resp 23   Ht 5' 5\" (1.651 m)   Wt 201 lb (91.2 kg)   SpO2 98%   BMI 33.45 kg/m²  on heated high flow flow oxygen 100% / 60 L/min +100% nonrebreather mask   24HR INTAKE/OUTPUT:      Intake/Output Summary (Last 24 hours) at 12/23/2021 0955  Last data filed at 12/22/2021 1700  Gross per 24 hour   Intake 500 ml   Output --   Net 500 ml       CONSTITUTIONAL:  awake, alert,  no apparent distress, and appears stated age  LUNGS:  No increased work of breathing and goals to auscultation  CARDIOVASCULAR: S1 and S2 and no JVD  ABDOMEN:  normal bowel sounds, non-distended and non-tender to palpation  EXT: No edema, no calf tenderness. Pulses are present bilaterally. NEUROLOGIC:  Mental Status Exam:  Level of Alertness:   awake  Orientation:   person, place, time.  Non focal  SKIN:  normal skin color, texture, turgor, no redness, warmth, or swelling at IV sites    DATA:    CBC:  Recent Labs     12/21/21  0251 12/22/21  0428 12/23/21  0301   WBC 14.9* 17.9* 18.1*   RBC 5.53* 5.32* 5.23*   HGB 15.4 14.8 14.7   HCT 46.8 44.2 44.3    255 192   MCV 84.7 83.1 84.7   MCH 27.9 27.8 28.0   MCHC 32.9 33.5 33.1 RDW 13.4 13.6 13.8   BANDSPCT  --   --  5      BMP:  Recent Labs     12/21/21  0251 12/22/21  0428 12/23/21  0301   * 136 136   K 4.4 3.9 4.4    103 102   CO2 22 22 27   BUN 18 14 17   CREATININE 0.6 0.6 0.6   CALCIUM 8.4 8.4 8.6   GLUCOSE 172* 151* 161*      ABG:  No results for input(s): PHART, HPV7OXI, PO2ART, UFW8TIW, B0DZZXAH, BEART in the last 72 hours. Procalcitonin  Recent Labs     12/22/21  0428   PROCAL 0.04           Radiology Review:  Pertinent images / reports were reviewed as a part of this visit. Assessment:     1. Acute hypoxemic respiratory failure  2. COVID-19 pneumonia    Plan:     1. I have reviewed laboratories, medical records and images for this visit  2. Chest imaging has continued to reveal patchy bilateral airspace disease unchanged from previous  3. She has completed 5 days of remdesivir and 1 dose of Actemra  4. Decadron is now 6 mg daily  5. He is afebrile and procalcitonin remains negative. 6. No obvious signs of infection  7. Continues to have acute hypoxemic respiratory failure  8. Remains on heated high flow oxygen without significant improvement  9. Not using BiPAP at night any longer  .

## 2021-12-23 NOTE — PROGRESS NOTES
Shift assessment complete, see flowsheet for details. VS are WNL, patient is voiding appropriately, currently sitting up in chair. No complaints of pain this morning. Is able to call for needs and use call light. MD rounded this morning and we will continue to wean O2 as tolerated. Patient is able to participate and complete own care. Will continue to monitor.

## 2021-12-24 ENCOUNTER — APPOINTMENT (OUTPATIENT)
Dept: GENERAL RADIOLOGY | Age: 61
DRG: 871 | End: 2021-12-24
Payer: COMMERCIAL

## 2021-12-24 LAB
ANION GAP SERPL CALCULATED.3IONS-SCNC: 14 MMOL/L (ref 3–16)
BASOPHILS ABSOLUTE: 0 K/UL (ref 0–0.2)
BASOPHILS RELATIVE PERCENT: 0 %
BUN BLDV-MCNC: 17 MG/DL (ref 7–20)
CALCIUM SERPL-MCNC: 8.5 MG/DL (ref 8.3–10.6)
CHLORIDE BLD-SCNC: 102 MMOL/L (ref 99–110)
CHOLESTEROL, TOTAL: 285 MG/DL (ref 0–199)
CO2: 20 MMOL/L (ref 21–32)
CREAT SERPL-MCNC: 0.5 MG/DL (ref 0.6–1.2)
EOSINOPHILS ABSOLUTE: 0.5 K/UL (ref 0–0.6)
EOSINOPHILS RELATIVE PERCENT: 2 %
GFR AFRICAN AMERICAN: >60
GFR NON-AFRICAN AMERICAN: >60
GLUCOSE BLD-MCNC: 187 MG/DL (ref 70–99)
HCT VFR BLD CALC: 43.2 % (ref 36–48)
HDLC SERPL-MCNC: 24 MG/DL (ref 40–60)
HEMOGLOBIN: 14.3 G/DL (ref 12–16)
LDL CHOLESTEROL CALCULATED: 215 MG/DL
LYMPHOCYTES ABSOLUTE: 1.2 K/UL (ref 1–5.1)
LYMPHOCYTES RELATIVE PERCENT: 5 %
MAGNESIUM: 1.9 MG/DL (ref 1.8–2.4)
MCH RBC QN AUTO: 27.8 PG (ref 26–34)
MCHC RBC AUTO-ENTMCNC: 33.1 G/DL (ref 31–36)
MCV RBC AUTO: 84.1 FL (ref 80–100)
MONOCYTES ABSOLUTE: 0.7 K/UL (ref 0–1.3)
MONOCYTES RELATIVE PERCENT: 3 %
NEUTROPHILS ABSOLUTE: 21.2 K/UL (ref 1.7–7.7)
NEUTROPHILS RELATIVE PERCENT: 90 %
PDW BLD-RTO: 13.8 % (ref 12.4–15.4)
PHOSPHORUS: 3 MG/DL (ref 2.5–4.9)
PLATELET # BLD: 159 K/UL (ref 135–450)
PLATELET SLIDE REVIEW: ADEQUATE
PMV BLD AUTO: 8.6 FL (ref 5–10.5)
POTASSIUM SERPL-SCNC: 3.3 MMOL/L (ref 3.5–5.1)
PROCALCITONIN: 0.04 NG/ML (ref 0–0.15)
RBC # BLD: 5.13 M/UL (ref 4–5.2)
RBC # BLD: NORMAL 10*6/UL
SLIDE REVIEW: ABNORMAL
SODIUM BLD-SCNC: 136 MMOL/L (ref 136–145)
TOXIC GRANULATION: PRESENT
TRIGL SERPL-MCNC: 232 MG/DL (ref 0–150)
VLDLC SERPL CALC-MCNC: 46 MG/DL
WBC # BLD: 23.5 K/UL (ref 4–11)

## 2021-12-24 PROCEDURE — 6370000000 HC RX 637 (ALT 250 FOR IP): Performed by: INTERNAL MEDICINE

## 2021-12-24 PROCEDURE — 94761 N-INVAS EAR/PLS OXIMETRY MLT: CPT

## 2021-12-24 PROCEDURE — 6360000002 HC RX W HCPCS: Performed by: INTERNAL MEDICINE

## 2021-12-24 PROCEDURE — 84145 PROCALCITONIN (PCT): CPT

## 2021-12-24 PROCEDURE — 71045 X-RAY EXAM CHEST 1 VIEW: CPT

## 2021-12-24 PROCEDURE — 80048 BASIC METABOLIC PNL TOTAL CA: CPT

## 2021-12-24 PROCEDURE — 36415 COLL VENOUS BLD VENIPUNCTURE: CPT

## 2021-12-24 PROCEDURE — 84100 ASSAY OF PHOSPHORUS: CPT

## 2021-12-24 PROCEDURE — 2000000000 HC ICU R&B

## 2021-12-24 PROCEDURE — 83735 ASSAY OF MAGNESIUM: CPT

## 2021-12-24 PROCEDURE — 99233 SBSQ HOSP IP/OBS HIGH 50: CPT | Performed by: INTERNAL MEDICINE

## 2021-12-24 PROCEDURE — 85025 COMPLETE CBC W/AUTO DIFF WBC: CPT

## 2021-12-24 PROCEDURE — 2700000000 HC OXYGEN THERAPY PER DAY

## 2021-12-24 PROCEDURE — 2580000003 HC RX 258: Performed by: INTERNAL MEDICINE

## 2021-12-24 RX ORDER — POTASSIUM CHLORIDE 20 MEQ/1
40 TABLET, EXTENDED RELEASE ORAL PRN
Status: DISCONTINUED | OUTPATIENT
Start: 2021-12-24 | End: 2021-12-28 | Stop reason: HOSPADM

## 2021-12-24 RX ORDER — MAGNESIUM SULFATE IN WATER 40 MG/ML
2000 INJECTION, SOLUTION INTRAVENOUS PRN
Status: DISCONTINUED | OUTPATIENT
Start: 2021-12-24 | End: 2021-12-28 | Stop reason: HOSPADM

## 2021-12-24 RX ORDER — POTASSIUM CHLORIDE 7.45 MG/ML
10 INJECTION INTRAVENOUS PRN
Status: DISCONTINUED | OUTPATIENT
Start: 2021-12-24 | End: 2021-12-28 | Stop reason: HOSPADM

## 2021-12-24 RX ADMIN — ENOXAPARIN SODIUM 40 MG: 100 INJECTION SUBCUTANEOUS at 09:28

## 2021-12-24 RX ADMIN — FAMOTIDINE 20 MG: 20 TABLET ORAL at 20:12

## 2021-12-24 RX ADMIN — POTASSIUM CHLORIDE 40 MEQ: 1500 TABLET, EXTENDED RELEASE ORAL at 12:27

## 2021-12-24 RX ADMIN — GUAIFENESIN SYRUP AND DEXTROMETHORPHAN 5 ML: 100; 10 SYRUP ORAL at 04:34

## 2021-12-24 RX ADMIN — Medication 10 ML: at 20:13

## 2021-12-24 RX ADMIN — Medication 2000 UNITS: at 09:29

## 2021-12-24 RX ADMIN — DEXAMETHASONE SODIUM PHOSPHATE 6 MG: 10 INJECTION, SOLUTION INTRAMUSCULAR; INTRAVENOUS at 09:28

## 2021-12-24 RX ADMIN — ASPIRIN 81 MG: 81 TABLET, COATED ORAL at 09:29

## 2021-12-24 RX ADMIN — ENOXAPARIN SODIUM 40 MG: 100 INJECTION SUBCUTANEOUS at 20:13

## 2021-12-24 RX ADMIN — AMLODIPINE BESYLATE 2.5 MG: 5 TABLET ORAL at 09:29

## 2021-12-24 RX ADMIN — Medication 10 ML: at 09:29

## 2021-12-24 RX ADMIN — METOPROLOL TARTRATE 25 MG: 25 TABLET, FILM COATED ORAL at 09:29

## 2021-12-24 RX ADMIN — METOPROLOL TARTRATE 25 MG: 25 TABLET, FILM COATED ORAL at 20:12

## 2021-12-24 RX ADMIN — GUAIFENESIN SYRUP AND DEXTROMETHORPHAN 5 ML: 100; 10 SYRUP ORAL at 09:36

## 2021-12-24 RX ADMIN — GUAIFENESIN SYRUP AND DEXTROMETHORPHAN 5 ML: 100; 10 SYRUP ORAL at 16:29

## 2021-12-24 RX ADMIN — FAMOTIDINE 20 MG: 20 TABLET ORAL at 09:29

## 2021-12-24 ASSESSMENT — PAIN SCALES - GENERAL
PAINLEVEL_OUTOF10: 0

## 2021-12-24 ASSESSMENT — PAIN DESCRIPTION - PAIN TYPE: TYPE: ACUTE PAIN

## 2021-12-24 ASSESSMENT — PAIN DESCRIPTION - LOCATION: LOCATION: GENERALIZED

## 2021-12-24 NOTE — PROGRESS NOTES
Lovenox  Diet: ADULT DIET; Regular  Code Status: Full Code    Medications:  Reviewed    Infusion Medications    sodium chloride Stopped (12/19/21 1023)     Scheduled Medications    dexamethasone (PF)  6 mg IntraVENous Daily    enoxaparin  40 mg SubCUTAneous BID    amLODIPine  2.5 mg Oral Daily    aspirin  81 mg Oral Daily    metoprolol tartrate  25 mg Oral BID    sodium chloride flush  5-40 mL IntraVENous 2 times per day    famotidine  20 mg Oral BID    Vitamin D  2,000 Units Oral Daily     PRN Meds: loperamide, nitroGLYCERIN, sodium chloride flush, sodium chloride, ondansetron **OR** ondansetron, polyethylene glycol, acetaminophen **OR** acetaminophen, guaiFENesin-dextromethorphan      Intake/Output Summary (Last 24 hours) at 12/24/2021 0932  Last data filed at 12/23/2021 1700  Gross per 24 hour   Intake 900 ml   Output 200 ml   Net 700 ml       Physical Exam Performed:    /70   Pulse 66   Temp 97 °F (36.1 °C) (Temporal)   Resp 27   Ht 5' 5\" (1.651 m)   Wt 190 lb 12.8 oz (86.5 kg)   SpO2 92%   BMI 31.75 kg/m²     General appearance: No apparent distress, appears stated age and cooperative. HEENT: Pupils equal, round, and reactive to light. Conjunctivae/corneas clear. Neck: Supple, with full range of motion. No jugular venous distention. Trachea midline. Respiratory:  Normal respiratory effort. Clear to auscultation, bilaterally without Rales/Wheezes/Rhonchi. Cardiovascular: Regular rate and rhythm with normal S1/S2 without murmurs, rubs or gallops. Abdomen: Soft, non-tender, non-distended with normal bowel sounds. Musculoskeletal: No clubbing, cyanosis or edema bilaterally. Full range of motion without deformity. Skin: Skin color, texture, turgor normal.  No rashes or lesions. Neurologic:  Neurovascularly intact without any focal sensory/motor deficits.  Cranial nerves: II-XII intact, grossly non-focal.  Psychiatric: Alert and oriented, thought content appropriate, normal insight  Capillary Refill: Brisk,< 3 seconds   Peripheral Pulses: +2 palpable, equal bilaterally       Labs:   Recent Labs     12/22/21  0428 12/23/21  0301   WBC 17.9* 18.1*   HGB 14.8 14.7   HCT 44.2 44.3    192     Recent Labs     12/22/21  0428 12/23/21  0301    136   K 3.9 4.4    102   CO2 22 27   BUN 14 17   CREATININE 0.6 0.6   CALCIUM 8.4 8.6   PHOS 3.3 3.5     No results for input(s): AST, ALT, BILIDIR, BILITOT, ALKPHOS in the last 72 hours. No results for input(s): INR in the last 72 hours. No results for input(s): Catherine Comber in the last 72 hours. Urinalysis:      Lab Results   Component Value Date    NITRU Negative 12/23/2021    WBCUA 3-5 12/13/2021    BACTERIA Rare 12/13/2021    RBCUA None seen 12/13/2021    BLOODU Negative 12/23/2021    SPECGRAV 1.028 12/23/2021    GLUCOSEU >=1000 12/23/2021       Radiology:  XR CHEST PORTABLE   Final Result   No significant interval change in multifocal pneumonia. XR CHEST PORTABLE   Final Result   Bilateral asymmetric multifocal pneumonia, generally similar to 12/17/2021. No pleural fluid or pneumothorax detected. XR CHEST PORTABLE   Final Result   Bilateral airspace disease, with mild progression on the left, consistent   with pneumonia. Pattern is common for COVID. XR CHEST PORTABLE   Final Result   Bilateral airspace disease, consistent with COVID pneumonia. Superimposed   atelectasis or typical pneumonia are other considerations, given greater   consolidation in the left lower lobe.                      Active Hospital Problems    Diagnosis     Pneumonia due to COVID-19 virus [U07.1, J12.82]            Electronically signed by Dharmesh Prescott MD on 12/24/2021 at 9:32 AM

## 2021-12-24 NOTE — PROGRESS NOTES
P Pulmonary and Critical Care    Follow Up Note    Subjective:   CHIEF COMPLAINT / HPI:   Chief Complaint   Patient presents with    Positive For Covid-19     DX w 12/8. States her oxygen saturation was 85% @ home. Interval history: Patient is currently on heated high flow nasal cannula 45 L / 66%. Awake and alert, states that she feels great. Frustrated that she will not be able to go home anytime soon. Past Medical History:    Reviewed; no changes    Social History:    Reviewed; no changes    REVIEW OF SYSTEMS:    CONSTITUTIONAL:  negative for fevers and chills  RESPIRATORY:  See HPI  CARDIOVASCULAR:  negative for chest pain, palpitations, edema  GASTROINTESTINAL:  negative for nausea, vomiting, diarrhea, constipation and abdominal pain    Objective:   PHYSICAL EXAM:        VITALS:  /65   Pulse 76   Temp 97 °F (36.1 °C) (Temporal)   Resp 22   Ht 5' 5\" (1.651 m)   Wt 190 lb 12.8 oz (86.5 kg)   SpO2 92%   BMI 31.75 kg/m²  on heated high flow flow oxygen 100% / 60 L/min +100% nonrebreather mask   24HR INTAKE/OUTPUT:      Intake/Output Summary (Last 24 hours) at 12/24/2021 1428  Last data filed at 12/24/2021 1058  Gross per 24 hour   Intake 440 ml   Output 200 ml   Net 240 ml       CONSTITUTIONAL:  awake, alert,  no apparent distress, and appears stated age  LUNGS:  No increased work of breathing and goals to auscultation  CARDIOVASCULAR: S1 and S2 and no JVD  ABDOMEN:  normal bowel sounds, non-distended and non-tender to palpation  EXT: No edema, no calf tenderness. Pulses are present bilaterally. NEUROLOGIC:  Mental Status Exam:  Level of Alertness:   awake  Orientation:   person, place, time.  Non focal  SKIN:  normal skin color, texture, turgor, no redness, warmth, or swelling at IV sites    DATA:    CBC:  Recent Labs     12/22/21  0428 12/23/21  0301 12/24/21  1014   WBC 17.9* 18.1* 23.5*   RBC 5.32* 5.23* 5.13   HGB 14.8 14.7 14.3   HCT 44.2 44.3 43.2    192 159   MCV 83.1 84.7 84.1   MCH 27.8 28.0 27.8   MCHC 33.5 33.1 33.1   RDW 13.6 13.8 13.8   BANDSPCT  --  5  --       BMP:  Recent Labs     12/22/21  0428 12/23/21  0301 12/24/21  1014    136 136   K 3.9 4.4 3.3*    102 102   CO2 22 27 20*   BUN 14 17 17   CREATININE 0.6 0.6 0.5*   CALCIUM 8.4 8.6 8.5   GLUCOSE 151* 161* 187*      ABG:  No results for input(s): PHART, TCQ7BHB, PO2ART, ULH5OHN, Z1DTANHJ, BEART in the last 72 hours. Procalcitonin  Recent Labs     12/22/21 0428   PROCAL 0.04           Radiology Review:  Pertinent images / reports were reviewed as a part of this visit. Assessment:     1. Acute hypoxemic respiratory failure  2. COVID-19 pneumonia  3 leukocytosis  Plan:     Severe COVID-19 pneumonia-persistent bilateral infiltrates on chest x-ray. Still requiring heated high flow nasal cannula, 45 L / 66%. No significant tachypnea noted. Has cough with some mucoid phlegm at times. Not requiring BiPAP as per report. Could consider CT chest early next week to evaluate for pulmonary fibrosis if no improvement noted. On Decadron 6 mg IV daily, completed 5-day course of remdesivir and 1 dose of Actemra. Procalcitonin has been low. CRP 3. Leukocytosis noted, WBC 23. However patient does not any signs of a bacterial infection. Will recheck procalcitonin. Leukocytosis could be related to chronic steroid use. Check D-dimer-on prophylactic Lovenox. Recheck D-dimer tomorrow. Critical care team will follow. Lucero Barbosa

## 2021-12-24 NOTE — PLAN OF CARE
Problem: Pain:  Goal: Pain level will decrease  Description: Pain level will decrease  Outcome: Ongoing     Problem: Airway Clearance - Ineffective  Goal: Achieve or maintain patent airway  Outcome: Ongoing     Problem: Gas Exchange - Impaired  Goal: Absence of hypoxia  Outcome: Ongoing     Problem: Breathing Pattern - Ineffective  Goal: Ability to achieve and maintain a regular respiratory rate  Outcome: Ongoing     Problem:  Body Temperature -  Risk of, Imbalanced  Goal: Complications related to the disease process, condition or treatment will be avoided or minimized  Outcome: Ongoing     Problem: Isolation Precautions - Risk of Spread of Infection  Goal: Prevent transmission of infection  Outcome: Ongoing     Problem: Nutrition Deficits  Goal: Optimize nutritional status  Outcome: Ongoing     Problem: Risk for Fluid Volume Deficit  Goal: Maintain normal serum potassium, sodium, calcium, phosphorus, and pH  Outcome: Ongoing     Problem: Loneliness or Risk for Loneliness  Goal: Demonstrate positive use of time alone when socialization is not possible  Outcome: Ongoing     Problem: Fatigue  Goal: Verbalize increase energy and improved vitality  Outcome: Ongoing     Problem: Patient Education: Go to Patient Education Activity  Goal: Patient/Family Education  Outcome: Ongoing

## 2021-12-24 NOTE — PROGRESS NOTES
Pt has gotten up with assistance to [facility]. NRB available as needed. No changes noted. Will continue to monitor.

## 2021-12-24 NOTE — PROGRESS NOTES
This note also relates to the following rows which could not be included:  Resp - Cannot attach notes to unvalidated device data       12/23/21 2113   Oxygen Therapy/Pulse Ox   O2 Therapy Oxygen humidified   O2 Device Heated high flow cannula  (pt. refused bipap for hs at this time)   O2 Flow Rate (L/min) 45 L/min   FiO2  59 %   SpO2 91 %   Skin Assessment Clean, dry, & intact   Humidification Temp 31   Humidification Temp Measured 31   Pulse Oximeter Device Mode Continuous   Pulse Oximeter Device Location Finger

## 2021-12-24 NOTE — PROGRESS NOTES
Assessment completed and documented. VSS. Pt on airvo 45L, 66% sattting low to mid 90s. meds given per MAR, PRN cough medicine given per pt request. Pt sitting up in chair eating breakfast. Denies needs. Call light within reach, will continue to monitor.

## 2021-12-24 NOTE — PROGRESS NOTES
Reassessment documented. No changes noted in care. Pt sleeping between care. Denies needs or concerns at present. Will continue to monitor.

## 2021-12-24 NOTE — PROGRESS NOTES
PM assessment complete and documented. Meds given per MAR. Airvo as noted. Pt denies needs or concerns at present. Will continue to monitor.

## 2021-12-25 LAB
ANION GAP SERPL CALCULATED.3IONS-SCNC: 11 MMOL/L (ref 3–16)
BASOPHILS ABSOLUTE: 0 K/UL (ref 0–0.2)
BASOPHILS RELATIVE PERCENT: 0 %
BUN BLDV-MCNC: 17 MG/DL (ref 7–20)
CALCIUM SERPL-MCNC: 8.6 MG/DL (ref 8.3–10.6)
CHLORIDE BLD-SCNC: 105 MMOL/L (ref 99–110)
CO2: 22 MMOL/L (ref 21–32)
CREAT SERPL-MCNC: 0.6 MG/DL (ref 0.6–1.2)
D DIMER: 256 NG/ML DDU (ref 0–229)
EOSINOPHILS ABSOLUTE: 0.2 K/UL (ref 0–0.6)
EOSINOPHILS RELATIVE PERCENT: 1 %
GFR AFRICAN AMERICAN: >60
GFR NON-AFRICAN AMERICAN: >60
GLUCOSE BLD-MCNC: 162 MG/DL (ref 70–99)
HCT VFR BLD CALC: 42.2 % (ref 36–48)
HEMOGLOBIN: 13.9 G/DL (ref 12–16)
LYMPHOCYTES ABSOLUTE: 0.6 K/UL (ref 1–5.1)
LYMPHOCYTES RELATIVE PERCENT: 3 %
MAGNESIUM: 2 MG/DL (ref 1.8–2.4)
MCH RBC QN AUTO: 27.8 PG (ref 26–34)
MCHC RBC AUTO-ENTMCNC: 33 G/DL (ref 31–36)
MCV RBC AUTO: 84.3 FL (ref 80–100)
MONOCYTES ABSOLUTE: 1.6 K/UL (ref 0–1.3)
MONOCYTES RELATIVE PERCENT: 8 %
NEUTROPHILS ABSOLUTE: 17.7 K/UL (ref 1.7–7.7)
NEUTROPHILS RELATIVE PERCENT: 88 %
PDW BLD-RTO: 13.9 % (ref 12.4–15.4)
PHOSPHORUS: 3.4 MG/DL (ref 2.5–4.9)
PLATELET # BLD: 134 K/UL (ref 135–450)
PLATELET SLIDE REVIEW: ADEQUATE
PMV BLD AUTO: 8.6 FL (ref 5–10.5)
POTASSIUM SERPL-SCNC: 4.2 MMOL/L (ref 3.5–5.1)
RBC # BLD: 5 M/UL (ref 4–5.2)
RBC # BLD: NORMAL 10*6/UL
SLIDE REVIEW: ABNORMAL
SODIUM BLD-SCNC: 138 MMOL/L (ref 136–145)
WBC # BLD: 20.1 K/UL (ref 4–11)

## 2021-12-25 PROCEDURE — 85379 FIBRIN DEGRADATION QUANT: CPT

## 2021-12-25 PROCEDURE — 6370000000 HC RX 637 (ALT 250 FOR IP): Performed by: INTERNAL MEDICINE

## 2021-12-25 PROCEDURE — 36415 COLL VENOUS BLD VENIPUNCTURE: CPT

## 2021-12-25 PROCEDURE — 80048 BASIC METABOLIC PNL TOTAL CA: CPT

## 2021-12-25 PROCEDURE — 2580000003 HC RX 258: Performed by: INTERNAL MEDICINE

## 2021-12-25 PROCEDURE — 83735 ASSAY OF MAGNESIUM: CPT

## 2021-12-25 PROCEDURE — 84100 ASSAY OF PHOSPHORUS: CPT

## 2021-12-25 PROCEDURE — 2700000000 HC OXYGEN THERAPY PER DAY

## 2021-12-25 PROCEDURE — 6360000002 HC RX W HCPCS: Performed by: INTERNAL MEDICINE

## 2021-12-25 PROCEDURE — 99233 SBSQ HOSP IP/OBS HIGH 50: CPT | Performed by: INTERNAL MEDICINE

## 2021-12-25 PROCEDURE — 85025 COMPLETE CBC W/AUTO DIFF WBC: CPT

## 2021-12-25 PROCEDURE — 94761 N-INVAS EAR/PLS OXIMETRY MLT: CPT

## 2021-12-25 PROCEDURE — 2000000000 HC ICU R&B

## 2021-12-25 RX ADMIN — ENOXAPARIN SODIUM 40 MG: 100 INJECTION SUBCUTANEOUS at 08:51

## 2021-12-25 RX ADMIN — GUAIFENESIN SYRUP AND DEXTROMETHORPHAN 5 ML: 100; 10 SYRUP ORAL at 18:16

## 2021-12-25 RX ADMIN — ENOXAPARIN SODIUM 40 MG: 100 INJECTION SUBCUTANEOUS at 20:23

## 2021-12-25 RX ADMIN — Medication 10 ML: at 20:23

## 2021-12-25 RX ADMIN — Medication 10 ML: at 08:52

## 2021-12-25 RX ADMIN — GUAIFENESIN SYRUP AND DEXTROMETHORPHAN 5 ML: 100; 10 SYRUP ORAL at 21:50

## 2021-12-25 RX ADMIN — FAMOTIDINE 20 MG: 20 TABLET ORAL at 20:23

## 2021-12-25 RX ADMIN — METOPROLOL TARTRATE 25 MG: 25 TABLET, FILM COATED ORAL at 08:51

## 2021-12-25 RX ADMIN — ASPIRIN 81 MG: 81 TABLET, COATED ORAL at 08:51

## 2021-12-25 RX ADMIN — FAMOTIDINE 20 MG: 20 TABLET ORAL at 08:51

## 2021-12-25 RX ADMIN — GUAIFENESIN SYRUP AND DEXTROMETHORPHAN 5 ML: 100; 10 SYRUP ORAL at 08:58

## 2021-12-25 RX ADMIN — METOPROLOL TARTRATE 25 MG: 25 TABLET, FILM COATED ORAL at 20:23

## 2021-12-25 RX ADMIN — GUAIFENESIN SYRUP AND DEXTROMETHORPHAN 5 ML: 100; 10 SYRUP ORAL at 13:20

## 2021-12-25 RX ADMIN — Medication 2000 UNITS: at 08:51

## 2021-12-25 RX ADMIN — DEXAMETHASONE SODIUM PHOSPHATE 6 MG: 10 INJECTION, SOLUTION INTRAMUSCULAR; INTRAVENOUS at 08:51

## 2021-12-25 RX ADMIN — AMLODIPINE BESYLATE 2.5 MG: 5 TABLET ORAL at 08:51

## 2021-12-25 ASSESSMENT — PAIN SCALES - GENERAL
PAINLEVEL_OUTOF10: 0

## 2021-12-25 NOTE — PROGRESS NOTES
Assessment completed and documented. VSS. Pt on airvo 40L 61% satting low 90s. meds given per [facility]. PRN robitussin given per pt request. Pt sitting up in chair eating breakfast. Denies pain or needs. Call light within reach, will continue to monitor.

## 2021-12-25 NOTE — PROGRESS NOTES
[facility] Pulmonary and Critical Care    Follow Up Note    Subjective:   CHIEF COMPLAINT / HPI:   Chief Complaint   Patient presents with    Positive For Covid-19     DX w 12/8. States her oxygen saturation was 85% @ home. Interval history: Appears to have improved O2 needs 60% / 40 L high flow nasal cannula-now switched over to 15 L high flow nasal cannula. Sitting up in chair, mild tachypnea only. Past Medical History:    Reviewed; no changes    Social History:    Reviewed; no changes    REVIEW OF SYSTEMS:    CONSTITUTIONAL:  negative for fevers and chills  RESPIRATORY:  See HPI  CARDIOVASCULAR:  negative for chest pain, palpitations, edema  GASTROINTESTINAL:  negative for nausea, vomiting, diarrhea, constipation and abdominal pain    Objective:   PHYSICAL EXAM:        VITALS:  BP (!) 110/59   Pulse 97   Temp 97.5 °F (36.4 °C) (Temporal)   Resp 22   Ht 5' 5\" (1.651 m)   Wt 195 lb 3.2 oz (88.5 kg)   SpO2 97%   BMI 32.48 kg/m²  on heated high flow flow oxygen 100% / 60 L/min +100% nonrebreather mask   24HR INTAKE/OUTPUT:      Intake/Output Summary (Last 24 hours) at 12/25/2021 1432  Last data filed at 12/25/2021 1317  Gross per 24 hour   Intake 480 ml   Output 1100 ml   Net -620 ml       CONSTITUTIONAL:  awake, alert,  no apparent distress, and appears stated age  LUNGS:  No increased work of breathing and goals to auscultation  CARDIOVASCULAR: S1 and S2 and no JVD  ABDOMEN:  normal bowel sounds, non-distended and non-tender to palpation  EXT: No edema, no calf tenderness. Pulses are present bilaterally. NEUROLOGIC:  Mental Status Exam:  Level of Alertness:   awake  Orientation:   person, place, time.  Non focal  SKIN:  normal skin color, texture, turgor, no redness, warmth, or swelling at IV sites    DATA:    CBC:  Recent Labs     12/23/21  0301 12/24/21  1014 12/25/21  0348   WBC 18.1* 23.5* 20.1*   RBC 5.23* 5.13 5.00   HGB 14.7 14.3 13.9   HCT 44.3 43.2 42.2    159 134*   MCV 84.7 84.1 84.3 MCH 28.0 27.8 27.8   MCHC 33.1 33.1 33.0   RDW 13.8 13.8 13.9   BANDSPCT 5  --   --       BMP:  Recent Labs     12/23/21  0301 12/24/21  1014 12/25/21  0347    136 138   K 4.4 3.3* 4.2    102 105   CO2 27 20* 22   BUN 17 17 17   CREATININE 0.6 0.5* 0.6   CALCIUM 8.6 8.5 8.6   GLUCOSE 161* 187* 162*      ABG:  No results for input(s): PHART, PUM9PTI, PO2ART, PIP9WJX, M3MFALPF, BEART in the last 72 hours. Procalcitonin  Recent Labs     12/24/21  1014   PROCAL 0.04           Radiology Review:  Pertinent images / reports were reviewed as a part of this visit. Assessment:     1. Acute hypoxemic respiratory failure  2. COVID-19 pneumonia  3 leukocytosis  Plan:     Severe COVID-19 pneumonia-persistent bilateral infiltrates on chest x-ray. Improved O2 needs and currently on 15 L high flow nasal cannula. .  Mild tachypnea noted. Could consider CT chest early next week to evaluate for pulmonary fibrosis if no improvement noted. On Decadron 6 mg IV daily, completed 5-day course of remdesivir and 1 dose of Actemra. Procalcitonin has been low. CRP 3. Leukocytosis noted, WBC 20. However patient does not any signs of a bacterial infection. Procalcitonin is low. Leukocytosis could be related to chronic steroid use. Hold off on antibiotics. D-dimer 256, continue with prophylactic Lovenox    Critical care team will follow. Christen Hall

## 2021-12-25 NOTE — PROGRESS NOTES
Hospitalist Progress Note      PCP: Angelica Lorenz, APRN - CNP    Date of Admission: 12/13/2021    Chief Complaint: Dyspnea    Hospital Course: This 62 y.o. female with PMHx of hypertension, hyperlipidemia, coronary artery disease presented with dyspnea. Patient states that she went to urgent care on December 8. She has been having URI symptoms for several days prior to that and thought she had the flu. She was tested positive for COVID-19 infection December 8. She has been doing better however over the last 2 to 3 days she has been getting progressively dyspneic. She reports nonproductive cough and pleuritic chest pain if she coughs. She has also been having nausea and diarrhea. At the ED she was found to be hypoxemic at 86% on room air and currently requires 5 L per nasal cannula to maintain saturations of 91%. Bilateral multifocal pneumonia is evident on chest x-ray. Interval history:  Pt seen and examined today. Overnight events noted, interval ancillary notes and labs reviewed. Remains On airvo FiO2 61% on 40 L/min satting around 92  Afebrile overnight, WBCs down to 20, procalcitonin within normal limits  Reported she is feeling better and denied cough, SOB, chest pain, nausea, vomiting or abdominal pain      Assessment/Plan:    COVID-19 pneumonia  Patient is unvaccinated:  Alternate with pain BiPAP and high flow  Status post 5-day course of remdesivir and 1 dose of Actemra  CRP trended down  Decadron dose reduced to 6 mg daily on 12/22/2021  Chest x-ray  on 12/24/21 showed no significant interval and change in multifocal pneumonia  Continue droplet plus precautions  Continue Lovenox for hypercoagulable state  Continue supplemental sedation oxygen as needed to keep sats above 92    Acute hypoxic respiratory failure secondary to above    Leukocytosis likely secondary to steroids  Afebrile, procalcitonin WNL  Urinalysis negative, cultures negative to date  CBC closely monitor    History of CAD:Stable  Continue aspirin statins and beta-blockers    DVT Prophylaxis: Lovenox  Diet: ADULT DIET; Regular  Code Status: Full Code    Medications:  Reviewed    Infusion Medications    sodium chloride Stopped (12/19/21 1023)     Scheduled Medications    dexamethasone (PF)  6 mg IntraVENous Daily    enoxaparin  40 mg SubCUTAneous BID    amLODIPine  2.5 mg Oral Daily    aspirin  81 mg Oral Daily    metoprolol tartrate  25 mg Oral BID    sodium chloride flush  5-40 mL IntraVENous 2 times per day    famotidine  20 mg Oral BID    Vitamin D  2,000 Units Oral Daily     PRN Meds: potassium chloride **OR** potassium alternative oral replacement **OR** potassium chloride, magnesium sulfate, loperamide, nitroGLYCERIN, sodium chloride flush, sodium chloride, ondansetron **OR** ondansetron, polyethylene glycol, acetaminophen **OR** acetaminophen, guaiFENesin-dextromethorphan      Intake/Output Summary (Last 24 hours) at 12/25/2021 0900  Last data filed at 12/25/2021 0853  Gross per 24 hour   Intake 720 ml   Output 800 ml   Net -80 ml       Physical Exam Performed:    /69   Pulse 72   Temp 97.8 °F (36.6 °C) (Temporal)   Resp 23   Ht 5' 5\" (1.651 m)   Wt 195 lb 3.2 oz (88.5 kg)   SpO2 92%   BMI 32.48 kg/m²     General appearance: No apparent distress, appears stated age and cooperative. HEENT: Pupils equal, round, and reactive to light. Conjunctivae/corneas clear. Neck: Supple, with full range of motion. No jugular venous distention. Trachea midline. Respiratory:  Normal respiratory effort. Clear to auscultation, bilaterally without Rales/Wheezes/Rhonchi. Cardiovascular: Regular rate and rhythm with normal S1/S2 without murmurs, rubs or gallops. Abdomen: Soft, non-tender, non-distended with normal bowel sounds. Musculoskeletal: No clubbing, cyanosis or edema bilaterally. Full range of motion without deformity. Skin: Skin color, texture, turgor normal.  No rashes or lesions.   Neurologic: Neurovascularly intact without any focal sensory/motor deficits. Cranial nerves: II-XII intact, grossly non-focal.  Psychiatric: Alert and oriented, thought content appropriate, normal insight  Capillary Refill: Brisk,< 3 seconds   Peripheral Pulses: +2 palpable, equal bilaterally       Labs:   Recent Labs     12/23/21  0301 12/24/21  1014 12/25/21  0348   WBC 18.1* 23.5* 20.1*   HGB 14.7 14.3 13.9   HCT 44.3 43.2 42.2    159 134*     Recent Labs     12/23/21  0301 12/24/21  1014 12/25/21  0347    136 138   K 4.4 3.3* 4.2    102 105   CO2 27 20* 22   BUN 17 17 17   CREATININE 0.6 0.5* 0.6   CALCIUM 8.6 8.5 8.6   PHOS 3.5 3.0 3.4     No results for input(s): AST, ALT, BILIDIR, BILITOT, ALKPHOS in the last 72 hours. No results for input(s): INR in the last 72 hours. No results for input(s): Susanne Ill in the last 72 hours. Urinalysis:      Lab Results   Component Value Date    NITRU Negative 12/23/2021    WBCUA 3-5 12/13/2021    BACTERIA Rare 12/13/2021    RBCUA None seen 12/13/2021    BLOODU Negative 12/23/2021    SPECGRAV 1.028 12/23/2021    GLUCOSEU >=1000 12/23/2021       Radiology:  XR CHEST PORTABLE   Final Result   No significant interval change in multifocal pneumonia. XR CHEST PORTABLE   Final Result   Bilateral asymmetric multifocal pneumonia, generally similar to 12/17/2021. No pleural fluid or pneumothorax detected. XR CHEST PORTABLE   Final Result   Bilateral airspace disease, with mild progression on the left, consistent   with pneumonia. Pattern is common for COVID. XR CHEST PORTABLE   Final Result   Bilateral airspace disease, consistent with COVID pneumonia. Superimposed   atelectasis or typical pneumonia are other considerations, given greater   consolidation in the left lower lobe.                      Active Hospital Problems    Diagnosis     Pneumonia due to COVID-19 virus [U07.1, J12.82]            Electronically signed by [facility] Cleopatra Rolle MD on 12/25/2021 at 9:00 AM

## 2021-12-25 NOTE — PROGRESS NOTES
This note also relates to the following rows which could not be included:  Resp - Cannot attach notes to unvalidated device data  SpO2 - Cannot attach notes to unvalidated device data       12/25/21 0928   Oxygen Therapy/Pulse Ox   O2 Therapy Oxygen humidified   $Oxygen $Daily Charge   O2 Device High flow nasal cannula   O2 Flow Rate (L/min) 15 L/min

## 2021-12-25 NOTE — PLAN OF CARE
Problem: Pain:  Goal: Pain level will decrease  Description: Pain level will decrease  12/25/2021 1030 by Donn Osborn RN  Outcome: Ongoing  12/25/2021 0240 by Greg Sullivan RN  Outcome: Ongoing     Problem: Airway Clearance - Ineffective  Goal: Achieve or maintain patent airway  12/25/2021 1030 by Donn Osborn RN  Outcome: Ongoing  12/25/2021 0240 by Greg Sullivan RN  Outcome: Ongoing     Problem: Gas Exchange - Impaired  Goal: Absence of hypoxia  12/25/2021 1030 by Donn Osborn RN  Outcome: Ongoing  12/25/2021 0240 by Greg Sullivan RN  Outcome: Ongoing     Problem: Breathing Pattern - Ineffective  Goal: Ability to achieve and maintain a regular respiratory rate  12/25/2021 1030 by Donn Osborn RN  Outcome: Ongoing  12/25/2021 0240 by Greg Sullivan RN  Outcome: Ongoing     Problem:  Body Temperature -  Risk of, Imbalanced  Goal: Complications related to the disease process, condition or treatment will be avoided or minimized  12/25/2021 1030 by Donn Osborn RN  Outcome: Ongoing  12/25/2021 0240 by Greg Sullivan RN  Outcome: Ongoing     Problem: Isolation Precautions - Risk of Spread of Infection  Goal: Prevent transmission of infection  12/25/2021 1030 by Donn Osborn RN  Outcome: Ongoing  12/25/2021 0240 by Greg Sullivan RN  Outcome: Ongoing     Problem: Nutrition Deficits  Goal: Optimize nutritional status  12/25/2021 1030 by Donn Osborn RN  Outcome: Ongoing  12/25/2021 0240 by Greg Sullivan RN  Outcome: Ongoing     Problem: Risk for Fluid Volume Deficit  Goal: Maintain normal serum potassium, sodium, calcium, phosphorus, and pH  12/25/2021 1030 by Donn Osborn RN  Outcome: Ongoing  12/25/2021 0240 by Greg Sullivan RN  Outcome: Ongoing     Problem: Loneliness or Risk for Loneliness  Goal: Demonstrate positive use of time alone when socialization is not possible  12/25/2021 1030 by Donn Osborn RN  Outcome: Ongoing  12/25/2021 0240 by Greg Sullivan RN  Outcome: Ongoing     Problem: Fatigue  Goal: Verbalize increase energy and improved vitality  12/25/2021 1030 by Radha Lin RN  Outcome: Ongoing  12/25/2021 0240 by Juan Yo RN  Outcome: Ongoing     Problem: Patient Education: Go to Patient Education Activity  Goal: Patient/Family Education  12/25/2021 1030 by Radha Lin RN  Outcome: Ongoing  12/25/2021 0240 by Juan Yo RN  Outcome: Ongoing

## 2021-12-26 LAB
ANION GAP SERPL CALCULATED.3IONS-SCNC: 11 MMOL/L (ref 3–16)
ANISOCYTOSIS: ABNORMAL
BASOPHILS ABSOLUTE: 0 K/UL (ref 0–0.2)
BASOPHILS RELATIVE PERCENT: 0 %
BLOOD CULTURE, ROUTINE: NORMAL
BUN BLDV-MCNC: 18 MG/DL (ref 7–20)
CALCIUM SERPL-MCNC: 8.6 MG/DL (ref 8.3–10.6)
CHLORIDE BLD-SCNC: 105 MMOL/L (ref 99–110)
CO2: 22 MMOL/L (ref 21–32)
CREAT SERPL-MCNC: 0.5 MG/DL (ref 0.6–1.2)
CULTURE, BLOOD 2: NORMAL
EOSINOPHILS ABSOLUTE: 0 K/UL (ref 0–0.6)
EOSINOPHILS RELATIVE PERCENT: 0 %
GFR AFRICAN AMERICAN: >60
GFR NON-AFRICAN AMERICAN: >60
GLUCOSE BLD-MCNC: 131 MG/DL (ref 70–99)
HCT VFR BLD CALC: 42.8 % (ref 36–48)
HEMOGLOBIN: 14 G/DL (ref 12–16)
LYMPHOCYTES ABSOLUTE: 0.7 K/UL (ref 1–5.1)
LYMPHOCYTES RELATIVE PERCENT: 4 %
MAGNESIUM: 2.1 MG/DL (ref 1.8–2.4)
MCH RBC QN AUTO: 27.9 PG (ref 26–34)
MCHC RBC AUTO-ENTMCNC: 32.7 G/DL (ref 31–36)
MCV RBC AUTO: 85.3 FL (ref 80–100)
MONOCYTES ABSOLUTE: 0.7 K/UL (ref 0–1.3)
MONOCYTES RELATIVE PERCENT: 4 %
NEUTROPHILS ABSOLUTE: 15.5 K/UL (ref 1.7–7.7)
NEUTROPHILS RELATIVE PERCENT: 92 %
PDW BLD-RTO: 14.1 % (ref 12.4–15.4)
PHOSPHORUS: 3.9 MG/DL (ref 2.5–4.9)
PLATELET # BLD: 115 K/UL (ref 135–450)
PLATELET SLIDE REVIEW: ABNORMAL
PMV BLD AUTO: 8.6 FL (ref 5–10.5)
POTASSIUM SERPL-SCNC: 4.6 MMOL/L (ref 3.5–5.1)
RBC # BLD: 5.01 M/UL (ref 4–5.2)
SLIDE REVIEW: ABNORMAL
SODIUM BLD-SCNC: 138 MMOL/L (ref 136–145)
WBC # BLD: 16.9 K/UL (ref 4–11)

## 2021-12-26 PROCEDURE — 6360000002 HC RX W HCPCS: Performed by: INTERNAL MEDICINE

## 2021-12-26 PROCEDURE — 6370000000 HC RX 637 (ALT 250 FOR IP): Performed by: INTERNAL MEDICINE

## 2021-12-26 PROCEDURE — 36415 COLL VENOUS BLD VENIPUNCTURE: CPT

## 2021-12-26 PROCEDURE — 84100 ASSAY OF PHOSPHORUS: CPT

## 2021-12-26 PROCEDURE — 1200000000 HC SEMI PRIVATE

## 2021-12-26 PROCEDURE — 85025 COMPLETE CBC W/AUTO DIFF WBC: CPT

## 2021-12-26 PROCEDURE — 2580000003 HC RX 258: Performed by: INTERNAL MEDICINE

## 2021-12-26 PROCEDURE — 83735 ASSAY OF MAGNESIUM: CPT

## 2021-12-26 PROCEDURE — 80048 BASIC METABOLIC PNL TOTAL CA: CPT

## 2021-12-26 RX ADMIN — AMLODIPINE BESYLATE 2.5 MG: 5 TABLET ORAL at 08:23

## 2021-12-26 RX ADMIN — ENOXAPARIN SODIUM 40 MG: 100 INJECTION SUBCUTANEOUS at 08:23

## 2021-12-26 RX ADMIN — METOPROLOL TARTRATE 25 MG: 25 TABLET, FILM COATED ORAL at 21:22

## 2021-12-26 RX ADMIN — FAMOTIDINE 20 MG: 20 TABLET ORAL at 08:24

## 2021-12-26 RX ADMIN — Medication 10 ML: at 08:25

## 2021-12-26 RX ADMIN — FAMOTIDINE 20 MG: 20 TABLET ORAL at 21:22

## 2021-12-26 RX ADMIN — ASPIRIN 81 MG: 81 TABLET, COATED ORAL at 08:24

## 2021-12-26 RX ADMIN — METOPROLOL TARTRATE 25 MG: 25 TABLET, FILM COATED ORAL at 08:24

## 2021-12-26 RX ADMIN — Medication 2000 UNITS: at 08:24

## 2021-12-26 RX ADMIN — Medication 10 ML: at 21:23

## 2021-12-26 RX ADMIN — DEXAMETHASONE SODIUM PHOSPHATE 6 MG: 10 INJECTION, SOLUTION INTRAMUSCULAR; INTRAVENOUS at 08:24

## 2021-12-26 RX ADMIN — ENOXAPARIN SODIUM 40 MG: 100 INJECTION SUBCUTANEOUS at 21:22

## 2021-12-26 ASSESSMENT — PAIN SCALES - GENERAL
PAINLEVEL_OUTOF10: 0

## 2021-12-26 NOTE — PROGRESS NOTES
Placed on NC at 5L  Pt ambulated to bathroom and back (apx 5 feet). SpO2 down to 87% with ambulation. After less than 2 minutes of sitting, SpO2 up to 92% with 5L NC. Pt did not complain of SOB with ambulation.  Kamlesh Knapp RN 2:19 PM

## 2021-12-26 NOTE — PROGRESS NOTES
Hospitalist Progress Note      PCP: BRISSA So CNP    Date of Admission: 12/13/2021    Chief Complaint: Dyspnea    Hospital Course: This 62 y.o. female with PMHx of hypertension, hyperlipidemia, coronary artery disease presented with dyspnea. Patient states that she went to urgent care on December 8. She has been having URI symptoms for several days prior to that and thought she had the flu. She was tested positive for COVID-19 infection December 8. She has been doing better however over the last 2 to 3 days she has been getting progressively dyspneic. She reports nonproductive cough and pleuritic chest pain if she coughs. She has also been having nausea and diarrhea. At the ED she was found to be hypoxemic at 86% on room air and currently requires 5 L per nasal cannula to maintain saturations of 91%. Bilateral multifocal pneumonia is evident on chest x-ray. Interval history:  Pt seen and examined today. Overnight events noted, interval ancillary notes and labs reviewed. O2 requirement down to 12 L HFNC satting around 93%  Afebrile overnight,  WBCs trended down  Reported shortness of breath but denied any denied fevers, chills, chest pain, nausea, vomiting or abdominal pain      Assessment/Plan:    COVID-19 pneumonia  Patient is unvaccinated:  Alternate with pain BiPAP and high flow  Status post 5-day course of remdesivir and 1 dose of Actemra  CRP trended down  Decadron dose reduced to 6 mg daily on 12/22/2021  Chest x-ray  on 12/24/21 showed no significant interval and change in multifocal pneumonia  Continue droplet plus precautions  Continue Lovenox for hypercoagulable state  Continue supplemental sedation oxygen as needed to keep sats above 92    Acute hypoxic respiratory failure secondary to above    Leukocytosis likely secondary to steroids: WBCs trending down  Afebrile, procalcitonin WNL  Urinalysis negative, cultures negative to date  CBC closely monitor    History of Neurovascularly intact without any focal sensory/motor deficits. Cranial nerves: II-XII intact, grossly non-focal.  Psychiatric: Alert and oriented, thought content appropriate, normal insight  Capillary Refill: Brisk,< 3 seconds   Peripheral Pulses: +2 palpable, equal bilaterally       Labs:   Recent Labs     12/24/21  1014 12/25/21  0348 12/26/21  0457   WBC 23.5* 20.1* 16.9*   HGB 14.3 13.9 14.0   HCT 43.2 42.2 42.8    134* 115*     Recent Labs     12/24/21  1014 12/25/21  0347 12/26/21  0457    138 138   K 3.3* 4.2 4.6    105 105   CO2 20* 22 22   BUN 17 17 18   CREATININE 0.5* 0.6 0.5*   CALCIUM 8.5 8.6 8.6   PHOS 3.0 3.4 3.9     No results for input(s): AST, ALT, BILIDIR, BILITOT, ALKPHOS in the last 72 hours. No results for input(s): INR in the last 72 hours. No results for input(s): [place] in the last 72 hours. Urinalysis:      Lab Results   Component Value Date    NITRU Negative 12/23/2021    WBCUA 3-5 12/13/2021    BACTERIA Rare 12/13/2021    RBCUA None seen 12/13/2021    BLOODU Negative 12/23/2021    SPECGRAV 1.028 12/23/2021    GLUCOSEU >=1000 12/23/2021       Radiology:  XR CHEST PORTABLE   Final Result   No significant interval change in multifocal pneumonia. XR CHEST PORTABLE   Final Result   Bilateral asymmetric multifocal pneumonia, generally similar to 12/17/2021. No pleural fluid or pneumothorax detected. XR CHEST PORTABLE   Final Result   Bilateral airspace disease, with mild progression on the left, consistent   with pneumonia. Pattern is common for COVID. XR CHEST PORTABLE   Final Result   Bilateral airspace disease, consistent with COVID pneumonia. Superimposed   atelectasis or typical pneumonia are other considerations, given greater   consolidation in the left lower lobe.                      Active Hospital Problems    Diagnosis     Pneumonia due to COVID-19 virus [U07.1, J12.82]            Electronically signed by [facility] Fifi Little MD on 12/26/2021 at 9:08 AM

## 2021-12-26 NOTE — PLAN OF CARE
Problem: Pain:  Goal: Pain level will decrease  Description: Pain level will decrease  12/25/2021 2131 by Thais Closs, RN  Outcome: Ongoing    Goal: Control of acute pain  Description: Control of acute pain  12/25/2021 2131 by Thais Closs, RN  Outcome: Ongoing    Goal: Control of chronic pain  Description: Control of chronic pain  12/25/2021 2131 by Thais Closs, RN  Outcome: Ongoing     Problem: Airway Clearance - Ineffective  Goal: Achieve or maintain patent airway  12/25/2021 2131 by Thais Closs, RN  Outcome: Ongoing       Problem: Gas Exchange - Impaired  Goal: Absence of hypoxia  12/25/2021 2131 by Thais Closs, RN  Outcome: Ongoing    Goal: Promote optimal lung function  12/25/2021 2131 by Thais Closs, RN  Outcome: Ongoing    Problem: Breathing Pattern - Ineffective  Goal: Ability to achieve and maintain a regular respiratory rate  12/25/2021 2131 by Thais Closs, RN  Outcome: Ongoing       Problem:  Body Temperature -  Risk of, Imbalanced  Goal: Ability to maintain a body temperature within defined limits  12/25/2021 2131 by Thais Closs, RN  Outcome: Ongoing    Goal: Will regain or maintain usual level of consciousness  12/25/2021 2131 by Thais Closs, RN  Outcome: Ongoing    Goal: Complications related to the disease process, condition or treatment will be avoided or minimized  12/25/2021 2131 by Thais Closs, RN  Outcome: Ongoing       Problem: Isolation Precautions - Risk of Spread of Infection  Goal: Prevent transmission of infection  12/25/2021 2131 by Thais Closs, RN  Outcome: Ongoing       Problem: Nutrition Deficits  Goal: Optimize nutritional status  12/25/2021 2131 by Thais Closs, RN  Outcome: Ongoing       Problem: Risk for Fluid Volume Deficit  Goal: Maintain normal heart rhythm  12/25/2021 2131 by Thais Closs, RN  Outcome: Ongoing    Goal: Maintain absence of muscle cramping  12/25/2021 2131 by Thais Closs, RN  Outcome: Ongoing    Goal: Maintain normal serum potassium, sodium, calcium, phosphorus, and pH  12/25/2021 2131 by Mony Sanchez RN  Outcome: Ongoing       Problem: Loneliness or Risk for Loneliness  Goal: Demonstrate positive use of time alone when socialization is not possible  12/25/2021 2131 by Mony Sanchez RN  Outcome: Ongoing       Problem: Fatigue  Goal: Verbalize increase energy and improved vitality  12/25/2021 2131 by Mony Sanchez RN  Outcome: Ongoing       Problem: Patient Education: Go to Patient Education Activity  Goal: Patient/Family Education  12/25/2021 2131 by Mony Sanchez RN  Outcome: Ongoing

## 2021-12-27 LAB
ANION GAP SERPL CALCULATED.3IONS-SCNC: 9 MMOL/L (ref 3–16)
BANDED NEUTROPHILS RELATIVE PERCENT: 3 % (ref 0–7)
BASOPHILS ABSOLUTE: 0 K/UL (ref 0–0.2)
BASOPHILS RELATIVE PERCENT: 0 %
BUN BLDV-MCNC: 18 MG/DL (ref 7–20)
CALCIUM SERPL-MCNC: 8.8 MG/DL (ref 8.3–10.6)
CHLORIDE BLD-SCNC: 103 MMOL/L (ref 99–110)
CO2: 24 MMOL/L (ref 21–32)
CREAT SERPL-MCNC: 0.6 MG/DL (ref 0.6–1.2)
EOSINOPHILS ABSOLUTE: 0 K/UL (ref 0–0.6)
EOSINOPHILS RELATIVE PERCENT: 0 %
GFR AFRICAN AMERICAN: >60
GFR NON-AFRICAN AMERICAN: >60
GLUCOSE BLD-MCNC: 168 MG/DL (ref 70–99)
HCT VFR BLD CALC: 42.1 % (ref 36–48)
HEMOGLOBIN: 13.8 G/DL (ref 12–16)
LYMPHOCYTES ABSOLUTE: 0.5 K/UL (ref 1–5.1)
LYMPHOCYTES RELATIVE PERCENT: 3 %
MAGNESIUM: 2 MG/DL (ref 1.8–2.4)
MCH RBC QN AUTO: 28.1 PG (ref 26–34)
MCHC RBC AUTO-ENTMCNC: 32.9 G/DL (ref 31–36)
MCV RBC AUTO: 85.4 FL (ref 80–100)
MONOCYTES ABSOLUTE: 0.8 K/UL (ref 0–1.3)
MONOCYTES RELATIVE PERCENT: 5 %
NEUTROPHILS ABSOLUTE: 14.7 K/UL (ref 1.7–7.7)
NEUTROPHILS RELATIVE PERCENT: 89 %
PDW BLD-RTO: 13.9 % (ref 12.4–15.4)
PHOSPHORUS: 4 MG/DL (ref 2.5–4.9)
PLATELET # BLD: 125 K/UL (ref 135–450)
PLATELET SLIDE REVIEW: ABNORMAL
PMV BLD AUTO: 8.7 FL (ref 5–10.5)
POTASSIUM SERPL-SCNC: 4.4 MMOL/L (ref 3.5–5.1)
RBC # BLD: 4.92 M/UL (ref 4–5.2)
RBC # BLD: NORMAL 10*6/UL
SLIDE REVIEW: ABNORMAL
SODIUM BLD-SCNC: 136 MMOL/L (ref 136–145)
WBC # BLD: 16 K/UL (ref 4–11)

## 2021-12-27 PROCEDURE — 6360000002 HC RX W HCPCS: Performed by: INTERNAL MEDICINE

## 2021-12-27 PROCEDURE — 84100 ASSAY OF PHOSPHORUS: CPT

## 2021-12-27 PROCEDURE — 36415 COLL VENOUS BLD VENIPUNCTURE: CPT

## 2021-12-27 PROCEDURE — 80048 BASIC METABOLIC PNL TOTAL CA: CPT

## 2021-12-27 PROCEDURE — 85025 COMPLETE CBC W/AUTO DIFF WBC: CPT

## 2021-12-27 PROCEDURE — 1200000000 HC SEMI PRIVATE

## 2021-12-27 PROCEDURE — 6370000000 HC RX 637 (ALT 250 FOR IP): Performed by: INTERNAL MEDICINE

## 2021-12-27 PROCEDURE — 2580000003 HC RX 258: Performed by: INTERNAL MEDICINE

## 2021-12-27 PROCEDURE — 83735 ASSAY OF MAGNESIUM: CPT

## 2021-12-27 PROCEDURE — 94680 O2 UPTK RST&XERS DIR SIMPLE: CPT

## 2021-12-27 RX ADMIN — METOPROLOL TARTRATE 25 MG: 25 TABLET, FILM COATED ORAL at 20:12

## 2021-12-27 RX ADMIN — FAMOTIDINE 20 MG: 20 TABLET ORAL at 09:43

## 2021-12-27 RX ADMIN — ASPIRIN 81 MG: 81 TABLET, COATED ORAL at 09:43

## 2021-12-27 RX ADMIN — ENOXAPARIN SODIUM 40 MG: 100 INJECTION SUBCUTANEOUS at 20:12

## 2021-12-27 RX ADMIN — Medication 2000 UNITS: at 12:56

## 2021-12-27 RX ADMIN — ENOXAPARIN SODIUM 40 MG: 100 INJECTION SUBCUTANEOUS at 09:43

## 2021-12-27 RX ADMIN — Medication 10 ML: at 20:13

## 2021-12-27 RX ADMIN — FAMOTIDINE 20 MG: 20 TABLET ORAL at 20:12

## 2021-12-27 RX ADMIN — DEXAMETHASONE SODIUM PHOSPHATE 6 MG: 10 INJECTION, SOLUTION INTRAMUSCULAR; INTRAVENOUS at 09:43

## 2021-12-27 RX ADMIN — METOPROLOL TARTRATE 25 MG: 25 TABLET, FILM COATED ORAL at 09:43

## 2021-12-27 RX ADMIN — Medication 10 ML: at 09:43

## 2021-12-27 RX ADMIN — AMLODIPINE BESYLATE 2.5 MG: 5 TABLET ORAL at 09:43

## 2021-12-27 ASSESSMENT — PAIN SCALES - GENERAL
PAINLEVEL_OUTOF10: 0

## 2021-12-27 NOTE — PROGRESS NOTES
Assessment completed and documented. VSS. Pt on 4L at 92%. meds given per [facility]. Pt sitting up in chair. Denies pain or needs. Call light within reach, will continue to monitor.

## 2021-12-27 NOTE — PROGRESS NOTES
No changes noted in assessment. Pt sleeping between care. Assisted to bathroom. Sats decrease to 77% when back in bed. Denies further needs at present. Will continue to monitor.

## 2021-12-27 NOTE — PROGRESS NOTES
Nutrition Assessment     Type and Reason for Visit: Reassess    Nutrition Recommendations/Plan:   No nutrition related recommendations at this time     Nutrition Assessment:  Pt's nutrition status is stable. Pt reports excellent appetite and PO intake on a regular diet, consuming % of meals. Deemed to be at low nutrition risk at this time. Will continue to monitor for changes in status. Malnutrition Assessment:  Malnutrition Status: No malnutrition    Nutrition Related Findings: LBM 12/25. no edema noted      Current Nutrition Therapies:    ADULT DIET;  Regular    Anthropometric Measures:  · Height: 5' 5\" (165.1 cm)  · Current Body Wt: 188 lb 0.8 oz (85.3 kg)   · BMI: 31.3    Nutrition Diagnosis:   No nutrition diagnosis at this time     Nutrition Interventions:   Food and/or Nutrient Delivery:  Continue Current Diet  Nutrition Education/Counseling:  Education not indicated   Coordination of Nutrition Care:  Continue to monitor while inpatient    Goals:  po intake at least 50% of meals       Nutrition Monitoring and Evaluation:   Behavioral-Environmental Outcomes:  None Identified   Food/Nutrient Intake Outcomes:  Food and Nutrient Intake  Physical Signs/Symptoms Outcomes:  None Identified     Discharge Planning:    No discharge needs at this time     Electronically signed by Trey Munoz RD, LD on 12/27/21 at 2:25 PM EST    Contact: [phone]

## 2021-12-27 NOTE — PROGRESS NOTES
Hospitalist Progress Note      PCP: Grabiel Trotter, APRN - CNP    Date of Admission: 12/13/2021    Chief Complaint: Dyspnea    Hospital Course: This 62 y.o. female with PMHx of hypertension, hyperlipidemia, coronary artery disease presented with dyspnea. Patient states that she went to urgent care on December 8. She has been having URI symptoms for several days prior to that and thought she had the flu. She was tested positive for COVID-19 infection December 8. She has been doing better however over the last 2 to 3 days she has been getting progressively dyspneic. She reports nonproductive cough and pleuritic chest pain if she coughs. She has also been having nausea and diarrhea. At the ED she was found to be hypoxemic at 86% on room air and currently requires 5 L per nasal cannula to maintain saturations of 91%. Bilateral multifocal pneumonia is evident on chest x-ray. Interval history:  Pt seen and examined today. Overnight events noted, interval ancillary notes and labs reviewed. Weaned to 4 L nasal cannula satting around 91%  Afebrile overnight, WBCs trending down to 16 K likely secondary to steroids  denied cough, SOB, chest pain, nausea, vomiting or abdominal pain  Patient was awaiting LTAC pre-CERT but due to her improved oxygen she is not a candidate for LTAC  respiratory home O2 eval ordered. Plan for the patient to be discharged home/SNF tomorrow      Assessment/Plan:    COVID-19 pneumonia:   Patient is unvaccinated:  Alternate with pain BiPAP and high flow  Status post 5-day course of remdesivir and 1 dose of Actemra  CRP trended down  Decadron dose reduced to 6 mg daily on 12/22/2021  Chest x-ray  on 12/24/21 showed no significant interval and change in multifocal pneumonia  Continue droplet plus precautions  Continue Lovenox for hypercoagulable state  Continue supplemental sedation oxygen as needed to keep sats above 92    Acute hypoxic respiratory failure secondary to above: Down to 4 L nasal cannula    Leukocytosis likely secondary to steroids: WBCs trending down  Afebrile, procalcitonin WNL  Urinalysis negative, cultures negative to date  CBC closely monitor    History of CAD:Stable  Continue aspirin statins and beta-blockers    DVT Prophylaxis: Lovenox  Diet: ADULT DIET; Regular  Code Status: Full Code    Medications:  Reviewed    Infusion Medications    sodium chloride Stopped (12/19/21 1023)     Scheduled Medications    dexamethasone (PF)  6 mg IntraVENous Daily    enoxaparin  40 mg SubCUTAneous BID    amLODIPine  2.5 mg Oral Daily    aspirin  81 mg Oral Daily    metoprolol tartrate  25 mg Oral BID    sodium chloride flush  5-40 mL IntraVENous 2 times per day    famotidine  20 mg Oral BID    Vitamin D  2,000 Units Oral Daily     PRN Meds: potassium chloride **OR** potassium alternative oral replacement **OR** potassium chloride, magnesium sulfate, loperamide, nitroGLYCERIN, sodium chloride flush, sodium chloride, ondansetron **OR** ondansetron, polyethylene glycol, acetaminophen **OR** acetaminophen, guaiFENesin-dextromethorphan      Intake/Output Summary (Last 24 hours) at 12/27/2021 0732  Last data filed at 12/26/2021 2029  Gross per 24 hour   Intake 720 ml   Output 950 ml   Net -230 ml       Physical Exam Performed:    /83   Pulse 87   Temp 97.8 °F (36.6 °C) (Temporal)   Resp 23   Ht 5' 5\" (1.651 m)   Wt 188 lb (85.3 kg)   SpO2 95%   BMI 31.28 kg/m²     General appearance: No apparent distress, appears stated age and cooperative. HEENT: Pupils equal, round, and reactive to light. Conjunctivae/corneas clear. Neck: Supple, with full range of motion. No jugular venous distention. Trachea midline. Respiratory:  Normal respiratory effort. Clear to auscultation, bilaterally without Rales/Wheezes/Rhonchi. Cardiovascular: Regular rate and rhythm with normal S1/S2 without murmurs, rubs or gallops.   Abdomen: Soft, non-tender, non-distended with normal bowel sounds. Musculoskeletal: No clubbing, cyanosis or edema bilaterally. Full range of motion without deformity. Skin: Skin color, texture, turgor normal.  No rashes or lesions. Neurologic:  Neurovascularly intact without any focal sensory/motor deficits. Cranial nerves: II-XII intact, grossly non-focal.  Psychiatric: Alert and oriented, thought content appropriate, normal insight  Capillary Refill: Brisk,< 3 seconds   Peripheral Pulses: +2 palpable, equal bilaterally       Labs:   Recent Labs     12/25/21 0348 12/26/21 0457 12/27/21  0241   WBC 20.1* 16.9* 16.0*   HGB 13.9 14.0 13.8   HCT 42.2 42.8 42.1   * 115* 125*     Recent Labs     12/25/21 0347 12/26/21 0457 12/27/21  0241    138 136   K 4.2 4.6 4.4    105 103   CO2 22 22 24   BUN 17 18 18   CREATININE 0.6 0.5* 0.6   CALCIUM 8.6 8.6 8.8   PHOS 3.4 3.9 4.0     No results for input(s): AST, ALT, BILIDIR, BILITOT, ALKPHOS in the last 72 hours. No results for input(s): INR in the last 72 hours. No results for input(s): Amelia Ulm in the last 72 hours. Urinalysis:      Lab Results   Component Value Date    NITRU Negative 12/23/2021    WBCUA 3-5 12/13/2021    BACTERIA Rare 12/13/2021    RBCUA None seen 12/13/2021    BLOODU Negative 12/23/2021    SPECGRAV 1.028 12/23/2021    GLUCOSEU >=1000 12/23/2021       Radiology:  XR CHEST PORTABLE   Final Result   No significant interval change in multifocal pneumonia. XR CHEST PORTABLE   Final Result   Bilateral asymmetric multifocal pneumonia, generally similar to 12/17/2021. No pleural fluid or pneumothorax detected. XR CHEST PORTABLE   Final Result   Bilateral airspace disease, with mild progression on the left, consistent   with pneumonia. Pattern is common for COVID. XR CHEST PORTABLE   Final Result   Bilateral airspace disease, consistent with COVID pneumonia.   Superimposed   atelectasis or typical pneumonia are other considerations, given greater consolidation in the left lower lobe.                      Active Hospital Problems    Diagnosis     Pneumonia due to COVID-19 virus [U07.1, J12.82]            Electronically signed by Malorie Aguayo MD on 12/27/2021 at 7:32 AM

## 2021-12-27 NOTE — PROGRESS NOTES
PM assessment complete and documented. Meds given per MAR. NC secure. Pt denies needs or concerns. Happy she is doing better. Will continue to monitor.

## 2021-12-27 NOTE — PROGRESS NOTES
12/27/21 1154   Resting (Room Air)   SpO2 85   HR 74   Resting (On O2)   SpO2 3   HR 72   O2 Device Nasal cannula   O2 Flow Rate (l/min) 3 l/min   During Walk (Room Air)   SpO2 82   Walk/Assistance Device Ambulation   Rate of Dyspnea 1   During Walk (On O2)   SpO2 93   HR 84   O2 Device Nasal cannula   O2 Flow Rate (l/min) 4 l/min   Walk/Assistance Device Ambulation   Rate of Dyspnea 1   After Walk   SpO2 90   HR 76   O2 Device Nasal cannula   O2 Flow Rate (l/min) 3 l/min   Rate of Dyspnea 0   Does the Patient Qualify for Home O2 Yes   Liter Flow at Rest 3   Liter Flow on Exertion 4     Electronically signed by Diallo Cardoza RCP on 12/27/2021 at 11:55 AM

## 2021-12-28 ENCOUNTER — APPOINTMENT (OUTPATIENT)
Dept: GENERAL RADIOLOGY | Age: 61
DRG: 871 | End: 2021-12-28
Payer: COMMERCIAL

## 2021-12-28 VITALS
SYSTOLIC BLOOD PRESSURE: 126 MMHG | WEIGHT: 190.1 LBS | DIASTOLIC BLOOD PRESSURE: 75 MMHG | HEIGHT: 65 IN | BODY MASS INDEX: 31.67 KG/M2 | HEART RATE: 131 BPM | RESPIRATION RATE: 12 BRPM | TEMPERATURE: 97.3 F | OXYGEN SATURATION: 97 %

## 2021-12-28 LAB
ANION GAP SERPL CALCULATED.3IONS-SCNC: 9 MMOL/L (ref 3–16)
BANDED NEUTROPHILS RELATIVE PERCENT: 2 % (ref 0–7)
BASOPHILS ABSOLUTE: 0 K/UL (ref 0–0.2)
BASOPHILS RELATIVE PERCENT: 0 %
BUN BLDV-MCNC: 21 MG/DL (ref 7–20)
CALCIUM SERPL-MCNC: 8.8 MG/DL (ref 8.3–10.6)
CHLORIDE BLD-SCNC: 105 MMOL/L (ref 99–110)
CO2: 24 MMOL/L (ref 21–32)
CREAT SERPL-MCNC: 0.7 MG/DL (ref 0.6–1.2)
EOSINOPHILS ABSOLUTE: 0 K/UL (ref 0–0.6)
EOSINOPHILS RELATIVE PERCENT: 0 %
GFR AFRICAN AMERICAN: >60
GFR NON-AFRICAN AMERICAN: >60
GLUCOSE BLD-MCNC: 183 MG/DL (ref 70–99)
HCT VFR BLD CALC: 41.6 % (ref 36–48)
HEMATOLOGY PATH CONSULT: NO
HEMOGLOBIN: 13.6 G/DL (ref 12–16)
LYMPHOCYTES ABSOLUTE: 0.7 K/UL (ref 1–5.1)
LYMPHOCYTES RELATIVE PERCENT: 5 %
MAGNESIUM: 2 MG/DL (ref 1.8–2.4)
MCH RBC QN AUTO: 28 PG (ref 26–34)
MCHC RBC AUTO-ENTMCNC: 32.8 G/DL (ref 31–36)
MCV RBC AUTO: 85.5 FL (ref 80–100)
METAMYELOCYTES RELATIVE PERCENT: 1 %
MONOCYTES ABSOLUTE: 1 K/UL (ref 0–1.3)
MONOCYTES RELATIVE PERCENT: 7 %
NEUTROPHILS ABSOLUTE: 12.6 K/UL (ref 1.7–7.7)
NEUTROPHILS RELATIVE PERCENT: 85 %
PDW BLD-RTO: 13.9 % (ref 12.4–15.4)
PHOSPHORUS: 3.9 MG/DL (ref 2.5–4.9)
PLATELET # BLD: 134 K/UL (ref 135–450)
PLATELET SLIDE REVIEW: ABNORMAL
PMV BLD AUTO: 8.6 FL (ref 5–10.5)
POTASSIUM SERPL-SCNC: 4.6 MMOL/L (ref 3.5–5.1)
PROCALCITONIN: 0.03 NG/ML (ref 0–0.15)
RBC # BLD: 4.87 M/UL (ref 4–5.2)
RBC # BLD: NORMAL 10*6/UL
SLIDE REVIEW: ABNORMAL
SODIUM BLD-SCNC: 138 MMOL/L (ref 136–145)
WBC # BLD: 14.3 K/UL (ref 4–11)

## 2021-12-28 PROCEDURE — 6370000000 HC RX 637 (ALT 250 FOR IP): Performed by: INTERNAL MEDICINE

## 2021-12-28 PROCEDURE — 97165 OT EVAL LOW COMPLEX 30 MIN: CPT

## 2021-12-28 PROCEDURE — 97530 THERAPEUTIC ACTIVITIES: CPT

## 2021-12-28 PROCEDURE — 80048 BASIC METABOLIC PNL TOTAL CA: CPT

## 2021-12-28 PROCEDURE — 36415 COLL VENOUS BLD VENIPUNCTURE: CPT

## 2021-12-28 PROCEDURE — 6360000002 HC RX W HCPCS: Performed by: INTERNAL MEDICINE

## 2021-12-28 PROCEDURE — 83735 ASSAY OF MAGNESIUM: CPT

## 2021-12-28 PROCEDURE — 84100 ASSAY OF PHOSPHORUS: CPT

## 2021-12-28 PROCEDURE — 71045 X-RAY EXAM CHEST 1 VIEW: CPT

## 2021-12-28 PROCEDURE — 85025 COMPLETE CBC W/AUTO DIFF WBC: CPT

## 2021-12-28 PROCEDURE — 97116 GAIT TRAINING THERAPY: CPT

## 2021-12-28 PROCEDURE — 84145 PROCALCITONIN (PCT): CPT

## 2021-12-28 PROCEDURE — 2580000003 HC RX 258: Performed by: INTERNAL MEDICINE

## 2021-12-28 PROCEDURE — 97161 PT EVAL LOW COMPLEX 20 MIN: CPT

## 2021-12-28 RX ORDER — DEXAMETHASONE 4 MG/1
6 TABLET ORAL EVERY 12 HOURS SCHEDULED
Status: DISCONTINUED | OUTPATIENT
Start: 2021-12-29 | End: 2021-12-28 | Stop reason: HOSPADM

## 2021-12-28 RX ORDER — DEXAMETHASONE 6 MG/1
6 TABLET ORAL
Qty: 3 TABLET | Refills: 0 | Status: SHIPPED | OUTPATIENT
Start: 2021-12-29 | End: 2022-01-01

## 2021-12-28 RX ORDER — FAMOTIDINE 20 MG/1
20 TABLET, FILM COATED ORAL 2 TIMES DAILY
Qty: 6 TABLET | Refills: 0 | Status: SHIPPED | OUTPATIENT
Start: 2021-12-28 | End: 2022-01-04 | Stop reason: SDUPTHER

## 2021-12-28 RX ADMIN — DEXAMETHASONE SODIUM PHOSPHATE 6 MG: 10 INJECTION, SOLUTION INTRAMUSCULAR; INTRAVENOUS at 09:47

## 2021-12-28 RX ADMIN — METOPROLOL TARTRATE 25 MG: 25 TABLET, FILM COATED ORAL at 09:48

## 2021-12-28 RX ADMIN — AMLODIPINE BESYLATE 2.5 MG: 5 TABLET ORAL at 09:48

## 2021-12-28 RX ADMIN — Medication 2000 UNITS: at 09:48

## 2021-12-28 RX ADMIN — ASPIRIN 81 MG: 81 TABLET, COATED ORAL at 09:48

## 2021-12-28 RX ADMIN — ENOXAPARIN SODIUM 40 MG: 100 INJECTION SUBCUTANEOUS at 09:47

## 2021-12-28 RX ADMIN — FAMOTIDINE 20 MG: 20 TABLET ORAL at 09:48

## 2021-12-28 RX ADMIN — Medication 10 ML: at 09:48

## 2021-12-28 ASSESSMENT — PAIN SCALES - WONG BAKER
WONGBAKER_NUMERICALRESPONSE: 0

## 2021-12-28 ASSESSMENT — PAIN SCALES - GENERAL
PAINLEVEL_OUTOF10: 0

## 2021-12-28 NOTE — PROGRESS NOTES
Patient received all discharge education and instructions. Patient educated on how to use the portable tank for oxygen. Return demostration given. Patient is waiting on her ride.

## 2021-12-28 NOTE — PROGRESS NOTES
Occupational Therapy   Occupational Therapy Initial Assessment  Date: 2021   Patient Name: Tereso Nunez  MRN: [id number]     : 1960    Date of Service: 2021  Tereso Nunez scored a 23/24 on the AM-PAC ADL Inpatient form. Current research shows that an AM-PAC score of 18 or greater is typically associated with a discharge to the patient's home setting. Based on the patient's AM-PAC score, and their current ADL deficits, it is recommended that the patient have 2-3 sessions per week of Occupational Therapy at d/c to increase the patient's independence. At this time, this patient demonstrates the endurance and safety to discharge home with [facility] and a follow up treatment frequency of 2-3x/wk. Please see assessment section for further patient specific details. If patient discharges prior to next session this note will serve as a discharge summary. Please see below for the latest assessment towards goals. HOME HEALTH CARE: LEVEL 1 STANDARD    - Initial home health evaluation to occur within 24-48 hours, in patient home   - Therapy to evaluate with goal of regaining prior level of functioning   - Therapy to evaluate if patient has [id number] needs for personal care    Discharge Recommendations:  S Level 1,2-3 sessions per week  OT Equipment Recommendations  Equipment Needed: No  Other: pt owns needed equipment    Assessment   Performance deficits / Impairments: Decreased functional mobility ; Decreased endurance;Decreased ADL status  Assessment: pt below baseline function and would benefit from continued OT  Treatment Diagnosis: decreased ADl/IADLs/endurance/mobility  Prognosis: Good  Decision Making: Low Complexity  OT Education: OT Role;Plan of Care;ADL Adaptive Strategies;Transfer Training;Energy Conservation  Patient Education: pt receptive, would benefit from carryover  Barriers to Learning: none identified  REQUIRES OT FOLLOW UP: Yes  Activity Tolerance  Activity Tolerance: She was tested positive for COVID-19 infection December 8.     Subjective   General  Chart Reviewed: Yes  Patient assessed for rehabilitation services?: Yes  Response to previous treatment: Patient with no complaints from previous session  Family / Caregiver Present: No  Diagnosis: COVID pneumonia, lactic acidosis  Subjective  Subjective: pt in chair on arrival - denies pain - states she is feeling well - on 4L 02 throughout evaluation  Pain Assessment  Perez-Henderson Pain Rating: No hurt  Social/Functional History  Social/Functional History  Lives With: Spouse,Family  Type of Home: House  Home Layout: One level,Laundry in basement (does not access basement)  Home Access: Ramped entrance  Bathroom Shower/Tub: Tub/Shower unit  Bathroom Equipment: Shower chair,3-in-1 commode,Hand-held shower  Home Equipment: Rolling walker,4 wheeled walker,Reacher  Receives Help From: Family  ADL Assistance: Independent  Homemaking Assistance: Independent  Homemaking Responsibilities: Yes (family available to help)  Ambulation Assistance: Independent  Transfer Assistance: Independent  Active : Yes  Occupation: Full time employment  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       Objective   Vision: Within Functional Limits  Hearing: Within functional limits    Orientation  Overall Orientation Status: Within Normal Limits     Functional Mobility  Functional - Mobility Device: Rolling Walker  Activity: To/from bathroom  Functional Mobility Comments: SBA progressing to supervision with 02 management with walker  Toilet Transfers  Toilet - Technique: Ambulating  Equipment Used: Raised toilet seat without rails  Toilet Transfer: Supervision  ADL  Feeding: Independent  Grooming: Modified independent   LE Dressing:  (reports independence with donning/doffing socks)  Toileting: Modified independent   Additional Comments: pt ambulated in room x 3 trials of 15-20 feet - first trial with no device 02 saturation dropping to 81% but recovered within 2 minutes - second and third trial with RW pt's 02 saturation remained at 85% or above and recovered within 1 minute in sitting; practiced managing 02 line during ambulation with good safety awareness           Transfers  Sit to stand: Modified independent  Stand to sit: Modified independent     Cognition  Overall Cognitive Status: [address] [facility] 114  Times per week: 3-5x per week  Times per day: Daily  Current Treatment Recommendations: Functional Mobility Training,Safety Education & Training,Self-Care / ADL,Patient/Caregiver Education & Training,Endurance Training      AM-PAC Score        AM-PAC Inpatient Daily Activity Raw Score: 23 (12/28/21 1512)  AM-PAC Inpatient ADL T-Scale Score : 51.12 (12/28/21 1512)  ADL Inpatient CMS 0-100% Score: 15.86 (12/28/21 1512)  ADL Inpatient CMS G-Code Modifier : CI (12/28/21 1512)    Goals  Short term goals  Time Frame for Short term goals: discharge  Short term goal 1: mod I functional mobility with RW including management of 02 line for ADLs  Short term goal 2: mod I ADL transfers  Short term goal 3: mod I UB ADLs  Short term goal 4: mod I LB ADLs  Patient Goals   Patient goals : \"to return home and get stronger, get off oxygen\"       Therapy Time   Individual Concurrent Group Co-treatment   Time In       1308   Time Out       1359   Minutes       51   Timed Code Treatment Minutes: 41 Minutes   Total minutes 810 LAKESHA Heath/SUNG [id number], 12/28/2021, 3:27 PM

## 2021-12-28 NOTE — PLAN OF CARE
Problem: Pain:  Goal: Pain level will decrease  Description: Pain level will decrease  12/27/2021 2110 by Brianna Mendieta RN  Outcome: Ongoing  Goal: Control of acute pain  Description: Control of acute pain  12/27/2021 2110 by Brianna Mendieta RN  Outcome: Ongoing  Goal: Control of chronic pain  Description: Control of chronic pain  12/27/2021 2110 by Brianna Mendieta RN  Outcome: Ongoing     Problem: Airway Clearance - Ineffective  Goal: Achieve or maintain patent airway  12/28/2021 1051 by Malathi Muñoz RN  Outcome: Ongoing  12/27/2021 2110 by Brianna Mendieta RN  Outcome: Ongoing     Problem: Gas Exchange - Impaired  Goal: Absence of hypoxia  12/28/2021 1051 by Malathi Muñoz RN  Outcome: Ongoing  12/27/2021 2110 by Brianna Mendieta RN  Outcome: Ongoing  Goal: Promote optimal lung function  12/28/2021 1051 by Malathi Muñoz RN  Outcome: Ongoing  12/27/2021 2110 by Brianna Mendieta RN  Outcome: Ongoing     Problem: Breathing Pattern - Ineffective  Goal: Ability to achieve and maintain a regular respiratory rate  12/28/2021 1051 by Malathi Muñoz RN  Outcome: Ongoing  12/27/2021 2110 by Brianna Mendieta RN  Outcome: Ongoing     Problem:  Body Temperature -  Risk of, Imbalanced  Goal: Ability to maintain a body temperature within defined limits  12/27/2021 2110 by Brianna Mendieta RN  Outcome: Ongoing  Goal: Will regain or maintain usual level of consciousness  12/27/2021 2110 by Brianna Mendieta RN  Outcome: Ongoing  Goal: Complications related to the disease process, condition or treatment will be avoided or minimized  12/27/2021 2110 by Brianna Mendieta RN  Outcome: Ongoing     Problem: Isolation Precautions - Risk of Spread of Infection  Goal: Prevent transmission of infection  12/28/2021 1051 by Malathi Muñoz RN  Outcome: Ongoing  12/27/2021 2110 by Brianna Mendieta RN  Outcome: Ongoing     Problem: Nutrition Deficits  Goal: Optimize nutritional status  12/27/2021 2110 by Brianna Mendieta RN  Outcome: Ongoing Problem: Risk for Fluid Volume Deficit  Goal: Maintain normal heart rhythm  12/27/2021 2110 by Abran Henry RN  Outcome: Ongoing  Goal: Maintain absence of muscle cramping  12/27/2021 2110 by Abran Henry RN  Outcome: Ongoing  Goal: Maintain normal serum potassium, sodium, calcium, phosphorus, and pH  12/27/2021 2110 by Abran Henry RN  Outcome: Ongoing     Problem: Loneliness or Risk for Loneliness  Goal: Demonstrate positive use of time alone when socialization is not possible  12/27/2021 2110 by Abran Henry RN  Outcome: Ongoing     Problem: Fatigue  Goal: Verbalize increase energy and improved vitality  12/27/2021 2110 by Abran Henry RN  Outcome: Ongoing     Problem: Patient Education: Go to Patient Education Activity  Goal: Patient/Family Education  12/27/2021 2110 by Abran Henry RN  Outcome: Ongoing

## 2021-12-28 NOTE — PLAN OF CARE
Problem: Pain:  Goal: Pain level will decrease  Description: Pain level will decrease  12/27/2021 2110 by Thomas Rodriguez RN  Outcome: Ongoing  12/27/2021 1017 by Valentino Amabile, RN  Outcome: Ongoing  Goal: Control of acute pain  Description: Control of acute pain  12/27/2021 2110 by Thomas Rodriguez RN  Outcome: Ongoing  12/27/2021 1017 by Valentino Amabile, RN  Outcome: Ongoing  Goal: Control of chronic pain  Description: Control of chronic pain  12/27/2021 2110 by Thomas Rodriguez RN  Outcome: Ongoing  12/27/2021 1017 by Valentino Amabile, RN  Outcome: Ongoing     Problem: Airway Clearance - Ineffective  Goal: Achieve or maintain patent airway  12/27/2021 2110 by Thomas Rodriguez RN  Outcome: Ongoing  12/27/2021 1017 by Valentino Amabile, RN  Outcome: Ongoing     Problem: Gas Exchange - Impaired  Goal: Absence of hypoxia  12/27/2021 2110 by Thomas Rodriguez RN  Outcome: Ongoing  12/27/2021 1017 by Valentino Amabile, RN  Outcome: Ongoing  Goal: Promote optimal lung function  12/27/2021 2110 by Thomas Rodriguez RN  Outcome: Ongoing  12/27/2021 1017 by Valentino Amabile, RN  Outcome: Ongoing     Problem: Breathing Pattern - Ineffective  Goal: Ability to achieve and maintain a regular respiratory rate  12/27/2021 2110 by Thomas Rodriguez RN  Outcome: Ongoing  12/27/2021 1017 by Valentino Amabile, RN  Outcome: Ongoing     Problem:  Body Temperature -  Risk of, Imbalanced  Goal: Ability to maintain a body temperature within defined limits  12/27/2021 2110 by Thomas Rodriguez RN  Outcome: Ongoing  12/27/2021 1017 by Valentino Amabile, RN  Outcome: Ongoing  Goal: Will regain or maintain usual level of consciousness  12/27/2021 2110 by Thomas Rodriguez RN  Outcome: Ongoing  12/27/2021 1017 by Valentino Amabile, RN  Outcome: Ongoing  Goal: Complications related to the disease process, condition or treatment will be avoided or minimized  12/27/2021 2110 by Thomas Rodriguez RN  Outcome: Ongoing  12/27/2021 1017 by Valentino Amabile, RN  Outcome: Ongoing     Problem: Isolation Precautions - Risk of Spread of Infection  Goal: Prevent transmission of infection  12/27/2021 2110 by Esther Freire RN  Outcome: Ongoing  12/27/2021 1017 by Keshia Srivastava RN  Outcome: Ongoing     Problem: Nutrition Deficits  Goal: Optimize nutritional status  12/27/2021 2110 by Esther Freire RN  Outcome: Ongoing  12/27/2021 1017 by Keshia Srivastava RN  Outcome: Ongoing     Problem: Risk for Fluid Volume Deficit  Goal: Maintain normal heart rhythm  12/27/2021 2110 by Esther Freire RN  Outcome: Ongoing  12/27/2021 1017 by Keshia Srivastava RN  Outcome: Ongoing  Goal: Maintain absence of muscle cramping  12/27/2021 2110 by Esther Freire RN  Outcome: Ongoing  12/27/2021 1017 by Keshia Srivastava RN  Outcome: Ongoing  Goal: Maintain normal serum potassium, sodium, calcium, phosphorus, and pH  12/27/2021 2110 by Esther Freire RN  Outcome: Ongoing  12/27/2021 1017 by Keshia Srivastava RN  Outcome: Ongoing     Problem: Loneliness or Risk for Loneliness  Goal: Demonstrate positive use of time alone when socialization is not possible  12/27/2021 2110 by Esther Freire RN  Outcome: Ongoing  12/27/2021 1017 by Keshia Srivastava RN  Outcome: Ongoing     Problem: Fatigue  Goal: Verbalize increase energy and improved vitality  12/27/2021 2110 by Esther Freire RN  Outcome: Ongoing  12/27/2021 1017 by Keshia Srivastava RN  Outcome: Ongoing     Problem: Patient Education: Go to Patient Education Activity  Goal: Patient/Family Education  12/27/2021 2110 by Esther Freire RN  Outcome: Ongoing  12/27/2021 1017 by Keshia Srivastava RN  Outcome: Ongoing

## 2021-12-28 NOTE — PROGRESS NOTES
12/28/21 1302   Resting (Room Air)   SpO2 85   HR 97   Resting (On O2)   SpO2 93   HR 97   O2 Device Nasal cannula   O2 Flow Rate (l/min) 3 l/min   During Walk (Room Air)   SpO2 82   HR 96   Walk/Assistance Device Ambulation   Rate of Dyspnea 1   During Walk (On O2)   SpO2 87   HR 95   O2 Device Nasal cannula   O2 Flow Rate (l/min) 2 l/min   Need Additional O2 Flow Rate Rows Yes   O2 Flow Rate (l/min) 4 l/min   O2 Saturation 93   Walk/Assistance Device Ambulation   Rate of Dyspnea 1   After Walk   SpO2 94   O2 Device Nasal cannula   O2 Flow Rate (l/min) 4 l/min   Rate of Dyspnea 0   Does the Patient Qualify for Home O2 Yes   Liter Flow at Rest 3   Liter Flow on Exertion 4   Does the Patient Need Portable Oxygen Tanks Yes

## 2021-12-28 NOTE — CARE COORDINATION
[facility] received a referral to this patient for home 02 @ 4 lpm cont via nc. Home 02 has been arranged for delivery. Pt aware to call [facility] [phone] to initiate setup. Portable tank has been delivered to the hospital floor for discharge. Thank you for the referral.  Electronically signed by Tesfaye Infante on 12/28/2021 at 1:46 PM  Cell ph# [phone]    NOTE: After 5:00 pm, Weekends, Holidays: Call [facility] at [phone] to coordinate delivery of home medical equipment.

## 2021-12-28 NOTE — DISCHARGE INSTR - COC
Colon polyp K63.5    Hypersomnia G47.10    Thyroid nodule E04.1    Sleep apnea in adult G47.30    Coronary artery disease involving native coronary artery of native heart without angina pectoris I25.10    Hypertension, essential I10    Class 1 obesity without serious comorbidity with body mass index (BMI) of 32.0 to 32.9 in adult E66.9, Z68.32    Impaired fasting glucose R73.01    Statin intolerance Z78.9    Mixed hypercholesterolemia and hypertriglyceridemia E78.2    Pneumonia due to COVID-19 virus U07.1, J12.82       Isolation/Infection:   Isolation            Droplet Plus          Patient Infection Status       Infection Onset Added Last Indicated Last Indicated By Review Planned Expiration Resolved Resolved By    COVID-19 12/14/21 12/15/21 12/14/21 COVID-19 12/22/21 12/28/21      Resolved    C-diff Rule Out 12/14/21 12/14/21 12/14/21 Clostridium difficile toxin/antigen (Ordered)   12/14/21 Rule-Out Test Resulted    COVID-19 (Rule Out) 12/13/21 12/13/21 12/14/21 COVID-19 (Ordered)   12/15/21 Rule-Out Test Resulted            Nurse Assessment:  Last Vital Signs: /75   Pulse 131   Temp 97.3 °F (36.3 °C)   Resp 12   Ht 5' 5\" (1.651 m)   Wt 190 lb 1.6 oz (86.2 kg)   SpO2 97%   BMI 31.63 kg/m²     Last documented pain score (0-10 scale): Pain Level: 0  Last Weight:   Wt Readings from Last 1 Encounters:   12/28/21 190 lb 1.6 oz (86.2 kg)     Mental Status:  oriented, alert, coherent, logical, thought processes intact, and able to concentrate and follow conversation    IV Access:  - None    Nursing Mobility/ADLs:  Walking   Independent  Transfer  Independent  Bathing  Independent  Dressing  Independent  [address]  Independent  Med Delivery   whole    Wound Care Documentation and Therapy:        Elimination:  Continence:    Bowel: Yes  Bladder: Yes  Urinary Catheter: None   Colostomy/Ileostomy/Ileal Conduit: No       Date of Last BM: 12/27      Intake/Output Care:24718} for {GREATER/LESS:[id number]} 30 days.      Update Admission H&P: {CHP DME Changes in VTMUW:[id number]}    PHYSICIAN SIGNATURE:  {Esignature:[id number]}

## 2021-12-28 NOTE — PLAN OF CARE
Problem: Airway Clearance - Ineffective  Goal: Achieve or maintain patent airway  Outcome: Ongoing     Problem: Airway Clearance - Ineffective  Goal: Achieve or maintain patent airway  Outcome: Ongoing     Problem: Gas Exchange - Impaired  Goal: Absence of hypoxia  Outcome: Ongoing  Goal: Promote optimal lung function  Outcome: Ongoing

## 2021-12-28 NOTE — CARE COORDINATION
Referral made for home oxygen upon discharge. Will need an order and qualifying SATs before discharge. SATs must be day before Discharge or day of Discharge.      Home oxygen [place]  CM called Neno Barger at [facility] for home oxygen  Home oxygen order pending    Electronically signed by Luca Canales RN on 12/28/2021 at 11:41 AM

## 2021-12-28 NOTE — PROGRESS NOTES
Physical Therapy    Facility/Department: [address]  Initial Assessment    NAME: Saul Monroy  : 1960  MRN: [id number]    Date of Service: 2021    Discharge Recommendations:      PT Equipment Recommendations  Equipment Needed: No  Other: Pt has RW and shower chair. Saul Monroy scored a 20/24 on the AM-PAC short mobility form. Current research shows that an AM-PAC score of 18 or greater is typically associated with a discharge to the patient's home setting. Based on the patient's AM-PAC score and their current functional mobility deficits, it is recommended that the patient have 2-3 sessions per week of Physical Therapy at d/c to increase the patient's independence. At this time, this patient demonstrates the endurance and safety to discharge home with 24 assist and home PT and a follow up treatment frequency of 2-3x/wk. Please see assessment section for further patient specific details. If patient discharges prior to next session this note will serve as a discharge summary. Please see below for the latest assessment towards goals. HOME HEALTH CARE: LEVEL 1 STANDARD  - Initial home health evaluation to occur within 24-48 hours, in patient home   - Therapy to evaluate with goal of regaining prior level of functioning   - Therapy to evaluate if patient has [address] [address] needs for personal care    Assessment   Body structures, Functions, Activity limitations: Decreased functional mobility ; Decreased balance;Decreased endurance  Assessment: Pt is a 61 yo female admitted to [facility] for lactic acidosis in association with COVID-19. At this time the patient benefits from use of RW for stability and energy conservation with ambulation. The patient also requires supervision for safe O2 line management. The patient continues to desaturate to the mid 80s with ambulation but recovers steadily with use of PLB once seated.  Recommending continued skilled PT to safely progress tolerance to activity and independence with functional mobility in order for pt to eventually return to work. Treatment Diagnosis: Decreased tolerance to activity and impaired functional mobility in setting of COVID-19  Prognosis: Good  Decision Making: Low Complexity  Exam: MMT, balance, functional mobility  Clinical Presentation: Stable  PT Education: Goals;PT Role;Plan of Care;Home Exercise Program;General Safety; Energy Conservation;Disease Specific Education;Equipment; Adaptive Device Training;Transfer Training;Functional Mobility Training  Patient Education: Pacing activity, symptom monitoring at home, d/c recommendations - pt verbalized understanding. All questions answered. Barriers to Learning: None  REQUIRES PT FOLLOW UP: Yes  Activity Tolerance  Activity Tolerance: Patient Tolerated treatment well;Treatment limited secondary to medical complications (free text)  Activity Tolerance: 91-93% on 4L at rest. Drops to 81% on 4L ambulating without AD, improved tolerance with use of RW dropping only to 85%. Requires ~2 minutes to recover to >90% with good use of PLB after activity. Patient Diagnosis(es): The primary encounter diagnosis was Acute hypoxemic respiratory failure due to COVID-19 [facility]). Diagnoses of Multifocal pneumonia, Lactic acidosis, Hyponatremia, Pneumonia due to COVID-19 virus, and Leukocytosis, unspecified type were also pertinent to this visit. has a past medical history of Arthritis, Complex tear of lateral meniscus of right knee as current injury, Complex tear of medial meniscus of right knee as current injury, Coronary artery disease involving native coronary artery of native heart without angina pectoris, Helicobacter pylori antibody positive, Hyperlipidemia, Hypertension, essential, Mallet fracture, closed, initial encounter, Patellofemoral syndrome, Synovitis of knee, and Tear of medial cartilage or meniscus of knee, current. has a past surgical history that includes Hysterectomy;   section; Colonoscopy (2012); Knee arthroscopy (Right, 9-27-13); knee surgery (9-27-13); Tubal ligation; knee surgery (1-21-14); Knee arthroscopy (Left, 11/28/2014); and Knee arthroscopy (Right, 01/23/2018). Restrictions  Restrictions/Precautions  Restrictions/Precautions: Fall Risk,Isolation (medium fall risk, droplet plus isolation)  Position Activity Restriction  Other position/activity restrictions: Juanito Meza is a 62 y.o. female who presents to the emergency department 12/13 as it pertains to difficulties with Covid hypoxia. 62 y.o. female with past medical history of hypertension, hyperlipidemia, coronary artery disease, presents from home with dyspnea. Patient states that she went to urgent care on December 8. She has been having URI symptoms for several days prior to that and thought she had the flu. She was tested positive for COVID-19 infection December 8. Vision/Hearing  Vision: Within Functional Limits  Hearing: Within functional limits       Subjective  General  Chart Reviewed: Yes  Patient assessed for rehabilitation services?: Yes  Family / Caregiver Present: No  Diagnosis: Lactic acidosis with COVID-19  Follows Commands: Within Functional Limits  General Comment  Comments: Pt sitting up in chair upon therapist arrival. Agreeable to PT/OT eval.  Subjective  Subjective: Pt denies pain. Sergio Medina to go home hopefully today.   Pain Screening  Patient Currently in Pain: Denies  Vital Signs  Patient Currently in Pain: Denies       Orientation  Orientation  Overall Orientation Status: Within Normal Limits     Social/Functional History  Social/Functional History  Lives With: Spouse,Family  Type of Home: House  Home Layout: One level,Laundry in basement (does not access basement)  Home Access: Ramped entrance  Bathroom Shower/Tub: Tub/Shower unit  Bathroom Equipment: Shower chair,3-in-1 commode,Hand-held shower  Home Equipment: [address] Help From: Family  ADL Assistance: Independent  Homemaking Assistance: Independent  Homemaking Responsibilities: Yes (family available to help)  Ambulation Assistance: Independent  Transfer Assistance: Independent  Active : Yes  Occupation: Full time employment  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     Cognition    WNL    Objective  AROM RLE (degrees)  RLE AROM: WNL  AROM LLE (degrees)  LLE AROM : WNL  Strength RLE  Strength RLE: WFL  Comment: 5/5 hip flexion, knee flex/ext, ankle DF  Strength LLE  Strength LLE: WFL  Comment: 5/5 hip flexion, knee flex/ext, ankle DF        Sensation  Overall Sensation Status: WFL     Bed mobility  Comment: Not observed this date, pt up in chair at beginning/end of session     Transfers  Sit to Stand: Modified independent (To chair and toilet)  Stand to sit: Modified independent (From chair and toilet with use of grab bar)     Ambulation  Ambulation?: Yes  More Ambulation?: Yes  Ambulation 1  Surface: level tile  Device: No Device  Other Apparatus: O2 (4L)  Assistance: Stand by assistance;Minimal assistance (SBA with 1 LOB requiring Min A to recover)  Quality of Gait: Slow and cautious vetnura, downward gaze, decreased step length (B), narrow GARETH  Distance: 30 ft  Comments: Pt with 1 LOB. SpO2 drops to 81% on 4L after ambulation, requires ~2 minutes to recover. Ambulation 2  Surface - 2: level tile  Device 2: Rolling Walker  Other Apparatus 2: O2 (4L)  Assistance 2: Stand by assistance;Supervision (SBA first trial, supervision second trial)  Quality of Gait 2: Improved step length, wider GARETH, slow ventura  Distance: 30 (1st trial), 15 + 15 (2nd trial)  Comments: Pt ambulated in room with cues and assist for O2 line management on first trial. Pt ambulated to/from bathroom with improved self management of O2 line, requiring only supervision. Pt much steadier and demonstrates improved energy conservation with use of RW.  SpO2 85% on 4L after both ambulation trials with use of RW, recovers to >90% within 2 minutes with seated rest.        Balance  Posture: Good  Sitting - Static: Good  Sitting - Dynamic: Good  Standing - Static: Fair;+  Standing - Dynamic: Fair  Comments: Pt indep for all static and dynamic sitting balance. SBA for static standing balance without UE support. SBA-Min A for dynamic standing balance without UE support, improved to SBA with (B) UE support on RW. Plan   Plan  Times per week: 2-3x  Current Treatment Recommendations: Strengthening,Transfer Training,Endurance Training,Patient/Caregiver Education & Training,Equipment Evaluation, Education, & procurement,Home Exercise Program,Safety Education & Training,Gait Training,Functional Mobility Training,Balance Training  Safety Devices  Type of devices:  All fall risk precautions in place,Gait belt,Nurse notified,Call light within reach,Left in chair    AM-PAC Score  AM-PAC Inpatient Mobility Raw Score : 20 (12/28/21 1526)  AM-PAC Inpatient T-Scale Score : 47.67 (12/28/21 1526)  Mobility Inpatient CMS 0-100% Score: 35.83 (12/28/21 1526)  Mobility Inpatient CMS G-Code Modifier : CJ (12/28/21 1526)          Goals  Short term goals  Time Frame for Short term goals: Before discharge  Short term goal 1: Pt will complete bed mobility indep  Short term goal 2: Pt will complete sit<>stand indep  Short term goal 3: Pt will ambulate 50 ft with LRAD Mod I  Short term goal 4: Pt will tolerate 5 minutes of standing activity with no more than supervision and unilateral UE support in order to complete ADLs  Patient Goals   Patient goals : Go home later today       Therapy Time   Individual Concurrent Group Co-treatment   Time In 1308         Time Out 1403         Minutes 55         Timed Code Treatment Minutes: 55 Wei Alexander, PT, DPT #[id number]

## 2021-12-29 NOTE — ADT AUTH CERT
Viral Illness, Acute - Care Day 13 (12/25/2021) by Elva Treviño RN       Review Status Review Entered   Completed 12/28/2021 09:47      Criteria Review      Care Day: 13 Care Date: 12/25/2021 Level of Care: Inpatient Floor    Guideline Day 2    Level Of Care    (X) Floor    Clinical Status    (X) * Hypotension absent    (X) * No requirement for mechanical ventilation    ( ) * Oxygenation at baseline or improved    12/28/2021 9:47 AM EST by Toyin Richards      O2 per HHFNC @ 40L and FiO2 of 61%, decreased to HFNC @ 12L/min over course of day, RR 20-27, Sats 87-97%    (X) * Mental status at baseline    Activity    (X) Advance activity as tolerated    Routes    (X) * Oral hydration    (X) Oral or IV medications    (X) Usual diet    Interventions    (X) Possible isolation    12/28/2021 9:47 AM EST by Toyin Richards      droplet plus    (X) Pulse oximetry    12/28/2021 9:47 AM EST by Toyin Richards      O2 per HHFNC @ 40L and FiO2 of 61%, decreased to HFNC @ 12L/min over course of day, RR 20-27, Sats 87-97%    (X) Possible oxygen    12/28/2021 9:47 AM EST by Othelia       O2 per HHFNC @ 40L and FiO2 of 61%, decreased to HFNC @ 12L/min over course of day, RR 20-27, Sats 87-97%    Medications    (X) Possible DVT prophylaxis    12/28/2021 9:47 AM EST by Toyin Richards      enoxaparin 40 mgSubCUTAneousBID    * Milestone   Additional Notes   12/25      IM      Remains On airvo FiO2 61% on 40 L/min satting around 92   Afebrile overnight, WBCs down to 20, procalcitonin within normal limits   Reported she is feeling better and denied cough, SOB, chest pain, nausea, vomiting or abdominal pain      /69   Pulse 72   Temp 97.8 °F (36.6 °C) (Temporal)   Resp 23   Ht 5' 5\" (1.651 m)   Wt 195 lb 3.2 oz (88.5 kg)   SpO2 92%   BMI 32.48 kg/m²        General appearance: No apparent distress, appears stated age and cooperative. HEENT: Pupils equal, round, and reactive to light.  Conjunctivae/corneas clear.   Neck: Supple, with full range of motion. No jugular venous distention. Trachea midline. Respiratory:  Normal respiratory effort. Clear to auscultation, bilaterally without Rales/Wheezes/Rhonchi. Cardiovascular: Regular rate and rhythm with normal S1/S2 without murmurs, rubs or gallops. Abdomen: Soft, non-tender, non-distended with normal bowel sounds. Musculoskeletal: No clubbing, cyanosis or edema bilaterally.  Full range of motion without deformity. Skin: Skin color, texture, turgor normal.  No rashes or lesions. Neurologic:  Neurovascularly intact without any focal sensory/motor deficits. Cranial nerves: II-XII intact, grossly non-focal.   Psychiatric: Alert and oriented, thought content appropriate, normal insight   Capillary Refill: Brisk,< 3 seconds    Peripheral Pulses: +2 palpable, equal bilaterally                Assessment/Plan:       COVID-19 pneumonia   Patient is unvaccinated: Alternate with pain BiPAP and high flow   Status post 5-day course of remdesivir and 1 dose of Actemra   CRP trended down   Decadron dose reduced to 6 mg daily on 12/22/2021   Chest x-ray  on 12/24/21 showed no significant interval and change in multifocal pneumonia   Continue droplet plus precautions   Continue Lovenox for hypercoagulable state   Continue supplemental sedation oxygen as needed to keep sats above 92       Acute hypoxic respiratory failure secondary to above       Leukocytosis likely secondary to steroids   Afebrile, procalcitonin WNL   Urinalysis negative, cultures negative to date   CBC closely monitor       History of CAD:Stable   Continue aspirin statins and beta-blockers       DVT Prophylaxis: Lovenox   Diet: ADULT DIET; Regular   Code Status: Full Code   ------------   Pulm      Assessment:       1. Acute hypoxemic respiratory failure   2.  COVID-19 pneumonia   3 leukocytosis   Plan:       Severe COVID-19 pneumonia-persistent bilateral infiltrates on chest x-ray.  Improved O2 needs and currently on 15 L high flow nasal cannula. Charlotte Samuel tachypnea noted.  Could consider CT chest early next week to evaluate for pulmonary fibrosis if no improvement noted.       On Decadron 6 mg IV daily, completed 5-day course of remdesivir and 1 dose of Actemra.  Procalcitonin has been low.   CRP 3.       Leukocytosis noted, WBC 20.  However patient does not any signs of a bacterial infection.  Procalcitonin is low.  Leukocytosis could be related to chronic steroid use.  Hold off on antibiotics.       D-dimer 256, continue with prophylactic Lovenox    -------------          12/25/2021 03:47   Sodium: 138   Potassium: 4.2   Chloride: 105   CO2: 22   BUN: 17   Creatinine: 0.6   Anion Gap: 11   GFR Non-: >60   GFR African American: >60   Magnesium: 2.00   Glucose: 162 (H)   CALCIUM, SERUM, 459585: 8.6   Phosphorus: 3.4      12/25/2021 03:48   WBC: 20.1 (H)   RBC: 5.00   Hemoglobin Quant: 13.9   Hematocrit: 42.2   MCV: 84.3   MCH: 27.8   MCHC: 33.0   MPV: 8.6   RDW: 13.9   Platelet Count: 835 (L)   Neutrophils %: 88.0   Lymphocyte %: 3.0   Monocytes %: 8.0   Eosinophils %: 1.0   Basophils %: 0.0   Neutrophils Absolute: 17.7 (H)   Lymphocytes Absolute: 0.6 (L)   Monocytes Absolute: 1.6 (H)   Eosinophils Absolute: 0.2   Basophils Absolute: 0.0   RBC Morphology: Normal   SLIDE REVIEW: see below   PLATELET SLIDE REVIEW: Adequate   D-Dimer, Quant: 256 (H)   ---   Scheduled Medications   · dexamethasone (PF) 6 mg IntraVENous Daily   · enoxaparin 40 mg SubCUTAneous BID   · amLODIPine 2.5 mg Oral Daily   · aspirin 81 mg Oral Daily   · metoprolol tartrate 25 mg Oral BID   · sodium chloride flush 5-40 mL IntraVENous 2 times per day   · famotidine 20 mg Oral BID   · Vitamin D 2,000 Units Oral Daily         PRN   guaiFENesin-dextromethorphan (ROBITUSSIN DM) 100-10 MG/5ML syrup 5 mL PO x4        Viral Illness, Acute - Care Day 11 (12/23/2021) by Jose Blackburn RN       Review Status Review Entered   Completed 12/28/2021 09:43      Criteria Review      Care Day: 11 Care Date: 12/23/2021 Level of Care: Inpatient Floor    Guideline Day 2    Level Of Care    (X) Floor    Clinical Status    (X) * Hypotension absent    (X) * No requirement for mechanical ventilation    ( ) * Oxygenation at baseline or improved    12/28/2021 9:43 AM EST by Bradly Carvajal      Remains on airvo and present effort at 45 L/min satting around 90%; refusing BiPAP    (X) * Mental status at baseline    Activity    (X) Advance activity as tolerated    Routes    (X) * Oral hydration    (X) Oral or IV medications    (X) Usual diet    Interventions    (X) Possible isolation    (X) Pulse oximetry    (X) Possible oxygen    12/28/2021 9:43 AM EST by Bradly Carvajal      Remains on airvo and present effort at 45 L/min satting around 90%; refusing BiPAP    Medications    (X) Possible antiviral medication    (X) Possible DVT prophylaxis    * Milestone   Additional Notes   12/23      IM      Remains on airvo and present effort at 45 L/min satting around 90%; refusing BiPAP   Afebrile overnight, WBCs elevated to 18.1 likely secondary to steroids, procalcitonin WNL, cultures negative to date    denied cough, SOB, chest pain, nausea, vomiting or abdominal pain          BP (!) 117/56   Pulse 60   Temp 97.5 °F (36.4 °C) (Temporal)   Resp 23   Ht 5' 5\" (1.651 m)   Wt 201 lb (91.2 kg)   SpO2 90%   BMI 33.45 kg/m²        General appearance: No apparent distress, appears stated age and cooperative. HEENT: Pupils equal, round, and reactive to light. Conjunctivae/corneas clear. Neck: Supple, with full range of motion. No jugular venous distention. Trachea midline. Respiratory:  Normal respiratory effort. Clear to auscultation, bilaterally without Rales/Wheezes/Rhonchi. Cardiovascular: Regular rate and rhythm with normal S1/S2 without murmurs, rubs or gallops. Abdomen: Soft, non-tender, non-distended with normal bowel sounds.    Musculoskeletal: No (H)   RBC: 5.23 (H)   Hemoglobin Quant: 14.7   Hematocrit: 44.3   MCV: 84.7   MCH: 28.0   MCHC: 33.1   MPV: 8.1   RDW: 13.8   Platelet Count: 540   Neutrophils %: 85.0   Lymphocyte %: 4.0   Monocytes %: 6.0   Eosinophils %: 0.0   Basophils %: 0.0   Neutrophils Absolute: 16.3 (H)   Lymphocytes Absolute: 0.7 (L)   Monocytes Absolute: 1.1   Eosinophils Absolute: 0.0   Basophils Absolute: 0.0   Bands Relative: 5   RBC Morphology: Normal   SLIDE REVIEW: see below   PLATELET SLIDE REVIEW: Adequate      12/23/2021 17:15   Color, UA: YELLOW   Clarity, UA: Clear   Glucose, UA: >=1000 (A)   Bilirubin, Urine: Negative   Ketones, Urine: Negative   Specific Gravity, UA: 1.028   Blood, Urine: Negative   pH, UA: 5.0   Protein, UA: Negative   Urobilinogen, Urine: 1.0   Nitrite, Urine: Negative   Leukocyte Esterase, Urine: Negative   Urine Type: NotGiven   Microscopic Examination: Not Indicated         Scheduled Medications   · dexamethasone (PF) 6 mg IntraVENous Daily   · enoxaparin 40 mg SubCUTAneous BID   · amLODIPine 2.5 mg Oral Daily   · aspirin 81 mg Oral Daily   · metoprolol tartrate 25 mg Oral BID   · sodium chloride flush 5-40 mL IntraVENous 2 times per day   · famotidine 20 mg Oral BID   · Vitamin D 2,000 Units Oral Daily         PRN   guaiFENesin-dextromethorphan (ROBITUSSIN DM) 100-10 MG/5ML syrup 5 mL PO x2

## 2021-12-30 ENCOUNTER — TELEPHONE (OUTPATIENT)
Dept: INTERNAL MEDICINE CLINIC | Age: 61
End: 2021-12-30

## 2021-12-30 NOTE — TELEPHONE ENCOUNTER
----- Message from Kellie Knutson sent at 12/30/2021  2:56 PM EST -----  Subject: Appointment Request    Reason for Call: Routine Hospital Follow Up    QUESTIONS  Type of Appointment? Established Patient  Reason for appointment request? No appointments available during search  Additional Information for Provider? Cade Browne daughter Pt was DC from [facility] on 12/28/2021. Pt tested positive with covid (tested   positive) 12/11/2021. Pt has pneumonia now and is now on oxygen and is   checking oxygen levels. Pt is complaining of a sore nose around where the   oxygen piece sits on nose. Pt's oxygen is 4 litters. I do not have   anything within the 5 days. ---------------------------------------------------------------------------  --------------  Silke DUFFY  What is the best way for the office to contact you? OK to leave message on   [employer], OK to respond with electronic message via [employer] portal (only   for patients who have registered [employer] account)  Preferred Call Back Phone Number? [phone]  ---------------------------------------------------------------------------  --------------  SCRIPT ANSWERS  Relationship to Patient? Third Party  Representative Name? Cade Browne Daughter  (Patient requests to see provider urgently. )? No  (Has the patient been discharged from the hospital within 2 business days   AND does not have a Telephone Encounter  Follow Up From [address] [place] [address]   documented in Danae Elias?)? No  Do you have any questions for your primary care provider that need to be   answered prior to your appointment? (Use RN Triage if question pertains to   anything on the red flag list)? No  (Patient needs follow up visit after hospital discharge) Book first   available appointment within 7 days OF DISCHARGE, if no appt, proceed to   book the next available time slot within 14 days OF DISCHARGE AND Send   Message to Provider.  [address] Follow Up appointment cannot be booked   beyond 14 Days and should result in a Message to Provider. ? Yes   Have you been diagnosed with, awaiting test results for, or told that you   are suspected of having COVID-19 (Coronavirus)? (If patient has tested   negative or was tested as a requirement for work, school, or travel and   not based on symptoms, answer no)? No  Within the past two weeks have you developed any of the following symptoms   (answer no if symptoms have been present longer than 2 weeks or began   more than 2 weeks ago)? Fever or Chills, Cough, Shortness of breath or   difficulty breathing, Loss of taste or smell, Sore throat, Nasal   congestion, Sneezing or runny nose, Fatigue or generalized body aches   (answer no if pain is specific to a body part e.g. back pain), Diarrhea,   Headache?  Yes

## 2021-12-30 NOTE — ADT AUTH CERT
SR/ST on monitor       Physical Exam:   Respiratory:  Normal respiratory effort. Clear to auscultation, bilaterally without Rales/Wheezes/Rhonchi. Abnl/Pertinent Labs/Radiology/Diagnostic Studies:   bs 168  wbc 16.0  plts 125       IM A/P:   Pneumonia due to COVID-19 virus    Patient is unvaccinated   Status post 5-day course of remdesivir and 1 dose of Actemra      Weaned to 4 L nasal cannula satting around 91%   Afebrile overnight, WBCs trending down to 16 K likely secondary to steroids   Patient was awaiting LTAC pre-CERT but due to her improved oxygen she is    not a candidate for LTAC, respiratory home O2 eval ordered.     Plan for the patient to be discharged home/SNF tomorrow      cont norvasc, asa, pepcid, lopressor    cont iv decadron 6mg qd    cont sq lovenox 40mg bid   cont vit d 2,000 units qd    cont robitussin dm 5cc q4h prn . . no doses   ---------------------------------------------------------------------------------   12/28 1733 - Pt discharged home on new oxygen - 4lnc                           Viral Illness, Acute - Care Day 14 (12/26/2021) by Juaquin Joyner RN       Review Status Review Entered   Completed 12/29/2021 13:05      Criteria Review      Care Day: 14 Care Date: 12/26/2021 Level of Care: Telemetry    Guideline Day 3    Level Of Care    ( ) Floor to discharge    12/29/2021 1:05 PM EST by Patric Rucker      12/26 - transferred from ICU to m/s unit w/ telemetry    Clinical Status    ( ) * Hemodynamic stability    (X) * Afebrile or temperature acceptable for next level of care    12/29/2021 1:05 PM EST by Patric Rucker      afebrile    ( ) * Tachypnea absent    12/29/2021 1:05 PM EST by Patric Rucker      RR 20-28    ( ) * Hypoxemia absent    (X) * Mental status at baseline    12/29/2021 1:05 PM EST by Faviola gorman    ( ) * Renal function at baseline, or stable and acceptable for next level of care    ( ) * Discharge plans and education understood    Activity    ( ) * Ambulatory or acceptable for next level of care    Routes    ( ) * Oral hydration    (X) * Oral medications or regimen acceptable for next level of care    12/29/2021 1:05 PM EST by Bonita Lee      see notes    ( ) * Oral diet or acceptable for next level of care    Interventions    ( ) * Isolation not indicated, or is performable at next level of care    12/29/2021 1:05 PM EST by Bonita Lee      droplet plus isolation    (X) Pulse oximetry    12/29/2021 1:05 PM EST by Bonita Lee      q4h and prn    Medications    ( ) * Antimicrobial medication absent or regimen established for next level of care    (X) Possible DVT prophylaxis    12/29/2021 1:05 PM EST by Madonna Velasquez   Additional Notes   12/26 - transferred from ICU to m/s unit w/ telemetry       Baselines (lab values, vitals, O2 amount/delivery, etc):   Pt on no home oxygen       Pertinent Updates:   remains w/ dyspnea w/ exertion       Vitals:   afebrile RR 20-28  132/71  SR/ST on monitor    weaned from 13L HF O2 to 5lnc w/ sat 94%       Physical Exam:   Respiratory:  Normal respiratory effort. Clear to auscultation, bilaterally without Rales/Wheezes/Rhonchi. Abnl/Pertinent Labs/Radiology/Diagnostic Studies:   bs 131  wbc 16.9  plts 115        IM A/P:    Afebrile overnight,  WBCs trended down   Reported shortness of breath but denied any denied fevers,    chills, chest pain, nausea, vomiting or abdominal pain   O2 requirement down to 12 L HFNC satting around 93%      Pneumonia due to COVID-19 virus    Patient is unvaccinated   Status post 5-day course of remdesivir and 1 dose of Actemra      cont norvasc, asa, pepcid, lopressor    cont iv decadron 6mg qd    cont sq lovenox 40mg bid   cont vit d 2,000 units qd    cont robitussin dm 5cc q4h prn . . no doses (x4 doses 12/25)

## 2021-12-30 NOTE — TELEPHONE ENCOUNTER
They may consider calling oxygen supply company to see any protective device to use locally or change the prongs.   If there is any open sore, I can send Bactroban cream

## 2022-01-03 NOTE — DISCHARGE SUMMARY
Hospital Medicine Discharge Summary    Patient ID: Jeremi Castañeda      Patient's PCP: Goldy Centeno, APRN - CNP    Admit Date: 12/13/2021     Discharge Date: 12/28/2021     Admitting Physician: Ronald Prince MD     Discharge Physician: Eneida Gandhi MD        Active Hospital Problems    Diagnosis     Pneumonia due to COVID-19 virus [U07.1, J12.82]          Hospital Course: This 62 y.o. female with PMHx of hypertension, hyperlipidemia, coronary artery disease presented with dyspnea. On admission, patient was found to be hypoxic satting around 86% on room air; started on supplemental oxygen. CXR showed multifocal pneumonia.       COVID-19 pneumonia: On admission, patient was found to be hypoxic satting around 86% on room air; started on supplemental oxygen. CXR showed multifocal pneumonia. Patient received 1 dose Actemra, 5-day course of remdesivir and IV steroids during hospital stay. Patient initially required BiPAP and was later transitioned to high flow. O2 requirement was weaned as tolerated to room air and patient was discharged home in stable condition.     Acute hypoxic respiratory failure secondary to above: Resolved     Leukocytosis likely secondary to steroids: WBCs  trended down  Afebrile, procalcitonin WNL. Urinalysis negative, cultures negative to date     History of CAD:Stable  Continue aspirin statins and beta-blockers    Physical Exam Performed:     /75   Pulse 131   Temp 97.3 °F (36.3 °C)   Resp 12   Ht 5' 5\" (1.651 m)   Wt 190 lb 1.6 oz (86.2 kg)   SpO2 97%   BMI 31.63 kg/m²     General appearance:  No apparent distress, appears stated age and cooperative. Eyes: Sclera clear. Pupils equal.  ENT: Moist oral mucosa. Trachea midline, no adenopathy. Cardiovascular: Regular rhythm, normal S1, S2. No murmur. No edema in lower extremities  Respiratory:Not usingaccessory muscles. Good inspiratory effort. Clear to auscultation bilaterally, no wheeze or crackles.    GI: Abdomen soft, no tenderness, not distended, normal bowel sounds  Musculoskeletal: Normal ROM, No cyanosis in digits. Neurology: CN 2-12 grossly intact. No speech or motor deficits  Psych: Normal affect. Alert and oriented in time, place and person  Skin: Warm, dry, normal turgor      Labs: For convenience and continuity at follow-up the following most recent labs are provided:      CBC:    Lab Results   Component Value Date    WBC 14.3 12/28/2021    HGB 13.6 12/28/2021    HCT 41.6 12/28/2021     12/28/2021       Renal:    Lab Results   Component Value Date     12/28/2021    K 4.6 12/28/2021    K 4.2 12/14/2021     12/28/2021    CO2 24 12/28/2021    BUN 21 12/28/2021    CREATININE 0.7 12/28/2021    CALCIUM 8.8 12/28/2021    PHOS 3.9 12/28/2021         Significant Diagnostic Studies    Radiology:   XR CHEST PORTABLE   Final Result   Persistent multifocal airspace disease consistent with atypical viral   pneumonia/COVID pneumonia. No substantial change compared to priors. XR CHEST PORTABLE   Final Result   No significant interval change in multifocal pneumonia. XR CHEST PORTABLE   Final Result   Bilateral asymmetric multifocal pneumonia, generally similar to 12/17/2021. No pleural fluid or pneumothorax detected. XR CHEST PORTABLE   Final Result   Bilateral airspace disease, with mild progression on the left, consistent   with pneumonia. Pattern is common for COVID. XR CHEST PORTABLE   Final Result   Bilateral airspace disease, consistent with COVID pneumonia. Superimposed   atelectasis or typical pneumonia are other considerations, given greater   consolidation in the left lower lobe.          XR CHEST 1 VIEW    (Results Pending)          Consults:     None    Disposition:  Home    Condition at Discharge: Stable     Discharge Instructions/Follow-up:   Follow up with your PCP within 5-7  days of discharge and have PCP evaluate the need for ongoing oxygen requirement  Obtain CBC repeat chest x-ray and follow-up with PCP  Take all your medications as prescribed. Discharge Medications:     Discharge Medication List as of 12/28/2021  2:17 PM           Details   dexamethasone (DECADRON) 6 MG tablet Take 1 tablet by mouth daily (with breakfast) for 3 doses, Disp-3 tablet, R-0Normal      famotidine (PEPCID) 20 MG tablet Take 1 tablet by mouth 2 times daily for 3 days, Disp-6 tablet, R-0Normal              Details   pitavastatin (LIVALO) 2 MG TABS tablet Take 1 tablet by mouth nightly, Disp-30 tablet, R-5Normal      metoprolol tartrate (LOPRESSOR) 25 MG tablet Take 1 tablet by mouth 2 times daily, Disp-180 tablet, R-3Normal      amLODIPine (NORVASC) 5 MG tablet Take 0.5 tablets by mouth daily, Disp-45 tablet, R-3Normal      CPAP Machine MISC Historical Med      Bempedoic Acid (NEXLETOL) 180 MG TABS Take 180 mg by mouth daily, Disp-30 tablet, R-5Normal      Coenzyme Q10 (COQ10 PO) Take 100 mg by mouth Historical Med      nitroGLYCERIN (NITROSTAT) 0.4 MG SL tablet Place 1 tablet under the tongue every 5 minutes as needed for Chest pain, Disp-25 tablet, R-3Normal      Multiple Vitamins-Minerals (THERAPEUTIC MULTIVITAMIN-MINERALS) tablet Take 1 tablet by mouth daily      aspirin 81 MG EC tablet Take 81 mg by mouth daily. The patient was seen and examined on day of discharge and this discharge summary is in conjunction with any daily progress note from day of discharge. Time Spent on discharge is 45 minutes  in the examination, evaluation, counseling and review of medications and discharge plan. Note that greater  than 30 minutes was spent in preparing discharge papers, discussing discharge with patient, medication review, etc.     Signed:    Dharmesh Prescott MD   1/3/2022      Thank you BRISSA Patel CNP for the opportunity to be involved in this patient's care.  If you have any questions or concerns please feel free to contact me at [phone].

## 2022-01-04 ENCOUNTER — OFFICE VISIT (OUTPATIENT)
Dept: INTERNAL MEDICINE CLINIC | Age: 62
End: 2022-01-04
Payer: COMMERCIAL

## 2022-01-04 VITALS
BODY MASS INDEX: 30.25 KG/M2 | DIASTOLIC BLOOD PRESSURE: 80 MMHG | HEART RATE: 113 BPM | SYSTOLIC BLOOD PRESSURE: 120 MMHG | OXYGEN SATURATION: 92 % | WEIGHT: 181.8 LBS

## 2022-01-04 DIAGNOSIS — I10 HYPERTENSION, ESSENTIAL: Chronic | ICD-10-CM

## 2022-01-04 DIAGNOSIS — R73.01 IMPAIRED FASTING GLUCOSE: ICD-10-CM

## 2022-01-04 DIAGNOSIS — J12.82 PNEUMONIA DUE TO COVID-19 VIRUS: ICD-10-CM

## 2022-01-04 DIAGNOSIS — U07.1 PNEUMONIA DUE TO COVID-19 VIRUS: ICD-10-CM

## 2022-01-04 DIAGNOSIS — I25.10 CORONARY ARTERY DISEASE INVOLVING NATIVE CORONARY ARTERY OF NATIVE HEART WITHOUT ANGINA PECTORIS: Chronic | ICD-10-CM

## 2022-01-04 DIAGNOSIS — J12.82 PNEUMONIA DUE TO COVID-19 VIRUS: Primary | ICD-10-CM

## 2022-01-04 DIAGNOSIS — K21.9 GASTROESOPHAGEAL REFLUX DISEASE WITHOUT ESOPHAGITIS: ICD-10-CM

## 2022-01-04 DIAGNOSIS — U07.1 PNEUMONIA DUE TO COVID-19 VIRUS: Primary | ICD-10-CM

## 2022-01-04 LAB
ANION GAP SERPL CALCULATED.3IONS-SCNC: 16 MMOL/L (ref 3–16)
BASOPHILS ABSOLUTE: 0.1 K/UL (ref 0–0.2)
BASOPHILS RELATIVE PERCENT: 0.5 %
BUN BLDV-MCNC: 14 MG/DL (ref 7–20)
CALCIUM SERPL-MCNC: 8.8 MG/DL (ref 8.3–10.6)
CHLORIDE BLD-SCNC: 105 MMOL/L (ref 99–110)
CO2: 18 MMOL/L (ref 21–32)
CREAT SERPL-MCNC: 0.6 MG/DL (ref 0.6–1.2)
EOSINOPHILS ABSOLUTE: 0.2 K/UL (ref 0–0.6)
EOSINOPHILS RELATIVE PERCENT: 1.2 %
GFR AFRICAN AMERICAN: >60
GFR NON-AFRICAN AMERICAN: >60
GLUCOSE BLD-MCNC: 204 MG/DL (ref 70–99)
HCT VFR BLD CALC: 42.9 % (ref 36–48)
HEMOGLOBIN: 13.9 G/DL (ref 12–16)
LYMPHOCYTES ABSOLUTE: 0.4 K/UL (ref 1–5.1)
LYMPHOCYTES RELATIVE PERCENT: 3 %
MCH RBC QN AUTO: 27.7 PG (ref 26–34)
MCHC RBC AUTO-ENTMCNC: 32.4 G/DL (ref 31–36)
MCV RBC AUTO: 85.5 FL (ref 80–100)
MONOCYTES ABSOLUTE: 0.3 K/UL (ref 0–1.3)
MONOCYTES RELATIVE PERCENT: 2.3 %
NEUTROPHILS ABSOLUTE: 11.9 K/UL (ref 1.7–7.7)
NEUTROPHILS RELATIVE PERCENT: 93 %
PDW BLD-RTO: 15 % (ref 12.4–15.4)
PLATELET # BLD: 106 K/UL (ref 135–450)
PMV BLD AUTO: 8.7 FL (ref 5–10.5)
POTASSIUM SERPL-SCNC: 4.3 MMOL/L (ref 3.5–5.1)
RBC # BLD: 5.02 M/UL (ref 4–5.2)
SODIUM BLD-SCNC: 139 MMOL/L (ref 136–145)
WBC # BLD: 12.8 K/UL (ref 4–11)

## 2022-01-04 PROCEDURE — 1111F DSCHRG MED/CURRENT MED MERGE: CPT | Performed by: INTERNAL MEDICINE

## 2022-01-04 PROCEDURE — 99214 OFFICE O/P EST MOD 30 MIN: CPT | Performed by: INTERNAL MEDICINE

## 2022-01-04 RX ORDER — FAMOTIDINE 20 MG/1
20 TABLET, FILM COATED ORAL 2 TIMES DAILY
Qty: 6 TABLET | Refills: 0 | Status: SHIPPED | OUTPATIENT
Start: 2022-01-04 | End: 2022-01-06 | Stop reason: SDUPTHER

## 2022-01-04 SDOH — ECONOMIC STABILITY: FOOD INSECURITY: WITHIN THE PAST 12 MONTHS, THE FOOD YOU BOUGHT JUST DIDN'T LAST AND YOU DIDN'T HAVE MONEY TO GET MORE.: NEVER TRUE

## 2022-01-04 SDOH — ECONOMIC STABILITY: FOOD INSECURITY: WITHIN THE PAST 12 MONTHS, YOU WORRIED THAT YOUR FOOD WOULD RUN OUT BEFORE YOU GOT MONEY TO BUY MORE.: NEVER TRUE

## 2022-01-04 ASSESSMENT — SOCIAL DETERMINANTS OF HEALTH (SDOH): HOW HARD IS IT FOR YOU TO PAY FOR THE VERY BASICS LIKE FOOD, HOUSING, MEDICAL CARE, AND HEATING?: NOT HARD AT ALL

## 2022-01-04 NOTE — ASSESSMENT & PLAN NOTE
Her most recent hospitalization with bilateral Covid pneumonia patient does require quite a bit of oxygen even at rest at this point we can continue her on the oxygen    The patient is homebound and unable to get out of the house without significant hardship to herself and her family if her insurance covers her for home health services we can initiate that the family will let me know    Patient still unable to go back to work and she will be bringing me forms to fill out that for her

## 2022-01-04 NOTE — PROGRESS NOTES
Post-Discharge Transitional Care Management Services      [facility]   YOB: 1960    Date of Visit:  1/4/2022  30 Day Post-Discharge Date: 7    Allergies   Allergen Reactions    Latex Rash    Ampicillin     Codeine Itching    Floxin [Ofloxacin] Other (See Comments)     Becomes angry    Prednisone      Causes sores in mouth      Quinolones Other (See Comments)    Statins Other (See Comments)     MUSCLE CRAMPS    Welchol; crestor ; lipitor ; zocor ; zetia    Caffeine Palpitations and Other (See Comments)     Chest pain     Outpatient Medications Marked as Taking for the 1/4/22 encounter (Office Visit) with Zac De La Fuente MD   Medication Sig Dispense Refill    famotidine (PEPCID) 20 MG tablet Take 1 tablet by mouth 2 times daily for 3 days 6 tablet 0         Vitals:    01/04/22 1027   BP: 120/80   Pulse: 113   SpO2: 92%   Weight: 181 lb 12.8 oz (82.5 kg)     Body mass index is 30.25 kg/m². Wt Readings from Last 3 Encounters:   01/04/22 181 lb 12.8 oz (82.5 kg)   12/28/21 190 lb 1.6 oz (86.2 kg)   07/22/21 195 lb (88.5 kg)     BP Readings from Last 3 Encounters:   01/04/22 120/80   12/28/21 126/75   07/22/21 138/80        Patient was admitted to [facility] from12/13/2021 - 12/28/2021 (15 days)  [facility]    Inpatient course: Discharge summary reviewed- see chart. Current status:stable  Review of Systems     Physical Exam  Constitutional:       General: She is not in acute distress. Appearance: Normal appearance. HENT:      Head: Normocephalic and atraumatic. Right Ear: Tympanic membrane normal.      Left Ear: Tympanic membrane normal.      Nose: Nose normal.   Eyes:      Extraocular Movements: Extraocular movements intact. Conjunctiva/sclera: Conjunctivae normal.      Pupils: Pupils are equal, round, and reactive to light. Neck:      Vascular: No carotid bruit.    Cardiovascular:      Rate and Rhythm: Normal rate and regular rhythm. Pulses: Normal pulses. Heart sounds: No murmur heard. Pulmonary:      Effort: Pulmonary effort is normal. No respiratory distress. Breath sounds: Normal breath sounds. Abdominal:      General: Abdomen is flat. Bowel sounds are normal. There is no distension. Palpations: Abdomen is soft. Tenderness: There is no abdominal tenderness. Musculoskeletal:         General: No swelling, tenderness or deformity. Cervical back: Normal range of motion and neck supple. No rigidity or tenderness. Right lower leg: No edema. Left lower leg: No edema. Lymphadenopathy:      Cervical: No cervical adenopathy. Skin:     Coloration: Skin is not jaundiced. Findings: No bruising, erythema or lesion. Neurological:      General: No focal deficit present. Mental Status: She is alert and oriented to person, place, and time. Cranial Nerves: No cranial nerve deficit. Motor: No weakness. Gait: Gait normal.              Assessment/Plan:  1. Pneumonia due to COVID-19 virus  Assessment & Plan:  Her most recent hospitalization with bilateral Covid pneumonia patient does require quite a bit of oxygen even at rest at this point we can continue her on the oxygen    The patient is homebound and unable to get out of the house without significant hardship to herself and her family if her insurance covers her for home health services we can initiate that the family will let me know    Patient still unable to go back to work and she will be bringing me forms to fill out that for her    Orders:  -     [place] DISCHARGE MEDS RECONCILED W/ CURRENT OUTPATIENT MED LIST  -     XR CHEST STANDARD (2 VW); Future  -     CBC Auto Differential; Future  -     Basic Metabolic Panel; Future  2. Hypertension, essential  -     [place] DISCHARGE MEDS RECONCILED W/ CURRENT OUTPATIENT MED LIST  3.  Coronary artery disease involving native coronary artery of native heart without angina pectoris  -     [place] DISCHARGE MEDS RECONCILED W/ CURRENT OUTPATIENT MED LIST  4. Gastroesophageal reflux disease without esophagitis  -     famotidine (PEPCID) 20 MG tablet;  Take 1 tablet by mouth 2 times daily for 3 days, Disp-6 tablet, R-0Normal  Patient did have some leukocytosis and low platelets most likely related to her infection and the treatment associated with it at this point we will go ahead and repeat the blood test to see if they are back to its baseline patient also had high sugar and will get a check her A1c and then have serial sugars in the future with her primary care physician    Diagnostic test results reviewed: inpatient labs and chest x-ray    Patient risk of morbidity and mortality: high    Medical Decision Making: moderate complexity

## 2022-01-05 DIAGNOSIS — R73.01 IMPAIRED FASTING GLUCOSE: ICD-10-CM

## 2022-01-06 DIAGNOSIS — K21.9 GASTROESOPHAGEAL REFLUX DISEASE WITHOUT ESOPHAGITIS: ICD-10-CM

## 2022-01-06 LAB
ESTIMATED AVERAGE GLUCOSE: 154.2 MG/DL
HBA1C MFR BLD: 7 %

## 2022-01-06 RX ORDER — FAMOTIDINE 20 MG/1
20 TABLET, FILM COATED ORAL 2 TIMES DAILY
Qty: 60 TABLET | Refills: 0 | Status: SHIPPED | OUTPATIENT
Start: 2022-01-06 | End: 2022-03-02 | Stop reason: ALTCHOICE

## 2022-01-10 ENCOUNTER — TELEPHONE (OUTPATIENT)
Dept: INTERNAL MEDICINE CLINIC | Age: 62
End: 2022-01-10

## 2022-01-10 NOTE — TELEPHONE ENCOUNTER
Called pt daughter and advised that this was faxed the day of her visit, pt daughter stated she will come  later today.  SG

## 2022-01-10 NOTE — TELEPHONE ENCOUNTER
Pt saw you 1/4 and she left a 1 page disabilty paper with you that day--calling to see if ready---please call daughter at [phone]. Thanks.

## 2022-01-11 ENCOUNTER — TELEPHONE (OUTPATIENT)
Dept: SLEEP CENTER | Age: 62
End: 2022-01-11

## 2022-01-20 ENCOUNTER — TELEPHONE (OUTPATIENT)
Dept: INTERNAL MEDICINE CLINIC | Age: 62
End: 2022-01-20

## 2022-01-20 NOTE — TELEPHONE ENCOUNTER
Corazon is calling back---she is now working remotely from home she can be reached at [phone]. Thanks.

## 2022-01-20 NOTE — TELEPHONE ENCOUNTER
Corazon with [employer] is requesting to speak to someone to verify FMLA paperwork patient submitted. She states she has paperwork but there is not a provider signature.

## 2022-01-24 ENCOUNTER — OFFICE VISIT (OUTPATIENT)
Dept: INTERNAL MEDICINE CLINIC | Age: 62
End: 2022-01-24
Payer: COMMERCIAL

## 2022-01-24 VITALS
BODY MASS INDEX: 30.79 KG/M2 | DIASTOLIC BLOOD PRESSURE: 70 MMHG | HEART RATE: 84 BPM | WEIGHT: 185 LBS | SYSTOLIC BLOOD PRESSURE: 138 MMHG | OXYGEN SATURATION: 97 %

## 2022-01-24 DIAGNOSIS — J12.82 PNEUMONIA DUE TO COVID-19 VIRUS: Primary | ICD-10-CM

## 2022-01-24 DIAGNOSIS — R73.01 IMPAIRED FASTING GLUCOSE: ICD-10-CM

## 2022-01-24 DIAGNOSIS — I10 HYPERTENSION, ESSENTIAL: ICD-10-CM

## 2022-01-24 DIAGNOSIS — U07.1 PNEUMONIA DUE TO COVID-19 VIRUS: Primary | ICD-10-CM

## 2022-01-24 DIAGNOSIS — J96.11 CHRONIC RESPIRATORY FAILURE WITH HYPOXIA (HCC): ICD-10-CM

## 2022-01-24 PROCEDURE — 99214 OFFICE O/P EST MOD 30 MIN: CPT | Performed by: NURSE PRACTITIONER

## 2022-01-24 ASSESSMENT — ENCOUNTER SYMPTOMS
GASTROINTESTINAL NEGATIVE: 1
RESPIRATORY NEGATIVE: 1

## 2022-01-24 NOTE — PROGRESS NOTES
SUBJECTIVE:    Patient ID: Cam Madera is a 62 y.o. female. CC: Covid pneumonia, chronic respiratory failure, hypertension, diabetes    HPI: Patient presents to the office today for follow-up of chronic and acute medical conditions. The patient was admitted from 12/13/2021 until 13/70/0343 for Covid complicated by pneumonia and respiratory failure. She was treated with Actemra, remdesivir, and IV steroids. She was discharged on home oxygen. The patient contacted me last week that her oxygen level was maintaining over 90% on 4 L. She was given permission to reduce to 3 L of oxygen and continue to monitor. Today, the patient reports that she \"feels great. \"  She continues to use oxygen and is maintaining oxygen saturation over 90% on 3 L. She has no specific acute complaint today. We discussed her A1c of 7.0 in the hospital.  This is in the context of severe illness and steroids. Previous A1c was consistent with prediabetes.         Past Medical History:   Diagnosis Date    Arthritis     RT TOES; established with podiatry    Complex tear of lateral meniscus of right knee as current injury 9/19/2013    Complex tear of medial meniscus of right knee as current injury 9/19/2013    Coronary artery disease involving native coronary artery of native heart without angina pectoris 5/8/2675    Helicobacter pylori antibody positive 12/27/2015    Hyperlipidemia     DIET CONTROL    Hypertension, essential 1/7/2021    Mallet fracture, closed, initial encounter 8/29/2019    Patellofemoral syndrome 2/17/2014    Synovitis of knee 1/9/2014    Tear of medial cartilage or meniscus of knee, current 9/8/2014        Current Outpatient Medications   Medication Sig Dispense Refill    famotidine (PEPCID) 20 MG tablet Take 1 tablet by mouth 2 times daily 60 tablet 0    pitavastatin (LIVALO) 2 MG TABS tablet Take 1 tablet by mouth nightly 30 tablet 5    metoprolol tartrate (LOPRESSOR) 25 MG tablet Take 1 tablet by mouth 2 times daily 180 tablet 3    amLODIPine (NORVASC) 5 MG tablet Take 0.5 tablets by mouth daily 45 tablet 3    CPAP Machine MISC by Does not apply route      Bempedoic Acid (NEXLETOL) 180 MG TABS Take 180 mg by mouth daily 30 tablet 5    Coenzyme Q10 (COQ10 PO) Take 100 mg by mouth       nitroGLYCERIN (NITROSTAT) 0.4 MG SL tablet Place 1 tablet under the tongue every 5 minutes as needed for Chest pain 25 tablet 3    Multiple Vitamins-Minerals (THERAPEUTIC MULTIVITAMIN-MINERALS) tablet Take 1 tablet by mouth daily      aspirin 81 MG EC tablet Take 81 mg by mouth daily. No current facility-administered medications for this visit. Review of Systems   Constitutional: Negative. Respiratory: Negative. Cardiovascular: Negative. Gastrointestinal: Negative. Genitourinary: Negative. Musculoskeletal: Negative. Neurological: Negative. Psychiatric/Behavioral: Negative. OBJECTIVE:  Physical Exam  Vitals reviewed. Constitutional:       General: She is not in acute distress. Appearance: She is well-developed. She is not diaphoretic. Interventions: Nasal cannula in place. HENT:      Head: Normocephalic and atraumatic. Eyes:      General: No scleral icterus. Conjunctiva/sclera: Conjunctivae normal.   Neck:      Vascular: No JVD. Cardiovascular:      Rate and Rhythm: Normal rate and regular rhythm. Pulmonary:      Effort: Pulmonary effort is normal. No respiratory distress. Breath sounds: Normal breath sounds. No wheezing. Abdominal:      General: There is no distension. Palpations: Abdomen is soft. Tenderness: There is no abdominal tenderness. There is no guarding or rebound. Musculoskeletal:         General: Normal range of motion. Cervical back: Neck supple. Skin:     General: Skin is warm and dry. Neurological:      Mental Status: She is alert and oriented to person, place, and time.    Psychiatric: Behavior: Behavior normal.         Thought Content: Thought content normal.        /70   Pulse 84   Wt 185 lb (83.9 kg)   SpO2 97%   BMI 30.79 kg/m²      PHQ Scores 1/22/2021 2/6/2019 6/4/2018   PHQ2 Score 0 0 0   PHQ9 Score 0 0 0     Interpretation of Total Score Depression Severity: 1-4 = Minimal depression, 5-9 = Mild depression, 10-14 = Moderate depression, 15-19 = Moderately severe depression, 20-27 =Severe depression      ASSESSMENT/PLAN:  Stacie Nina was seen today for hypertension. Diagnoses and all orders for this visit:    Pneumonia due to COVID-19 virus  - Admitted, now home and recovering  - Weaned from 4L to 3L    Chronic respiratory failure with hypoxia ([facility] [address]  - Weaned from 4L to 3L. - She is maintained sat > 90%  - OK to wean to 2L. - We discussed obtaining nocturnal oxygen prior to stopping O2 completely    Impaired fasting glucose  She will maintain off medication for now and will monitor her A1c in the future. Hypertension, essential  - Normotensive  - she has met JNC standards.  - Continue current regimen.         BRISSA Reece - CNP

## 2022-02-18 ENCOUNTER — HOSPITAL ENCOUNTER (OUTPATIENT)
Age: 62
Discharge: HOME | End: 2022-02-18
Payer: COMMERCIAL

## 2022-02-18 ENCOUNTER — HOSPITAL ENCOUNTER (OUTPATIENT)
Dept: GENERAL RADIOLOGY | Age: 62
Discharge: HOME | End: 2022-02-18
Payer: COMMERCIAL

## 2022-02-18 DIAGNOSIS — U07.1 PNEUMONIA DUE TO COVID-19 VIRUS: Primary | ICD-10-CM

## 2022-02-18 DIAGNOSIS — U07.1 PNEUMONIA DUE TO COVID-19 VIRUS: ICD-10-CM

## 2022-02-18 DIAGNOSIS — J12.82 PNEUMONIA DUE TO COVID-19 VIRUS: Primary | ICD-10-CM

## 2022-02-18 DIAGNOSIS — J12.82 PNEUMONIA DUE TO COVID-19 VIRUS: ICD-10-CM

## 2022-02-18 PROCEDURE — 71046 X-RAY EXAM CHEST 2 VIEWS: CPT

## 2022-02-25 ENCOUNTER — TELEPHONE (OUTPATIENT)
Dept: PULMONOLOGY | Age: 62
End: 2022-02-25

## 2022-02-25 NOTE — TELEPHONE ENCOUNTER
No recent compliance available - last used date 12/2/21. LMTCB/MyChart to see if patient is still using machine; if so, she will need to bring it to us or to DME before the appointment. Per Branden Copeland, if she's still on oxygen and is not using her machine because of this, she should be using her machine and she can contact her DME to get a bleed-in adapter.

## 2022-03-02 ENCOUNTER — OFFICE VISIT (OUTPATIENT)
Dept: ENT CLINIC | Age: 62
End: 2022-03-02
Payer: COMMERCIAL

## 2022-03-02 VITALS
SYSTOLIC BLOOD PRESSURE: 163 MMHG | DIASTOLIC BLOOD PRESSURE: 92 MMHG | OXYGEN SATURATION: 92 % | TEMPERATURE: 97.3 F | HEART RATE: 82 BPM

## 2022-03-02 DIAGNOSIS — J34.89 NOSE DRYNESS: Primary | ICD-10-CM

## 2022-03-02 PROCEDURE — 99213 OFFICE O/P EST LOW 20 MIN: CPT | Performed by: OTOLARYNGOLOGY

## 2022-03-02 RX ORDER — EUCALYPTUS/PEPPERMINT OIL
2 SOLUTION, NON-ORAL NASAL EVERY 6 HOURS PRN
Qty: 30 ML | Refills: 2 | Status: SHIPPED | OUTPATIENT
Start: 2022-03-02 | End: 2022-07-26

## 2022-03-02 ASSESSMENT — ENCOUNTER SYMPTOMS
RHINORRHEA: 0
SORE THROAT: 0
VOICE CHANGE: 0
SINUS PAIN: 0
TROUBLE SWALLOWING: 0

## 2022-03-02 NOTE — PROGRESS NOTES
[facility]       PCP:  BRISSA Monreal - [place]      CHIEF COMPLAINT  Chief Complaint   Patient presents with    Nose Problem     dry and sore nose due to wearing oxygen        HISTORY OF PRESENT ILLNESS       Patricia Zhou is a 65 y.o. female. The patient stated that the left side of her nose was sore after using oxygen during hospitalization for COVID-19 in 2021. She stated that her nose is better but is still sore. REVIEW OF SYSTEMS   Review of Systems   Constitutional: Negative for chills, fever and unexpected weight change. HENT: Positive for congestion (left side difficulty breathing since recent hospitalizatio for COVID) and tinnitus (comes and goes since childhood, twice a month lasts for a couple seconds, sounds like a hearing test). Negative for ear discharge, ear pain, hearing loss, mouth sores, nosebleeds, postnasal drip, rhinorrhea, sinus pain, sore throat, trouble swallowing and voice change.           PAST MEDICAL HISTORY    Past Medical History:   Diagnosis Date    Arthritis     RT TOES; established with podiatry    Complex tear of lateral meniscus of right knee as current injury 2013    Complex tear of medial meniscus of right knee as current injury 2013    Coronary artery disease involving native coronary artery of native heart without angina pectoris 3/0/4822    Helicobacter pylori antibody positive 2015    Hyperlipidemia     DIET CONTROL    Hypertension, essential 2021    Mallet fracture, closed, initial encounter 2019    Patellofemoral syndrome 2014    Synovitis of knee 2014    Tear of medial cartilage or meniscus of knee, current 2014       Past Surgical History:   Procedure Laterality Date     SECTION      breech    COLONOSCOPY      one polyp removed    HYSTERECTOMY      endometriosis    KNEE ARTHROSCOPY Right 13 RIGHT KNEE ARTHROSCOPE, PARTIAL MEDIAL AND LATERAL MENISCECTOMY, SYNOVECTOMY, CHONDROPLASTY OF MEDIAL FEMORAL CONDYLE    KNEE ARTHROSCOPY Left 11/28/2014    LEFT KNEE ARTHROSCOPY WITH PARTIAL MEDIAL MENISCECTOMY,    KNEE ARTHROSCOPY Right 01/23/2018    ACTUAL PROCEDURE: RIGHT KNEE ARTHROSCOPY, PARTIAL MEDIAL MENISCECTOMY,CHONDROPLASTY, SYNOVECTOMY      KNEE SURGERY  9-27-13    RIGHT KNEE ARTHROSCOPE, PARTIAL MEDIAL AND LATERAL    KNEE SURGERY  1-21-14     RIGHT KNEE ARTHROSCOPY WITH PARTIAL LATERAL MENISCECTOMY,    TUBAL LIGATION           EXAMINATION    Vitals:    03/02/22 1032   BP: (!) 163/92   Site: Left Upper Arm   Position: Sitting   Cuff Size: Large Adult   Pulse: 82   Temp: 97.3 °F (36.3 °C)   TempSrc: Temporal   SpO2: 92%         ENT Physical Exam  Consititutional  Appearance: patient appears well-developed, well-nourished and well-groomed, patient is cooperative;  Communication/Voice: communication appropriate for developmental age; vocal quality normal;  Head and Face  Appearance: head appears normal, face appears normal and face appears atraumatic;  Palpation: facial palpation normal;  Salivary: glands normal;  Ear  Hearing: intact; Auricles: right auricle normal; left auricle normal;  Ear Canals: right ear canal normal; left ear canal normal;  Tympanic Membranes: right tympanic membrane normal; left tympanic membrane normal;  Nose  External Nose: nares patent bilaterally; external nose normal;  Internal Nose: nasal obstruction not present; nasal septal deviation (moderate leftward) present; deviation is to the left, septal deviation is intermediate; bilateral inferior turbinates normal;  Nose comments: Nasal mucosa was dry and crusty.   Oral Cavity/Oropharynx  Lips: normal;  Tongue: normal;  Oral mucosa: normal;  Hard palate: normal;  Soft palate: normal;  Tonsils: bilateral tonsils 1+,  Base of Tongue: normal;  Posterior pharyngeal wall: normal;  Neck  Neck: neck normal; neck palpation normal;  Thyroid: thyroid normal;  Lymphatic  Palpation: no cervical adenopathy noted;            IMPRESSION / DIAGNOSES / Kimberly Pen was seen today for nose problem. Diagnoses and all orders for this visit:    Nose dryness  -     Misc Natural Product Nasal (PONARIS) SOLN; 2 sprays by Each Nostril route every 6 hours as needed (nasal dryness or crusting)         RECOMMENDATIONS/PLAN      1. Ponaris, as needed for nasal dryness or crusting. Patient understands this is available over-the-counter, if not covered by her insurance. 2. Acetaminophen (eg. Tylenol) or Ibuprofen (eg. Advil) (over the counter medications) as needed for fever or pain. 3. Return for any further ENT or sinus problems or symptoms.

## 2022-03-03 ENCOUNTER — HOSPITAL ENCOUNTER (OUTPATIENT)
Dept: SLEEP CENTER | Age: 62
Discharge: HOME | End: 2022-03-03
Payer: COMMERCIAL

## 2022-03-03 DIAGNOSIS — G47.30 SLEEP APNEA IN ADULT: ICD-10-CM

## 2022-03-03 PROCEDURE — 95806 SLEEP STUDY UNATT&RESP EFFT: CPT

## 2022-03-07 PROCEDURE — 95806 SLEEP STUDY UNATT&RESP EFFT: CPT | Performed by: INTERNAL MEDICINE

## 2022-03-09 DIAGNOSIS — E78.2 MIXED HYPERCHOLESTEROLEMIA AND HYPERTRIGLYCERIDEMIA: Primary | ICD-10-CM

## 2022-03-09 NOTE — PROGRESS NOTES
Abelino Alvarado         : 1960 [facility]    Diagnosis: [x] BRAD (G47.33) [] CSA (G47.31) [] Apnea (G47.30)   Length of Need: [x] 12 Months [] 99 Months [] Other:    Machine (VICKY!): [] Respironics Dream Station   2   Auto [x] ResMed AirSense     Auto S11 [] Other:     [x]  CPAP () [] Bilevel ()   Mode: [x] Auto [] Spontaneous    Mode: [] Auto [] Spontaneous      P min 6 cmH2O  P max 16 cmH2O      Comfort Settings:   - Ramp Pressure: 4 cmH2O                                        - Ramp time: 15 min                                     -  Flex/EPR - 3 full time                                    - For ResMed Bilevel (TiMax-4 sec   TiMin- 0.2 sec)     Humidifier: [x] Heated ()        [x] Water chamber replacement ()/ 1 per 6 months        Mask:   [x] Nasal () /1 per 3 months [] Full Face () /1 per 3 months   [x] Patient choice -Size and fit mask [] Patient Choice - Size and fit mask   [] Dispense:  [] Dispense:    [x] Headgear () / 1 per 3 months [] Headgear () / 1 per 3 months   [x] Replacement Nasal Cushion ()/2 per month [] Interface Replacement ()/1 per month   [] Replacement Nasal Pillows ()/2 per month         Tubing: [x] Heated ()/1 per 3 months    [] Standard ()/1 per 3 months [] Other:           Filters: [x] Non-disposable ()/1 per 6 months     [x] Ultra-Fine, Disposable ()/2 per month        Miscellaneous: [] Chin Strap ()/ 1 per 6 months [] O2 bleed-in:       LPM   [] Oximetry on CPAP/Bilevel []  Other:    [x] Modem: ()         Start Order Date: 22    MEDICAL JUSTIFICATION:  I, the undersigned, certify that the above prescribed supplies are medically necessary for this patients wellbeing. In my opinion, the supplies are both reasonable and necessary in reference to accepted standards of medicalpractice in treatment of this patients condition.     Kashmir Ortega MD      NPI: [id number]       Order Signed Date: 22    Electronically signed by James Harrell MD on 3/9/2022 at 4:46 PM    Lauren Wooten  1960  [address] [facility] [place]  [phone] (home)   [phone] (mobile)      Insurance Info (confirm with patient if correct):  Payor/Plan Subscr  Sex Relation Sub.  Ins. ID Effective Group Num

## 2022-03-11 ENCOUNTER — HOSPITAL ENCOUNTER (OUTPATIENT)
Age: 62
Discharge: HOME | End: 2022-03-11
Payer: COMMERCIAL

## 2022-03-11 ENCOUNTER — HOSPITAL ENCOUNTER (OUTPATIENT)
Dept: GENERAL RADIOLOGY | Age: 62
Discharge: HOME | End: 2022-03-11
Payer: COMMERCIAL

## 2022-03-11 DIAGNOSIS — U07.1 PNEUMONIA DUE TO COVID-19 VIRUS: ICD-10-CM

## 2022-03-11 DIAGNOSIS — J12.82 PNEUMONIA DUE TO COVID-19 VIRUS: ICD-10-CM

## 2022-03-11 LAB
A/G RATIO: 2.1 (ref 1.1–2.2)
ALBUMIN SERPL-MCNC: 4.4 G/DL (ref 3.4–5)
ALP BLD-CCNC: 91 U/L (ref 40–129)
ALT SERPL-CCNC: 15 U/L (ref 10–40)
ANION GAP SERPL CALCULATED.3IONS-SCNC: 15 MMOL/L (ref 3–16)
AST SERPL-CCNC: 17 U/L (ref 15–37)
BILIRUB SERPL-MCNC: 0.9 MG/DL (ref 0–1)
BUN BLDV-MCNC: 13 MG/DL (ref 7–20)
CALCIUM SERPL-MCNC: 9.6 MG/DL (ref 8.3–10.6)
CHLORIDE BLD-SCNC: 105 MMOL/L (ref 99–110)
CHOLESTEROL, TOTAL: 418 MG/DL (ref 0–199)
CO2: 24 MMOL/L (ref 21–32)
CREAT SERPL-MCNC: 0.6 MG/DL (ref 0.6–1.2)
GFR AFRICAN AMERICAN: >60
GFR NON-AFRICAN AMERICAN: >60
GLUCOSE BLD-MCNC: 101 MG/DL (ref 70–99)
HDLC SERPL-MCNC: 39 MG/DL (ref 40–60)
LDL CHOLESTEROL CALCULATED: 336 MG/DL
POTASSIUM SERPL-SCNC: 4.5 MMOL/L (ref 3.5–5.1)
SODIUM BLD-SCNC: 144 MMOL/L (ref 136–145)
TOTAL PROTEIN: 6.5 G/DL (ref 6.4–8.2)
TRIGL SERPL-MCNC: 214 MG/DL (ref 0–150)
VLDLC SERPL CALC-MCNC: 43 MG/DL

## 2022-03-11 PROCEDURE — 71046 X-RAY EXAM CHEST 2 VIEWS: CPT

## 2022-03-11 PROCEDURE — 36415 COLL VENOUS BLD VENIPUNCTURE: CPT

## 2022-03-11 PROCEDURE — 80053 COMPREHEN METABOLIC PANEL: CPT

## 2022-03-11 PROCEDURE — 80061 LIPID PANEL: CPT

## 2022-03-14 ENCOUNTER — OFFICE VISIT (OUTPATIENT)
Dept: INTERNAL MEDICINE CLINIC | Age: 62
End: 2022-03-14
Payer: COMMERCIAL

## 2022-03-14 ENCOUNTER — TELEPHONE (OUTPATIENT)
Dept: INTERNAL MEDICINE CLINIC | Age: 62
End: 2022-03-14

## 2022-03-14 VITALS
BODY MASS INDEX: 31.28 KG/M2 | DIASTOLIC BLOOD PRESSURE: 78 MMHG | OXYGEN SATURATION: 98 % | SYSTOLIC BLOOD PRESSURE: 138 MMHG | HEART RATE: 90 BPM | WEIGHT: 188 LBS

## 2022-03-14 DIAGNOSIS — U07.1 PNEUMONIA DUE TO COVID-19 VIRUS: ICD-10-CM

## 2022-03-14 DIAGNOSIS — J12.82 PNEUMONIA DUE TO COVID-19 VIRUS: ICD-10-CM

## 2022-03-14 DIAGNOSIS — I25.10 CORONARY ARTERY DISEASE INVOLVING NATIVE CORONARY ARTERY OF NATIVE HEART WITHOUT ANGINA PECTORIS: ICD-10-CM

## 2022-03-14 DIAGNOSIS — J84.9 INTERSTITIAL LUNG DISEASE (HCC): Primary | ICD-10-CM

## 2022-03-14 DIAGNOSIS — Z86.16 HISTORY OF COVID-19: ICD-10-CM

## 2022-03-14 DIAGNOSIS — R73.01 IMPAIRED FASTING GLUCOSE: ICD-10-CM

## 2022-03-14 DIAGNOSIS — Z23 NEED FOR SHINGLES VACCINE: ICD-10-CM

## 2022-03-14 DIAGNOSIS — I10 HYPERTENSION, ESSENTIAL: ICD-10-CM

## 2022-03-14 DIAGNOSIS — E78.2 MIXED HYPERCHOLESTEROLEMIA AND HYPERTRIGLYCERIDEMIA: ICD-10-CM

## 2022-03-14 DIAGNOSIS — Z78.9 STATIN INTOLERANCE: ICD-10-CM

## 2022-03-14 PROCEDURE — 90750 HZV VACC RECOMBINANT IM: CPT | Performed by: NURSE PRACTITIONER

## 2022-03-14 PROCEDURE — 90471 IMMUNIZATION ADMIN: CPT | Performed by: NURSE PRACTITIONER

## 2022-03-14 PROCEDURE — 99214 OFFICE O/P EST MOD 30 MIN: CPT | Performed by: NURSE PRACTITIONER

## 2022-03-14 ASSESSMENT — ENCOUNTER SYMPTOMS
WHEEZING: 0
SHORTNESS OF BREATH: 1
GASTROINTESTINAL NEGATIVE: 1
COUGH: 0

## 2022-03-14 ASSESSMENT — PATIENT HEALTH QUESTIONNAIRE - PHQ9
SUM OF ALL RESPONSES TO PHQ QUESTIONS 1-9: 0
2. FEELING DOWN, DEPRESSED OR HOPELESS: 0
SUM OF ALL RESPONSES TO PHQ QUESTIONS 1-9: 0
SUM OF ALL RESPONSES TO PHQ9 QUESTIONS 1 & 2: 0
SUM OF ALL RESPONSES TO PHQ QUESTIONS 1-9: 0
1. LITTLE INTEREST OR PLEASURE IN DOING THINGS: 0
SUM OF ALL RESPONSES TO PHQ QUESTIONS 1-9: 0

## 2022-03-14 NOTE — LETTER
[facility]  [address] [address]  Phone: [phone]  Fax: [phone]    BRISSA Murcia CNP        March 14, 2022     Patient: Marky Velasquez   YOB: 1960   Date of Visit: 3/14/2022       To Whom It May Concern: It is my medical opinion that Maryann Weems should remain off work until she see's the specialist and completes testing . If you have any questions or concerns, please don't hesitate to call.     Sincerely,        BRISSA Murcia CNP

## 2022-03-14 NOTE — PROGRESS NOTES
SUBJECTIVE:    Patient ID: Trever Harris is a 65 y.o. female. CC: History of Covid, interstitial lung disease, hypertension, hyperlipidemia, statin intolerance    HPI: The patient was admitted from 12/13/2021 until 00/92/3542 for Covid complicated by pneumonia and respiratory failure. She was treated with Actemra, remdesivir, and IV steroids. She was discharged on home oxygen.     After discharge, the patient was weaned off oxygen. She checked her oxygen level recently and it was 95-96% on room air. Overall, the patient states she is feeling much improved. She continues to experience lightheadedness with activity. She feels this is getting better.     We discussed her A1c of 7.0 in the hospital.  This is in the context of severe illness and steroids.   Previous A1c was consistent with prediabetes.           Past Medical History:   Diagnosis Date    Arthritis     RT TOES; established with podiatry    Complex tear of lateral meniscus of right knee as current injury 9/19/2013    Complex tear of medial meniscus of right knee as current injury 9/19/2013    Coronary artery disease involving native coronary artery of native heart without angina pectoris 0/2/2753    Helicobacter pylori antibody positive 12/27/2015    Hyperlipidemia     DIET CONTROL    Hypertension, essential 1/7/2021    Mallet fracture, closed, initial encounter 8/29/2019    Patellofemoral syndrome 2/17/2014    Synovitis of knee 1/9/2014    Tear of medial cartilage or meniscus of knee, current 9/8/2014        Current Outpatient Medications   Medication Sig Dispense Refill    Misc Natural Product Nasal (PONARIS) SOLN 2 sprays by Each Nostril route every 6 hours as needed (nasal dryness or crusting) 30 mL 2    pitavastatin (LIVALO) 2 MG TABS tablet Take 1 tablet by mouth nightly 30 tablet 5    metoprolol tartrate (LOPRESSOR) 25 MG tablet Take 1 tablet by mouth 2 times daily 180 tablet 3    amLODIPine (NORVASC) 5 MG tablet Take Behavior: Behavior normal.         Thought Content: Thought content normal.        /78   Pulse 90   Wt 188 lb (85.3 kg)   SpO2 98%   BMI 31.28 kg/m²      PHQ Scores 3/14/2022 1/22/2021 2/6/2019 6/4/2018   PHQ2 Score 0 0 0 0   PHQ9 Score 0 0 0 0     Interpretation of Total Score Depression Severity: 1-4 = Minimal depression, 5-9 = Mild depression, 10-14 = Moderate depression, 15-19 = Moderately severe depression, 20-27 =Severe depression        ASSESSMENT/PLAN:  Michael Espinoza was seen today for other and fatigue. Diagnoses and all orders for this visit:    Interstitial lung disease ([facility] [address]  - Chronic interstitial lung findings on repeat chest x-ray following Covid pneumonia  - She has residual shortness of breath which may be related to interstitial findings. -     Suki Mckinnon MD, Pulmonary, [facility]    Pneumonia due to COVID-19 virus  - Resolved  -     [facility]    Hypertension, essential  - Normotensive  - she has met JNC standards.  - Continue current regimen. Coronary artery disease involving native coronary artery of native heart without angina pectoris  -Denies chest pain or shortness of breath  -Statin intolerance    Mixed hypercholesterolemia and hypertriglyceridemia  Statin intolerance  -She has tried multiple statins and found to be intolerant.  -She was started on Livalo and had good results but insurance issue with cost  -She is checking with pharmacy to see if this is covered    Impaired fasting glucose  -Likely related to acute illness.   Monitor    History of COVID-19  -     [facility]    Need for shingles vaccine  -     Zoster recombinant [facility])        BRISSA Escobar - CNP

## 2022-03-14 NOTE — TELEPHONE ENCOUNTER
Patient is calling back with information from her insurance company. She states her plan covers rosuvastatin, simvastatin & atorvastatin. Mail delivery pharmacy is [facility]. She gave efax #[phone], pharmacist line #[phone] & fax #[phone].

## 2022-03-15 NOTE — TELEPHONE ENCOUNTER
Spoke with pt -- advised those are all the meds the pt had side effects from   She will check with nolan to see what the livalo will cost with her insurance   Cardiologist had ordered that med and it was at [facility]

## 2022-03-16 NOTE — PROGRESS NOTES
Kip Dk         : 1960 [address] [facility]    Diagnosis: [x] BRAD (G47.33) [] CSA (G47.31) [] Apnea (G47.30)   Length of Need: [x] 12 Months [] 99 Months [] Other:    Machine (VICKY!): [] Respironics Dream Station   2   Auto [x] ResMed AirSense     Auto S11 [] Other:     [x]  CPAP () [] Bilevel ()   Mode: [x] Auto [] Spontaneous    Mode: [] Auto [] Spontaneous      Pmin 6 cmH2O  P max 16 cnH2O      Comfort Settings:   - Ramp Pressure: 4 cmH2O                                        - Ramp time: 15 min                                     -  Flex/EPR - 3 full time                                    - For ResMed Bilevel (TiMax-4 sec   TiMin- 0.2 sec)     Humidifier: [x] Heated ()        [x] Water chamber replacement ()/ 1 per 6 months        Mask:   [x] Nasal () /1 per 3 months [] Full Face () /1 per 3 months   [x] Patient choice -Size and fit mask [] Patient Choice - Size and fit mask   [] Dispense:  [] Dispense:    [x] Headgear () / 1 per 3 months [] Headgear () / 1 per 3 months   [x] Replacement Nasal Cushion ()/2 per month [] Interface Replacement ()/1 per month   [] Replacement Nasal Pillows ()/2 per month         Tubing: [x] Heated ()/1 per 3 months    [] Standard ()/1 per 3 months [] Other:           Filters: [x] Non-disposable ()/1 per 6 months     [x] Ultra-Fine, Disposable ()/2 per month        Miscellaneous: [] Chin Strap ()/ 1 per 6 months [] O2 bleed-in:       LPM   [] Oximetry on CPAP/Bilevel []  Other:    [x] Modem: ()         Start Order Date: 22    MEDICAL JUSTIFICATION:  I, the undersigned, certify that the above prescribed supplies are medically necessary for this patients wellbeing. In my opinion, the supplies are both reasonable and necessary in reference to accepted standards of medicalpractice in treatment of this patients condition.     Nery Marolw RCP      NPI:        Order Signed Date: 22    Electronically signed by Mila Cruz RCP on 3/16/2022 at 2:05 PM    Christiano Huang  1960  [address] [address] [place] [place]  [phone] (home)   [phone] (mobile)      Insurance Info (confirm with patient if correct):  Payor/Plan Subscr  Sex Relation Sub.  Ins. ID Effective Group Num

## 2022-03-17 NOTE — PROGRESS NOTES
Omar Jeffrey         : 1960 [facility]    Diagnosis: [x] BRAD (G47.33) [] CSA (G47.31) [] Apnea (G47.30)   Length of Need: [x] 12 Months [] 99 Months [] Other:    Machine (VICKY!): [] Respironics Dream Station   2   Auto [x] ResMed AirSense     Auto S11 [] Other:     [x]  CPAP () [] Bilevel ()   Mode: [x] Auto [] Spontaneous    Mode: [] Auto [] Spontaneous      P min 6 cmH2O  P max 16 cmH2O      Comfort Settings:   - Ramp Pressure: 4 cmH2O                                        - Ramp time: 15 min                                     -  Flex/EPR - 3 full time                                    - For ResMed Bilevel (TiMax-4 sec   TiMin- 0.2 sec)     Humidifier: [x] Heated ()        [x] Water chamber replacement ()/ 1 per 6 months        Mask:   [x] Nasal () /1 per 3 months [] Full Face () /1 per 3 months   [x] Patient choice -Size and fit mask [] Patient Choice - Size and fit mask   [] Dispense:  [] Dispense:    [x] Headgear () / 1 per 3 months [] Headgear () / 1 per 3 months   [x] Replacement Nasal Cushion ()/2 per month [] Interface Replacement ()/1 per month   [] Replacement Nasal Pillows ()/2 per month         Tubing: [x] Heated ()/1 per 3 months    [] Standard ()/1 per 3 months [] Other:           Filters: [x] Non-disposable ()/1 per 6 months     [x] Ultra-Fine, Disposable ()/2 per month        Miscellaneous: [] Chin Strap ()/ 1 per 6 months [] O2 bleed-in:       LPM   [] Oximetry on CPAP/Bilevel []  Other:    [x] Modem: ()         Start Order Date: 22    MEDICAL JUSTIFICATION:  I, the undersigned, certify that the above prescribed supplies are medically necessary for this patients wellbeing. In my opinion, the supplies are both reasonable and necessary in reference to accepted standards of medicalpractice in treatment of this patients condition.     Uday La RCP      NPI:        Order Signed Date: 22    Electronically signed by Lucia Park RCP on 3/17/2022 at 2:38 PM    Katty Cline  1960  [address] [facility] [address]  [phone] (home)   [phone] (mobile)      Insurance Info (confirm with patient if correct):  Payor/Plan Subscr  Sex Relation Sub.  Ins. ID Effective Group Num

## 2022-03-21 ENCOUNTER — OFFICE VISIT (OUTPATIENT)
Dept: PULMONOLOGY | Age: 62
End: 2022-03-21
Payer: COMMERCIAL

## 2022-03-21 VITALS
DIASTOLIC BLOOD PRESSURE: 88 MMHG | WEIGHT: 187 LBS | BODY MASS INDEX: 31.16 KG/M2 | SYSTOLIC BLOOD PRESSURE: 154 MMHG | HEIGHT: 65 IN | OXYGEN SATURATION: 94 % | HEART RATE: 100 BPM

## 2022-03-21 DIAGNOSIS — Z86.16 HISTORY OF COVID-19: ICD-10-CM

## 2022-03-21 DIAGNOSIS — Z99.89 OSA ON CPAP: ICD-10-CM

## 2022-03-21 DIAGNOSIS — J84.10 PULMONARY FIBROSIS (HCC): Primary | ICD-10-CM

## 2022-03-21 DIAGNOSIS — G47.33 OSA ON CPAP: ICD-10-CM

## 2022-03-21 DIAGNOSIS — E66.09 CLASS 1 OBESITY DUE TO EXCESS CALORIES WITHOUT SERIOUS COMORBIDITY WITH BODY MASS INDEX (BMI) OF 32.0 TO 32.9 IN ADULT: ICD-10-CM

## 2022-03-21 PROCEDURE — 99214 OFFICE O/P EST MOD 30 MIN: CPT | Performed by: INTERNAL MEDICINE

## 2022-03-21 PROCEDURE — 94664 DEMO&/EVAL PT USE INHALER: CPT | Performed by: INTERNAL MEDICINE

## 2022-03-21 RX ORDER — ALBUTEROL SULFATE 90 UG/1
2 AEROSOL, METERED RESPIRATORY (INHALATION) 4 TIMES DAILY PRN
Qty: 18 G | Refills: 5 | Status: SHIPPED | OUTPATIENT
Start: 2022-03-21

## 2022-03-21 NOTE — PROGRESS NOTES
PULMONARY OFFICE FOLLOW-UP VISIT    CONSULTING PHYSICIAN:  BRISSA Resendiz - CNP     REASON FOR VISIT:   Chief Complaint   Patient presents with    New Patient     ILD, h/o COVID, Ref: Dr Colette Rubio    Shortness of Breath     worse with exertion     Cough     dry        DATE OF VISIT: 3/21/2022    HISTORY OF PRESENT ILLNESS: 65y.o. year old female comes into the pulmonary office for follow-up. Patient was recently admitted to the hospital with COVID-19 pneumonia in December 2021. She was treated from 12/13/2021 until 20/28/6398 for Covid complicated by pneumonia and respiratory failure.  She was treated with Actemra, remdesivir, and IV steroids.  She was discharged on home oxygen. After discharge, the patient was weaned off oxygen. Her breathing has continued to improve. She is able to do more without any oxygen desaturation. Denies any chest pain or chest tightness. Intermittent dry cough present. Has not started work yet. Weight has been stable. Does have obstructive sleep apnea but not yet using the CPAP therapy. Following with Dr. Brigitte Purcell. REVIEW OF SYSTEMS:   CONSTITUTIONAL SYMPTOMS: The patient denies fever, fatigue, night sweats, weight loss or weight gain. HEENT: No vision changes. No tinnitus, Denies sinus pain. No hoarseness, or dysphagia. NECK: Patient denies swelling in the neck. CARDIOVASCULAR: Denies chest pain, palpitation, syncope. RESPIRATORY: As per HPI. GASTROINTESTINAL: Denies nausea, abdominal pain or change in bowel function. GENITOURINARY: Denies obstructive symptoms. No history of incontinence. BREASTS: No masses or lumps in the breasts. SKIN: No rashes or itching. MUSCULOSKELETAL: Denies weakness or bone pain. NEUROLOGICAL: No headaches or seizures. PSYCHIATRIC: Denies mood swings or depression.    ENDOCRINE: Denies heat or cold intolerance or excessive thirst.  HEMATOLOGIC/LYMPHATIC: Denies easy bruising or lymph node swelling. ALLERGIC/IMMUNOLOGIC: No environmental allergies.     PAST MEDICAL HISTORY:   Past Medical History:   Diagnosis Date    Arthritis     RT TOES; established with podiatry    Complex tear of lateral meniscus of right knee as current injury 2013    Complex tear of medial meniscus of right knee as current injury 2013    Coronary artery disease involving native coronary artery of native heart without angina pectoris 3401    Helicobacter pylori antibody positive 2015    Hyperlipidemia     DIET CONTROL    Hypertension, essential 2021    Mallet fracture, closed, initial encounter 2019    Patellofemoral syndrome 2014    Synovitis of knee 2014    Tear of medial cartilage or meniscus of knee, current 2014       PAST SURGICAL HISTORY:   Past Surgical History:   Procedure Laterality Date     SECTION      breech    COLONOSCOPY      one polyp removed    HYSTERECTOMY      endometriosis    KNEE ARTHROSCOPY Right 13    RIGHT KNEE ARTHROSCOPE, PARTIAL MEDIAL AND LATERAL MENISCECTOMY, SYNOVECTOMY, CHONDROPLASTY OF MEDIAL FEMORAL CONDYLE    KNEE ARTHROSCOPY Left 2014    LEFT KNEE ARTHROSCOPY WITH PARTIAL MEDIAL MENISCECTOMY,    KNEE ARTHROSCOPY Right 2018    ACTUAL PROCEDURE: RIGHT KNEE ARTHROSCOPY, PARTIAL MEDIAL MENISCECTOMY,CHONDROPLASTY, SYNOVECTOMY      KNEE SURGERY  13    RIGHT KNEE ARTHROSCOPE, PARTIAL MEDIAL AND LATERAL    KNEE SURGERY  14     RIGHT KNEE ARTHROSCOPY WITH PARTIAL LATERAL MENISCECTOMY,    TUBAL LIGATION         SOCIAL HISTORY:   Social History     Tobacco Use    Smoking status: Former Smoker     Packs/day: 0.25     Years: 7.00     Pack years: 1.75     Types: Cigarettes     Quit date: 1988     Years since quittin.5    Smokeless tobacco: Never Used    Tobacco comment: quit    Vaping Use    Vaping Use: Never used   Substance Use Topics    Alcohol use: No    Drug use: No       FAMILY HISTORY:   Family History   Problem Relation Age of Onset    Heart Disease Mother     Stroke Mother         x2    High Blood Pressure Mother     Diabetes Mother     Heart Attack Mother 48    Diabetes Father     Cancer Brother         colon       MEDICATIONS:     Current Outpatient Medications on File Prior to Visit   Medication Sig Dispense Refill    Misc Natural Product Nasal (PONARIS) SOLN 2 sprays by Each Nostril route every 6 hours as needed (nasal dryness or crusting) 30 mL 2    pitavastatin (LIVALO) 2 MG TABS tablet Take 1 tablet by mouth nightly 30 tablet 5    metoprolol tartrate (LOPRESSOR) 25 MG tablet Take 1 tablet by mouth 2 times daily 180 tablet 3    amLODIPine (NORVASC) 5 MG tablet Take 0.5 tablets by mouth daily 45 tablet 3    Bempedoic Acid (NEXLETOL) 180 MG TABS Take 180 mg by mouth daily 30 tablet 5    nitroGLYCERIN (NITROSTAT) 0.4 MG SL tablet Place 1 tablet under the tongue every 5 minutes as needed for Chest pain 25 tablet 3    Multiple Vitamins-Minerals (THERAPEUTIC MULTIVITAMIN-MINERALS) tablet Take 1 tablet by mouth daily      aspirin 81 MG EC tablet Take 81 mg by mouth daily.  CPAP Machine MISC by Does not apply route (Patient not taking: Reported on 3/21/2022)       No current facility-administered medications on file prior to visit. ALLERGIES:   Allergies as of 03/21/2022 - Fully Reviewed 03/21/2022   Allergen Reaction Noted    Latex Rash 01/22/2018    Ampicillin  11/18/2004    Codeine Itching 10/04/2011    Floxin [ofloxacin] Other (See Comments) 10/04/2011    Prednisone  01/21/2014    Quinolones Other (See Comments) 11/18/2004    Statins Other (See Comments) 09/27/2013    Caffeine Palpitations and Other (See Comments) 10/04/2011      OBJECTIVE:   height is 5' 5\" (1.651 m) and weight is 187 lb (84.8 kg). Her blood pressure is 154/88 (abnormal) and her pulse is 100. Her oxygen saturation is 94%.             PHYSICAL EXAM:    CONSTITUTIONAL: She is a 65y.o.-year-old who appears her stated age. She is alert and oriented x 3 and in no acute distress. HEENT: PERRLA, EOMI. No scleral icterus. No thrush, atraumatic, normocephalic. NECK: Supple, without cervical or supraclavicular lymphadenopathy:  CARDIOVASCULAR: S1 S2 RRR. Without murmer  RESPIRATORY & CHEST: Lungs are clear to auscultation and percussion. No wheezing, no crackles. Good air movement  GASTROINTESTINAL & ABDOMEN: Soft, nontender, positive bowel sounds in all quadrants, non-distended, without hepatosplenomegaly. GENITOURINARY: Deferred. MUSCULOSKELETAL: No tenderness to palpation of the axial skeleton. There is no clubbing. No cyanosis. No edema of the lower extremities. SKIN OF BODY: No rash or jaundice. PSYCHIATRIC EVALUATION: Normal affect. Patient answers questions appropriately. HEMATOLOGIC/LYMPHATIC/ IMMUNOLOGIC: No palpable lymphadenopathy. NEUROLOGIC: Alert and oriented x 3. Groslly non-focal. Motor strength is 5+/5 in all muscle groups. The patient has a normal sensorium globally. LABS:    Lab Summary Latest Ref Rng & Units 3/11/2022 1/4/2022 12/28/2021   WBC 4.0 - 11.0 K/uL - 12. 8(H) 14. 3(H)   Hgb 12.0 - 16.0 g/dL - 13.9 13.6   Hct 36.0 - 48.0 % - 42.9 41.6   Platelets 341 - 323 K/uL - 106(L) 134(L)   Sodium 136 - 145 mmol/L 144 139 138   Potassium 3.5 - 5.1 mmol/L 4.5 4.3 4.6   BUN 7 - 20 mg/dL 13 14 21(H)   Creatinine 0.6 - 1.2 mg/dL 0.6 0.6 0.7   Glucose 70 - 99 mg/dL 101(H) 204(H) 183(H)   Calcium 8.3 - 10.6 mg/dL 9.6 8.8 8.8   Magnesium 1.80 - 2.40 mg/dL - - 2.00   Phosphorus 2.5 - 4.9 mg/dL - - 3.9   Alk Phos 40 - 129 U/L 91 - -   Albumin 3.4 - 5.0 g/dL 4.4 - -   Bilirubin 0.0 - 1.0 mg/dL 0.9 - -   AST 15 - 37 U/L 17 - -   ALT 10 - 40 U/L 15 - -   HDL cholesterol 40 - 60 mg/dL 39(L) - -   Triglycerides 0 - 150 mg/dL 214(H) - -   LDL calc <100 mg/dL 336(H) - -   VLDL calc Not Established mg/dL 43 - -   Some recent data might be hidden         IMAGING:    Narrative EXAMINATION:   TWO XRAY VIEWS OF THE CHEST       3/11/2022 10:04 am           FINDINGS:   There are chronic reticulonodular changes throughout the lungs again noted   without acute lobar consolidation, pneumothorax or effusion.  Heart size and   vascularity are stable.  There is no pneumothorax or effusion.  Mediastinal   contours are stable.           Impression   Stable nonspecific chronic interstitial changes.  No acute abnormality.             Pulmonary Functions Testing Results:          IMPRESSION AND RECOMMENDATIONS:     1. Pulmonary fibrosis ([address]  -Patient had severe COVID-19 pneumonia in December 2021.  -She has been left with bibasilar mild pulmonary fibrosis. -I compared her chest x-rays from December 2021 to March 2022. Her imaging shows improved aeration in bilateral lungs with slowly improving pulmonary fibrosis. -I will get a complete PFT in order to assess her total lung capacity.  -No active interventions at this time. 2. History of COVID-19  -This is slowly improving.  -I will give her a prescription for albuterol HFA to be used as needed for shortness of breath and cough. -Patient was instructed in the use of HFA Demonstration Device. Patient understood instructions and is aware to call the office if they have any problems or questions. Time spent was 5 mins. - Full PFT Study With Bronchodilator; Future  -From pulmonary standpoint patient can restart working. She can start working half a day for few months before moving on to a full day work. 3. Class 1 obesity due to excess calories without serious comorbidity with body mass index (BMI) of 32.0 to 32.9 in adult  -I strongly advised the patient to make efforts to lose weight. I discussed various modalities including moderate intensity intermittent exercises, diet control and bariatric surgery. If the patient loses even 10 to 15% of current body weight, it will be beneficial in improving the overall health.      4. BRAD on CPAP  -Patient to continue follow-up with Dr. Christal Hankins. Thank you Mr. Navarro  for letting me take care of this very pleasant patient. Return in about 3 months (around 6/21/2022). Lucía Galindo MD  Pulmonary Critical Care and Sleep Medicine  3/21/2022, 9:19 AM    This note was completed using dragon medical speech recognition software. Grammatical errors, random word insertions, pronoun errors and incomplete sentences are occasional consequences of this technology due to software limitations. If there are questions or concerns about the content of this note of information contained within the body of this dictation they should be addressed with the provider for clarification.

## 2022-04-01 ENCOUNTER — TELEPHONE (OUTPATIENT)
Dept: PULMONOLOGY | Age: 62
End: 2022-04-01

## 2022-04-01 NOTE — TELEPHONE ENCOUNTER
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 Rajeev Ngo suggested her discuss with physician.  She has 159 sick days and she would just need a note stating unable to work for 4 months so she can use her sick days until her shelter disibility kicks in

## 2022-04-01 NOTE — TELEPHONE ENCOUNTER
Pt called and said that doctor put her on light duty and half days. Her employer wants her to go out on disability detention, so she wants a new work order to say she can't work.     Call her to discuss    Ph # [phone]

## 2022-05-23 ENCOUNTER — HOSPITAL ENCOUNTER (OUTPATIENT)
Dept: PULMONOLOGY | Age: 62
Discharge: HOME | End: 2022-05-23
Payer: COMMERCIAL

## 2022-05-23 ENCOUNTER — TELEPHONE (OUTPATIENT)
Dept: PULMONOLOGY | Age: 62
End: 2022-05-23

## 2022-05-23 VITALS — RESPIRATION RATE: 16 BRPM | HEART RATE: 85 BPM | OXYGEN SATURATION: 97 %

## 2022-05-23 DIAGNOSIS — Z86.16 HISTORY OF COVID-19: ICD-10-CM

## 2022-05-23 LAB
DLCO %PRED: 50 %
DLCO PRED: NORMAL
DLCO/VA %PRED: NORMAL
DLCO/VA PRED: NORMAL
DLCO/VA: NORMAL
DLCO: NORMAL
EXPIRATORY TIME-POST: NORMAL
EXPIRATORY TIME: NORMAL
FEF 25-75% %CHNG: NORMAL
FEF 25-75% %PRED-POST: NORMAL
FEF 25-75% %PRED-PRE: NORMAL
FEF 25-75% PRED: NORMAL
FEF 25-75%-POST: NORMAL
FEF 25-75%-PRE: NORMAL
FEV1 %PRED-POST: 65 %
FEV1 %PRED-PRE: 58 %
FEV1 PRED: NORMAL
FEV1-POST: NORMAL
FEV1-PRE: NORMAL
FEV1/FVC %PRED-POST: NORMAL
FEV1/FVC %PRED-PRE: NORMAL
FEV1/FVC PRED: NORMAL
FEV1/FVC-POST: 114 %
FEV1/FVC-PRE: 114 %
FVC %PRED-POST: NORMAL
FVC %PRED-PRE: NORMAL
FVC PRED: NORMAL
FVC-POST: NORMAL
FVC-PRE: NORMAL
GAW %PRED: NORMAL
GAW PRED: NORMAL
GAW: NORMAL
IC %PRED: NORMAL
IC PRED: NORMAL
IC: NORMAL
MEP: NORMAL
MIP: NORMAL
MVV %PRED-PRE: NORMAL
MVV PRED: NORMAL
MVV-PRE: NORMAL
PEF %PRED-POST: NORMAL
PEF %PRED-PRE: NORMAL
PEF PRED: NORMAL
PEF%CHNG: NORMAL
PEF-POST: NORMAL
PEF-PRE: NORMAL
RAW %PRED: NORMAL
RAW PRED: NORMAL
RAW: NORMAL
RV %PRED: NORMAL
RV PRED: NORMAL
RV: NORMAL
SVC %PRED: NORMAL
SVC PRED: NORMAL
SVC: NORMAL
TLC %PRED: 64 %
TLC PRED: NORMAL
TLC: NORMAL
VA %PRED: NORMAL
VA PRED: NORMAL
VA: NORMAL
VTG %PRED: NORMAL
VTG PRED: NORMAL
VTG: NORMAL

## 2022-05-23 PROCEDURE — 94729 DIFFUSING CAPACITY: CPT

## 2022-05-23 PROCEDURE — 6370000000 HC RX 637 (ALT 250 FOR IP): Performed by: INTERNAL MEDICINE

## 2022-05-23 PROCEDURE — 94200 LUNG FUNCTION TEST (MBC/MVV): CPT

## 2022-05-23 PROCEDURE — 94060 EVALUATION OF WHEEZING: CPT

## 2022-05-23 PROCEDURE — 94760 N-INVAS EAR/PLS OXIMETRY 1: CPT

## 2022-05-23 PROCEDURE — 94726 PLETHYSMOGRAPHY LUNG VOLUMES: CPT

## 2022-05-23 RX ORDER — ALBUTEROL SULFATE 90 UG/1
4 AEROSOL, METERED RESPIRATORY (INHALATION) ONCE
Status: COMPLETED | OUTPATIENT
Start: 2022-05-23 | End: 2022-05-23

## 2022-05-23 RX ADMIN — Medication 4 PUFF: at 11:38

## 2022-05-23 ASSESSMENT — PULMONARY FUNCTION TESTS
FEV1_PERCENT_PREDICTED_POST: 65
FEV1_PERCENT_PREDICTED_PRE: 58
FEV1/FVC_POST: 114
FEV1/FVC_PRE: 114

## 2022-05-23 NOTE — TELEPHONE ENCOUNTER
Patient scheduled for [facility] on 5/31, but per Kev Liao @ [address] she is still on the waitlist. Left voicemail with patient to reschedule with us and to see if she would like her order sent to [facility]. Sent [employer] message also.

## 2022-05-24 NOTE — PROCEDURES
Pulmonary Function Testing      Patient name:  Ivan Kay     [address] [place] [address] Unit #:   [id number]   Date of test: 5/23/2022  Date of interpretation:   5/24/2022    Ms. Ivan Kay is a 65y.o. year-old non smoker. The spirometry data were acceptable and reproducible. Spirometry:  Flow volume loops were normal. The FEV-1/FVC ratio was normal. The  post-bronchodilator FEV-1 was 1.71 liters (65% of predicted), which was moderately decreased. The FVC was 1.92 liters (57% of predicted), which was decreased. Response to inhaled bronchodilators (albuterol) was not significant. Lung volumes:  Lung volumes were tested by plethysmography. The total lung capacity was 3.23 liters (64% of predicted), which was decreased. The residual volume was 1.3 for liters (70% of predicted), which was decreased. The ratio of residual volume to total lung capacity (RV/TLC) was 110, which was normal. Specific airway resistance was normal.    Diffusion capacity was found to be decreased. Interpretation:  Restrictive lung disease with reduced diffusion capacity. Partial bronchodilator response noted on spirometry.

## 2022-05-27 NOTE — TELEPHONE ENCOUNTER
LM for patient that we would cancel the appointment due to not having a machine and to call office to discuss different DME company and to reschedule appointment.

## 2022-06-02 ENCOUNTER — OFFICE VISIT (OUTPATIENT)
Dept: PULMONOLOGY | Age: 62
End: 2022-06-02
Payer: COMMERCIAL

## 2022-06-02 VITALS
DIASTOLIC BLOOD PRESSURE: 82 MMHG | BODY MASS INDEX: 32.96 KG/M2 | OXYGEN SATURATION: 94 % | HEIGHT: 65 IN | WEIGHT: 197.8 LBS | SYSTOLIC BLOOD PRESSURE: 126 MMHG | HEART RATE: 86 BPM

## 2022-06-02 DIAGNOSIS — J84.10 PULMONARY FIBROSIS (HCC): Primary | ICD-10-CM

## 2022-06-02 DIAGNOSIS — U07.1 PNEUMONIA DUE TO COVID-19 VIRUS: ICD-10-CM

## 2022-06-02 DIAGNOSIS — J12.82 PNEUMONIA DUE TO COVID-19 VIRUS: ICD-10-CM

## 2022-06-02 DIAGNOSIS — E66.09 CLASS 1 OBESITY DUE TO EXCESS CALORIES WITHOUT SERIOUS COMORBIDITY WITH BODY MASS INDEX (BMI) OF 32.0 TO 32.9 IN ADULT: ICD-10-CM

## 2022-06-02 DIAGNOSIS — G47.30 SLEEP APNEA IN ADULT: ICD-10-CM

## 2022-06-02 PROCEDURE — 99214 OFFICE O/P EST MOD 30 MIN: CPT | Performed by: INTERNAL MEDICINE

## 2022-06-02 RX ORDER — FLUTICASONE FUROATE AND VILANTEROL TRIFENATATE 100; 25 UG/1; UG/1
1 POWDER RESPIRATORY (INHALATION) DAILY
Qty: 60 EACH | Refills: 0 | Status: SHIPPED | OUTPATIENT
Start: 2022-06-02 | End: 2022-07-05

## 2022-06-02 NOTE — PROGRESS NOTES
PULMONARY OFFICE FOLLOW-UP VISIT    CONSULTING PHYSICIAN:  BRISSA Bailey CNP     REASON FOR VISIT:   Chief Complaint   Patient presents with    Follow-up     pulmonary fibrosis     Results     PFT        DATE OF VISIT: 6/2/2022    HISTORY OF PRESENT ILLNESS: 65y.o. year old female comes into the pulmonary office for a follow-up. Patient reports that her breathing has been stable for the most part. Occasionally with exertion she does get short of breath and does have associated wheezing. Has to take up to 3 to 4 puffs of albuterol in order to get relief. Has not been required to use oxygen and has been keeping good oxygen saturation for the most part. Weight has been stable. Has not been able to get the CPAP therapy as of now. Previously:  Patient was recently admitted to the hospital with COVID-19 pneumonia in December 2021. She was treated from 12/13/2021 until 87/74/4480 for Covid complicated by pneumonia and respiratory failure.  She was treated with Actemra, remdesivir, and IV steroids.  She was discharged on home oxygen. After discharge, the patient was weaned off oxygen. Her breathing has continued to improve. She is able to do more without any oxygen desaturation. Denies any chest pain or chest tightness. Intermittent dry cough present. Has not started work yet. Weight has been stable. Does have obstructive sleep apnea but not yet using the CPAP therapy. Following with Dr. Linda Casillas. REVIEW OF SYSTEMS:   8 point review of systems negative except that mentioned in the HPI.     PAST MEDICAL HISTORY:   Past Medical History:   Diagnosis Date    Arthritis     RT TOES; established with podiatry    Complex tear of lateral meniscus of right knee as current injury 9/19/2013    Complex tear of medial meniscus of right knee as current injury 9/19/2013    Coronary artery disease involving native coronary artery of native heart without angina pectoris 6/9/9869    Helicobacter daily as needed for Wheezing 18 g 5    Spacer/Aero-Holding Chambers NAVIN 1 Device by Does not apply route daily as needed (shortness of breath) 1 each 0    Misc Natural Product Nasal (PONARIS) SOLN 2 sprays by Each Nostril route every 6 hours as needed (nasal dryness or crusting) 30 mL 2    pitavastatin (LIVALO) 2 MG TABS tablet Take 1 tablet by mouth nightly 30 tablet 5    metoprolol tartrate (LOPRESSOR) 25 MG tablet Take 1 tablet by mouth 2 times daily 180 tablet 3    amLODIPine (NORVASC) 5 MG tablet Take 0.5 tablets by mouth daily 45 tablet 3    nitroGLYCERIN (NITROSTAT) 0.4 MG SL tablet Place 1 tablet under the tongue every 5 minutes as needed for Chest pain 25 tablet 3    Multiple Vitamins-Minerals (THERAPEUTIC MULTIVITAMIN-MINERALS) tablet Take 1 tablet by mouth daily      aspirin 81 MG EC tablet Take 81 mg by mouth daily. No current facility-administered medications on file prior to visit. ALLERGIES:   Allergies as of 06/02/2022 - Fully Reviewed 06/02/2022   Allergen Reaction Noted    Latex Rash 01/22/2018    Ampicillin  11/18/2004    Codeine Itching 10/04/2011    Floxin [ofloxacin] Other (See Comments) 10/04/2011    Prednisone  01/21/2014    Quinolones Other (See Comments) 11/18/2004    Statins Other (See Comments) 09/27/2013    Caffeine Palpitations and Other (See Comments) 10/04/2011      OBJECTIVE:   height is 5' 5\" (1.651 m) and weight is 197 lb 12.8 oz (89.7 kg). Her blood pressure is 126/82 and her pulse is 86. Her oxygen saturation is 94%. PHYSICAL EXAM:    CONSTITUTIONAL: She is a 65y.o.-year-old who appears her stated age. She is alert and oriented x 3 and in no acute distress. HEENT: PERRLA, EOMI. No scleral icterus. No thrush, atraumatic, normocephalic. NECK: Supple, without cervical or supraclavicular lymphadenopathy:  CARDIOVASCULAR: S1 S2 RRR. Without murmer  RESPIRATORY & CHEST: Lungs are clear to auscultation and percussion.  No wheezing, no crackles. Good air movement  GASTROINTESTINAL & ABDOMEN: Soft, nontender, positive bowel sounds in all quadrants, non-distended, without hepatosplenomegaly. GENITOURINARY: Deferred. MUSCULOSKELETAL: No tenderness to palpation of the axial skeleton. There is no clubbing. No cyanosis. No edema of the lower extremities. SKIN OF BODY: No rash or jaundice. PSYCHIATRIC EVALUATION: Normal affect. Patient answers questions appropriately. HEMATOLOGIC/LYMPHATIC/ IMMUNOLOGIC: No palpable lymphadenopathy. NEUROLOGIC: Alert and oriented x 3. Groslly non-focal. Motor strength is 5+/5 in all muscle groups. The patient has a normal sensorium globally. LABS:    Lab Summary Latest Ref Rng & Units 3/11/2022 1/4/2022   WBC 4.0 - 11.0 K/uL - 12. 8(H)   Hgb 12.0 - 16.0 g/dL - 13.9   Hct 36.0 - 48.0 % - 42.9   Platelets 439 - 100 K/uL - 106(L)   Sodium 136 - 145 mmol/L 144 139   Potassium 3.5 - 5.1 mmol/L 4.5 4.3   BUN 7 - 20 mg/dL 13 14   Creatinine 0.6 - 1.2 mg/dL 0.6 0.6   Glucose 70 - 99 mg/dL 101(H) 204(H)   Calcium 8.3 - 10.6 mg/dL 9.6 8.8   Alk Phos 40 - 129 U/L 91 -   Albumin 3.4 - 5.0 g/dL 4.4 -   Bilirubin 0.0 - 1.0 mg/dL 0.9 -   AST 15 - 37 U/L 17 -   ALT 10 - 40 U/L 15 -   HDL cholesterol 40 - 60 mg/dL 39(L) -   Triglycerides 0 - 150 mg/dL 214(H) -   LDL calc <100 mg/dL 336(H) -   VLDL calc Not Established mg/dL 43 -   Some recent data might be hidden         IMAGING:    Narrative   EXAMINATION:   TWO XRAY VIEWS OF THE CHEST       3/11/2022 10:04 am           FINDINGS:   There are chronic reticulonodular changes throughout the lungs again noted   without acute lobar consolidation, pneumothorax or effusion.  Heart size and   vascularity are stable.  There is no pneumothorax or effusion.  Mediastinal   contours are stable.           Impression   Stable nonspecific chronic interstitial changes.  No acute abnormality.             Pulmonary Functions Testing Results:    Pulmonary Function Testing 5/23/2022     Spirometry:  Flow volume loops were normal. The FEV-1/FVC ratio was normal. The  post-bronchodilator FEV-1 was 1.71 liters (65% of predicted), which was moderately decreased. The FVC was 1.92 liters (57% of predicted), which was decreased. Response to inhaled bronchodilators (albuterol) was not significant.     Lung volumes:  Lung volumes were tested by plethysmography. The total lung capacity was 3.23 liters (64% of predicted), which was decreased. The residual volume was 1.3 for liters (70% of predicted), which was decreased. The ratio of residual volume to total lung capacity (RV/TLC) was 110, which was normal. Specific airway resistance was normal.     Diffusion capacity was found to be decreased.        Interpretation:  Restrictive lung disease with reduced diffusion capacity. Partial bronchodilator response noted on spirometry. IMPRESSION AND RECOMMENDATIONS:     1. Pulmonary fibrosis ([address]  -Patient had severe COVID-19 pneumonia in December 2021.  -She has been left with bibasilar mild pulmonary fibrosis. -I compared her chest x-rays from December 2021 to March 2022. Her imaging shows improved aeration in bilateral lungs with slowly improving pulmonary fibrosis. -Complete PFT does show reduced total lung capacity as well as diffusion capacity.  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 2. History of COVID-19  -This is slowly improving.  -She has been getting partial relief with albuterol.  -At this time I will start her on Breo Ellipta 100/25 mcg 1 puff daily.  -Patient was instructed in the use of DPI Demonstration Device. Patient understood instructions and is aware to call the office if they have any problems or questions. Time spent was 5 mins.  -She has applied for disability as she is unable to perform her occupational duties.     3. Class 1 obesity due to excess calories without serious comorbidity with body mass index (BMI) of 32.0 to 32.9 in adult  -I strongly advised the patient to make efforts to lose weight. I discussed various modalities including moderate intensity intermittent exercises, diet control and bariatric surgery. If the patient loses even 10 to 15% of current body weight, it will be beneficial in improving the overall health. 4. BRAD on CPAP  -Patient to continue follow-up with Dr. Nish Nogueira. Thank you Mr. Navarro  for letting me take care of this very pleasant patient. Return in about 6 months (around 12/2/2022). Mike Price MD  Pulmonary Critical Care and Sleep Medicine  6/2/2022, 3:32 PM    This note was completed using dragon medical speech recognition software. Grammatical errors, random word insertions, pronoun errors and incomplete sentences are occasional consequences of this technology due to software limitations. If there are questions or concerns about the content of this note of information contained within the body of this dictation they should be addressed with the provider for clarification.

## 2022-06-25 NOTE — PROGRESS NOTES
4/15/2020    Chief Complaint   Patient presents with    6 Month Follow-Up    Hypertension    Hyperlipidemia       TELEHEALTH EVALUATION -- Audio/Visual (During INIBT-65 public health emergency)    HPI:    Kristi Boone (:  1960) has requested an audio/video evaluation for the following concern(s): CAD, HTN, Atul Perez is 62 y.o. female who has a history of non-obst CAD, HTN and hyperlipidemia. Her other hx includes: thyroid nodule (biopsy > benign)    Before her Cath in May she remembers feeling significant chest pain that radiated to her shoulder and down her arm. She initially thought it was a pulled muscle but since pain was lingering for five weeks she decided to get it checked out. She denies any recurrence of these symptoms. Today, she complains of heaviness in her chest when she works long hours. She works in [employer] and recently has been doing a lot of weed eating and walking around a lot. The last episode occurred last week when she was working for 7 hours straight. She recalls her chest becoming heavy that stayed localized in chest. She had no associated symptoms. Resolves with rest. These symptoms are not new or changed in intensity. She does not notice any heaviness when doing routine work around the house. Denies any SOB/SOTO. Does not have orthopnea/PND. No swelling and weight remains stable. She did not start Repatha that was prescribed last OV because she was she was afraid of the potential side effects. She has decreased her Norvasc to 1/2 tab daily because she was having frequent HA's which have resolved. Home BP been running ~121-137/ . These symptoms show no change since the last OV. With regard to medication therapy the patient has been compliant with prescribed regimen. They have tolerated therapy to date. Prior to Visit Medications    Medication Sig Taking?  Authorizing Provider   metoprolol tartrate (LOPRESSOR) 25 MG tablet TAKE ONE TABLET Pt called in stating she tested positive for COVID today but her sx's started on Monday 6 20 22  She is c/o headaches, nausea, ST, slight cough and congestion  Pt would like a Antiviral called in for her  She denies Fevers and SOB  TT out to on call provider to make aware  Per on call provider pt is out of the window for Paxlovid and is not having any SOB  Pt is to continue OTC medications for now and follow up with her PCP on Monday  Pt made aware and verbalized understanding  BY MOUTH TWICE A DAY Yes Pam Hawkins MD   amLODIPine (NORVASC) 5 MG tablet Take 1 tablet by mouth daily  Patient taking differently: Take 5 mg by mouth daily Patient taking half tablet daily due to headaches Yes Cinthya Lowry APRN - CNP   Multiple Vitamins-Minerals (THERAPEUTIC MULTIVITAMIN-MINERALS) tablet Take 1 tablet by mouth daily Yes Historical Provider, MD   aspirin 81 MG EC tablet Take 81 mg by mouth daily.    Yes Historical Provider, MD       Social History     Tobacco Use    Smoking status: Former Smoker     Packs/day: 0.25     Years: 7.00     Pack years: 1.75     Types: Cigarettes     Last attempt to quit: 1988     Years since quittin.5    Smokeless tobacco: Never Used    Tobacco comment: quit    Substance Use Topics    Alcohol use: No    Drug use: No        Allergies   Allergen Reactions    Latex Rash    Ampicillin     Codeine Itching    Floxin [Ofloxacin] Other (See Comments)     Becomes angry    Prednisone      Causes sores in mouth      Quinolones Other (See Comments)    Statins Other (See Comments)     MUSCLE CRAMPS    Welchol; crestor ; lipitor ; zocor ; zetia    Caffeine Palpitations and Other (See Comments)     Chest pain   ,   Past Medical History:   Diagnosis Date    Arthritis     RT TOES; established with podiatry    Hyperlipidemia     DIET CONTROL   ,   Past Surgical History:   Procedure Laterality Date     SECTION      breech    COLONOSCOPY      one polyp removed    HYSTERECTOMY      endometriosis    KNEE ARTHROSCOPY Right 13    RIGHT KNEE ARTHROSCOPE, PARTIAL MEDIAL AND LATERAL MENISCECTOMY, SYNOVECTOMY, CHONDROPLASTY OF MEDIAL FEMORAL CONDYLE    KNEE ARTHROSCOPY Left 2014    LEFT KNEE ARTHROSCOPY WITH PARTIAL MEDIAL MENISCECTOMY,    KNEE ARTHROSCOPY Right 2018    ACTUAL PROCEDURE: RIGHT KNEE ARTHROSCOPY, PARTIAL MEDIAL MENISCECTOMY,CHONDROPLASTY, SYNOVECTOMY      KNEE SURGERY  13    RIGHT KNEE ARTHROSCOPE, (10/4/19)  ~Did not start Repatha    ~intolerant to most statins   ~vitamin D level low 26 Aug '19 > 2000 U recommended         3. Coronary artery disease involving native coronary artery of native heart without angina pectoris   ~Stable; denies recurrence of her typical angina symptoms   ~Has had episode of chest heaviness after working 7 hours   ~stable with non-obst disease of the RCA & CX by cath May '19  ~ASA / BB              Plan:     1. Start Livalo 2mg       ~Repeat lipids and CMP in 6-8wks       ~consider referral to endocrinology if LDL remains suboptimal/elevatged  2. Nitro tabs PRN for chest discomfort   3. Stress echo by the end of year for surveillance   4. Follow up in 6-8 months      Return in about 6 months (around 10/15/2020). Aziza Gomez is a 62 y.o. female being evaluated by a Virtual Visit (video visit) encounter to address concerns as mentioned above. A caregiver was present when appropriate. Due to this being a TeleHealth encounter (During LYJ-28 public health emergency), evaluation of the following organ systems was limited: Vitals/Constitutional/EENT/Resp/CV/GI//MS/Neuro/Skin/Heme-Lymph-Imm. Pursuant to the emergency declaration under the [address] [place] [address] authority and the [employer] Act, this Virtual Visit was conducted with patient's (and/or legal guardian's) consent, to reduce the patient's risk of exposure to COVID-19 and provide necessary medical care. The patient (and/or legal guardian) has also been advised to contact this office for worsening conditions or problems, and seek emergency medical treatment and/or call 911 if deemed necessary. Services were provided through a video synchronous discussion virtually to substitute for in-person clinic visit. Patient located at home and provider located at [facility].     Overall the patient is stable from CV standpoint    I have

## 2022-07-05 ENCOUNTER — TELEMEDICINE (OUTPATIENT)
Dept: INTERNAL MEDICINE CLINIC | Age: 62
End: 2022-07-05
Payer: COMMERCIAL

## 2022-07-05 DIAGNOSIS — J04.0 LARYNGITIS: ICD-10-CM

## 2022-07-05 DIAGNOSIS — R05.9 COUGH: ICD-10-CM

## 2022-07-05 DIAGNOSIS — J40 BRONCHITIS: Primary | ICD-10-CM

## 2022-07-05 PROCEDURE — 99213 OFFICE O/P EST LOW 20 MIN: CPT | Performed by: NURSE PRACTITIONER

## 2022-07-05 RX ORDER — METHYLPREDNISOLONE 4 MG/1
TABLET ORAL
Qty: 1 KIT | Refills: 0 | Status: SHIPPED | OUTPATIENT
Start: 2022-07-05 | End: 2022-07-11

## 2022-07-05 RX ORDER — BENZONATATE 100 MG/1
100 CAPSULE ORAL EVERY 6 HOURS PRN
Qty: 30 CAPSULE | Refills: 0 | Status: SHIPPED | OUTPATIENT
Start: 2022-07-05 | End: 2022-07-15

## 2022-07-05 RX ORDER — FLUTICASONE FUROATE AND VILANTEROL 100; 25 UG/1; UG/1
POWDER RESPIRATORY (INHALATION)
Qty: 60 EACH | Refills: 5 | Status: SHIPPED | OUTPATIENT
Start: 2022-07-05

## 2022-07-05 ASSESSMENT — ENCOUNTER SYMPTOMS
COUGH: 1
VOICE CHANGE: 1
SORE THROAT: 0
SHORTNESS OF BREATH: 0

## 2022-07-05 NOTE — PROGRESS NOTES
2022    TELEHEALTH EVALUATION -- Audio/Visual (During LOCVT-99 public health emergency)    HPI:    Adam Santiago (:  1960) has requested an audio/video evaluation for the following concern(s):    Feels good but has laryngitis and cough. She does not feel ill. She denies fever. Cough and hoarseness for a few weeks. Cough worse at night. She is using inhaler. She has been able to rest her voice. Review of Systems   Constitutional: Negative for fever. HENT: Positive for voice change. Negative for sore throat. Respiratory: Positive for cough. Negative for shortness of breath. Prior to Visit Medications    Medication Sig Taking? Authorizing Provider   fluticasone-vilanterol (BREO ELLIPTA) 100-25 MCG/INH AEPB inhaler INHALE ONE DOSE BY MOUTH DAILY Yes Lashaun Sharma MD   albuterol sulfate HFA (VENTOLIN HFA) 108 (90 Base) MCG/ACT inhaler Inhale 2 puffs into the lungs 4 times daily as needed for Wheezing Yes Lashaun Sharma MD   Spacer/Aero-Holding Erven Lemming 1 Device by Does not apply route daily as needed (shortness of breath) Yes Lashaun Sharma MD   Misc Natural Product Nasal (PONARIS) SOLN 2 sprays by Each Nostril route every 6 hours as needed (nasal dryness or crusting) Yes Sulaiman Quispe MD   pitavastatin (LIVALO) 2 MG TABS tablet Take 1 tablet by mouth nightly Yes BRISSA Eastman CNP   metoprolol tartrate (LOPRESSOR) 25 MG tablet Take 1 tablet by mouth 2 times daily Yes BRISSA Colmenares CNP   amLODIPine (NORVASC) 5 MG tablet Take 0.5 tablets by mouth daily Yes BRISSA Colmenares CNP   nitroGLYCERIN (NITROSTAT) 0.4 MG SL tablet Place 1 tablet under the tongue every 5 minutes as needed for Chest pain Yes BRISSA Harmon CNP   Multiple Vitamins-Minerals (THERAPEUTIC MULTIVITAMIN-MINERALS) tablet Take 1 tablet by mouth daily Yes Historical Provider, MD   aspirin 81 MG EC tablet Take 81 mg by mouth daily.    Yes Historical Provider, MD       Social History     Tobacco Use    Smoking status: Former Smoker     Packs/day: 0.25     Years: 7.00     Pack years: 1.75     Types: Cigarettes     Quit date: 1988     Years since quittin.8    Smokeless tobacco: Never Used    Tobacco comment: quit    Vaping Use    Vaping Use: Never used   Substance Use Topics    Alcohol use: No    Drug use: No            PHYSICAL EXAMINATION:  [ INSTRUCTIONS:  \"[x]\" Indicates a positive item  \"[]\" Indicates a negative item  -- DELETE ALL ITEMS NOT EXAMINED]  Vital Signs: (As obtained by patient/caregiver or practitioner observation)    Blood pressure-  Heart rate-    Respiratory rate-    Temperature-  Pulse oximetry-     Constitutional: [x] Appears well-developed and well-nourished [x] No apparent distress      [] Abnormal-   Mental status  [x] Alert and awake  [x] Oriented to person/place/time [x]Able to follow commands      Eyes:  EOM    [x]  Normal  [] Abnormal-  Sclera  [x]  Normal  [] Abnormal -         Discharge [x]  None visible  [] Abnormal -    HENT:   [x] Normocephalic, atraumatic. [x] Abnormal - laryngitis   [x] Mouth/Throat: Mucous membranes are moist.     External Ears [x] Normal  [] Abnormal-     Neck: [x] No visualized mass     Pulmonary/Chest: [x] Respiratory effort normal.  [x] No visualized signs of difficulty breathing or respiratory distress        [] Abnormal-      Musculoskeletal:   [x] Normal gait with no signs of ataxia         [x] Normal range of motion of neck        [] Abnormal-       Neurological:        [x] No Facial Asymmetry (Cranial nerve 7 motor function) (limited exam to video visit)          [x] No gaze palsy        [] Abnormal-         Skin:        [x] No significant exanthematous lesions or discoloration noted on facial skin         [] Abnormal-            Psychiatric:       [x] Normal Affect [x] No Hallucinations        [] Abnormal-     Other pertinent observable physical exam findings-         ASSESSMENT/PLAN:  1.  Bronchitis  - No clear evidence of bacterial infection  - Suspect bronchitis given lack of fever, cough worse at night  - Symptomatic tx. Follow-up if worsening, failing to improve  - methylPREDNISolone (MEDROL, SEVEN,) 4 MG tablet; Take as directed  Dispense: 1 kit; Refill: 0  - benzonatate (TESSALON PERLES) 100 MG capsule; Take 1 capsule by mouth every 6 hours as needed for Cough  Dispense: 30 capsule; Refill: 0    2. Laryngitis  - No clear evidence of bacterial infection  - Suspect laryngitis given lack of fever  - Symptomatic tx. Follow-up if worsening, failing to improve  - methylPREDNISolone (MEDROL, SEVEN,) 4 MG tablet; Take as directed  Dispense: 1 kit; Refill: 0  - benzonatate (TESSALON PERLES) 100 MG capsule; Take 1 capsule by mouth every 6 hours as needed for Cough  Dispense: 30 capsule; Refill: 0    3. Cough  - methylPREDNISolone (MEDROL, SEVEN,) 4 MG tablet; Take as directed  Dispense: 1 kit; Refill: 0  - benzonatate (TESSALON PERLES) 100 MG capsule; Take 1 capsule by mouth every 6 hours as needed for Cough  Dispense: 30 capsule; Refill: 0      No follow-ups on file. Raheemdamon Panda, was evaluated through a synchronous (real-time) audio-video encounter. The patient (or guardian if applicable) is aware that this is a billable service, which includes applicable co-pays. This Virtual Visit was conducted with patient's (and/or legal guardian's) consent. The visit was conducted pursuant to the emergency declaration under the [facility], [address] [place] [address] authority and the [employer] Act. Patient identification was verified, and a caregiver was present when appropriate. The patient was located at Home: [address] [place] [address] [phone]. Provider was located at [facility] (Appt Dept): [address] [facility],  [address] [facility].        Total time spent on this encounter: Not billed by time    --BRISSA So - CNP on 7/5/2022 at 3:35 PM    An electronic signature was used to authenticate this note.

## 2022-07-26 ENCOUNTER — OFFICE VISIT (OUTPATIENT)
Dept: INTERNAL MEDICINE CLINIC | Age: 62
End: 2022-07-26
Payer: COMMERCIAL

## 2022-07-26 VITALS
WEIGHT: 201 LBS | BODY MASS INDEX: 33.49 KG/M2 | HEART RATE: 94 BPM | SYSTOLIC BLOOD PRESSURE: 126 MMHG | DIASTOLIC BLOOD PRESSURE: 76 MMHG | OXYGEN SATURATION: 96 % | HEIGHT: 65 IN

## 2022-07-26 DIAGNOSIS — R30.0 DYSURIA: ICD-10-CM

## 2022-07-26 DIAGNOSIS — N30.00 ACUTE CYSTITIS WITHOUT HEMATURIA: Primary | ICD-10-CM

## 2022-07-26 LAB
BILIRUBIN, POC: ABNORMAL
BLOOD URINE, POC: ABNORMAL
CLARITY, POC: CLEAR
COLOR, POC: YELLOW
GLUCOSE URINE, POC: ABNORMAL
KETONES, POC: ABNORMAL
LEUKOCYTE EST, POC: ABNORMAL
NITRITE, POC: ABNORMAL
PH, POC: 5.5
PROTEIN, POC: ABNORMAL
SPECIFIC GRAVITY, POC: 1.02
UROBILINOGEN, POC: 0.2

## 2022-07-26 PROCEDURE — 99213 OFFICE O/P EST LOW 20 MIN: CPT | Performed by: FAMILY MEDICINE

## 2022-07-26 PROCEDURE — 81002 URINALYSIS NONAUTO W/O SCOPE: CPT | Performed by: FAMILY MEDICINE

## 2022-07-26 RX ORDER — SULFAMETHOXAZOLE AND TRIMETHOPRIM 800; 160 MG/1; MG/1
1 TABLET ORAL 2 TIMES DAILY
Qty: 10 TABLET | Refills: 0 | Status: SHIPPED | OUTPATIENT
Start: 2022-07-26 | End: 2022-07-31

## 2022-07-26 NOTE — PATIENT INSTRUCTIONS
No quick diets:  losing weight is a marathon and not a sprint. Eat clean or healthy 5-6 days per week and splurge 1-2 days a week. Minimum servings 3 veggies and 2 fruits per day. Try to eat a rainbow of colors over several weeks. Limit or avoid sugar. Don't drink your calories unless they are nutritious  (juice also has a lot of sugar so limit it too)  Get enough sleep or rest  --- most adults need at least 7 hours. Try to move more. Try not to obsess about your weight. It just causes more stress. If hungry between meals, drink water first.  Best overall diets are The DASH diet or the Mediterranean diet.

## 2022-07-26 NOTE — PROGRESS NOTES
2022    Charo Manzanares (:  1960) is a 65 y.o. female, here for evaluation of the following chief complaint(s):  Dysuria (Started 2 days ago. )        ASSESSMENT/PLAN:    1. Dysuria  - POCT Urinalysis no Micro  - Culture, Urine    Results for POC orders placed in visit on 22   POCT Urinalysis no Micro   Result Value Ref Range    Color, UA yellow     Clarity, UA clear     Glucose, UA POC neg     Bilirubin, UA neg     Ketones, UA neg     Spec Grav, UA 1.025     Blood, UA POC trace     pH, UA 5.5     Protein, UA POC neg     Urobilinogen, UA 0.2     Leukocytes, UA small     Nitrite, UA neg          2. Acute cystitis without hematuria  - push fluids  - sulfamethoxazole-trimethoprim (BACTRIM DS) 800-160 MG per tablet; Take 1 tablet by mouth in the morning and 1 tablet before bedtime. Do all this for 5 days. Dispense: 10 tablet; Refill: 0      FOLLOW UP:  Call or return to clinic prn if these symptoms worsen or fail to improve as anticipated. HPI  C/o burning urine and suprapubic pressure x 3 days. No fever. No flank pain. Not prone to UTIs but has had in past.        Outpatient Medications Marked as Taking for the 22 encounter (Office Visit) with Varsha Peñaloza MD   Medication Sig Dispense Refill    fluticasone-vilanterol (BREO ELLIPTA) 100-25 MCG/INH AEPB inhaler INHALE ONE DOSE BY MOUTH DAILY 60 each 5    albuterol sulfate HFA (VENTOLIN HFA) 108 (90 Base) MCG/ACT inhaler Inhale 2 puffs into the lungs 4 times daily as needed for Wheezing 18 g 5    metoprolol tartrate (LOPRESSOR) 25 MG tablet Take 1 tablet by mouth 2 times daily 180 tablet 3    amLODIPine (NORVASC) 5 MG tablet Take 0.5 tablets by mouth daily 45 tablet 3    Multiple Vitamins-Minerals (THERAPEUTIC MULTIVITAMIN-MINERALS) tablet Take 1 tablet by mouth daily      aspirin 81 MG EC tablet Take 81 mg by mouth daily.            Review of Systems    OBJECTIVE:    Vitals:    22 1046   BP: 126/76   Pulse: 94 SpO2: 96%   Weight: 201 lb (91.2 kg)   Height: 5' 5\" (1.651 m)       Physical Exam  Vitals reviewed. Constitutional:       General: She is not in acute distress. Appearance: She is well-developed. She is not diaphoretic. Cardiovascular:      Rate and Rhythm: Normal rate and regular rhythm. Heart sounds: Normal heart sounds. Pulmonary:      Effort: Pulmonary effort is normal.      Breath sounds: Normal breath sounds. No rales. Abdominal:      General: Bowel sounds are normal. There is no distension or abdominal bruit. Palpations: Abdomen is soft. There is no mass. Tenderness: There is abdominal tenderness in the suprapubic area. There is no right CVA tenderness, left CVA tenderness, guarding or rebound. Hernia: No hernia is present. Skin:     General: Skin is warm and dry. Coloration: Skin is not pale. Findings: No rash. Psychiatric:         Behavior: Behavior normal.       Results for POC orders placed in visit on 07/26/22   POCT Urinalysis no Micro   Result Value Ref Range    Color, UA yellow     Clarity, UA clear     Glucose, UA POC neg     Bilirubin, UA neg     Ketones, UA neg     Spec Grav, UA 1.025     Blood, UA POC trace     pH, UA 5.5     Protein, UA POC neg     Urobilinogen, UA 0.2     Leukocytes, UA small     Nitrite, UA neg            An electronic signature was used to authenticate this note.     Roberto Carlos Crews MD

## 2022-07-28 LAB
ORGANISM: ABNORMAL
URINE CULTURE, ROUTINE: ABNORMAL

## 2022-09-01 DIAGNOSIS — I10 HYPERTENSION, ESSENTIAL: ICD-10-CM

## 2022-09-01 RX ORDER — AMLODIPINE BESYLATE 5 MG/1
TABLET ORAL
Qty: 45 TABLET | Refills: 3 | Status: SHIPPED | OUTPATIENT
Start: 2022-09-01

## 2022-09-01 NOTE — TELEPHONE ENCOUNTER
Recent Visits  Date Type Provider Dept   07/26/22 Office Visit Rishabh Shoemaker MD [facility]   03/14/22 Office Visit BRISSA Patel CNP [facility]   01/24/22 Office Visit BRISSA Patel CNP [facility]   01/04/22 Office Visit Spencer Vallejo MD [facility]   07/22/21 Office Visit BRISSA Patel CNP [facility]   Showing recent visits within past 540 days with a meds authorizing provider and meeting all other requirements  Future Appointments  Date Type Provider Dept   09/15/22 Appointment BRISSA Patel CNP [facility]   Showing future appointments within next 150 days with a meds authorizing provider and meeting all other requirements     7/26/2022

## 2022-09-15 ENCOUNTER — OFFICE VISIT (OUTPATIENT)
Dept: INTERNAL MEDICINE CLINIC | Age: 62
End: 2022-09-15
Payer: COMMERCIAL

## 2022-09-15 VITALS
BODY MASS INDEX: 34.32 KG/M2 | HEART RATE: 85 BPM | WEIGHT: 206 LBS | OXYGEN SATURATION: 96 % | HEIGHT: 65 IN | SYSTOLIC BLOOD PRESSURE: 122 MMHG | DIASTOLIC BLOOD PRESSURE: 80 MMHG

## 2022-09-15 DIAGNOSIS — Z78.9 STATIN INTOLERANCE: ICD-10-CM

## 2022-09-15 DIAGNOSIS — E78.2 MIXED HYPERCHOLESTEROLEMIA AND HYPERTRIGLYCERIDEMIA: ICD-10-CM

## 2022-09-15 DIAGNOSIS — Z23 NEED FOR PNEUMOCOCCAL VACCINE: ICD-10-CM

## 2022-09-15 DIAGNOSIS — R73.01 IMPAIRED FASTING GLUCOSE: ICD-10-CM

## 2022-09-15 DIAGNOSIS — I10 HYPERTENSION, ESSENTIAL: ICD-10-CM

## 2022-09-15 DIAGNOSIS — K21.9 GASTROESOPHAGEAL REFLUX DISEASE WITHOUT ESOPHAGITIS: ICD-10-CM

## 2022-09-15 DIAGNOSIS — J84.9 INTERSTITIAL LUNG DISEASE (HCC): ICD-10-CM

## 2022-09-15 DIAGNOSIS — Z23 NEED FOR SHINGLES VACCINE: ICD-10-CM

## 2022-09-15 DIAGNOSIS — R73.01 IMPAIRED FASTING GLUCOSE: Primary | ICD-10-CM

## 2022-09-15 LAB
CHOLESTEROL, TOTAL: 442 MG/DL (ref 0–199)
HDLC SERPL-MCNC: 45 MG/DL (ref 40–60)
LDL CHOLESTEROL CALCULATED: 344 MG/DL
TRIGL SERPL-MCNC: 263 MG/DL (ref 0–150)
VLDLC SERPL CALC-MCNC: 53 MG/DL

## 2022-09-15 PROCEDURE — 90471 IMMUNIZATION ADMIN: CPT | Performed by: NURSE PRACTITIONER

## 2022-09-15 PROCEDURE — 99214 OFFICE O/P EST MOD 30 MIN: CPT | Performed by: NURSE PRACTITIONER

## 2022-09-15 PROCEDURE — 90677 PCV20 VACCINE IM: CPT | Performed by: NURSE PRACTITIONER

## 2022-09-15 PROCEDURE — 90750 HZV VACC RECOMBINANT IM: CPT | Performed by: NURSE PRACTITIONER

## 2022-09-15 PROCEDURE — 90472 IMMUNIZATION ADMIN EACH ADD: CPT | Performed by: NURSE PRACTITIONER

## 2022-09-16 LAB
ESTIMATED AVERAGE GLUCOSE: 116.9 MG/DL
HBA1C MFR BLD: 5.7 %

## 2022-09-22 DIAGNOSIS — I10 HYPERTENSION, ESSENTIAL: ICD-10-CM

## 2022-09-22 DIAGNOSIS — I25.10 CORONARY ARTERY DISEASE INVOLVING NATIVE CORONARY ARTERY OF NATIVE HEART WITHOUT ANGINA PECTORIS: Chronic | ICD-10-CM

## 2022-09-23 ASSESSMENT — ENCOUNTER SYMPTOMS
WHEEZING: 0
COUGH: 0
GASTROINTESTINAL NEGATIVE: 1

## 2022-09-23 NOTE — PROGRESS NOTES
SUBJECTIVE:    Patient ID: Charo Manzanares is a 65 y.o. female. CC: History of Covid, interstitial lung disease, hypertension, hyperlipidemia, statin intolerance    HPI: The patient was admitted from 12/13/2021 until 14/44/3781 for Covid complicated by pneumonia and respiratory failure. She was treated with Actemra, remdesivir, and IV steroids. She was discharged on home oxygen. After discharge, the patient was weaned off oxygen. She checked her oxygen level recently and it was 95-96% on room air. Overall, the patient states she is feeling much improved. She continues to experience lightheadedness with activity. She feels this is getting better. We discussed her A1c of 7.0 in the hospital.  This is in the context of severe illness and steroids. Previous A1c was consistent with prediabetes.          Past Medical History:   Diagnosis Date    Arthritis     RT TOES; established with podiatry    Complex tear of lateral meniscus of right knee as current injury 9/19/2013    Complex tear of medial meniscus of right knee as current injury 9/19/2013    Coronary artery disease involving native coronary artery of native heart without angina pectoris 7/3/5894    Helicobacter pylori antibody positive 12/27/2015    Hyperlipidemia     DIET CONTROL    Hypertension, essential 1/7/2021    Mallet fracture, closed, initial encounter 8/29/2019    Patellofemoral syndrome 2/17/2014    Synovitis of knee 1/9/2014    Tear of medial cartilage or meniscus of knee, current 9/8/2014        Current Outpatient Medications   Medication Sig Dispense Refill    amLODIPine (NORVASC) 5 MG tablet TAKE 1/2 TABLET BY MOUTH DAILY 45 tablet 3    fluticasone-vilanterol (BREO ELLIPTA) 100-25 MCG/INH AEPB inhaler INHALE ONE DOSE BY MOUTH DAILY 60 each 5    albuterol sulfate HFA (VENTOLIN HFA) 108 (90 Base) MCG/ACT inhaler Inhale 2 puffs into the lungs 4 times daily as needed for Wheezing 18 g 5    Multiple Vitamins-Minerals (THERAPEUTIC MULTIVITAMIN-MINERALS) tablet Take 1 tablet by mouth daily      aspirin 81 MG EC tablet Take 81 mg by mouth daily. metoprolol tartrate (LOPRESSOR) 25 MG tablet TAKE ONE TABLET BY MOUTH TWICE A  tablet 3    Spacer/Aero-Holding Chambers NAVIN 1 Device by Does not apply route daily as needed (shortness of breath) (Patient not taking: Reported on 7/26/2022) 1 each 0    nitroGLYCERIN (NITROSTAT) 0.4 MG SL tablet Place 1 tablet under the tongue every 5 minutes as needed for Chest pain (Patient not taking: Reported on 7/26/2022) 25 tablet 3     No current facility-administered medications for this visit. Review of Systems   Constitutional:  Positive for fatigue. Negative for fever. Respiratory:  Negative for cough and wheezing. Cardiovascular: Negative. Gastrointestinal: Negative. Genitourinary: Negative. Musculoskeletal: Negative. Neurological: Negative. Psychiatric/Behavioral: Negative. All other systems reviewed and are negative. OBJECTIVE:  Physical Exam  Vitals reviewed. Constitutional:       General: She is not in acute distress. Appearance: She is well-developed. She is not diaphoretic. HENT:      Head: Normocephalic and atraumatic. Eyes:      General: No scleral icterus. Conjunctiva/sclera: Conjunctivae normal.   Neck:      Vascular: No JVD. Cardiovascular:      Rate and Rhythm: Normal rate and regular rhythm. Pulmonary:      Effort: Pulmonary effort is normal. No respiratory distress. Breath sounds: Normal breath sounds. No wheezing. Abdominal:      General: There is no distension. Palpations: Abdomen is soft. Tenderness: There is no abdominal tenderness. There is no guarding or rebound. Musculoskeletal:         General: Normal range of motion. Cervical back: Neck supple. Skin:     General: Skin is warm and dry. Neurological:      Mental Status: She is alert and oriented to person, place, and time.    Psychiatric:

## 2022-09-28 ENCOUNTER — TELEMEDICINE (OUTPATIENT)
Dept: INTERNAL MEDICINE CLINIC | Age: 62
End: 2022-09-28
Payer: COMMERCIAL

## 2022-09-28 DIAGNOSIS — J06.9 VIRAL URI: Primary | ICD-10-CM

## 2022-09-28 PROCEDURE — 99213 OFFICE O/P EST LOW 20 MIN: CPT | Performed by: NURSE PRACTITIONER

## 2022-09-28 NOTE — PROGRESS NOTES
2022    TELEHEALTH EVALUATION -- Audio/Visual (During VGPRI-98 public health emergency)    Chief Complaint   Patient presents with    Sinus Problem     4 days. Cough, congestion, headache. Negative Covid test yesterday        HPI:    Raheem Panda (:  1960) has requested an audio/video evaluation for the following concern(s):    VV for 3-4 day history of nasal congestion, headaches, fatigue, cough (mild), mild aches   Denies fever, chills  She denies any SOB but is having lots of nasal congestion and having to remove cpap at night. Had a negative home covid test yesterday   Using tylenol which helps with symptoms   Using inhalers as prescribed, not needing albuterol more frequently   Denies any known exposures to flu or covid     Review of Systems  Negative other than HPI     Prior to Visit Medications    Medication Sig Taking? Authorizing Provider   metoprolol tartrate (LOPRESSOR) 25 MG tablet TAKE ONE TABLET BY MOUTH TWICE A DAY Yes BRISSA So CNP   amLODIPine (NORVASC) 5 MG tablet TAKE 1/2 TABLET BY MOUTH DAILY Yes BRISSA So CNP   fluticasone-vilanterol (BREO ELLIPTA) 100-25 MCG/INH AEPB inhaler INHALE ONE DOSE BY MOUTH DAILY Yes Foster Robison MD   albuterol sulfate HFA (VENTOLIN HFA) 108 (90 Base) MCG/ACT inhaler Inhale 2 puffs into the lungs 4 times daily as needed for Wheezing Yes Foster Robison MD   Multiple Vitamins-Minerals (THERAPEUTIC MULTIVITAMIN-MINERALS) tablet Take 1 tablet by mouth daily Yes Historical Provider, MD   aspirin 81 MG EC tablet Take 81 mg by mouth daily.    Yes Historical Provider, MD   Spacer/Aero-Holding Yvonne Mcwilliams 1 Device by Does not apply route daily as needed (shortness of breath)  Patient not taking: No sig reported  Foster Robison MD   nitroGLYCERIN (NITROSTAT) 0.4 MG SL tablet Place 1 tablet under the tongue every 5 minutes as needed for Chest pain  Patient not taking: No sig reported  BRISSA Marte CNP     Social History     Tobacco Use    Smoking status: Former     Packs/day: 0.25     Years: 7.00     Pack years: 1.75     Types: Cigarettes     Quit date: 1988     Years since quittin.0    Smokeless tobacco: Never    Tobacco comments:     quit    Vaping Use    Vaping Use: Never used   Substance Use Topics    Alcohol use: No    Drug use: No          PHYSICAL EXAMINATION:  [ INSTRUCTIONS:  \"[x]\" Indicates a positive item  \"[]\" Indicates a negative item  -- DELETE ALL ITEMS NOT EXAMINED]  Vital Signs: (As obtained by patient/caregiver or practitioner observation)    Patient-Reported Vitals 2022   Patient-Reported Weight 180   Patient-Reported Height 5'5\"   Patient-Reported SpO2 95       Physical Exam  Constitutional:       General: She is not in acute distress. Appearance: Normal appearance. She is ill-appearing (not acute). HENT:      Head: Normocephalic and atraumatic. Nose:      Comments: Sounds congested   Pulmonary:      Effort: Pulmonary effort is normal. No respiratory distress. Comments: No cough of conversational dyspnea noted during VV   Neurological:      Mental Status: She is alert and oriented to person, place, and time. Mental status is at baseline. Psychiatric:         Mood and Affect: Mood normal.         Behavior: Behavior normal.        Other pertinent observable physical exam findings-     Due to this being a TeleHealth encounter, evaluation of the following organ systems is limited: Vitals/Constitutional/EENT/Resp/CV/GI//MS/Neuro/Skin/Heme-Lymph-Imm.     ASSESSMENT/PLAN:  Viral URI  Uncontrolled   Has had a negative home covid test - discussed PCR test  Supportive care reviewed  Humidified air  Honey lemon tea  Nasal rinse prn  Flonase daily  NSAIDs/ tylenol for pain and fever  Mucinex prn for congestion   covid transmission precautions reviewed   Check pulse ox and go to ED/ urgent care if drops below 92% and/ or uncontrolled worsening dyspnea    FU criteria reviewed An  electronic signature was used to authenticate this note. --Lisa Sargent, APRN - CNP on 9/28/2022 at 1:45 PM        Miguel Kaye, was evaluated through a synchronous (real-time) audio-video encounter. The patient (or guardian if applicable) is aware that this is a billable service, which includes applicable co-pays. This Virtual Visit was conducted with patient's (and/or legal guardian's) consent. The visit was conducted pursuant to the emergency declaration under the [facility], [address] [place] [address] [facility] authority and the [employer] and [employer] General Act. Patient identification was verified, and a caregiver was present when appropriate. The patient was located at Home: [address] [place] [id number]. Provider was located at [facility] (Appt Dept): [address] [place] [address]  [address] ,[address],  [facility].

## 2022-10-06 ENCOUNTER — OFFICE VISIT (OUTPATIENT)
Dept: INTERNAL MEDICINE CLINIC | Age: 62
End: 2022-10-06
Payer: COMMERCIAL

## 2022-10-06 VITALS
SYSTOLIC BLOOD PRESSURE: 134 MMHG | DIASTOLIC BLOOD PRESSURE: 86 MMHG | HEIGHT: 65 IN | OXYGEN SATURATION: 95 % | TEMPERATURE: 97.6 F | BODY MASS INDEX: 34.95 KG/M2 | WEIGHT: 209.8 LBS | HEART RATE: 92 BPM

## 2022-10-06 DIAGNOSIS — J44.1 COPD WITH ACUTE EXACERBATION (HCC): Primary | ICD-10-CM

## 2022-10-06 PROCEDURE — 99213 OFFICE O/P EST LOW 20 MIN: CPT | Performed by: NURSE PRACTITIONER

## 2022-10-06 RX ORDER — HYDROCODONE BITARTRATE AND HOMATROPINE METHYLBROMIDE ORAL SOLUTION 5; 1.5 MG/5ML; MG/5ML
5 LIQUID ORAL 3 TIMES DAILY PRN
Qty: 105 ML | Refills: 0 | Status: SHIPPED | OUTPATIENT
Start: 2022-10-06 | End: 2022-10-13

## 2022-10-06 RX ORDER — METHYLPREDNISOLONE 4 MG/1
TABLET ORAL
Qty: 1 KIT | Refills: 0 | Status: SHIPPED | OUTPATIENT
Start: 2022-10-06 | End: 2022-10-12

## 2022-10-06 RX ORDER — AZITHROMYCIN 250 MG/1
TABLET, FILM COATED ORAL
Qty: 1 PACKET | Refills: 0 | Status: SHIPPED | OUTPATIENT
Start: 2022-10-06 | End: 2022-10-11

## 2022-10-06 ASSESSMENT — ENCOUNTER SYMPTOMS
GASTROINTESTINAL NEGATIVE: 1
SHORTNESS OF BREATH: 1
COUGH: 1
CHEST TIGHTNESS: 1
WHEEZING: 1

## 2022-10-06 NOTE — PROGRESS NOTES
SUBJECTIVE:    Patient ID: Eric Panda is a 65 y.o. female. CC: Cough, congestion, wheezing    HPI: The patient presents to the office for an acute visit. 2 weeks of worsening respiratory symptoms  She reports chest tightness, cough, wheezing which is worsening. She was seen a few days into her illness by my partners and felt to have viral illness  No fever. She has tried tessalon and OTC cough remedies without improvement      Current Outpatient Medications   Medication Sig Dispense Refill    metoprolol tartrate (LOPRESSOR) 25 MG tablet TAKE ONE TABLET BY MOUTH TWICE A  tablet 3    amLODIPine (NORVASC) 5 MG tablet TAKE 1/2 TABLET BY MOUTH DAILY 45 tablet 3    fluticasone-vilanterol (BREO ELLIPTA) 100-25 MCG/INH AEPB inhaler INHALE ONE DOSE BY MOUTH DAILY 60 each 5    albuterol sulfate HFA (VENTOLIN HFA) 108 (90 Base) MCG/ACT inhaler Inhale 2 puffs into the lungs 4 times daily as needed for Wheezing 18 g 5    nitroGLYCERIN (NITROSTAT) 0.4 MG SL tablet Place 1 tablet under the tongue every 5 minutes as needed for Chest pain 25 tablet 3    Multiple Vitamins-Minerals (THERAPEUTIC MULTIVITAMIN-MINERALS) tablet Take 1 tablet by mouth daily      aspirin 81 MG EC tablet Take 81 mg by mouth daily. Spacer/Aero-Holding Esthela Kemp NAVIN 1 Device by Does not apply route daily as needed (shortness of breath) (Patient not taking: No sig reported) 1 each 0     No current facility-administered medications for this visit. Review of Systems   Constitutional:  Positive for chills and fatigue. Negative for fever. HENT:  Positive for congestion. Respiratory:  Positive for cough, chest tightness, shortness of breath and wheezing. Gastrointestinal: Negative. Genitourinary: Negative. Musculoskeletal: Negative. OBJECTIVE:  Physical Exam  Constitutional:       General: She is not in acute distress. Appearance: Normal appearance. She is ill-appearing. She is not toxic-appearing. HENT:      Head: Normocephalic and atraumatic. Right Ear: Tympanic membrane normal.      Left Ear: Tympanic membrane normal.   Eyes:      Conjunctiva/sclera: Conjunctivae normal.      Pupils: Pupils are equal, round, and reactive to light. Cardiovascular:      Rate and Rhythm: Normal rate and regular rhythm. Pulmonary:      Effort: No accessory muscle usage. Breath sounds: No decreased air movement. Examination of the right-upper field reveals wheezing. Examination of the left-upper field reveals wheezing. Wheezing present. Skin:     General: Skin is warm and dry. Neurological:      General: No focal deficit present. Mental Status: She is alert and oriented to person, place, and time. Psychiatric:         Mood and Affect: Mood normal.         Behavior: Behavior normal.      /86   Pulse 92   Temp 97.6 °F (36.4 °C)   Ht 5' 5\" (1.651 m)   Wt 209 lb 12.8 oz (95.2 kg)   SpO2 95%   BMI 34.91 kg/m²      PHQ Scores 3/14/2022 1/22/2021 2/6/2019 6/4/2018   PHQ2 Score 0 0 0 0   PHQ9 Score 0 0 0 0     Interpretation of Total Score Depression Severity: 1-4 = Minimal depression, 5-9 = Mild depression, 10-14 = Moderate depression, 15-19 = Moderately severe depression, 20-27 =Severe depression      ASSESSMENT/PLAN:  Suki Gonzalez was seen today for congestion and cough. Diagnoses and all orders for this visit:    COPD with acute exacerbation ([facility] [address]  -     azithromycin (ZITHROMAX) 250 MG tablet; Take 2 tabs (500 mg) on Day 1, and take 1 tab (250 mg) on days 2 through 5.  -     methylPREDNISolone (MEDROL, SEVEN,) 4 MG tablet; Take as directed  -     HYDROcodone homatropine (HYCODAN) 5-1.5 MG/5ML solution; Take 5 mLs by mouth 3 times daily as needed (cough) for up to 7 days. - Almost 2 weeks of resp symptoms worsening  - Treat for COPD exacerbation.   Allergy listed to prednisone but she is willing to try medrol pack r/t wheezing and severity of s/s  - Allergy to codeine but she has tolerated Vicodin before so will try Hycodan for cough which is severe. Controlled Substance Monitoring:  Acute and Chronic Pain Monitoring:   RX Monitoring 10/6/2022   Periodic Controlled Substance Monitoring No signs of potential drug abuse or diversion identified.            Natan Jordan, APRN - CNP

## 2022-10-20 ENCOUNTER — HOSPITAL ENCOUNTER (OUTPATIENT)
Age: 62
Discharge: HOME | End: 2022-10-20
Payer: COMMERCIAL

## 2022-10-20 ENCOUNTER — PATIENT MESSAGE (OUTPATIENT)
Dept: PULMONOLOGY | Age: 62
End: 2022-10-20

## 2022-10-20 ENCOUNTER — HOSPITAL ENCOUNTER (OUTPATIENT)
Dept: GENERAL RADIOLOGY | Age: 62
Discharge: HOME | End: 2022-10-20
Payer: COMMERCIAL

## 2022-10-20 DIAGNOSIS — R06.02 SHORTNESS OF BREATH: ICD-10-CM

## 2022-10-20 DIAGNOSIS — R06.02 SHORTNESS OF BREATH: Primary | ICD-10-CM

## 2022-10-20 DIAGNOSIS — R05.8 OTHER COUGH: Primary | ICD-10-CM

## 2022-10-20 PROCEDURE — 71046 X-RAY EXAM CHEST 2 VIEWS: CPT

## 2022-10-20 NOTE — TELEPHONE ENCOUNTER
She needs to come in for an acute sick visit tomorrow. She needs to get a CXR prior to the visit. Thanks.

## 2022-10-20 NOTE — TELEPHONE ENCOUNTER
From: Eric Panda  To: Dr. Mahogany Skaggs: 10/20/2022 8:06 AM EDT  Subject: What should I do? Good morning I wanted to let you know on the 6th of October I went to my family doctor and was told I had brontricis. I was given a cough medication, a steroid and an antibiotic. Finished the medications, but had to call him back on 18th my cough is still going on and I'm wheezing really bad. I'm just concerned about my wheezing. Should I make an appointment with you.  I've also been checking my oxygen levels they have been 93-96

## 2022-10-21 ENCOUNTER — TELEPHONE (OUTPATIENT)
Dept: PULMONOLOGY | Age: 62
End: 2022-10-21

## 2022-10-21 ENCOUNTER — OFFICE VISIT (OUTPATIENT)
Dept: PULMONOLOGY | Age: 62
End: 2022-10-21
Payer: COMMERCIAL

## 2022-10-21 VITALS
SYSTOLIC BLOOD PRESSURE: 128 MMHG | OXYGEN SATURATION: 93 % | DIASTOLIC BLOOD PRESSURE: 74 MMHG | HEART RATE: 88 BPM | BODY MASS INDEX: 34.78 KG/M2 | WEIGHT: 209 LBS

## 2022-10-21 DIAGNOSIS — G47.33 OSA (OBSTRUCTIVE SLEEP APNEA): ICD-10-CM

## 2022-10-21 DIAGNOSIS — U09.9 POST-COVID CHRONIC DYSPNEA: Primary | ICD-10-CM

## 2022-10-21 DIAGNOSIS — R06.09 POST-COVID CHRONIC DYSPNEA: Primary | ICD-10-CM

## 2022-10-21 DIAGNOSIS — J84.10 PULMONARY FIBROSIS (HCC): ICD-10-CM

## 2022-10-21 DIAGNOSIS — E66.09 CLASS 1 OBESITY DUE TO EXCESS CALORIES WITH SERIOUS COMORBIDITY AND BODY MASS INDEX (BMI) OF 34.0 TO 34.9 IN ADULT: ICD-10-CM

## 2022-10-21 PROCEDURE — 99214 OFFICE O/P EST MOD 30 MIN: CPT | Performed by: INTERNAL MEDICINE

## 2022-10-21 RX ORDER — IPRATROPIUM BROMIDE AND ALBUTEROL SULFATE 2.5; .5 MG/3ML; MG/3ML
1 SOLUTION RESPIRATORY (INHALATION) EVERY 4 HOURS
Qty: 360 ML | Refills: 3 | Status: SHIPPED | OUTPATIENT
Start: 2022-10-21

## 2022-10-21 RX ORDER — HYDROCODONE BITARTRATE AND HOMATROPINE METHYLBROMIDE ORAL SOLUTION 5; 1.5 MG/5ML; MG/5ML
2.5 LIQUID ORAL 4 TIMES DAILY PRN
Qty: 50 ML | Refills: 0 | Status: SHIPPED | OUTPATIENT
Start: 2022-10-21 | End: 2022-10-21 | Stop reason: SDUPTHER

## 2022-10-21 RX ORDER — FEXOFENADINE HCL 180 MG/1
180 TABLET ORAL DAILY
Qty: 5 TABLET | Refills: 0 | Status: SHIPPED | OUTPATIENT
Start: 2022-10-21

## 2022-10-21 RX ORDER — OXYMETAZOLINE HYDROCHLORIDE 0.05 G/100ML
2 SPRAY NASAL DAILY
Qty: 6 ML | Refills: 0 | Status: SHIPPED | OUTPATIENT
Start: 2022-10-21 | End: 2022-11-20

## 2022-10-21 RX ORDER — PREDNISONE 20 MG/1
40 TABLET ORAL DAILY
Qty: 20 TABLET | Refills: 0 | Status: SHIPPED | OUTPATIENT
Start: 2022-10-21 | End: 2022-10-28

## 2022-10-21 RX ORDER — HYDROCODONE BITARTRATE AND HOMATROPINE METHYLBROMIDE ORAL SOLUTION 5; 1.5 MG/5ML; MG/5ML
2.5 LIQUID ORAL 4 TIMES DAILY PRN
Qty: 50 ML | Refills: 0 | Status: SHIPPED | OUTPATIENT
Start: 2022-10-21 | End: 2022-10-26

## 2022-10-21 NOTE — PROGRESS NOTES
PULMONARY OFFICE FOLLOW-UP VISIT    CONSULTING PHYSICIAN:  BRISSA Khalil CNP     REASON FOR VISIT:   Chief Complaint   Patient presents with    Cough    Wheezing         DATE OF VISIT: 10/21/2022    HISTORY OF PRESENT ILLNESS: 65y.o. year old female comes into the pulmonary office for an acute sick visit. Patient reports that for last 10 days she has been having increased cough associated with shortness of breath and wheezing. She was given a course of Medrol pack, Z-Baldo and antitussive syrup by her primary care physician which she took for 7 days. While symptoms improved, they have not subsided. She continues to have cough which is productive of whitish expectoration, cough is more prominent at nighttime. She also has postnasal drip. Also reports some chest tightness. Has been using her Breo Ellipta inhaler regularly and frequently uses her albuterol inhaler. No sick contacts or recent travel. Is yet to get her flu vaccination. Previously: A follow-up. Patient reports that her breathing has been stable for the most part. Occasionally with exertion she does get short of breath and does have associated wheezing. Has to take up to 3 to 4 puffs of albuterol in order to get relief. Has not been required to use oxygen and has been keeping good oxygen saturation for the most part. Weight has been stable. Has not been able to get the CPAP therapy as of now. Patient was recently admitted to the hospital with COVID-19 pneumonia in December 2021. She was treated from 12/13/2021 until 41/09/7074 for Covid complicated by pneumonia and respiratory failure. She was treated with Actemra, remdesivir, and IV steroids. She was discharged on home oxygen. After discharge, the patient was weaned off oxygen. Her breathing has continued to improve. She is able to do more without any oxygen desaturation. Denies any chest pain or chest tightness. Intermittent dry cough present.   Has not started work yet. Weight has been stable. Does have obstructive sleep apnea but not yet using the CPAP therapy. Following with Dr. Sharmila Rose. REVIEW OF SYSTEMS:   8 point review of systems negative except that mentioned in the HPI.     PAST MEDICAL HISTORY:   Past Medical History:   Diagnosis Date    Arthritis     RT TOES; established with podiatry    Complex tear of lateral meniscus of right knee as current injury 2013    Complex tear of medial meniscus of right knee as current injury 2013    Coronary artery disease involving native coronary artery of native heart without angina pectoris 3015    Helicobacter pylori antibody positive 2015    Hyperlipidemia     DIET CONTROL    Hypertension, essential 2021    Mallet fracture, closed, initial encounter 2019    Patellofemoral syndrome 2014    Synovitis of knee 2014    Tear of medial cartilage or meniscus of knee, current 2014       PAST SURGICAL HISTORY:   Past Surgical History:   Procedure Laterality Date     SECTION      breech    COLONOSCOPY      one polyp removed    HYSTERECTOMY (CERVIX STATUS UNKNOWN)      endometriosis    KNEE ARTHROSCOPY Right 13    RIGHT KNEE ARTHROSCOPE, PARTIAL MEDIAL AND LATERAL MENISCECTOMY, SYNOVECTOMY, CHONDROPLASTY OF MEDIAL FEMORAL CONDYLE    KNEE ARTHROSCOPY Left 2014    LEFT KNEE ARTHROSCOPY WITH PARTIAL MEDIAL MENISCECTOMY,    KNEE ARTHROSCOPY Right 2018    ACTUAL PROCEDURE: RIGHT KNEE ARTHROSCOPY, PARTIAL MEDIAL MENISCECTOMY,CHONDROPLASTY, SYNOVECTOMY      KNEE SURGERY  13    RIGHT KNEE ARTHROSCOPE, PARTIAL MEDIAL AND LATERAL    KNEE SURGERY  14     RIGHT KNEE ARTHROSCOPY WITH PARTIAL LATERAL MENISCECTOMY,    TUBAL LIGATION         SOCIAL HISTORY:   Social History     Tobacco Use    Smoking status: Former     Packs/day: 0.25     Years: 7.00     Pack years: 1.75     Types: Cigarettes     Quit date: 1988     Years since quittin.0    Smokeless tobacco: Never    Tobacco comments:     quit 1988   Vaping Use    Vaping Use: Never used   Substance Use Topics    Alcohol use: No    Drug use: No       FAMILY HISTORY:   Family History   Problem Relation Age of Onset    Heart Disease Mother     Stroke Mother         x2    High Blood Pressure Mother     Diabetes Mother     Heart Attack Mother 48    Diabetes Father     Cancer Brother         colon       MEDICATIONS:     Current Outpatient Medications on File Prior to Visit   Medication Sig Dispense Refill    metoprolol tartrate (LOPRESSOR) 25 MG tablet TAKE ONE TABLET BY MOUTH TWICE A  tablet 3    amLODIPine (NORVASC) 5 MG tablet TAKE 1/2 TABLET BY MOUTH DAILY 45 tablet 3    fluticasone-vilanterol (BREO ELLIPTA) 100-25 MCG/INH AEPB inhaler INHALE ONE DOSE BY MOUTH DAILY 60 each 5    albuterol sulfate HFA (VENTOLIN HFA) 108 (90 Base) MCG/ACT inhaler Inhale 2 puffs into the lungs 4 times daily as needed for Wheezing 18 g 5    Spacer/Aero-Holding Chambers NAVIN 1 Device by Does not apply route daily as needed (shortness of breath) (Patient not taking: No sig reported) 1 each 0    nitroGLYCERIN (NITROSTAT) 0.4 MG SL tablet Place 1 tablet under the tongue every 5 minutes as needed for Chest pain 25 tablet 3    Multiple Vitamins-Minerals (THERAPEUTIC MULTIVITAMIN-MINERALS) tablet Take 1 tablet by mouth daily      aspirin 81 MG EC tablet Take 81 mg by mouth daily. No current facility-administered medications on file prior to visit. ALLERGIES:   Allergies as of 10/21/2022 - Fully Reviewed 10/21/2022   Allergen Reaction Noted    Latex Rash 01/22/2018    Ampicillin  11/18/2004    Codeine Itching 10/04/2011    Floxin [ofloxacin] Other (See Comments) 10/04/2011    Prednisone  01/21/2014    Statins Other (See Comments) 09/27/2013    Caffeine Palpitations and Other (See Comments) 10/04/2011      OBJECTIVE:   weight is 209 lb (94.8 kg). Her blood pressure is 128/74 and her pulse is 88.  Her oxygen saturation is 93%.            PHYSICAL EXAM:    CONSTITUTIONAL: She is a 65y.o.-year-old who appears her stated age. She is alert and oriented x 3 and in mild respiratory distress. HEENT: PERRLA, EOMI. No scleral icterus. No thrush, atraumatic, normocephalic. NECK: Supple, without cervical or supraclavicular lymphadenopathy:  CARDIOVASCULAR: S1 S2 RRR. Without murmer  RESPIRATORY & CHEST: Bilateral extensive scattered wheezing heard. GASTROINTESTINAL & ABDOMEN: Soft, nontender, positive bowel sounds in all quadrants, non-distended, without hepatosplenomegaly. GENITOURINARY: Deferred. MUSCULOSKELETAL: No tenderness to palpation of the axial skeleton. There is no clubbing. No cyanosis. No edema of the lower extremities. SKIN OF BODY: No rash or jaundice. PSYCHIATRIC EVALUATION: Normal affect. Patient answers questions appropriately. HEMATOLOGIC/LYMPHATIC/ IMMUNOLOGIC: No palpable lymphadenopathy. NEUROLOGIC: Alert and oriented x 3. Groslly non-focal. Motor strength is 5+/5 in all muscle groups. The patient has a normal sensorium globally. LABS:    Lab Summary Latest Ref Rng & Units 9/15/2022 3/11/2022   Sodium 136 - 145 mmol/L - 144   Potassium 3.5 - 5.1 mmol/L - 4.5   BUN 7 - 20 mg/dL - 13   Creatinine 0.6 - 1.2 mg/dL - 0.6   Glucose 70 - 99 mg/dL - 101(H)   Calcium 8.3 - 10.6 mg/dL - 9.6   Alk Phos 40 - 129 U/L - 91   Albumin 3.4 - 5.0 g/dL - 4.4   Bilirubin 0.0 - 1.0 mg/dL - 0.9   AST 15 - 37 U/L - 17   ALT 10 - 40 U/L - 15   HDL cholesterol 40 - 60 mg/dL 45 39(L)   Triglycerides 0 - 150 mg/dL 263(H) 214(H)   LDL calc <100 mg/dL 344(H) 336(H)   VLDL calc Not Established mg/dL 53 43   Some recent data might be hidden         IMAGING:    Narrative   EXAMINATION:   TWO XRAY VIEWS OF THE CHEST       10/20/2022 10:01 am           FINDINGS:   HEART/MEDIASTINUM: The cardiomediastinal silhouette is within normal limits.        PLEURA/LUNGS: Chronic reticulonodular changes are again noted in the lungs   bilaterally, unchanged from prior exam.  There are no focal consolidations or   pleural effusions. There is no appreciable pneumothorax. BONES/SOFT TISSUE: No acute abnormality. Impression   Stable appearance of the chest with chronic interstitial changes. Pulmonary Functions Testing Results:    Pulmonary Function Testing   5/23/2022     Spirometry:  Flow volume loops were normal. The FEV-1/FVC ratio was normal. The  post-bronchodilator FEV-1 was 1.71 liters (65% of predicted), which was moderately decreased. The FVC was 1.92 liters (57% of predicted), which was decreased. Response to inhaled bronchodilators (albuterol) was not significant. Lung volumes:  Lung volumes were tested by plethysmography. The total lung capacity was 3.23 liters (64% of predicted), which was decreased. The residual volume was 1.3 for liters (70% of predicted), which was decreased. The ratio of residual volume to total lung capacity (RV/TLC) was 110, which was normal. Specific airway resistance was normal.     Diffusion capacity was found to be decreased. Interpretation:  Restrictive lung disease with reduced diffusion capacity. Partial bronchodilator response noted on spirometry. IMPRESSION AND RECOMMENDATIONS:     1. Pulmonary fibrosis ([address]  -Patient had severe COVID-19 pneumonia in December 2021.  -She has been left with bibasilar mild pulmonary fibrosis. -I compared her chest x-rays from December 2021 to March 2022. Her imaging shows improved aeration in bilateral lungs with slowly improving pulmonary fibrosis. -Complete PFT does show reduced total lung capacity as well as diffusion capacity.  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 2.  Acute respiratory distress  -Patient has been having shortness of breath and wheezing for last 10 days.  -Recent chest imaging done yesterday was personally reviewed by me. No focal consolidation, pneumothorax or pulm edema seen.   Pulmonary fibrosis stable. -She is more than likely suffering from acute bronchitis from postnasal drip.  -I will give her another 7-day course of prednisone.  -Additionally I will provide her DuoNeb nebulization to use as needed.  -Continue with Hycodan cough syrup for symptomatic relief  -Since she is having excessive postnasal drip I will give her Afrin nasal spray to use for next 2 to 3 days.  -Continue with Breo Ellipta inhaler as before.  -She has applied for disability as she is unable to perform her occupational duties. 3. Class 1 obesity due to excess calories without serious comorbidity with body mass index (BMI) of 32.0 to 32.9 in adult  -I strongly advised the patient to make efforts to lose weight. I discussed various modalities including moderate intensity intermittent exercises, diet control and bariatric surgery. If the patient loses even 10 to 15% of current body weight, it will be beneficial in improving the overall health. 4. BRAD on CPAP  -Patient to continue follow-up with Dr. Bri Sanchez. Return for Asthma. Sweetie Quiles MD  Pulmonary Critical Care and Sleep Medicine  10/21/2022, 12:25 PM    This note was completed using dragon medical speech recognition software. Grammatical errors, random word insertions, pronoun errors and incomplete sentences are occasional consequences of this technology due to software limitations. If there are questions or concerns about the content of this note of information contained within the body of this dictation they should be addressed with the provider for clarification.

## 2022-10-21 NOTE — TELEPHONE ENCOUNTER
[employer] called and and was getting clarification on prescription says take 2 tablets for 7 days quantity of 20     predniSONE (DELTASONE) 20 MG tablet     PH: [phone]

## 2022-10-27 ENCOUNTER — TELEMEDICINE (OUTPATIENT)
Dept: PULMONOLOGY | Age: 62
End: 2022-10-27
Payer: COMMERCIAL

## 2022-10-27 DIAGNOSIS — E66.09 CLASS 1 OBESITY DUE TO EXCESS CALORIES WITH SERIOUS COMORBIDITY AND BODY MASS INDEX (BMI) OF 34.0 TO 34.9 IN ADULT: Chronic | ICD-10-CM

## 2022-10-27 DIAGNOSIS — I10 HYPERTENSION, ESSENTIAL: Chronic | ICD-10-CM

## 2022-10-27 DIAGNOSIS — G47.33 OBSTRUCTIVE SLEEP APNEA (ADULT) (PEDIATRIC): ICD-10-CM

## 2022-10-27 DIAGNOSIS — I25.10 CORONARY ARTERY DISEASE INVOLVING NATIVE CORONARY ARTERY OF NATIVE HEART WITHOUT ANGINA PECTORIS: Chronic | ICD-10-CM

## 2022-10-27 PROBLEM — E66.811 CLASS 1 OBESITY DUE TO EXCESS CALORIES WITH SERIOUS COMORBIDITY AND BODY MASS INDEX (BMI) OF 34.0 TO 34.9 IN ADULT: Chronic | Status: ACTIVE | Noted: 2021-03-30

## 2022-10-27 PROBLEM — K21.9 GASTROESOPHAGEAL REFLUX DISEASE WITHOUT ESOPHAGITIS: Chronic | Status: ACTIVE | Noted: 2022-01-04

## 2022-10-27 PROCEDURE — 99214 OFFICE O/P EST MOD 30 MIN: CPT | Performed by: INTERNAL MEDICINE

## 2022-10-27 ASSESSMENT — SLEEP AND FATIGUE QUESTIONNAIRES
ESS TOTAL SCORE: 3
HOW LIKELY ARE YOU TO NOD OFF OR FALL ASLEEP WHILE SITTING AND READING: 0
HOW LIKELY ARE YOU TO NOD OFF OR FALL ASLEEP IN A CAR, WHILE STOPPED FOR A FEW MINUTES IN TRAFFIC: 0
HOW LIKELY ARE YOU TO NOD OFF OR FALL ASLEEP WHILE SITTING AND TALKING TO SOMEONE: 0
HOW LIKELY ARE YOU TO NOD OFF OR FALL ASLEEP WHILE WATCHING TV: 0
HOW LIKELY ARE YOU TO NOD OFF OR FALL ASLEEP WHILE SITTING QUIETLY AFTER LUNCH WITHOUT ALCOHOL: 0
HOW LIKELY ARE YOU TO NOD OFF OR FALL ASLEEP WHILE LYING DOWN TO REST IN THE AFTERNOON WHEN CIRCUMSTANCES PERMIT: 3
HOW LIKELY ARE YOU TO NOD OFF OR FALL ASLEEP WHEN YOU ARE A PASSENGER IN A CAR FOR AN HOUR WITHOUT A BREAK: 0
HOW LIKELY ARE YOU TO NOD OFF OR FALL ASLEEP WHILE SITTING INACTIVE IN A PUBLIC PLACE: 0

## 2022-10-27 NOTE — PROGRESS NOTES
Raeann Mcmillan         : 1960    Diagnosis: [x] Hypoxia (R09.02) [] CSA (G47.31) [] Apnea (G47.30)   Length of Need: [] 13 Months [] 99 Months [] Other:    Machine (VICKY!): [] Respironics Dream Station      Auto [] ResMed AirSense     Auto [] Other:     []  CPAP () [] Bilevel ()   Mode: [] Auto [] Spontaneous    Mode: [] Auto [] Spontaneous              Comfort Settings:        Humidifier: [] Heated ()        [] Water chamber replacement ()/ 1 per 6 months        Mask:   [] Nasal () /1 per 3 months [] Full Face () /1 per 3 months   [] Patient choice -Size and fit mask [] Patient Choice - Size and fit mask   [] Dispense:  [] Dispense:    [] Headgear () / 1 per 3 months [] Headgear () / 1 per 3 months   [] Replacement Nasal Cushion ()/2 per month [] Interface Replacement ()/1 per month   [] Replacement Nasal Pillows ()/2 per month         Tubing: [] Heated ()/1 per 3 months    [] Standard ()/1 per 3 months [] Other:           Filters: [] Non-disposable ()/1 per 6 months     [] Ultra-Fine, Disposable ()/2 per month        Miscellaneous: [] Chin Strap ()/ 1 per 6 months [] O2 bleed-in:       LPM   [x] Overnight Oximetry on CPAP/Bilevel []  Other:    [] Modem: ()         Start Order Date: 10/27/22    MEDICAL JUSTIFICATION:  I, the undersigned, certify that the above prescribed supplies are medically necessary for this patients wellbeing. In my opinion, the supplies are both reasonable and necessary in reference to accepted standards of medicalpractice in treatment of this patients condition.     Tatiana Devi MD      NPI: [id number]       Order Signed Date: 10/27/22    Electronically signed by Tatiana Devi MD on 10/27/2022 at 1:57 PM    Raeann Mcmillan  1960  [address] [place] [address] [place]  [phone] (home)   [phone] (mobile)      Insurance Info (confirm with patient if correct):  Payer/Plan Subscr  Sex Relation Sub.  Ins. ID Effective Group Num

## 2022-10-27 NOTE — ASSESSMENT & PLAN NOTE
New Problem - On Tx. Reviewed sleep study and download compliance data with patient. Supplies and parts as needed for her machine. These are medically necessary. Limit caffeine use after 3pm.    Needs overnight oximetry on APAP (insurance mandated) even though standard of care is to do in-lab titration.

## 2022-10-27 NOTE — PROGRESS NOTES
Jey Barber Beat CNP [address]  [address] [facility]  [address], [address] [facility]- [phone]   [facility]. [address] [facility]. Justus Trevino [address] [phone]     Video Visit- Follow up    Antonio Carr, was evaluated through a synchronous (real-time) audio-video  encounter. The patient (or guardian if applicable) is aware that this is a billable  service, which includes applicable co-pays. This Virtual Visit was conducted with  patient's (and/or legal guardian's) consent. The visit was conducted pursuant to  the emergency declaration under the [facility], [facility] waiver authority and the [employer] General Act. Patient identification was verified,  and a caregiver was present when appropriate. The patient was located in a  state where the provider was licensed to provide care. Assessment/Plan:      1. Obstructive sleep apnea (adult) (pediatric)  Assessment & Plan:  New Problem - On Tx. Reviewed sleep study and download compliance data with patient. Supplies and parts as needed for her machine. These are medically necessary. Limit caffeine use after 3pm.    Needs overnight oximetry on APAP (insurance mandated) even though standard of care is to do in-lab titration. 2. Coronary artery disease involving native coronary artery of native heart without angina pectoris  Assessment & Plan:  Chronic- Stable. Discussed the importance of treating sleep apnea as part of the management of this disorder. Cont any meds per PCP and other physicians. 3. Hypertension, essential  Assessment & Plan:  Chronic- Stable. Discussed the importance of treating sleep apnea as part of the management of this disorder. Cont any meds per PCP and other physicians.    4. Class 1 obesity due to excess calories with serious comorbidity and body mass index (BMI) of 34.0 to 34.9 in adult  Assessment & Plan:  Chronic-not stable: Discussed importance of treating obstructive sleep apnea and getting sufficient sleep to assist with weight control. Encouraged her to work on weight loss through diet and exercise. Recommended DASH or Mediterranean diets. Reviewed, analyzed, and documented physiologic data from patient's PAP machine. This information was analyzed to assess complexity and medical decision making in regards to further testing and management. Diagnoses of Obstructive sleep apnea (adult) (pediatric), Coronary artery disease involving native coronary artery of native heart without angina pectoris, Hypertension, essential, and Class 1 obesity due to excess calories with serious comorbidity and body mass index (BMI) of 34.0 to 34.9 in adult were pertinent to this visit. The chronic medical conditions listed are directly related to the primary diagnosis listed above. The management of the primary diagnosis affects the secondary diagnosis and vice versa. Subjective:     Patient ID: Viki Price is a 65 y.o. female. Chief Complaint   Patient presents with    Sleep Apnea     Subjective   HPI:    Machine Modem/Download Info:  Compliance (hours/night): 7 hrs/night  % of nights >= 4 hrs: 95 %  Download AHI (/hour): 0.3 /HR  Average CPAP Pressure : 10 cmH2O APAP - Settings  Pressure Min: 6 cmH2O  Pressure Max: 16 cmH2O                 Comfort Settings  Flex/EPR (0-3): 3       Had insurance mandated HST on 3/3/22 which showed and VELASQUEZ-9.1/hr and low sat-79% with time below 88% of 188.4 min. Feels more rested, sleeping much better on  CPAP. Pressure feels good, not having SOB nor aerophagia. Having issues with bronchitis and did have COVID earlier this year. Her home sleep test showed a longer length of hypoxia compared to her previous test in 2021. She is currently not on oxygen at nighttime.       [address]    [place] Sleepiness Score: 3    Current Outpatient Medications Medication Instructions    albuterol sulfate HFA (VENTOLIN HFA) 108 (90 Base) MCG/ACT inhaler 2 puffs, Inhalation, 4 TIMES DAILY PRN    amLODIPine (NORVASC) 5 MG tablet TAKE 1/2 TABLET BY MOUTH DAILY    aspirin 81 mg, DAILY    fexofenadine (ALLEGRA) 180 mg, Oral, DAILY    fluticasone-vilanterol (BREO ELLIPTA) 100-25 MCG/INH AEPB inhaler INHALE ONE DOSE BY MOUTH DAILY    ipratropium-albuterol (DUONEB) 0.5-2.5 (3) MG/3ML SOLN nebulizer solution 3 mLs, Inhalation, EVERY 4 HOURS    metoprolol tartrate (LOPRESSOR) 25 MG tablet TAKE ONE TABLET BY MOUTH TWICE A DAY    Multiple Vitamins-Minerals (THERAPEUTIC MULTIVITAMIN-MINERALS) tablet 1 tablet, Oral, DAILY    nitroGLYCERIN (NITROSTAT) 0.4 mg, SubLINGual, EVERY 5 MIN PRN    oxymetazoline (AFRIN NODRIP SEVERE CONGEST) 0.05 % nasal spray 2 sprays, Nasal, DAILY    predniSONE (DELTASONE) 40 mg, Oral, DAILY    Spacer/Aero-Holding Chambers NAVIN 1 Device, Does not apply, DAILY PRN        Electronically signed by Roland Dia MD on 10/27/2022 at 2:05 PM

## 2022-10-27 NOTE — LETTER
[facility]  [place] [address] [place] [address]  Phone: [phone]  Fax: [phone]    Roland Dia MD    October 27, 2022     Enrike Luna, APRN - [facility]  [address] [address]    Patient: Hong Tian   MR Number: [id number]   YOB: 1960   Date of Visit: 10/27/2022       Dear Enrike Luna: Thank you for referring Mariusz Almazan to me for evaluation/treatment. Below are the relevant portions of my assessment and plan of care. 1. Obstructive sleep apnea (adult) (pediatric)  Assessment & Plan:  New Problem - On Tx. Reviewed sleep study and download compliance data with patient. Supplies and parts as needed for her machine. These are medically necessary. Limit caffeine use after 3pm.    Needs overnight oximetry on APAP (insurance mandated) even though standard of care is to do in-lab titration. 2. Coronary artery disease involving native coronary artery of native heart without angina pectoris  Assessment & Plan:  Chronic- Stable. Discussed the importance of treating sleep apnea as part of the management of this disorder. Cont any meds per PCP and other physicians. 3. Hypertension, essential  Assessment & Plan:  Chronic- Stable. Discussed the importance of treating sleep apnea as part of the management of this disorder. Cont any meds per PCP and other physicians. 4. Class 1 obesity due to excess calories with serious comorbidity and body mass index (BMI) of 34.0 to 34.9 in adult  Assessment & Plan:  Chronic-not stable:  Discussed importance of treating obstructive sleep apnea and getting sufficient sleep to assist with weight control. Encouraged her to work on weight loss through diet and exercise. Recommended DASH or Mediterranean diets. Reviewed, analyzed, and documented physiologic data from patient's PAP machine.     This information was analyzed to assess complexity and medical decision making in regards to further testing and management. Diagnoses of Obstructive sleep apnea (adult) (pediatric), Coronary artery disease involving native coronary artery of native heart without angina pectoris, Hypertension, essential, and Class 1 obesity due to excess calories with serious comorbidity and body mass index (BMI) of 34.0 to 34.9 in adult were pertinent to this visit. The chronic medical conditions listed are directly related to the primary diagnosis listed above. The management of the primary diagnosis affects the secondary diagnosis and vice versa. If you have questions, please do not hesitate to call me. I look forward to following Omari Moise along with you.     Sincerely,      Charito Maldonado MD

## 2022-11-09 ENCOUNTER — NURSE ONLY (OUTPATIENT)
Dept: INTERNAL MEDICINE CLINIC | Age: 62
End: 2022-11-09
Payer: COMMERCIAL

## 2022-11-09 DIAGNOSIS — Z23 NEED FOR INFLUENZA VACCINATION: Primary | ICD-10-CM

## 2022-11-09 PROCEDURE — 90471 IMMUNIZATION ADMIN: CPT | Performed by: NURSE PRACTITIONER

## 2022-11-09 PROCEDURE — 90674 CCIIV4 VAC NO PRSV 0.5 ML IM: CPT | Performed by: NURSE PRACTITIONER

## 2022-11-30 ENCOUNTER — TELEPHONE (OUTPATIENT)
Dept: PULMONOLOGY | Age: 62
End: 2022-11-30

## 2022-12-02 ENCOUNTER — OFFICE VISIT (OUTPATIENT)
Dept: PULMONOLOGY | Age: 62
End: 2022-12-02
Payer: COMMERCIAL

## 2022-12-02 VITALS
BODY MASS INDEX: 34.75 KG/M2 | DIASTOLIC BLOOD PRESSURE: 88 MMHG | HEIGHT: 65 IN | SYSTOLIC BLOOD PRESSURE: 136 MMHG | OXYGEN SATURATION: 97 % | WEIGHT: 208.6 LBS | HEART RATE: 91 BPM

## 2022-12-02 DIAGNOSIS — E66.09 CLASS 1 OBESITY DUE TO EXCESS CALORIES WITH SERIOUS COMORBIDITY AND BODY MASS INDEX (BMI) OF 34.0 TO 34.9 IN ADULT: ICD-10-CM

## 2022-12-02 DIAGNOSIS — U09.9 POST-COVID CHRONIC DYSPNEA: ICD-10-CM

## 2022-12-02 DIAGNOSIS — R06.09 POST-COVID CHRONIC DYSPNEA: ICD-10-CM

## 2022-12-02 DIAGNOSIS — J84.10 PULMONARY FIBROSIS (HCC): ICD-10-CM

## 2022-12-02 DIAGNOSIS — J45.20 MILD INTERMITTENT ASTHMA WITHOUT COMPLICATION: Primary | ICD-10-CM

## 2022-12-02 DIAGNOSIS — G47.33 OSA (OBSTRUCTIVE SLEEP APNEA): ICD-10-CM

## 2022-12-02 PROCEDURE — 3078F DIAST BP <80 MM HG: CPT | Performed by: INTERNAL MEDICINE

## 2022-12-02 PROCEDURE — 3074F SYST BP LT 130 MM HG: CPT | Performed by: INTERNAL MEDICINE

## 2022-12-02 PROCEDURE — 99214 OFFICE O/P EST MOD 30 MIN: CPT | Performed by: INTERNAL MEDICINE

## 2022-12-02 RX ORDER — MONTELUKAST SODIUM 10 MG/1
10 TABLET ORAL DAILY
Qty: 30 TABLET | Refills: 4 | Status: SHIPPED | OUTPATIENT
Start: 2022-12-02

## 2022-12-02 NOTE — PROGRESS NOTES
PULMONARY OFFICE FOLLOW-UP VISIT    CONSULTING PHYSICIAN:  BRISSA Sesay CNP     REASON FOR VISIT:   Chief Complaint   Patient presents with    Follow-up     Pulmonary fibrosis, post-COVID chronic dyspnea            DATE OF VISIT: 12/2/2022    HISTORY OF PRESENT ILLNESS: 65y.o. year old female comes into the pulmonary office for a follow up. Patient reports that her breathing has been stable for the most part. For the past few days she has been having some nasal congestion. Denies any discolored expectoration, fevers, chest pain, wheezing. Has been using her Breo Ellipta inhaler regularly. Rarely uses albuterol. Still continues to have some dyspnea on exertion. Climbing up stairs is the most difficult effort for her. Previously:  Patient reports that for last 10 days she has been having increased cough associated with shortness of breath and wheezing. She was given a course of Medrol pack, Z-Baldo and antitussive syrup by her primary care physician which she took for 7 days. While symptoms improved, they have not subsided. She continues to have cough which is productive of whitish expectoration, cough is more prominent at nighttime. She also has postnasal drip. Also reports some chest tightness. Has been using her Breo Ellipta inhaler regularly and frequently uses her albuterol inhaler. No sick contacts or recent travel. Is yet to get her flu vaccination. Patient reports that her breathing has been stable for the most part. Occasionally with exertion she does get short of breath and does have associated wheezing. Has to take up to 3 to 4 puffs of albuterol in order to get relief. Has not been required to use oxygen and has been keeping good oxygen saturation for the most part. Weight has been stable. Has not been able to get the CPAP therapy as of now. Patient was recently admitted to the hospital with COVID-19 pneumonia in December 2021.   She was treated from 12/13/2021 until  for Covid complicated by pneumonia and respiratory failure. She was treated with Actemra, remdesivir, and IV steroids. She was discharged on home oxygen. After discharge, the patient was weaned off oxygen. Her breathing has continued to improve. She is able to do more without any oxygen desaturation. Denies any chest pain or chest tightness. Intermittent dry cough present. Has not started work yet. Weight has been stable. Does have obstructive sleep apnea but not yet using the CPAP therapy. Following with Dr. Ruel Britt. REVIEW OF SYSTEMS:   8 point review of systems negative except that mentioned in the HPI.     PAST MEDICAL HISTORY:   Past Medical History:   Diagnosis Date    Arthritis     RT TOES; established with podiatry    Complex tear of lateral meniscus of right knee as current injury 2013    Complex tear of medial meniscus of right knee as current injury 2013    Coronary artery disease involving native coronary artery of native heart without angina pectoris 7943    Helicobacter pylori antibody positive 2015    Hyperlipidemia     DIET CONTROL    Hypertension, essential 2021    Mallet fracture, closed, initial encounter 2019    Obstructive sleep apnea (adult) (pediatric) 2020    Patellofemoral syndrome 2014    Synovitis of knee 2014    Tear of medial cartilage or meniscus of knee, current 2014       PAST SURGICAL HISTORY:   Past Surgical History:   Procedure Laterality Date     SECTION      breech    COLONOSCOPY      one polyp removed    HYSTERECTOMY (CERVIX STATUS UNKNOWN)      endometriosis    KNEE ARTHROSCOPY Right 13    RIGHT KNEE ARTHROSCOPE, PARTIAL MEDIAL AND LATERAL MENISCECTOMY, SYNOVECTOMY, CHONDROPLASTY OF MEDIAL FEMORAL CONDYLE    KNEE ARTHROSCOPY Left 2014    LEFT KNEE ARTHROSCOPY WITH PARTIAL MEDIAL MENISCECTOMY,    KNEE ARTHROSCOPY Right 2018    ACTUAL PROCEDURE: RIGHT KNEE ARTHROSCOPY, PARTIAL MEDIAL MENISCECTOMY,CHONDROPLASTY, SYNOVECTOMY      KNEE SURGERY  14    RIGHT KNEE ARTHROSCOPE, PARTIAL MEDIAL AND LATERAL    KNEE SURGERY  14     RIGHT KNEE ARTHROSCOPY WITH PARTIAL LATERAL MENISCECTOMY,    TUBAL LIGATION         SOCIAL HISTORY:   Social History     Tobacco Use    Smoking status: Former     Packs/day: 0.25     Years: 7.00     Pack years: 1.75     Types: Cigarettes     Quit date: 1988     Years since quittin.2    Smokeless tobacco: Never    Tobacco comments:     quit    Vaping Use    Vaping Use: Never used   Substance Use Topics    Alcohol use: No    Drug use: No       FAMILY HISTORY:   Family History   Problem Relation Age of Onset    Heart Disease Mother     Stroke Mother         x2    High Blood Pressure Mother     Diabetes Mother     Heart Attack Mother 48    Diabetes Father     Cancer Brother         colon       MEDICATIONS:     Current Outpatient Medications on File Prior to Visit   Medication Sig Dispense Refill    ipratropium-albuterol (DUONEB) 0.5-2.5 (3) MG/3ML SOLN nebulizer solution Inhale 3 mLs into the lungs every 4 hours 360 mL 3    fexofenadine (ALLEGRA) 180 MG tablet Take 1 tablet by mouth daily 5 tablet 0    metoprolol tartrate (LOPRESSOR) 25 MG tablet TAKE ONE TABLET BY MOUTH TWICE A  tablet 3    amLODIPine (NORVASC) 5 MG tablet TAKE 1/2 TABLET BY MOUTH DAILY 45 tablet 3    fluticasone-vilanterol (BREO ELLIPTA) 100-25 MCG/INH AEPB inhaler INHALE ONE DOSE BY MOUTH DAILY 60 each 5    albuterol sulfate HFA (VENTOLIN HFA) 108 (90 Base) MCG/ACT inhaler Inhale 2 puffs into the lungs 4 times daily as needed for Wheezing 18 g 5    Spacer/Aero-Holding Chambers NAVIN 1 Device by Does not apply route daily as needed (shortness of breath) (Patient not taking: No sig reported) 1 each 0    nitroGLYCERIN (NITROSTAT) 0.4 MG SL tablet Place 1 tablet under the tongue every 5 minutes as needed for Chest pain 25 tablet 3    Multiple Vitamins-Minerals (THERAPEUTIC MULTIVITAMIN-MINERALS) tablet Take 1 tablet by mouth daily      aspirin 81 MG EC tablet Take 81 mg by mouth daily. No current facility-administered medications on file prior to visit. ALLERGIES:   Allergies as of 12/02/2022 - Fully Reviewed 10/27/2022   Allergen Reaction Noted    Latex Rash 01/22/2018    Ampicillin  11/18/2004    Codeine Itching 10/04/2011    Floxin [ofloxacin] Other (See Comments) 10/04/2011    Prednisone  01/21/2014    Statins Other (See Comments) 09/27/2013    Caffeine Palpitations and Other (See Comments) 10/04/2011      OBJECTIVE:   height is 5' 5\" (1.651 m) and weight is 208 lb 9.6 oz (94.6 kg). Her blood pressure is 136/88 and her pulse is 91. Her oxygen saturation is 97%. PHYSICAL EXAM:    CONSTITUTIONAL: She is a 65y.o.-year-old who appears her stated age. She is alert and oriented x 3 and in mild respiratory distress. HEENT: PERRLA, EOMI. No scleral icterus. No thrush, atraumatic, normocephalic. NECK: Supple, without cervical or supraclavicular lymphadenopathy:  CARDIOVASCULAR: S1 S2 RRR. Without murmer  RESPIRATORY & CHEST: Bilateral extensive scattered wheezing heard. GASTROINTESTINAL & ABDOMEN: Soft, nontender, positive bowel sounds in all quadrants, non-distended, without hepatosplenomegaly. GENITOURINARY: Deferred. MUSCULOSKELETAL: No tenderness to palpation of the axial skeleton. There is no clubbing. No cyanosis. No edema of the lower extremities. SKIN OF BODY: No rash or jaundice. PSYCHIATRIC EVALUATION: Normal affect. Patient answers questions appropriately. HEMATOLOGIC/LYMPHATIC/ IMMUNOLOGIC: No palpable lymphadenopathy. NEUROLOGIC: Alert and oriented x 3. Groslly non-focal. Motor strength is 5+/5 in all muscle groups. The patient has a normal sensorium globally.       LABS:    Lab Summary Latest Ref Rng & Units 9/15/2022   HDL cholesterol 40 - 60 mg/dL 45   Triglycerides 0 - 150 mg/dL 263(H)   LDL calc <100 mg/dL 344(H)   VLDL calc Not Established mg/dL 53   Some recent data might be hidden         IMAGING:    Narrative   EXAMINATION:   TWO XRAY VIEWS OF THE CHEST       10/20/2022 10:01 am           FINDINGS:   HEART/MEDIASTINUM: The cardiomediastinal silhouette is within normal limits. PLEURA/LUNGS: Chronic reticulonodular changes are again noted in the lungs   bilaterally, unchanged from prior exam.  There are no focal consolidations or   pleural effusions. There is no appreciable pneumothorax. BONES/SOFT TISSUE: No acute abnormality. Impression   Stable appearance of the chest with chronic interstitial changes. Pulmonary Functions Testing Results:    Pulmonary Function Testing   5/23/2022     Spirometry:  Flow volume loops were normal. The FEV-1/FVC ratio was normal. The  post-bronchodilator FEV-1 was 1.71 liters (65% of predicted), which was moderately decreased. The FVC was 1.92 liters (57% of predicted), which was decreased. Response to inhaled bronchodilators (albuterol) was not significant. Lung volumes:  Lung volumes were tested by plethysmography. The total lung capacity was 3.23 liters (64% of predicted), which was decreased. The residual volume was 1.3 for liters (70% of predicted), which was decreased. The ratio of residual volume to total lung capacity (RV/TLC) was 110, which was normal. Specific airway resistance was normal.     Diffusion capacity was found to be decreased. Interpretation:  Restrictive lung disease with reduced diffusion capacity. Partial bronchodilator response noted on spirometry. IMPRESSION AND RECOMMENDATIONS:     1. Pulmonary fibrosis ([address]  -Stable. -Patient had severe COVID-19 pneumonia in December 2021.  -She has been left with bibasilar mild pulmonary fibrosis. -Complete PFT does show reduced total lung capacity as well as diffusion capacity.  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   -I will plan to enroll her into our cardiopulmonary rehabitation program next year. 2.  Mild intermittent asthma  -Stable  -I strongly suspect that she has developed airway hyperreactivity most likely due to postnasal drip.  -She will continue with Breo Ellipta and albuterol inhaler as before.  -Advised her to use nebulized albuterol if her symptoms are more pronounced.  -I will also start her on montelukast 10 mg p.o. nightly. -She will update me if she is having any discolored expectoration, fevers or increased wheezing.  -She has applied for disability as she is unable to perform her occupational duties. 3. Class 1 obesity due to excess calories without serious comorbidity with body mass index (BMI) of 32.0 to 32.9 in adult  -I strongly advised the patient to make efforts to lose weight. I discussed various modalities including moderate intensity intermittent exercises, diet control and bariatric surgery. If the patient loses even 10 to 15% of current body weight, it will be beneficial in improving the overall health. 4. BRAD on CPAP  -Patient to continue follow-up with Dr. Lorenzo Preciado. Return in about 6 months (around 6/2/2023) for Asthma, brad. Melquiades Iyer MD  Pulmonary Critical Care and Sleep Medicine  12/2/2022, 8:53 AM    This note was completed using dragon medical speech recognition software. Grammatical errors, random word insertions, pronoun errors and incomplete sentences are occasional consequences of this technology due to software limitations. If there are questions or concerns about the content of this note of information contained within the body of this dictation they should be addressed with the provider for clarification.

## 2022-12-09 ENCOUNTER — TELEPHONE (OUTPATIENT)
Dept: INTERNAL MEDICINE CLINIC | Age: 62
End: 2022-12-09

## 2022-12-09 NOTE — TELEPHONE ENCOUNTER
Pt denies headache, visual changes, facial asymmetry, no speech issues--no slurring. Numbness at times in face, hand and foot --mostly right side. Denies any past neck injuries. Had CTA in 2019 for headaches. Will go to ER if symptoms worsen or are worrisome. Appt made for her to see Magan on Monday.

## 2022-12-12 ENCOUNTER — OFFICE VISIT (OUTPATIENT)
Dept: INTERNAL MEDICINE CLINIC | Age: 62
End: 2022-12-12
Payer: COMMERCIAL

## 2022-12-12 VITALS
OXYGEN SATURATION: 96 % | HEIGHT: 65 IN | WEIGHT: 209 LBS | DIASTOLIC BLOOD PRESSURE: 88 MMHG | HEART RATE: 84 BPM | SYSTOLIC BLOOD PRESSURE: 146 MMHG | BODY MASS INDEX: 34.82 KG/M2 | TEMPERATURE: 97.5 F

## 2022-12-12 DIAGNOSIS — Z78.9 STATIN INTOLERANCE: ICD-10-CM

## 2022-12-12 DIAGNOSIS — I10 HYPERTENSION, ESSENTIAL: ICD-10-CM

## 2022-12-12 DIAGNOSIS — R20.2 NUMBNESS AND TINGLING: Primary | ICD-10-CM

## 2022-12-12 DIAGNOSIS — E78.2 MIXED HYPERCHOLESTEROLEMIA AND HYPERTRIGLYCERIDEMIA: ICD-10-CM

## 2022-12-12 DIAGNOSIS — R20.0 NUMBNESS AND TINGLING: Primary | ICD-10-CM

## 2022-12-12 PROCEDURE — 99214 OFFICE O/P EST MOD 30 MIN: CPT | Performed by: NURSE PRACTITIONER

## 2022-12-12 PROCEDURE — 3078F DIAST BP <80 MM HG: CPT | Performed by: NURSE PRACTITIONER

## 2022-12-12 PROCEDURE — 3074F SYST BP LT 130 MM HG: CPT | Performed by: NURSE PRACTITIONER

## 2022-12-12 RX ORDER — AMLODIPINE BESYLATE 10 MG/1
10 TABLET ORAL DAILY
Qty: 90 TABLET | Refills: 3 | Status: SHIPPED | OUTPATIENT
Start: 2022-12-12

## 2022-12-12 ASSESSMENT — ENCOUNTER SYMPTOMS
SHORTNESS OF BREATH: 0
GASTROINTESTINAL NEGATIVE: 1
RESPIRATORY NEGATIVE: 1

## 2022-12-12 NOTE — PROGRESS NOTES
SUBJECTIVE:    Patient ID: Kristy Bean is a 65 y.o. female. CC: numbness, hypertension    HPI: The patient presents to the office for an acute visit. She is getting left facial, hand and foot numbness. BP is high when this happens. Admits she has only been taking 1/2 her normal dose of amlodipine. No vision changes, no slurred speech. No chest pain, palpitations. Not on cholesterol treatment. Past statin intolerance. Did not start Repatha due to concern about side effect listed. Livalo was off formulary. Current Outpatient Medications   Medication Sig Dispense Refill    montelukast (SINGULAIR) 10 MG tablet Take 1 tablet by mouth daily 30 tablet 4    ipratropium-albuterol (DUONEB) 0.5-2.5 (3) MG/3ML SOLN nebulizer solution Inhale 3 mLs into the lungs every 4 hours 360 mL 3    metoprolol tartrate (LOPRESSOR) 25 MG tablet TAKE ONE TABLET BY MOUTH TWICE A  tablet 3    amLODIPine (NORVASC) 5 MG tablet TAKE 1/2 TABLET BY MOUTH DAILY 45 tablet 3    fluticasone-vilanterol (BREO ELLIPTA) 100-25 MCG/INH AEPB inhaler INHALE ONE DOSE BY MOUTH DAILY 60 each 5    albuterol sulfate HFA (VENTOLIN HFA) 108 (90 Base) MCG/ACT inhaler Inhale 2 puffs into the lungs 4 times daily as needed for Wheezing 18 g 5    Spacer/Aero-Holding Chambers NAVIN 1 Device by Does not apply route daily as needed (shortness of breath) 1 each 0    nitroGLYCERIN (NITROSTAT) 0.4 MG SL tablet Place 1 tablet under the tongue every 5 minutes as needed for Chest pain 25 tablet 3    Multiple Vitamins-Minerals (THERAPEUTIC MULTIVITAMIN-MINERALS) tablet Take 1 tablet by mouth daily      aspirin 81 MG EC tablet Take 81 mg by mouth daily. No current facility-administered medications for this visit. Review of Systems   Constitutional:  Negative for fever. Respiratory: Negative. Negative for shortness of breath. Cardiovascular: Negative. Negative for chest pain and palpitations. Gastrointestinal: Negative. Genitourinary: Negative. Musculoskeletal: Negative. Neurological:  Positive for numbness. Negative for dizziness, syncope, speech difficulty, weakness and headaches. Psychiatric/Behavioral: Negative. OBJECTIVE:  Physical Exam  Vitals reviewed. Constitutional:       General: She is not in acute distress. Appearance: She is well-developed. She is not diaphoretic. HENT:      Head: Normocephalic and atraumatic. Eyes:      General: No scleral icterus. Conjunctiva/sclera: Conjunctivae normal.   Neck:      Vascular: No JVD. Cardiovascular:      Rate and Rhythm: Normal rate and regular rhythm. Pulmonary:      Effort: Pulmonary effort is normal. No respiratory distress. Breath sounds: Normal breath sounds. No wheezing. Abdominal:      General: There is no distension. Palpations: Abdomen is soft. Tenderness: There is no abdominal tenderness. There is no guarding or rebound. Musculoskeletal:         General: Normal range of motion. Cervical back: Neck supple. Skin:     General: Skin is warm and dry. Neurological:      Mental Status: She is alert and oriented to person, place, and time. Psychiatric:         Behavior: Behavior normal.         Thought Content: Thought content normal.      BP (!) 146/88   Pulse 84   Temp 97.5 °F (36.4 °C)   Ht 5' 5\" (1.651 m)   Wt 209 lb (94.8 kg)   SpO2 96%   BMI 34.78 kg/m²      PHQ Scores 3/14/2022 1/22/2021 2/6/2019 6/4/2018   PHQ2 Score 0 0 0 0   PHQ9 Score 0 0 0 0     Interpretation of Total Score Depression Severity: 1-4 = Minimal depression, 5-9 = Mild depression, 10-14 = Moderate depression, 15-19 = Moderately severe depression, 20-27 =Severe depression        ASSESSMENT/PLAN:  Truett Dance was seen today for numbness and hypertension. Diagnoses and all orders for this visit:    Numbness and tingling  - Numbness, face, hands, foot. Occurring with hypertension at home.   She denies any persisting symptoms  - No vision changes, no slurred speech.  - No chest pain, palpitations. - Discussed concerns for CVA/TIA. No evidence of CVA today with no deficits noted. - She is high risk for CVA with accelerated hypertension, untreated dyslipidemia.  - Improve BP control.    - Readdress dyslipidemia    Hypertension, essential  - Chronic, uncontrolled  - High here and at home  - She has been taking less amlodipine than prescribed. - Increase amlodipine to 10 mg daily and monitor blood pressure improvement  -     amLODIPine (NORVASC) 10 MG tablet; Take 1 tablet by mouth daily    Mixed hypercholesterolemia and hypertriglyceridemia  - Chronic, uncontrolled  - She has been intolerant to numerous statins  - Repatha ordered but she did not start this out of fear of possible side effects  - Took Livalo but could not afford this when it went off formulary  - Readdressed high cardiovascular risk and poorly controlled dyslipidemia. She will try Repatha  -     Evolocumab 140 MG/ML SOAJ; Inject 140 mg into the skin every 14 days    Statin intolerance  - As above  -     Evolocumab 140 MG/ML SOAJ; Inject 140 mg into the skin every 14 days    Patient with concerning symptoms likely related to accelerated hypertension. No evidence of CVA. Could be due to TIA and we discussed hospitalization which she declined. Strongly recommended improved blood pressure and cholesterol control. Patient is agreeable. Her medications have been adjusted or restarted.   She will be seen back for short follow-up    Over 30 minutes was spent with the patient today as follows:   Preparing to see the patient (e.g., review of tests)  Obtaining and/or reviewing separately obtained history  Performing a medically appropriate examination and/or evaluation  Counseling and educating the patient/family/caregiver  Ordering medications, tests or procedures  Documenting clinical information in the electronic health record      BRISSA Herrera - CNP

## 2022-12-28 DIAGNOSIS — J45.20 MILD INTERMITTENT ASTHMA WITHOUT COMPLICATION: Primary | ICD-10-CM

## 2022-12-28 RX ORDER — IPRATROPIUM BROMIDE AND ALBUTEROL SULFATE 2.5; .5 MG/3ML; MG/3ML
SOLUTION RESPIRATORY (INHALATION)
Qty: 360 ML | Refills: 3 | Status: SHIPPED | OUTPATIENT
Start: 2022-12-28

## 2022-12-30 DIAGNOSIS — J45.20 MILD INTERMITTENT ASTHMA WITHOUT COMPLICATION: ICD-10-CM

## 2022-12-30 RX ORDER — IPRATROPIUM BROMIDE AND ALBUTEROL SULFATE 2.5; .5 MG/3ML; MG/3ML
SOLUTION RESPIRATORY (INHALATION)
Qty: 360 ML | Refills: 3 | OUTPATIENT
Start: 2022-12-30

## 2023-01-03 ENCOUNTER — OFFICE VISIT (OUTPATIENT)
Dept: INTERNAL MEDICINE CLINIC | Age: 63
End: 2023-01-03
Payer: COMMERCIAL

## 2023-01-03 VITALS
HEIGHT: 65 IN | HEART RATE: 87 BPM | OXYGEN SATURATION: 95 % | DIASTOLIC BLOOD PRESSURE: 70 MMHG | WEIGHT: 210 LBS | BODY MASS INDEX: 34.99 KG/M2 | SYSTOLIC BLOOD PRESSURE: 128 MMHG

## 2023-01-03 DIAGNOSIS — I10 HYPERTENSION, ESSENTIAL: Primary | ICD-10-CM

## 2023-01-03 DIAGNOSIS — E78.2 MIXED HYPERCHOLESTEROLEMIA AND HYPERTRIGLYCERIDEMIA: ICD-10-CM

## 2023-01-03 DIAGNOSIS — R20.0 NUMBNESS AND TINGLING: ICD-10-CM

## 2023-01-03 DIAGNOSIS — Z78.9 STATIN INTOLERANCE: ICD-10-CM

## 2023-01-03 DIAGNOSIS — R20.2 NUMBNESS AND TINGLING: ICD-10-CM

## 2023-01-03 PROCEDURE — 99213 OFFICE O/P EST LOW 20 MIN: CPT | Performed by: NURSE PRACTITIONER

## 2023-01-03 PROCEDURE — 3078F DIAST BP <80 MM HG: CPT | Performed by: NURSE PRACTITIONER

## 2023-01-03 PROCEDURE — 3074F SYST BP LT 130 MM HG: CPT | Performed by: NURSE PRACTITIONER

## 2023-01-03 ASSESSMENT — ENCOUNTER SYMPTOMS
RESPIRATORY NEGATIVE: 1
GASTROINTESTINAL NEGATIVE: 1

## 2023-01-03 NOTE — PROGRESS NOTES
SUBJECTIVE:    Patient ID: Charo Manzanares is a 65 y.o. female. CC: Hypertension, dyslipidemia    HPI: The patient presents to the office today for follow-up of hypertension and numbness. Patient was seen about 2 weeks ago with elevated blood pressure. There was concern about associated facial, hand, foot numbness. She was found to only be taking one half her normal dose of amlodipine. Amlodipine was increased to 10 mg daily which she has been taking as directed. She reports feeling well with resolution of all of her previous symptoms. Patient has a history of mixed hypercholesterolemia and triglyceridemia. This is chronic and uncontrolled. She has been intolerant to numerous statins in the past.  She cannot afford Livalo and it went off formulary. We discussed taking Repatha and given her previous facial and hand numbness associated with high blood pressure, cholesterol treatment was readdressed and she was agreeable Repatha.       Past Medical History:   Diagnosis Date    Arthritis     RT TOES; established with podiatry    Complex tear of lateral meniscus of right knee as current injury 9/19/2013    Complex tear of medial meniscus of right knee as current injury 9/19/2013    Coronary artery disease involving native coronary artery of native heart without angina pectoris 7/1/9983    Helicobacter pylori antibody positive 12/27/2015    Hyperlipidemia     DIET CONTROL    Hypertension, essential 1/7/2021    Mallet fracture, closed, initial encounter 8/29/2019    Obstructive sleep apnea (adult) (pediatric) 6/9/2020    Patellofemoral syndrome 2/17/2014    Synovitis of knee 1/9/2014    Tear of medial cartilage or meniscus of knee, current 9/8/2014        Current Outpatient Medications   Medication Sig Dispense Refill    ipratropium-albuterol (DUONEB) 0.5-2.5 (3) MG/3ML SOLN nebulizer solution INHALE ONE VIAL VIA NEBULIZER BY MOUTH INTO THE LUNGS  EVERY FOUR HOURS 360 mL 3    amLODIPine (NORVASC) 10 MG tablet Take 1 tablet by mouth daily 90 tablet 3    Evolocumab 140 MG/ML SOAJ Inject 140 mg into the skin every 14 days 6 Adjustable Dose Pre-filled Pen Syringe 3    montelukast (SINGULAIR) 10 MG tablet Take 1 tablet by mouth daily 30 tablet 4    metoprolol tartrate (LOPRESSOR) 25 MG tablet TAKE ONE TABLET BY MOUTH TWICE A  tablet 3    fluticasone-vilanterol (BREO ELLIPTA) 100-25 MCG/INH AEPB inhaler INHALE ONE DOSE BY MOUTH DAILY 60 each 5    albuterol sulfate HFA (VENTOLIN HFA) 108 (90 Base) MCG/ACT inhaler Inhale 2 puffs into the lungs 4 times daily as needed for Wheezing 18 g 5    Spacer/Aero-Holding Chambers NAVIN 1 Device by Does not apply route daily as needed (shortness of breath) 1 each 0    nitroGLYCERIN (NITROSTAT) 0.4 MG SL tablet Place 1 tablet under the tongue every 5 minutes as needed for Chest pain 25 tablet 3    Multiple Vitamins-Minerals (THERAPEUTIC MULTIVITAMIN-MINERALS) tablet Take 1 tablet by mouth daily      aspirin 81 MG EC tablet Take 81 mg by mouth daily. No current facility-administered medications for this visit. Review of Systems   Constitutional: Negative. Respiratory: Negative. Cardiovascular: Negative. Gastrointestinal: Negative. Genitourinary: Negative. Musculoskeletal: Negative. Neurological: Negative. Psychiatric/Behavioral: Negative. All other systems reviewed and are negative. OBJECTIVE:  Physical Exam  Constitutional:       Appearance: Normal appearance. HENT:      Head: Normocephalic and atraumatic. Eyes:      Conjunctiva/sclera: Conjunctivae normal.      Pupils: Pupils are equal, round, and reactive to light. Pulmonary:      Effort: Pulmonary effort is normal. No respiratory distress. Skin:     General: Skin is warm and dry. Neurological:      General: No focal deficit present. Mental Status: She is alert and oriented to person, place, and time.    Psychiatric:         Mood and Affect: Mood normal. Behavior: Behavior normal.      /70   Pulse 87   Ht 5' 5\" (1.651 m)   Wt 210 lb (95.3 kg)   SpO2 95%   BMI 34.95 kg/m²      PHQ Scores 1/3/2023 3/14/2022 1/22/2021 2/6/2019 6/4/2018   PHQ2 Score 0 0 0 0 0   PHQ9 Score 0 0 0 0 0     Interpretation of Total Score Depression Severity: 1-4 = Minimal depression, 5-9 = Mild depression, 10-14 = Moderate depression, 15-19 = Moderately severe depression, 20-27 =Severe depression        ASSESSMENT/PLAN:  Aislinn Kenyon was seen today for follow-up and hypertension. Diagnoses and all orders for this visit:    Hypertension, essential  - Normotensive  - she has met JNC standards.  - Continue current regimen. Numbness and tingling  - Resolved with improved BP control    Mixed hypercholesterolemia and hypertriglyceridemia  - Chronic, uncontrolled  - Repatha ordered given numerous statin intolerance     Statin intolerance  -Has tried and failed a number of statins. -Given high blood pressure, previous numbness and tingling concerning for TIA-like s/s, treatment for cholesterol to lower the risk of heart attack and stroke is appropriate.       Goldy Centeno, APRN - CNP

## 2023-01-04 NOTE — PROGRESS NOTES
Naoma Prim         : 1960    Diagnosis: [x] BRAD (G47.33) [] CSA (G47.31) [x] Apnea (G47.30)   Length of Need: [x] 6 Months [] 99 Months [x] Other: Hypoxia Unspecified (R09.02)       Miscellaneous: [] Chin Strap ()/ 1 per 6 months [] O2 bleed-in:       LPM   [x] Oximetry on CPAP/Bilevel []  Other:          Start Order Date: 21    MEDICAL JUSTIFICATION:  I, the undersigned, certify that the above prescribed supplies are medically necessary for this patients wellbeing. In my opinion, the supplies are both reasonable and necessary in reference to accepted standards of medicalpractice in treatment of this patients condition. BRISSA Steinberg CNP      NPI: [id number]       Order Signed Date: 21    Electronically signed by BRISSA Steinberg CNP on 3/30/2021 at 3:44 PM    Naoma Prim  1960  [address] [facility] [address]  [phone] (home)   [phone] (mobile)      Insurance Info (confirm with patient if correct):  Payor/Plan Subscr  Sex Relation Sub.  Ins. ID Effective Group Num [[id number]] : \par 15 month old with no chronic medical issues presents with worsening pustular/bullous rash on extremities and face (mild) only over past 1 week\par No acute viral illness\par Suspect impetigo but incomplete resolution with 72 hours of keflex and bactroban with few draining lesions per mother\par Recommend in-office appt to better visualize rash and for ear exam (mother reports ear pulling)\par Plan to obtain pustule cx and PCR for HSV/VZV

## 2023-01-17 ENCOUNTER — TELEPHONE (OUTPATIENT)
Dept: PULMONOLOGY | Age: 63
End: 2023-01-17

## 2023-01-17 DIAGNOSIS — J45.20 MILD INTERMITTENT ASTHMA WITHOUT COMPLICATION: ICD-10-CM

## 2023-01-17 RX ORDER — IPRATROPIUM BROMIDE AND ALBUTEROL SULFATE 2.5; .5 MG/3ML; MG/3ML
SOLUTION RESPIRATORY (INHALATION)
Qty: 1080 ML | Refills: 1 | Status: SHIPPED | OUTPATIENT
Start: 2023-01-17

## 2023-01-17 RX ORDER — FLUTICASONE FUROATE AND VILANTEROL 100; 25 UG/1; UG/1
1 POWDER RESPIRATORY (INHALATION) DAILY
Qty: 180 EACH | Refills: 1 | Status: SHIPPED | OUTPATIENT
Start: 2023-01-17

## 2023-01-17 RX ORDER — MONTELUKAST SODIUM 10 MG/1
10 TABLET ORAL DAILY
Qty: 90 TABLET | Refills: 1 | Status: SHIPPED | OUTPATIENT
Start: 2023-01-17

## 2023-01-17 RX ORDER — ALBUTEROL SULFATE 90 UG/1
2 AEROSOL, METERED RESPIRATORY (INHALATION) 4 TIMES DAILY PRN
Qty: 54 G | Refills: 1 | Status: SHIPPED | OUTPATIENT
Start: 2023-01-17

## 2023-01-18 ENCOUNTER — TELEPHONE (OUTPATIENT)
Dept: PULMONOLOGY | Age: 63
End: 2023-01-18

## 2023-01-18 NOTE — TELEPHONE ENCOUNTER
Patient called and stated the Michael Corbin is going to cost $100. Advised pt to check with pharmacy to see if it was for 1 month supply or 3 month supply. She will call office back if it was for 1 month supply.

## 2023-01-18 NOTE — TELEPHONE ENCOUNTER
Received fax from [employer] stating Fluticasone-Vilanterol in not preferred medication. Insurance will cover Wanda Irons (name brand). Sent fax stating to run as name brand.

## 2023-01-26 ENCOUNTER — OFFICE VISIT (OUTPATIENT)
Dept: INTERNAL MEDICINE CLINIC | Age: 63
End: 2023-01-26
Payer: COMMERCIAL

## 2023-01-26 VITALS
TEMPERATURE: 97.5 F | SYSTOLIC BLOOD PRESSURE: 140 MMHG | BODY MASS INDEX: 34.82 KG/M2 | WEIGHT: 209 LBS | OXYGEN SATURATION: 94 % | HEART RATE: 90 BPM | DIASTOLIC BLOOD PRESSURE: 90 MMHG | HEIGHT: 65 IN

## 2023-01-26 DIAGNOSIS — R69: ICD-10-CM

## 2023-01-26 DIAGNOSIS — J01.40 ACUTE NON-RECURRENT PANSINUSITIS: Primary | ICD-10-CM

## 2023-01-26 LAB
INFLUENZA A ANTIBODY: NEGATIVE
INFLUENZA B ANTIBODY: NEGATIVE

## 2023-01-26 PROCEDURE — 3077F SYST BP >= 140 MM HG: CPT | Performed by: NURSE PRACTITIONER

## 2023-01-26 PROCEDURE — 87804 INFLUENZA ASSAY W/OPTIC: CPT | Performed by: NURSE PRACTITIONER

## 2023-01-26 PROCEDURE — 99213 OFFICE O/P EST LOW 20 MIN: CPT | Performed by: NURSE PRACTITIONER

## 2023-01-26 PROCEDURE — 3079F DIAST BP 80-89 MM HG: CPT | Performed by: NURSE PRACTITIONER

## 2023-01-26 RX ORDER — PREDNISONE 20 MG/1
40 TABLET ORAL DAILY
Qty: 10 TABLET | Refills: 0 | Status: SHIPPED | OUTPATIENT
Start: 2023-01-26 | End: 2023-01-31

## 2023-01-26 RX ORDER — DOXYCYCLINE HYCLATE 100 MG
100 TABLET ORAL 2 TIMES DAILY
Qty: 20 TABLET | Refills: 0 | Status: SHIPPED | OUTPATIENT
Start: 2023-01-26 | End: 2023-02-05

## 2023-01-26 SDOH — ECONOMIC STABILITY: FOOD INSECURITY: WITHIN THE PAST 12 MONTHS, THE FOOD YOU BOUGHT JUST DIDN'T LAST AND YOU DIDN'T HAVE MONEY TO GET MORE.: NEVER TRUE

## 2023-01-26 SDOH — ECONOMIC STABILITY: FOOD INSECURITY: WITHIN THE PAST 12 MONTHS, YOU WORRIED THAT YOUR FOOD WOULD RUN OUT BEFORE YOU GOT MONEY TO BUY MORE.: NEVER TRUE

## 2023-01-26 ASSESSMENT — ENCOUNTER SYMPTOMS
COUGH: 1
SINUS PRESSURE: 1
SINUS PAIN: 1
SORE THROAT: 1

## 2023-01-26 ASSESSMENT — SOCIAL DETERMINANTS OF HEALTH (SDOH): HOW HARD IS IT FOR YOU TO PAY FOR THE VERY BASICS LIKE FOOD, HOUSING, MEDICAL CARE, AND HEATING?: NOT HARD AT ALL

## 2023-01-26 NOTE — PROGRESS NOTES
SUBJECTIVE:    Patient ID: Saul Monroy is a 65 y.o. female. CC: illness    HPI: The patient presents to the office for an acute visit. Head cold for 5 days. Congestion, headache, sore throat, sneezing, cough, malaise  No wheezing. No fever  She has taken Tylenol      Current Outpatient Medications   Medication Sig Dispense Refill    albuterol sulfate HFA (VENTOLIN HFA) 108 (90 Base) MCG/ACT inhaler Inhale 2 puffs into the lungs 4 times daily as needed for Wheezing 54 g 1    ipratropium-albuterol (DUONEB) 0.5-2.5 (3) MG/3ML SOLN nebulizer solution INHALE ONE VIAL VIA NEBULIZER BY MOUTH INTO THE LUNGS  EVERY FOUR HOURS 1080 mL 1    montelukast (SINGULAIR) 10 MG tablet Take 1 tablet by mouth daily 90 tablet 1    Fluticasone Furoate-Vilanterol (BREO ELLIPTA) 100-25 MCG/ACT AEPB Inhale 1 puff into the lungs daily 180 each 1    amLODIPine (NORVASC) 10 MG tablet Take 1 tablet by mouth daily 90 tablet 3    Evolocumab 140 MG/ML SOAJ Inject 140 mg into the skin every 14 days 6 Adjustable Dose Pre-filled Pen Syringe 3    metoprolol tartrate (LOPRESSOR) 25 MG tablet TAKE ONE TABLET BY MOUTH TWICE A  tablet 3    fluticasone-vilanterol (BREO ELLIPTA) 100-25 MCG/INH AEPB inhaler INHALE ONE DOSE BY MOUTH DAILY 60 each 5    Spacer/Aero-Holding Chambers NAVIN 1 Device by Does not apply route daily as needed (shortness of breath) 1 each 0    nitroGLYCERIN (NITROSTAT) 0.4 MG SL tablet Place 1 tablet under the tongue every 5 minutes as needed for Chest pain 25 tablet 3    Multiple Vitamins-Minerals (THERAPEUTIC MULTIVITAMIN-MINERALS) tablet Take 1 tablet by mouth daily      aspirin 81 MG EC tablet Take 81 mg by mouth daily. No current facility-administered medications for this visit. Review of Systems   Constitutional:  Positive for appetite change and fatigue. Negative for fever. HENT:  Positive for congestion, sinus pressure, sinus pain and sore throat. Respiratory:  Positive for cough. Musculoskeletal:  Positive for myalgias. Neurological:  Positive for headaches. OBJECTIVE:  Physical Exam  Constitutional:       Appearance: Normal appearance. She is ill-appearing. She is not toxic-appearing. HENT:      Head: Normocephalic and atraumatic. Right Ear: There is impacted cerumen. Left Ear: There is impacted cerumen. Nose:      Right Sinus: Maxillary sinus tenderness and frontal sinus tenderness present. Left Sinus: Maxillary sinus tenderness and frontal sinus tenderness present. Mouth/Throat:      Lips: Pink. Pharynx: Posterior oropharyngeal erythema present. No oropharyngeal exudate. Skin:     General: Skin is warm and dry. Neurological:      General: No focal deficit present. Mental Status: She is alert and oriented to person, place, and time. Psychiatric:         Mood and Affect: Mood normal.         Behavior: Behavior normal.      BP (!) 140/90   Pulse 90   Temp 97.5 °F (36.4 °C)   Ht 5' 5\" (1.651 m)   Wt 209 lb (94.8 kg)   SpO2 94%   BMI 34.78 kg/m²      PHQ Scores 1/3/2023 3/14/2022 1/22/2021 2/6/2019 6/4/2018   PHQ2 Score 0 0 0 0 0   PHQ9 Score 0 0 0 0 0     Interpretation of Total Score Depression Severity: 1-4 = Minimal depression, 5-9 = Mild depression, 10-14 = Moderate depression, 15-19 = Moderately severe depression, 20-27 =Severe depression      ASSESSMENT/PLAN:  Hansa Watts was seen today for congestion, generalized body aches, cough and nasal congestion. Diagnoses and all orders for this visit:    Acute non-recurrent pansinusitis  - Primarily URI symptoms, some cough  - No wheezing, fever  - Worsening  - COVID -  - Flu -  - Discussed probability of viral illness. Given length of time and underlying COPD, pulmonary fibrosis, she feels more comfortable starting an antibiotic therapy   -     predniSONE (DELTASONE) 20 MG tablet; Take 2 tablets by mouth daily for 5 days  -     doxycycline hyclate (VIBRA-TABS) 100 MG tablet;  Take 1 tablet by mouth 2 times daily for 10 days    Ill  -     POCT Influenza A/B        Forest Melendrez, APRN - CNP

## 2023-02-14 ENCOUNTER — HOSPITAL ENCOUNTER (OUTPATIENT)
Dept: MAMMOGRAPHY | Age: 63
Discharge: HOME | End: 2023-02-18

## 2023-02-14 DIAGNOSIS — Z12.31 VISIT FOR SCREENING MAMMOGRAM: ICD-10-CM

## 2023-02-14 PROBLEM — J45.20 MILD INTERMITTENT ASTHMA: Status: ACTIVE | Noted: 2023-02-14

## 2023-02-14 RX ORDER — MONTELUKAST SODIUM 10 MG/1
10 TABLET ORAL DAILY
Qty: 90 TABLET | Refills: 3 | Status: SHIPPED | OUTPATIENT
Start: 2023-02-14

## 2023-02-14 RX ORDER — ALBUTEROL SULFATE 90 UG/1
2 AEROSOL, METERED RESPIRATORY (INHALATION) 4 TIMES DAILY PRN
Qty: 54 G | Refills: 3 | Status: SHIPPED | OUTPATIENT
Start: 2023-02-14

## 2023-02-14 RX ORDER — FLUTICASONE FUROATE AND VILANTEROL 100; 25 UG/1; UG/1
1 POWDER RESPIRATORY (INHALATION) DAILY
Qty: 180 EACH | Refills: 3 | Status: SHIPPED | OUTPATIENT
Start: 2023-02-14

## 2023-02-17 ENCOUNTER — TELEPHONE (OUTPATIENT)
Dept: INTERNAL MEDICINE CLINIC | Age: 63
End: 2023-02-17

## 2023-02-17 DIAGNOSIS — I25.10 CORONARY ARTERY DISEASE INVOLVING NATIVE CORONARY ARTERY OF NATIVE HEART WITHOUT ANGINA PECTORIS: Chronic | ICD-10-CM

## 2023-02-17 DIAGNOSIS — I10 HYPERTENSION, ESSENTIAL: ICD-10-CM

## 2023-02-17 RX ORDER — AMLODIPINE BESYLATE 10 MG/1
10 TABLET ORAL DAILY
Qty: 90 TABLET | Refills: 1 | Status: SHIPPED | OUTPATIENT
Start: 2023-02-17

## 2023-02-17 NOTE — TELEPHONE ENCOUNTER
Patient states that the provider would need to call [phone] to approve mail delivery for medications listed below   metoprolol tartrate (LOPRESSOR) 25 MG tablet  amLODIPine (NORVASC) 10 MG tablet     [facility] will need approval for the 90 day supply. Please advise. Thank you.

## 2023-02-21 ENCOUNTER — OFFICE VISIT (OUTPATIENT)
Dept: PULMONOLOGY | Age: 63
End: 2023-02-21
Payer: COMMERCIAL

## 2023-02-21 VITALS
SYSTOLIC BLOOD PRESSURE: 128 MMHG | HEART RATE: 73 BPM | DIASTOLIC BLOOD PRESSURE: 72 MMHG | HEIGHT: 65 IN | BODY MASS INDEX: 35.16 KG/M2 | OXYGEN SATURATION: 96 % | WEIGHT: 211 LBS

## 2023-02-21 DIAGNOSIS — E66.09 CLASS 1 OBESITY DUE TO EXCESS CALORIES WITH SERIOUS COMORBIDITY AND BODY MASS INDEX (BMI) OF 34.0 TO 34.9 IN ADULT: Chronic | ICD-10-CM

## 2023-02-21 DIAGNOSIS — G47.33 OBSTRUCTIVE SLEEP APNEA (ADULT) (PEDIATRIC): Chronic | ICD-10-CM

## 2023-02-21 DIAGNOSIS — I10 HYPERTENSION, ESSENTIAL: Chronic | ICD-10-CM

## 2023-02-21 DIAGNOSIS — I25.10 CORONARY ARTERY DISEASE INVOLVING NATIVE CORONARY ARTERY OF NATIVE HEART WITHOUT ANGINA PECTORIS: Chronic | ICD-10-CM

## 2023-02-21 PROBLEM — J45.20 MILD INTERMITTENT ASTHMA: Chronic | Status: ACTIVE | Noted: 2023-02-14

## 2023-02-21 PROBLEM — E78.2 MIXED HYPERCHOLESTEROLEMIA AND HYPERTRIGLYCERIDEMIA: Chronic | Status: ACTIVE | Noted: 2021-07-22

## 2023-02-21 PROCEDURE — 99214 OFFICE O/P EST MOD 30 MIN: CPT | Performed by: INTERNAL MEDICINE

## 2023-02-21 PROCEDURE — 3078F DIAST BP <80 MM HG: CPT | Performed by: INTERNAL MEDICINE

## 2023-02-21 PROCEDURE — 3074F SYST BP LT 130 MM HG: CPT | Performed by: INTERNAL MEDICINE

## 2023-02-21 ASSESSMENT — SLEEP AND FATIGUE QUESTIONNAIRES
HOW LIKELY ARE YOU TO NOD OFF OR FALL ASLEEP WHILE WATCHING TV: 0
HOW LIKELY ARE YOU TO NOD OFF OR FALL ASLEEP WHILE SITTING QUIETLY AFTER LUNCH WITHOUT ALCOHOL: 0
HOW LIKELY ARE YOU TO NOD OFF OR FALL ASLEEP WHILE SITTING AND TALKING TO SOMEONE: 0
ESS TOTAL SCORE: 3
HOW LIKELY ARE YOU TO NOD OFF OR FALL ASLEEP WHILE SITTING INACTIVE IN A PUBLIC PLACE: 0
HOW LIKELY ARE YOU TO NOD OFF OR FALL ASLEEP WHILE SITTING AND READING: 0
HOW LIKELY ARE YOU TO NOD OFF OR FALL ASLEEP IN A CAR, WHILE STOPPED FOR A FEW MINUTES IN TRAFFIC: 0
HOW LIKELY ARE YOU TO NOD OFF OR FALL ASLEEP WHEN YOU ARE A PASSENGER IN A CAR FOR AN HOUR WITHOUT A BREAK: 1
HOW LIKELY ARE YOU TO NOD OFF OR FALL ASLEEP WHILE LYING DOWN TO REST IN THE AFTERNOON WHEN CIRCUMSTANCES PERMIT: 2

## 2023-02-21 NOTE — LETTER
[facility]  [id number] [facility]  [address] [address] [address]  Phone: [phone]  Fax: [phone]    Shameka Diallo MD    February 21, 2023     Radha Wall, APRN - CNP  [address] [address]    Patient: Viki Price   MR Number: [id number]   YOB: 1960   Date of Visit: 2/21/2023       Dear Radha Wall: Thank you for referring Min Hansen to me for evaluation/treatment. Below are the relevant portions of my assessment and plan of care. 1. Obstructive sleep apnea (adult) (pediatric)  Assessment & Plan:  Chronic-Stable: Reviewed and analyzed results of physiologic download from patient's machine and reviewed with patient. Supplies and parts as needed for her machine. These are medically necessary. Limit caffeine use after 3pm. Based on the analyzed data will continue with current settings. 2. Coronary artery disease involving native coronary artery of native heart without angina pectoris  Assessment & Plan:  Chronic- Stable. Discussed the importance of treating sleep apnea as part of the management of this disorder. Cont any meds per PCP and other physicians. 3. Hypertension, essential  Assessment & Plan:  Chronic- Stable. Discussed the importance of treating sleep apnea as part of the management of this disorder. Cont any meds per PCP and other physicians. 4. Class 1 obesity due to excess calories with serious comorbidity and body mass index (BMI) of 34.0 to 34.9 in adult  Assessment & Plan:  Chronic-not stable:  Discussed importance of treating obstructive sleep apnea and getting sufficient sleep to assist with weight control. Encouraged her to work on weight loss through diet and exercise. Recommended DASH or Mediterranean diets. Reviewed, analyzed, and documented physiologic data from patient's PAP machine.     This information was analyzed to assess complexity and medical decision making in regards to further testing and management. Diagnoses of Obstructive sleep apnea (adult) (pediatric), Coronary artery disease involving native coronary artery of native heart without angina pectoris, Hypertension, essential, and Class 1 obesity due to excess calories with serious comorbidity and body mass index (BMI) of 34.0 to 34.9 in adult were pertinent to this visit. The chronic medical conditions listed are directly related to the primary diagnosis listed above. The management of the primary diagnosis affects the secondary diagnosis and vice versa. If you have questions, please do not hesitate to call me. I look forward to following Tenzin Licea along with you.     Sincerely,      Vahe Gann MD

## 2023-02-21 NOTE — PROGRESS NOTES
Kathi Lloyd MD  [facility]  Aracely Mckeon [address], [address] [facility] [phone]   Reema Gonzalez  [address]  [place] [address] [phone] [place] [id number] [phone]      Assessment/Plan:      1. Obstructive sleep apnea (adult) (pediatric)  Assessment & Plan:  Chronic-Stable: Reviewed and analyzed results of physiologic download from patient's machine and reviewed with patient. Supplies and parts as needed for her machine. These are medically necessary. Limit caffeine use after 3pm. Based on the analyzed data will continue with current settings. 2. Coronary artery disease involving native coronary artery of native heart without angina pectoris  Assessment & Plan:  Chronic- Stable. Discussed the importance of treating sleep apnea as part of the management of this disorder. Cont any meds per PCP and other physicians. 3. Hypertension, essential  Assessment & Plan:  Chronic- Stable. Discussed the importance of treating sleep apnea as part of the management of this disorder. Cont any meds per PCP and other physicians. 4. Class 1 obesity due to excess calories with serious comorbidity and body mass index (BMI) of 34.0 to 34.9 in adult  Assessment & Plan:  Chronic-not stable:  Discussed importance of treating obstructive sleep apnea and getting sufficient sleep to assist with weight control. Encouraged her to work on weight loss through diet and exercise. Recommended DASH or Mediterranean diets. Reviewed, analyzed, and documented physiologic data from patient's PAP machine. This information was analyzed to assess complexity and medical decision making in regards to further testing and management.     Diagnoses of Obstructive sleep apnea (adult) (pediatric), Coronary artery disease involving native coronary artery of native heart without angina pectoris, Hypertension, essential, and Class 1 obesity due to excess calories with serious comorbidity and body mass index (BMI) of 34.0 to 34.9 in adult were pertinent to this visit. The chronic medical conditions listed are directly related to the primary diagnosis listed above. The management of the primary diagnosis affects the secondary diagnosis and vice versa. Subjective:   Subjective   Patient ID: Katelyn Nettles is a 65 y.o. female. Chief Complaint   Patient presents with    Sleep Apnea       HPI:  Machine Modem/Download Info:  Compliance (hours/night): 6 hrs/night  % of nights >= 4 hrs: 94 %  Download AHI (/hour): 0.2 /HR  Average CPAP Pressure : 10 cmH2O   APAP - Settings  Pressure Min: 6 cmH2O  Pressure Max: 16 cmH2O                 Comfort Settings  Flex/EPR (0-3): 3       She continues to do well with her machine at the current settings. No issues with EDS, snoring, or apneas. She is waking refreshed, for the most part, in the am.  No HA, dryness, or congestion. No issues using her machine. Reviewed her oximetry done on 2022 which is essentially normal while she is wearing her APAP machine.     [address]    Vincent - Vincent Sleepiness Score: 3    Social History     Socioeconomic History    Marital status:      Spouse name: Not on file    Number of children: Not on file    Years of education: Not on file    Highest education level: Not on file   Occupational History    Occupation: maintenance      Comment: [employer]   Tobacco Use    Smoking status: Former     Packs/day: 0.25     Years: 7.00     Pack years: 1.75     Types: Cigarettes     Quit date: 1988     Years since quittin.4    Smokeless tobacco: Never    Tobacco comments:     quit    Vaping Use    Vaping Use: Never used   Substance and Sexual Activity    Alcohol use: No    Drug use: No    Sexual activity: Yes Partners: Male     Birth control/protection: Surgical   Other Topics Concern    Not on file   Social History Narrative    Not on file     Social Determinants of Health     Financial Resource Strain: Low Risk     Difficulty of Paying Living Expenses: Not hard at all   Food Insecurity: No Food Insecurity    Worried About Running Out of Food in the Last Year: Never true    Ran Out of Food in the Last Year: Never true   Transportation Needs: Not on file   Physical Activity: Not on file   Stress: Not on file   Social Connections: Not on file   Intimate Partner Violence: Not on file   Housing Stability: Not on file       Current Outpatient Medications   Medication Instructions    albuterol sulfate HFA (VENTOLIN HFA) 108 (90 Base) MCG/ACT inhaler 2 puffs, Inhalation, 4 TIMES DAILY PRN    amLODIPine (NORVASC) 10 mg, Oral, DAILY    aspirin 81 mg, DAILY    Evolocumab 140 mg, SubCUTAneous, EVERY 14 DAYS    fluticasone furoate-vilanterol (BREO ELLIPTA) 100-25 MCG/ACT inhaler 1 puff, Inhalation, DAILY    ipratropium-albuterol (DUONEB) 0.5-2.5 (3) MG/3ML SOLN nebulizer solution INHALE ONE VIAL VIA NEBULIZER BY MOUTH INTO THE LUNGS  EVERY FOUR HOURS    metoprolol tartrate (LOPRESSOR) 25 MG tablet TAKE ONE TABLET BY MOUTH TWICE A DAY    montelukast (SINGULAIR) 10 mg, Oral, DAILY    Multiple Vitamins-Minerals (THERAPEUTIC MULTIVITAMIN-MINERALS) tablet 1 tablet, Oral, DAILY    nitroGLYCERIN (NITROSTAT) 0.4 mg, SubLINGual, EVERY 5 MIN PRN    Spacer/Aero-Holding Chambers NAVIN 1 Device, Does not apply, DAILY PRN          Vitals:  Weight BMI   Wt Readings from Last 3 Encounters:   02/21/23 211 lb (95.7 kg)   01/26/23 209 lb (94.8 kg)   01/03/23 210 lb (95.3 kg)    Body mass index is 35.11 kg/m².      BP HR SaO2   BP Readings from Last 3 Encounters:   02/21/23 128/72   01/26/23 (!) 140/90   01/03/23 128/70    Pulse Readings from Last 3 Encounters:   02/21/23 73   01/26/23 90   01/03/23 87    SpO2 Readings from Last 3 Encounters:   02/21/23 96%   01/26/23 94%   01/03/23 95%        Electronically signed by Roland Dia MD on 2/21/2023 at 10:29 AM

## 2023-02-21 NOTE — ASSESSMENT & PLAN NOTE
Chronic-Stable: Reviewed and analyzed results of physiologic download from patient's machine and reviewed with patient. Supplies and parts as needed for her machine. These are medically necessary. Limit caffeine use after 3pm. Based on the analyzed data will continue with current settings.

## 2023-03-07 ENCOUNTER — HOSPITAL ENCOUNTER (OUTPATIENT)
Dept: WOMENS IMAGING | Age: 63
Discharge: HOME | End: 2023-03-07
Payer: COMMERCIAL

## 2023-03-07 DIAGNOSIS — Z12.31 ENCOUNTER FOR SCREENING MAMMOGRAM FOR BREAST CANCER: ICD-10-CM

## 2023-03-07 PROCEDURE — 77063 BREAST TOMOSYNTHESIS BI: CPT

## 2023-03-27 ENCOUNTER — HOSPITAL ENCOUNTER (OUTPATIENT)
Age: 63
Discharge: HOME | End: 2023-03-27
Payer: COMMERCIAL

## 2023-03-27 ENCOUNTER — OFFICE VISIT (OUTPATIENT)
Dept: CARDIOLOGY CLINIC | Age: 63
End: 2023-03-27
Payer: COMMERCIAL

## 2023-03-27 VITALS
HEIGHT: 65 IN | SYSTOLIC BLOOD PRESSURE: 130 MMHG | OXYGEN SATURATION: 96 % | HEART RATE: 85 BPM | BODY MASS INDEX: 35.32 KG/M2 | WEIGHT: 212 LBS | DIASTOLIC BLOOD PRESSURE: 68 MMHG

## 2023-03-27 DIAGNOSIS — E78.2 MIXED HYPERCHOLESTEROLEMIA AND HYPERTRIGLYCERIDEMIA: ICD-10-CM

## 2023-03-27 DIAGNOSIS — I25.10 CORONARY ARTERY DISEASE INVOLVING NATIVE CORONARY ARTERY OF NATIVE HEART WITHOUT ANGINA PECTORIS: Primary | ICD-10-CM

## 2023-03-27 DIAGNOSIS — I10 PRIMARY HYPERTENSION: ICD-10-CM

## 2023-03-27 PROBLEM — I25.119 ATHEROSCLEROTIC HEART DISEASE OF NATIVE CORONARY ARTERY WITH UNSPECIFIED ANGINA PECTORIS (HCC): Status: ACTIVE | Noted: 2023-03-27

## 2023-03-27 PROBLEM — I25.119 ATHEROSCLEROTIC HEART DISEASE OF NATIVE CORONARY ARTERY WITH UNSPECIFIED ANGINA PECTORIS (HCC): Status: RESOLVED | Noted: 2023-03-27 | Resolved: 2023-03-27

## 2023-03-27 LAB
ALBUMIN SERPL-MCNC: 4.2 G/DL (ref 3.4–5)
ALBUMIN/GLOB SERPL: 1.4 {RATIO} (ref 1.1–2.2)
ALP SERPL-CCNC: 102 U/L (ref 40–129)
ALT SERPL-CCNC: 18 U/L (ref 10–40)
ANION GAP SERPL CALCULATED.3IONS-SCNC: 13 MMOL/L (ref 3–16)
AST SERPL-CCNC: 19 U/L (ref 15–37)
BILIRUB SERPL-MCNC: 1 MG/DL (ref 0–1)
BUN SERPL-MCNC: 13 MG/DL (ref 7–20)
CALCIUM SERPL-MCNC: 9.4 MG/DL (ref 8.3–10.6)
CHLORIDE SERPL-SCNC: 105 MMOL/L (ref 99–110)
CHOLEST SERPL-MCNC: 422 MG/DL (ref 0–199)
CO2 SERPL-SCNC: 21 MMOL/L (ref 21–32)
CREAT SERPL-MCNC: 0.7 MG/DL (ref 0.6–1.2)
GFR SERPLBLD CREATININE-BSD FMLA CKD-EPI: >60 ML/MIN/{1.73_M2}
GLUCOSE SERPL-MCNC: 99 MG/DL (ref 70–99)
HDLC SERPL-MCNC: 42 MG/DL (ref 40–60)
LDL CHOLESTEROL CALCULATED: 345 MG/DL
POTASSIUM SERPL-SCNC: 4.3 MMOL/L (ref 3.5–5.1)
PROT SERPL-MCNC: 7.2 G/DL (ref 6.4–8.2)
SODIUM SERPL-SCNC: 139 MMOL/L (ref 136–145)
TRIGL SERPL-MCNC: 174 MG/DL (ref 0–150)
TSH SERPL DL<=0.005 MIU/L-ACNC: 1 UIU/ML (ref 0.27–4.2)
VLDLC SERPL CALC-MCNC: 35 MG/DL

## 2023-03-27 PROCEDURE — 80053 COMPREHEN METABOLIC PANEL: CPT

## 2023-03-27 PROCEDURE — 36415 COLL VENOUS BLD VENIPUNCTURE: CPT

## 2023-03-27 PROCEDURE — 84443 ASSAY THYROID STIM HORMONE: CPT

## 2023-03-27 PROCEDURE — 3074F SYST BP LT 130 MM HG: CPT | Performed by: NURSE PRACTITIONER

## 2023-03-27 PROCEDURE — 3078F DIAST BP <80 MM HG: CPT | Performed by: NURSE PRACTITIONER

## 2023-03-27 PROCEDURE — 80061 LIPID PANEL: CPT

## 2023-03-27 PROCEDURE — 99214 OFFICE O/P EST MOD 30 MIN: CPT | Performed by: NURSE PRACTITIONER

## 2023-03-27 PROCEDURE — 93000 ELECTROCARDIOGRAM COMPLETE: CPT | Performed by: NURSE PRACTITIONER

## 2023-03-27 RX ORDER — PITAVASTATIN CALCIUM 2.09 MG/1
2 TABLET, FILM COATED ORAL NIGHTLY
Qty: 90 TABLET | Refills: 2 | Status: SHIPPED | OUTPATIENT
Start: 2023-03-27

## 2023-03-27 RX ORDER — NITROGLYCERIN 0.4 MG/1
0.4 TABLET SUBLINGUAL EVERY 5 MIN PRN
Qty: 25 TABLET | Refills: 3 | Status: SHIPPED | OUTPATIENT
Start: 2023-03-27

## 2023-03-27 NOTE — PROGRESS NOTES
History:    Social History     Tobacco Use   Smoking Status Former    Packs/day: 0.25    Years: 7.00    Pack years: 1.75    Types: Cigarettes    Quit date: 1988    Years since quittin.5   Smokeless Tobacco Never   Tobacco Comments    quit      Current Medications:  Current Outpatient Medications   Medication Sig Dispense Refill    metoprolol tartrate (LOPRESSOR) 25 MG tablet TAKE ONE TABLET BY MOUTH TWICE A  tablet 1    amLODIPine (NORVASC) 10 MG tablet Take 1 tablet by mouth daily 90 tablet 1    montelukast (SINGULAIR) 10 MG tablet Take 1 tablet by mouth daily 90 tablet 3    fluticasone furoate-vilanterol (BREO ELLIPTA) 100-25 MCG/ACT inhaler Inhale 1 puff into the lungs daily 180 each 3    albuterol sulfate HFA (VENTOLIN HFA) 108 (90 Base) MCG/ACT inhaler Inhale 2 puffs into the lungs 4 times daily as needed for Wheezing 54 g 3    ipratropium-albuterol (DUONEB) 0.5-2.5 (3) MG/3ML SOLN nebulizer solution INHALE ONE VIAL VIA NEBULIZER BY MOUTH INTO THE LUNGS  EVERY FOUR HOURS 1080 mL 1    Multiple Vitamins-Minerals (THERAPEUTIC MULTIVITAMIN-MINERALS) tablet Take 1 tablet by mouth daily      aspirin 81 MG EC tablet Take 81 mg by mouth daily. Spacer/Aero-Holding Shane HOLDEN 1 Device by Does not apply route daily as needed (shortness of breath) 1 each 0    nitroGLYCERIN (NITROSTAT) 0.4 MG SL tablet Place 1 tablet under the tongue every 5 minutes as needed for Chest pain (Patient not taking: Reported on 3/27/2023) 25 tablet 3     No current facility-administered medications for this visit. REVIEW OF SYSTEMS:    CONSTITUTIONAL: + major weight gain; fatigue, weakness, night sweats or fever. HEENT: No new vision difficulties or ringing in the ears. RESPIRATORY: No new SOB, PND, orthopnea or cough. CARDIOVASCULAR: See HPI  GI: No nausea, vomiting, diarrhea, constipation, abdominal pain or changes in bowel habits.   : No urinary frequency, urgency, incontinence hematuria or

## 2023-03-28 RX ORDER — INCLISIRAN 284 MG/1.5ML
284 INJECTION, SOLUTION SUBCUTANEOUS ONCE
Qty: 1.5 ML | Refills: 0 | Status: SHIPPED | OUTPATIENT
Start: 2023-03-28 | End: 2023-03-28

## 2023-03-29 ENCOUNTER — TELEPHONE (OUTPATIENT)
Dept: CARDIOLOGY CLINIC | Age: 63
End: 2023-03-29

## 2023-04-05 ENCOUNTER — OFFICE VISIT (OUTPATIENT)
Dept: INTERNAL MEDICINE CLINIC | Age: 63
End: 2023-04-05
Payer: COMMERCIAL

## 2023-04-05 VITALS
WEIGHT: 212 LBS | DIASTOLIC BLOOD PRESSURE: 80 MMHG | HEIGHT: 65 IN | BODY MASS INDEX: 35.32 KG/M2 | OXYGEN SATURATION: 95 % | HEART RATE: 92 BPM | SYSTOLIC BLOOD PRESSURE: 122 MMHG

## 2023-04-05 DIAGNOSIS — E78.2 MIXED HYPERCHOLESTEROLEMIA AND HYPERTRIGLYCERIDEMIA: ICD-10-CM

## 2023-04-05 DIAGNOSIS — E66.01 SEVERE OBESITY (BMI 35.0-39.9) WITH COMORBIDITY (HCC): ICD-10-CM

## 2023-04-05 DIAGNOSIS — J84.9 INTERSTITIAL LUNG DISEASE (HCC): ICD-10-CM

## 2023-04-05 DIAGNOSIS — I10 HYPERTENSION, ESSENTIAL: Primary | ICD-10-CM

## 2023-04-05 DIAGNOSIS — I25.10 CORONARY ARTERY DISEASE INVOLVING NATIVE CORONARY ARTERY OF NATIVE HEART WITHOUT ANGINA PECTORIS: ICD-10-CM

## 2023-04-05 DIAGNOSIS — R73.01 IMPAIRED FASTING GLUCOSE: ICD-10-CM

## 2023-04-05 DIAGNOSIS — Z78.9 STATIN INTOLERANCE: ICD-10-CM

## 2023-04-05 PROBLEM — U07.1 PNEUMONIA DUE TO COVID-19 VIRUS: Status: RESOLVED | Noted: 2021-12-13 | Resolved: 2023-04-05

## 2023-04-05 PROBLEM — J12.82 PNEUMONIA DUE TO COVID-19 VIRUS: Status: RESOLVED | Noted: 2021-12-13 | Resolved: 2023-04-05

## 2023-04-05 PROCEDURE — 99214 OFFICE O/P EST MOD 30 MIN: CPT | Performed by: NURSE PRACTITIONER

## 2023-04-05 PROCEDURE — 3079F DIAST BP 80-89 MM HG: CPT | Performed by: NURSE PRACTITIONER

## 2023-04-05 PROCEDURE — 3074F SYST BP LT 130 MM HG: CPT | Performed by: NURSE PRACTITIONER

## 2023-04-05 SDOH — ECONOMIC STABILITY: FOOD INSECURITY: WITHIN THE PAST 12 MONTHS, YOU WORRIED THAT YOUR FOOD WOULD RUN OUT BEFORE YOU GOT MONEY TO BUY MORE.: NEVER TRUE

## 2023-04-05 SDOH — ECONOMIC STABILITY: FOOD INSECURITY: WITHIN THE PAST 12 MONTHS, THE FOOD YOU BOUGHT JUST DIDN'T LAST AND YOU DIDN'T HAVE MONEY TO GET MORE.: NEVER TRUE

## 2023-04-05 SDOH — ECONOMIC STABILITY: INCOME INSECURITY: HOW HARD IS IT FOR YOU TO PAY FOR THE VERY BASICS LIKE FOOD, HOUSING, MEDICAL CARE, AND HEATING?: NOT HARD AT ALL

## 2023-04-05 SDOH — ECONOMIC STABILITY: HOUSING INSECURITY
IN THE LAST 12 MONTHS, WAS THERE A TIME WHEN YOU DID NOT HAVE A STEADY PLACE TO SLEEP OR SLEPT IN A SHELTER (INCLUDING NOW)?: NO

## 2023-04-05 ASSESSMENT — ENCOUNTER SYMPTOMS
RESPIRATORY NEGATIVE: 1
GASTROINTESTINAL NEGATIVE: 1

## 2023-04-05 NOTE — PROGRESS NOTES
Normocephalic and atraumatic. Eyes:      Conjunctiva/sclera: Conjunctivae normal.      Pupils: Pupils are equal, round, and reactive to light. Pulmonary:      Effort: Pulmonary effort is normal. No respiratory distress. Skin:     General: Skin is warm and dry. Neurological:      General: No focal deficit present. Mental Status: She is alert and oriented to person, place, and time. Psychiatric:         Mood and Affect: Mood normal.         Behavior: Behavior normal.      /80   Pulse 92   Ht 5' 5\" (1.651 m)   Wt 212 lb (96.2 kg)   SpO2 95%   BMI 35.28 kg/m²      PHQ Scores 1/3/2023 3/14/2022 1/22/2021 2/6/2019 6/4/2018   PHQ2 Score 0 0 0 0 0   PHQ9 Score 0 0 0 0 0     Interpretation of Total Score Depression Severity: 1-4 = Minimal depression, 5-9 = Mild depression, 10-14 = Moderate depression, 15-19 = Moderately severe depression, 20-27 =Severe depression        Assessment/Plan:  Segun Humphrey was seen today for 3 month follow-up. Diagnoses and all orders for this visit:    Hypertension, essential  - Normotensive  - she has met JNC standards.  - Continue current regimen. Coronary artery disease involving native coronary artery of native heart without angina pectoris  - Chronic, stable  - Continue current regimen    Mixed hypercholesterolemia and hypertriglyceridemia  Statin intolerance  - Chronic, uncontrolled  -Cardiology trying to get nonstatin treatment approved through insurance    Impaired fasting glucose  -A1c last year was 7.0 was atypical to her previous and subsequent A1c readings. This was drawn in the context of acute illness and steroid use. 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 Her most recent A1c has returned to her baseline of around 5.7 or mild prediabetes  -Continue monitoring impaired fasting glucose with annual A1c.     Interstitial lung disease ([facility] [address]  -Chronic, she is under the care of pulmonology  -Continue current regimen    Severe obesity (BMI

## 2023-05-11 ENCOUNTER — TELEPHONE (OUTPATIENT)
Dept: CARDIOLOGY CLINIC | Age: 63
End: 2023-05-11

## 2023-05-12 RX ORDER — INCLISIRAN 284 MG/1.5ML
284 INJECTION, SOLUTION SUBCUTANEOUS ONCE
Qty: 1.5 ML | Refills: 0 | Status: SHIPPED | OUTPATIENT
Start: 2023-05-12 | End: 2023-05-12

## 2023-05-31 ENCOUNTER — OFFICE VISIT (OUTPATIENT)
Dept: PULMONOLOGY | Age: 63
End: 2023-05-31
Payer: COMMERCIAL

## 2023-05-31 VITALS
BODY MASS INDEX: 34.82 KG/M2 | OXYGEN SATURATION: 95 % | SYSTOLIC BLOOD PRESSURE: 102 MMHG | HEART RATE: 94 BPM | HEIGHT: 65 IN | DIASTOLIC BLOOD PRESSURE: 64 MMHG | WEIGHT: 209 LBS

## 2023-05-31 DIAGNOSIS — J45.20 MILD INTERMITTENT ASTHMA WITHOUT COMPLICATION: Primary | Chronic | ICD-10-CM

## 2023-05-31 DIAGNOSIS — E66.01 CLASS 2 SEVERE OBESITY DUE TO EXCESS CALORIES WITH SERIOUS COMORBIDITY AND BODY MASS INDEX (BMI) OF 35.0 TO 35.9 IN ADULT (HCC): ICD-10-CM

## 2023-05-31 DIAGNOSIS — G47.33 OBSTRUCTIVE SLEEP APNEA (ADULT) (PEDIATRIC): Chronic | ICD-10-CM

## 2023-05-31 DIAGNOSIS — R06.02 POST-COVID CHRONIC SHORTNESS OF BREATH: ICD-10-CM

## 2023-05-31 DIAGNOSIS — U09.9 POST-COVID CHRONIC SHORTNESS OF BREATH: ICD-10-CM

## 2023-05-31 PROCEDURE — 3078F DIAST BP <80 MM HG: CPT | Performed by: INTERNAL MEDICINE

## 2023-05-31 PROCEDURE — 3074F SYST BP LT 130 MM HG: CPT | Performed by: INTERNAL MEDICINE

## 2023-05-31 PROCEDURE — 99214 OFFICE O/P EST MOD 30 MIN: CPT | Performed by: INTERNAL MEDICINE

## 2023-05-31 RX ORDER — GUAIFENESIN/DEXTROMETHORPHAN 100-10MG/5
5 SYRUP ORAL 3 TIMES DAILY PRN
Qty: 150 ML | Refills: 0 | Status: SHIPPED | OUTPATIENT
Start: 2023-05-31 | End: 2023-06-10

## 2023-05-31 RX ORDER — FLUTICASONE PROPIONATE 50 MCG
1 SPRAY, SUSPENSION (ML) NASAL DAILY
Qty: 15.8 EACH | Refills: 0 | Status: SHIPPED | OUTPATIENT
Start: 2023-05-31 | End: 2023-06-07

## 2023-05-31 RX ORDER — FEXOFENADINE HCL 180 MG/1
180 TABLET ORAL DAILY
Qty: 5 TABLET | Refills: 0 | Status: SHIPPED | OUTPATIENT
Start: 2023-05-31 | End: 2023-06-05

## 2023-05-31 NOTE — PROGRESS NOTES
PULMONARY OFFICE FOLLOW-UP VISIT    CONSULTING PHYSICIAN:  BRISSA Mcguire - CNP     REASON FOR VISIT:   Chief Complaint   Patient presents with    Asthma    Shortness of Breath    Cough     dry    Chest Congestion           DATE OF VISIT: 5/31/2023    HISTORY OF PRESENT ILLNESS: 59y.o. year old female comes into the pulmonary office for a follow up. Patient reports that for last few days she has been having postnasal drip, dry cough and feeling of chest congestion. Denies any fevers or night sweats. No sick contacts. No discolored expectoration. Has been having watering of her eyes. Has been taking her Breo Ellipta inhaler regularly. Has required to use her albuterol up to 4 times a day. Also reports that she has been gaining weight over the last few months. Finds it difficult to walk for long distances. Has to take frequent breaks. Previously: Patient reports that her breathing has been stable for the most part. For the past few days she has been having some nasal congestion. Denies any discolored expectoration, fevers, chest pain, wheezing. Has been using her Breo Ellipta inhaler regularly. Rarely uses albuterol. Still continues to have some dyspnea on exertion. Climbing up stairs is the most difficult effort for her.:  Patient reports that for last 10 days she has been having increased cough associated with shortness of breath and wheezing. She was given a course of Medrol pack, Z-Baldo and antitussive syrup by her primary care physician which she took for 7 days. While symptoms improved, they have not subsided. She continues to have cough which is productive of whitish expectoration, cough is more prominent at nighttime. She also has postnasal drip. Also reports some chest tightness. Has been using her Breo Ellipta inhaler regularly and frequently uses her albuterol inhaler. No sick contacts or recent travel. Is yet to get her flu vaccination.  Patient reports that her

## 2023-07-17 ENCOUNTER — TELEPHONE (OUTPATIENT)
Dept: CARDIOLOGY CLINIC | Age: 63
End: 2023-07-17

## 2023-07-17 NOTE — TELEPHONE ENCOUNTER
Submitted PA for REPATHA  Via [facility] (Key: [id number]  STATUS: PENDING. Follow up done daily; if no response in three days we will refax for status check. If another three days goes by with no response we will call the insurance for status.

## 2023-08-03 DIAGNOSIS — I10 HYPERTENSION, ESSENTIAL: ICD-10-CM

## 2023-08-03 DIAGNOSIS — I25.10 CORONARY ARTERY DISEASE INVOLVING NATIVE CORONARY ARTERY OF NATIVE HEART WITHOUT ANGINA PECTORIS: Chronic | ICD-10-CM

## 2023-08-03 RX ORDER — AMLODIPINE BESYLATE 10 MG/1
TABLET ORAL
Qty: 90 TABLET | Refills: 1 | Status: SHIPPED | OUTPATIENT
Start: 2023-08-03

## 2023-08-25 NOTE — TELEPHONE ENCOUNTER
Submitted PA for REPATHA  Via [facility] Key: [id number] STATUS: PENDING. SENT AGAIN WITH MORE CLINICAL INFORMATION RECEIVED BELOW      Follow up done daily; if no response in three days we will refax for status check. If another three days goes by with no response we will call the insurance for status.

## 2023-08-28 NOTE — TELEPHONE ENCOUNTER
I spoke with Shell Romero at [facility] : Hansa Adan was approved on 8/23/23  She is faxing over the approval letter as it is not yet in the chart

## 2023-08-29 NOTE — TELEPHONE ENCOUNTER
NYEKIZ:[id number];TPEITN:[id number]; Review Type:Prior Auth; Coverage Start Date:07/23/2024; Coverage End Date:08/25/2024;

## 2023-09-06 ENCOUNTER — TELEPHONE (OUTPATIENT)
Dept: PULMONOLOGY | Age: 63
End: 2023-09-06

## 2023-09-06 NOTE — TELEPHONE ENCOUNTER
Discussed with Dr. Jeannie Omalley. Advised patient that it is ok to take the Paxlovid to help lesson symptoms.

## 2023-09-06 NOTE — TELEPHONE ENCOUNTER
Patient called and said she went to see her PCP today and patient has covid and was prescribed paxlovid and patient is wanting  advice with this medication.      [id number]: [phone]

## 2023-09-19 NOTE — PROGRESS NOTES
[address]  [phone]  23  Referring:     REASON FOR CONSULT/CHIEF COMPLAINT/HPI     Reason for visit/ Chief complaint  Dyspnea on Exertion   HPI Debora Blackwell is a 59 y.o. female patient here by referral from Scar Moreira NP to establish care. She is a former patient of Dr Edu Arthur. PMH of non-obst CAD, HTN and hyperlipidemia . She continues to uses her CPAP. She follows with Dr Gilma Felix for asthma. She has a lot of family members with very high cholesterol. She has had high cholesterol  since her 20s. She just started Repatha due to not responding to statins in the past.     A few months ago (Dec '22) she was having stroke like symptoms; her BP was up to 180/ . She was only taking 1/2 dose of amlodipine. TIA was suspected. She smoked 0.25ppd, x 7 yrs, quit 88. Today she denies CP, does have tightness with activity. Her L arm will get pain if she is really overexerting herself. It has not gotten worse or changed recently. She has SOB all the time since having COVID and has lung damage. She denies orthopnea, palpitations or dizziness. She coughs up phlegm every once in a while. Patient is adherent with medications and is tolerating them well without side effects     HISTORY/ALLERGIES/ROS     MedHx:  has a past medical history of Arthritis, Complex tear of lateral meniscus of right knee as current injury, Complex tear of medial meniscus of right knee as current injury, Coronary artery disease involving native coronary artery of native heart without angina pectoris, Helicobacter pylori antibody positive, Hyperlipidemia, Hypertension, essential, Mallet fracture, closed, initial encounter, Obstructive sleep apnea (adult) (pediatric), Patellofemoral syndrome, Pneumonia due to COVID-19 virus, Synovitis of knee, and Tear of medial cartilage or meniscus of knee, current. SurgHx:  has a past surgical history that includes Hysterectomy;   section;

## 2023-09-27 ENCOUNTER — OFFICE VISIT (OUTPATIENT)
Dept: CARDIOLOGY CLINIC | Age: 63
End: 2023-09-27
Payer: COMMERCIAL

## 2023-09-27 ENCOUNTER — TELEPHONE (OUTPATIENT)
Dept: CARDIOLOGY CLINIC | Age: 63
End: 2023-09-27

## 2023-09-27 VITALS
DIASTOLIC BLOOD PRESSURE: 64 MMHG | OXYGEN SATURATION: 95 % | HEIGHT: 65 IN | BODY MASS INDEX: 35.2 KG/M2 | WEIGHT: 211.3 LBS | HEART RATE: 72 BPM | SYSTOLIC BLOOD PRESSURE: 108 MMHG

## 2023-09-27 DIAGNOSIS — E78.49 FAMILIAL HYPERLIPIDEMIA, HIGH LDL: ICD-10-CM

## 2023-09-27 DIAGNOSIS — R09.89 BRUIT OF RIGHT CAROTID ARTERY: ICD-10-CM

## 2023-09-27 DIAGNOSIS — R07.89 CHEST TIGHTNESS: ICD-10-CM

## 2023-09-27 DIAGNOSIS — J45.20 MILD INTERMITTENT ASTHMA WITHOUT COMPLICATION: Chronic | ICD-10-CM

## 2023-09-27 DIAGNOSIS — I10 PRIMARY HYPERTENSION: ICD-10-CM

## 2023-09-27 DIAGNOSIS — Z78.9 STATIN INTOLERANCE: ICD-10-CM

## 2023-09-27 DIAGNOSIS — I25.10 CORONARY ARTERY DISEASE INVOLVING NATIVE CORONARY ARTERY OF NATIVE HEART WITHOUT ANGINA PECTORIS: Primary | ICD-10-CM

## 2023-09-27 DIAGNOSIS — E66.01 SEVERE OBESITY (BMI 35.0-39.9) WITH COMORBIDITY (HCC): ICD-10-CM

## 2023-09-27 DIAGNOSIS — G47.33 OBSTRUCTIVE SLEEP APNEA (ADULT) (PEDIATRIC): Chronic | ICD-10-CM

## 2023-09-27 DIAGNOSIS — R06.02 SOB (SHORTNESS OF BREATH): ICD-10-CM

## 2023-09-27 DIAGNOSIS — E78.2 MIXED HYPERCHOLESTEROLEMIA AND HYPERTRIGLYCERIDEMIA: ICD-10-CM

## 2023-09-27 PROCEDURE — 3074F SYST BP LT 130 MM HG: CPT | Performed by: INTERNAL MEDICINE

## 2023-09-27 PROCEDURE — 99215 OFFICE O/P EST HI 40 MIN: CPT | Performed by: INTERNAL MEDICINE

## 2023-09-27 PROCEDURE — 3078F DIAST BP <80 MM HG: CPT | Performed by: INTERNAL MEDICINE

## 2023-09-27 PROCEDURE — 93000 ELECTROCARDIOGRAM COMPLETE: CPT | Performed by: INTERNAL MEDICINE

## 2023-09-27 NOTE — PATIENT INSTRUCTIONS
Follow up with THUY Anderson 1 week after heart cath   Follow up with Dr Sterling Perez in 2 months     LDL goal <70. BNP lab soon   Echo soon   Carotid Dopplers soon   Heart Cath- Anoop Hernandez will call you to schedule this today or tomorrow     Call for any questions or concerns. If you have any worsening of or change in symptoms, such as severe chest pain, please go straight to ED.

## 2023-09-27 NOTE — TELEPHONE ENCOUNTER
Date of Procedure: Thursday 10/12/23 @ [address] with Dr. Tereso Isaacs     Time of arrival: 6:45 am     Procedure time: 8:00 am     Spoke to Anneliese Carrillo and she is agreeable to date and time. Reviewed instructions and she verbalized understanding. Encouraged to call with any questions or concerns. Published on [employer], scheduled in Epic and e-mailed to cath lab.

## 2023-09-27 NOTE — TELEPHONE ENCOUNTER
----- Message from Merna Watkins RN sent at 9/27/2023 10:33 AM EDT -----  Regarding: [facility]  Fabio Cisneros! Please call to schedule next available. Thank you!      Merle Nassar

## 2023-10-03 NOTE — TELEPHONE ENCOUNTER
Received a message that Xenia's procedure needed to be moved back to 10:00 am, with an arrival time of 8:30 am on Thursday 10/12. I called and spoke to [facility] and she is agreeable to the new time. I updated Yamil Germain and emailed cath lab.

## 2023-10-05 ENCOUNTER — OFFICE VISIT (OUTPATIENT)
Dept: INTERNAL MEDICINE CLINIC | Age: 63
End: 2023-10-05
Payer: COMMERCIAL

## 2023-10-05 VITALS
BODY MASS INDEX: 35.65 KG/M2 | WEIGHT: 214 LBS | OXYGEN SATURATION: 96 % | DIASTOLIC BLOOD PRESSURE: 86 MMHG | HEIGHT: 65 IN | HEART RATE: 77 BPM | SYSTOLIC BLOOD PRESSURE: 130 MMHG

## 2023-10-05 DIAGNOSIS — Z23 FLU VACCINE NEED: ICD-10-CM

## 2023-10-05 DIAGNOSIS — R07.89 CHEST TIGHTNESS: ICD-10-CM

## 2023-10-05 DIAGNOSIS — I25.10 CORONARY ARTERY DISEASE INVOLVING NATIVE CORONARY ARTERY OF NATIVE HEART WITHOUT ANGINA PECTORIS: ICD-10-CM

## 2023-10-05 DIAGNOSIS — I10 HYPERTENSION, ESSENTIAL: ICD-10-CM

## 2023-10-05 DIAGNOSIS — R06.02 SOB (SHORTNESS OF BREATH): ICD-10-CM

## 2023-10-05 DIAGNOSIS — E78.2 MIXED HYPERCHOLESTEROLEMIA AND HYPERTRIGLYCERIDEMIA: ICD-10-CM

## 2023-10-05 DIAGNOSIS — Z86.16 HISTORY OF COVID-19: ICD-10-CM

## 2023-10-05 DIAGNOSIS — Z78.9 STATIN INTOLERANCE: ICD-10-CM

## 2023-10-05 DIAGNOSIS — I10 HYPERTENSION, ESSENTIAL: Primary | ICD-10-CM

## 2023-10-05 DIAGNOSIS — R73.01 IMPAIRED FASTING GLUCOSE: ICD-10-CM

## 2023-10-05 LAB
CHOLEST SERPL-MCNC: 159 MG/DL (ref 0–199)
HDLC SERPL-MCNC: 50 MG/DL (ref 40–60)
LDLC SERPL CALC-MCNC: 87 MG/DL
NT-PROBNP SERPL-MCNC: <36 PG/ML (ref 0–124)
TRIGL SERPL-MCNC: 111 MG/DL (ref 0–150)
VLDLC SERPL CALC-MCNC: 22 MG/DL

## 2023-10-05 PROCEDURE — 90471 IMMUNIZATION ADMIN: CPT | Performed by: NURSE PRACTITIONER

## 2023-10-05 PROCEDURE — 99214 OFFICE O/P EST MOD 30 MIN: CPT | Performed by: NURSE PRACTITIONER

## 2023-10-05 PROCEDURE — 3075F SYST BP GE 130 - 139MM HG: CPT | Performed by: NURSE PRACTITIONER

## 2023-10-05 PROCEDURE — 3079F DIAST BP 80-89 MM HG: CPT | Performed by: NURSE PRACTITIONER

## 2023-10-05 PROCEDURE — 90694 VACC AIIV4 NO PRSRV 0.5ML IM: CPT | Performed by: NURSE PRACTITIONER

## 2023-10-05 ASSESSMENT — ENCOUNTER SYMPTOMS
RESPIRATORY NEGATIVE: 1
GASTROINTESTINAL NEGATIVE: 1

## 2023-10-05 NOTE — PROGRESS NOTES
Chronic, stable  - Continue current regimen, metoprolol, nitroglycerin, Repatha  -     Hemoglobin A1C; Future  -     LIPID PANEL; Future    Mixed hypercholesterolemia and hypertriglyceridemia  - Chronic, stable  - Continue current regimen, metoprolol, nitroglycerin, Repatha  -     Hemoglobin A1C; Future  -     LIPID PANEL; Future    Statin intolerance  - Monitor cholesterol on Repatha   -     LIPID PANEL;  Future    Impaired fasting glucose  -     Hemoglobin A1C; Future    History of COVID-19  - Another bout of COVID  - Treated by urgent care with Paxlovid and recovering    Flu vaccine need  -     Influenza, FLUAD, (age 72 y+), IM, Preservative Free, 0.5 mL      Jp BRISSA Petty - CNP

## 2023-10-06 LAB
EST. AVERAGE GLUCOSE BLD GHB EST-MCNC: 114 MG/DL
HBA1C MFR BLD: 5.6 %

## 2023-10-11 ENCOUNTER — HOSPITAL ENCOUNTER (OUTPATIENT)
Dept: NON INVASIVE DIAGNOSTICS | Age: 63
Discharge: HOME | End: 2023-10-11
Payer: COMMERCIAL

## 2023-10-11 ENCOUNTER — TELEPHONE (OUTPATIENT)
Dept: CARDIOLOGY CLINIC | Age: 63
End: 2023-10-11

## 2023-10-11 ENCOUNTER — HOSPITAL ENCOUNTER (OUTPATIENT)
Dept: VASCULAR LAB | Age: 63
Discharge: HOME | End: 2023-10-11
Payer: COMMERCIAL

## 2023-10-11 DIAGNOSIS — R06.02 SOB (SHORTNESS OF BREATH): ICD-10-CM

## 2023-10-11 DIAGNOSIS — I25.10 CORONARY ARTERY DISEASE INVOLVING NATIVE CORONARY ARTERY OF NATIVE HEART WITHOUT ANGINA PECTORIS: ICD-10-CM

## 2023-10-11 DIAGNOSIS — R07.89 CHEST TIGHTNESS: ICD-10-CM

## 2023-10-11 DIAGNOSIS — R09.89 BRUIT OF RIGHT CAROTID ARTERY: ICD-10-CM

## 2023-10-11 PROCEDURE — 93880 EXTRACRANIAL BILAT STUDY: CPT

## 2023-10-11 PROCEDURE — 93306 TTE W/DOPPLER COMPLETE: CPT

## 2023-10-11 NOTE — TELEPHONE ENCOUNTER
----- Message from Ana Luisa Mays MD sent at 10/10/2023 11:30 AM EDT -----  BNP normal which is reassuring that she doesn't have significant volume overload, but still needs echo and cath as planned.   Cath scheduled for 10/12

## 2023-10-11 NOTE — TELEPHONE ENCOUNTER
Advised patient per [facility] message. Echo completed today. Advised that I will call her with results as soon as [facility] reviews. Pt verbalized understanding and has no further questions at this time.

## 2023-10-12 ENCOUNTER — APPOINTMENT (OUTPATIENT)
Dept: GENERAL RADIOLOGY | Age: 63
End: 2023-10-12
Payer: COMMERCIAL

## 2023-10-12 ENCOUNTER — APPOINTMENT (OUTPATIENT)
Dept: CT IMAGING | Age: 63
End: 2023-10-12
Attending: EMERGENCY MEDICINE
Payer: COMMERCIAL

## 2023-10-12 ENCOUNTER — HOSPITAL ENCOUNTER (OUTPATIENT)
Age: 63
Setting detail: OBSERVATION
Discharge: HOME | End: 2023-10-13
Attending: EMERGENCY MEDICINE | Admitting: INTERNAL MEDICINE
Payer: COMMERCIAL

## 2023-10-12 ENCOUNTER — HOSPITAL ENCOUNTER (OUTPATIENT)
Dept: CARDIAC CATH/INVASIVE PROCEDURES | Age: 63
Discharge: HOME | End: 2023-10-12
Attending: INTERNAL MEDICINE | Admitting: INTERNAL MEDICINE
Payer: COMMERCIAL

## 2023-10-12 VITALS
HEART RATE: 72 BPM | WEIGHT: 214 LBS | DIASTOLIC BLOOD PRESSURE: 80 MMHG | HEIGHT: 65 IN | SYSTOLIC BLOOD PRESSURE: 145 MMHG | OXYGEN SATURATION: 94 % | TEMPERATURE: 98.1 F | BODY MASS INDEX: 35.65 KG/M2 | RESPIRATION RATE: 16 BRPM

## 2023-10-12 DIAGNOSIS — I25.10 CORONARY ARTERY DISEASE INVOLVING NATIVE CORONARY ARTERY OF NATIVE HEART WITHOUT ANGINA PECTORIS: Chronic | ICD-10-CM

## 2023-10-12 DIAGNOSIS — I25.10 CORONARY ARTERY DISEASE INVOLVING NATIVE CORONARY ARTERY OF NATIVE HEART WITHOUT ANGINA PECTORIS: ICD-10-CM

## 2023-10-12 DIAGNOSIS — I25.83 CORONARY ARTERY DISEASE DUE TO LIPID RICH PLAQUE: Primary | ICD-10-CM

## 2023-10-12 DIAGNOSIS — R07.9 ACUTE CHEST PAIN: Primary | ICD-10-CM

## 2023-10-12 DIAGNOSIS — I25.10 CORONARY ARTERY DISEASE DUE TO LIPID RICH PLAQUE: Primary | ICD-10-CM

## 2023-10-12 DIAGNOSIS — E78.2 MIXED HYPERCHOLESTEROLEMIA AND HYPERTRIGLYCERIDEMIA: ICD-10-CM

## 2023-10-12 DIAGNOSIS — I10 HYPERTENSION, ESSENTIAL: ICD-10-CM

## 2023-10-12 PROBLEM — I20.0 UNSTABLE ANGINA (HCC): Status: ACTIVE | Noted: 2023-10-12

## 2023-10-12 PROBLEM — I21.4 NSTEMI (NON-ST ELEVATED MYOCARDIAL INFARCTION) (HCC): Status: ACTIVE | Noted: 2023-10-12

## 2023-10-12 LAB
ANION GAP SERPL CALCULATED.3IONS-SCNC: 11 MMOL/L (ref 3–16)
ANION GAP SERPL CALCULATED.3IONS-SCNC: 15 MMOL/L (ref 3–16)
BASOPHILS # BLD: 0 K/UL (ref 0–0.2)
BASOPHILS NFR BLD: 0.4 %
BUN SERPL-MCNC: 10 MG/DL (ref 7–20)
BUN SERPL-MCNC: 12 MG/DL (ref 7–20)
CALCIUM SERPL-MCNC: 8.6 MG/DL (ref 8.3–10.6)
CALCIUM SERPL-MCNC: 9.3 MG/DL (ref 8.3–10.6)
CHLORIDE SERPL-SCNC: 106 MMOL/L (ref 99–110)
CHLORIDE SERPL-SCNC: 106 MMOL/L (ref 99–110)
CO2 SERPL-SCNC: 21 MMOL/L (ref 21–32)
CO2 SERPL-SCNC: 24 MMOL/L (ref 21–32)
CREAT SERPL-MCNC: 0.6 MG/DL (ref 0.6–1.2)
CREAT SERPL-MCNC: 0.7 MG/DL (ref 0.6–1.2)
DEPRECATED RDW RBC AUTO: 13.9 % (ref 12.4–15.4)
DEPRECATED RDW RBC AUTO: 14 % (ref 12.4–15.4)
EKG ATRIAL RATE: 69 BPM
EKG ATRIAL RATE: 79 BPM
EKG DIAGNOSIS: NORMAL
EKG DIAGNOSIS: NORMAL
EKG P AXIS: 42 DEGREES
EKG P AXIS: 66 DEGREES
EKG P-R INTERVAL: 158 MS
EKG P-R INTERVAL: 168 MS
EKG Q-T INTERVAL: 416 MS
EKG Q-T INTERVAL: 420 MS
EKG QRS DURATION: 88 MS
EKG QRS DURATION: 88 MS
EKG QTC CALCULATION (BAZETT): 450 MS
EKG QTC CALCULATION (BAZETT): 477 MS
EKG R AXIS: 34 DEGREES
EKG R AXIS: 61 DEGREES
EKG T AXIS: 45 DEGREES
EKG T AXIS: 72 DEGREES
EKG VENTRICULAR RATE: 69 BPM
EKG VENTRICULAR RATE: 79 BPM
EOSINOPHIL # BLD: 0.1 K/UL (ref 0–0.6)
EOSINOPHIL NFR BLD: 1.4 %
GFR SERPLBLD CREATININE-BSD FMLA CKD-EPI: >60 ML/MIN/{1.73_M2}
GFR SERPLBLD CREATININE-BSD FMLA CKD-EPI: >60 ML/MIN/{1.73_M2}
GLUCOSE SERPL-MCNC: 104 MG/DL (ref 70–99)
GLUCOSE SERPL-MCNC: 118 MG/DL (ref 70–99)
HCT VFR BLD AUTO: 41.6 % (ref 36–48)
HCT VFR BLD AUTO: 42.6 % (ref 36–48)
HGB BLD-MCNC: 13.9 G/DL (ref 12–16)
HGB BLD-MCNC: 14.4 G/DL (ref 12–16)
INR PPP: 0.96 (ref 0.84–1.16)
LEFT VENTRICULAR EJECTION FRACTION MODE: NORMAL
LV EF: 60 %
LYMPHOCYTES # BLD: 3 K/UL (ref 1–5.1)
LYMPHOCYTES NFR BLD: 28 %
MCH RBC QN AUTO: 28.2 PG (ref 26–34)
MCH RBC QN AUTO: 28.4 PG (ref 26–34)
MCHC RBC AUTO-ENTMCNC: 33.5 G/DL (ref 31–36)
MCHC RBC AUTO-ENTMCNC: 33.8 G/DL (ref 31–36)
MCV RBC AUTO: 84.1 FL (ref 80–100)
MCV RBC AUTO: 84.1 FL (ref 80–100)
MONOCYTES # BLD: 0.8 K/UL (ref 0–1.3)
MONOCYTES NFR BLD: 7.6 %
NEUTROPHILS # BLD: 6.7 K/UL (ref 1.7–7.7)
NEUTROPHILS NFR BLD: 62.6 %
PLATELET # BLD AUTO: 274 K/UL (ref 135–450)
PLATELET # BLD AUTO: 305 K/UL (ref 135–450)
PMV BLD AUTO: 7.3 FL (ref 5–10.5)
PMV BLD AUTO: 7.4 FL (ref 5–10.5)
POC ACT LR: 224 SEC
POTASSIUM SERPL-SCNC: 3.8 MMOL/L (ref 3.5–5.1)
POTASSIUM SERPL-SCNC: 3.8 MMOL/L (ref 3.5–5.1)
PROTHROMBIN TIME: 12.8 SEC (ref 11.5–14.8)
RBC # BLD AUTO: 4.94 M/UL (ref 4–5.2)
RBC # BLD AUTO: 5.06 M/UL (ref 4–5.2)
SODIUM SERPL-SCNC: 141 MMOL/L (ref 136–145)
SODIUM SERPL-SCNC: 142 MMOL/L (ref 136–145)
TROPONIN, HIGH SENSITIVITY: 192 NG/L (ref 0–14)
TROPONIN, HIGH SENSITIVITY: 37 NG/L (ref 0–14)
WBC # BLD AUTO: 10.7 K/UL (ref 4–11)
WBC # BLD AUTO: 7.8 K/UL (ref 4–11)

## 2023-10-12 PROCEDURE — 2580000003 HC RX 258

## 2023-10-12 PROCEDURE — 85347 COAGULATION TIME ACTIVATED: CPT

## 2023-10-12 PROCEDURE — 99152 MOD SED SAME PHYS/QHP 5/>YRS: CPT

## 2023-10-12 PROCEDURE — 84484 ASSAY OF TROPONIN QUANT: CPT

## 2023-10-12 PROCEDURE — 2500000003 HC RX 250 WO HCPCS

## 2023-10-12 PROCEDURE — 99152 MOD SED SAME PHYS/QHP 5/>YRS: CPT | Performed by: INTERNAL MEDICINE

## 2023-10-12 PROCEDURE — C1894 INTRO/SHEATH, NON-LASER: HCPCS

## 2023-10-12 PROCEDURE — C1887 CATHETER, GUIDING: HCPCS

## 2023-10-12 PROCEDURE — 92978 ENDOLUMINL IVUS OCT C 1ST: CPT | Performed by: INTERNAL MEDICINE

## 2023-10-12 PROCEDURE — 6360000002 HC RX W HCPCS: Performed by: EMERGENCY MEDICINE

## 2023-10-12 PROCEDURE — 85610 PROTHROMBIN TIME: CPT

## 2023-10-12 PROCEDURE — 6360000002 HC RX W HCPCS

## 2023-10-12 PROCEDURE — 93454 CORONARY ARTERY ANGIO S&I: CPT

## 2023-10-12 PROCEDURE — C1874 STENT, COATED/COV W/DEL SYS: HCPCS

## 2023-10-12 PROCEDURE — 80048 BASIC METABOLIC PNL TOTAL CA: CPT

## 2023-10-12 PROCEDURE — 93005 ELECTROCARDIOGRAM TRACING: CPT | Performed by: EMERGENCY MEDICINE

## 2023-10-12 PROCEDURE — 93454 CORONARY ARTERY ANGIO S&I: CPT | Performed by: INTERNAL MEDICINE

## 2023-10-12 PROCEDURE — 93571 IV DOP VEL&/PRESS C FLO 1ST: CPT

## 2023-10-12 PROCEDURE — 36415 COLL VENOUS BLD VENIPUNCTURE: CPT

## 2023-10-12 PROCEDURE — 99285 EMERGENCY DEPT VISIT HI MDM: CPT

## 2023-10-12 PROCEDURE — 96374 THER/PROPH/DIAG INJ IV PUSH: CPT

## 2023-10-12 PROCEDURE — C1725 CATH, TRANSLUMIN NON-LASER: HCPCS

## 2023-10-12 PROCEDURE — 6370000000 HC RX 637 (ALT 250 FOR IP): Performed by: EMERGENCY MEDICINE

## 2023-10-12 PROCEDURE — 99153 MOD SED SAME PHYS/QHP EA: CPT

## 2023-10-12 PROCEDURE — 2709999900 HC NON-CHARGEABLE SUPPLY

## 2023-10-12 PROCEDURE — 6360000004 HC RX CONTRAST MEDICATION: Performed by: INTERNAL MEDICINE

## 2023-10-12 PROCEDURE — 93005 ELECTROCARDIOGRAM TRACING: CPT | Performed by: INTERNAL MEDICINE

## 2023-10-12 PROCEDURE — 93010 ELECTROCARDIOGRAM REPORT: CPT | Performed by: INTERNAL MEDICINE

## 2023-10-12 PROCEDURE — 2580000003 HC RX 258: Performed by: INTERNAL MEDICINE

## 2023-10-12 PROCEDURE — C1753 CATH, INTRAVAS ULTRASOUND: HCPCS

## 2023-10-12 PROCEDURE — 85025 COMPLETE CBC W/AUTO DIFF WBC: CPT

## 2023-10-12 PROCEDURE — 93458 L HRT ARTERY/VENTRICLE ANGIO: CPT

## 2023-10-12 PROCEDURE — C1769 GUIDE WIRE: HCPCS

## 2023-10-12 PROCEDURE — C1760 CLOSURE DEV, VASC: HCPCS

## 2023-10-12 PROCEDURE — 71045 X-RAY EXAM CHEST 1 VIEW: CPT

## 2023-10-12 PROCEDURE — G0378 HOSPITAL OBSERVATION PER HR: HCPCS

## 2023-10-12 PROCEDURE — 96375 TX/PRO/DX INJ NEW DRUG ADDON: CPT

## 2023-10-12 PROCEDURE — 92928 PRQ TCAT PLMT NTRAC ST 1 LES: CPT | Performed by: INTERNAL MEDICINE

## 2023-10-12 PROCEDURE — 99223 1ST HOSP IP/OBS HIGH 75: CPT | Performed by: INTERNAL MEDICINE

## 2023-10-12 PROCEDURE — 92928 PRQ TCAT PLMT NTRAC ST 1 LES: CPT

## 2023-10-12 PROCEDURE — 85027 COMPLETE CBC AUTOMATED: CPT

## 2023-10-12 PROCEDURE — 6370000000 HC RX 637 (ALT 250 FOR IP)

## 2023-10-12 PROCEDURE — 92978 ENDOLUMINL IVUS OCT C 1ST: CPT

## 2023-10-12 RX ORDER — SODIUM CHLORIDE 0.9 % (FLUSH) 0.9 %
5-40 SYRINGE (ML) INJECTION PRN
OUTPATIENT
Start: 2023-10-12

## 2023-10-12 RX ORDER — SODIUM CHLORIDE 0.9 % (FLUSH) 0.9 %
5-40 SYRINGE (ML) INJECTION EVERY 12 HOURS SCHEDULED
OUTPATIENT
Start: 2023-10-12

## 2023-10-12 RX ORDER — SODIUM CHLORIDE 9 MG/ML
INJECTION, SOLUTION INTRAVENOUS PRN
OUTPATIENT
Start: 2023-10-12

## 2023-10-12 RX ORDER — ONDANSETRON 2 MG/ML
4 INJECTION INTRAMUSCULAR; INTRAVENOUS EVERY 6 HOURS PRN
OUTPATIENT
Start: 2023-10-12

## 2023-10-12 RX ORDER — ACETAMINOPHEN 325 MG/1
650 TABLET ORAL EVERY 4 HOURS PRN
OUTPATIENT
Start: 2023-10-12

## 2023-10-12 RX ORDER — ONDANSETRON 2 MG/ML
4 INJECTION INTRAMUSCULAR; INTRAVENOUS EVERY 6 HOURS PRN
Status: DISCONTINUED | OUTPATIENT
Start: 2023-10-12 | End: 2023-10-13 | Stop reason: HOSPADM

## 2023-10-12 RX ORDER — SODIUM CHLORIDE 9 MG/ML
INJECTION, SOLUTION INTRAVENOUS CONTINUOUS
Status: DISCONTINUED | OUTPATIENT
Start: 2023-10-12 | End: 2023-10-13 | Stop reason: HOSPADM

## 2023-10-12 RX ORDER — ASPIRIN 81 MG/1
81 TABLET ORAL DAILY
Status: DISCONTINUED | OUTPATIENT
Start: 2023-10-13 | End: 2023-10-13 | Stop reason: HOSPADM

## 2023-10-12 RX ORDER — ISOSORBIDE MONONITRATE 30 MG/1
30 TABLET, EXTENDED RELEASE ORAL DAILY
Qty: 30 TABLET | Refills: 3 | Status: SHIPPED | OUTPATIENT
Start: 2023-10-12 | End: 2023-10-19 | Stop reason: SINTOL

## 2023-10-12 RX ORDER — SODIUM CHLORIDE 0.9 % (FLUSH) 0.9 %
5-40 SYRINGE (ML) INJECTION PRN
Status: DISCONTINUED | OUTPATIENT
Start: 2023-10-12 | End: 2023-10-12 | Stop reason: HOSPADM

## 2023-10-12 RX ORDER — MORPHINE SULFATE 4 MG/ML
4 INJECTION, SOLUTION INTRAMUSCULAR; INTRAVENOUS ONCE
Status: COMPLETED | OUTPATIENT
Start: 2023-10-12 | End: 2023-10-12

## 2023-10-12 RX ORDER — SODIUM CHLORIDE 0.9 % (FLUSH) 0.9 %
5-40 SYRINGE (ML) INJECTION EVERY 12 HOURS SCHEDULED
Status: DISCONTINUED | OUTPATIENT
Start: 2023-10-12 | End: 2023-10-13 | Stop reason: HOSPADM

## 2023-10-12 RX ORDER — ONDANSETRON 2 MG/ML
4 INJECTION INTRAMUSCULAR; INTRAVENOUS ONCE
Status: COMPLETED | OUTPATIENT
Start: 2023-10-12 | End: 2023-10-12

## 2023-10-12 RX ORDER — METOPROLOL SUCCINATE 25 MG/1
25 TABLET, EXTENDED RELEASE ORAL DAILY
Status: DISCONTINUED | OUTPATIENT
Start: 2023-10-12 | End: 2023-10-13 | Stop reason: HOSPADM

## 2023-10-12 RX ORDER — METOPROLOL TARTRATE 50 MG/1
50 TABLET, FILM COATED ORAL 2 TIMES DAILY
Qty: 90 TABLET | Refills: 1 | Status: SHIPPED | OUTPATIENT
Start: 2023-10-12 | End: 2023-10-19 | Stop reason: SDUPTHER

## 2023-10-12 RX ORDER — ACETAMINOPHEN 325 MG/1
650 TABLET ORAL EVERY 4 HOURS PRN
Status: DISCONTINUED | OUTPATIENT
Start: 2023-10-12 | End: 2023-10-13 | Stop reason: HOSPADM

## 2023-10-12 RX ORDER — SODIUM CHLORIDE 9 MG/ML
INJECTION, SOLUTION INTRAVENOUS PRN
Status: DISCONTINUED | OUTPATIENT
Start: 2023-10-12 | End: 2023-10-13 | Stop reason: HOSPADM

## 2023-10-12 RX ORDER — NITROGLYCERIN 0.4 MG/1
0.4 TABLET SUBLINGUAL ONCE
Status: COMPLETED | OUTPATIENT
Start: 2023-10-12 | End: 2023-10-12

## 2023-10-12 RX ORDER — OXYCODONE HYDROCHLORIDE AND ACETAMINOPHEN 5; 325 MG/1; MG/1
2 TABLET ORAL EVERY 4 HOURS PRN
Status: DISCONTINUED | OUTPATIENT
Start: 2023-10-12 | End: 2023-10-13 | Stop reason: HOSPADM

## 2023-10-12 RX ORDER — OXYCODONE HYDROCHLORIDE AND ACETAMINOPHEN 5; 325 MG/1; MG/1
1 TABLET ORAL EVERY 4 HOURS PRN
Status: DISCONTINUED | OUTPATIENT
Start: 2023-10-12 | End: 2023-10-13 | Stop reason: HOSPADM

## 2023-10-12 RX ORDER — SODIUM CHLORIDE 0.9 % (FLUSH) 0.9 %
5-40 SYRINGE (ML) INJECTION PRN
Status: DISCONTINUED | OUTPATIENT
Start: 2023-10-12 | End: 2023-10-13 | Stop reason: HOSPADM

## 2023-10-12 RX ORDER — SODIUM CHLORIDE 9 MG/ML
INJECTION, SOLUTION INTRAVENOUS CONTINUOUS
OUTPATIENT
Start: 2023-10-12

## 2023-10-12 RX ORDER — CLOPIDOGREL BISULFATE 75 MG/1
75 TABLET ORAL DAILY
Status: DISCONTINUED | OUTPATIENT
Start: 2023-10-13 | End: 2023-10-13 | Stop reason: HOSPADM

## 2023-10-12 RX ORDER — MORPHINE SULFATE 2 MG/ML
2 INJECTION, SOLUTION INTRAMUSCULAR; INTRAVENOUS
Status: DISCONTINUED | OUTPATIENT
Start: 2023-10-12 | End: 2023-10-13 | Stop reason: HOSPADM

## 2023-10-12 RX ADMIN — ONDANSETRON 4 MG: 2 INJECTION INTRAMUSCULAR; INTRAVENOUS at 14:06

## 2023-10-12 RX ADMIN — SODIUM CHLORIDE: 9 INJECTION, SOLUTION INTRAVENOUS at 16:58

## 2023-10-12 RX ADMIN — IOPAMIDOL 33 ML: 755 INJECTION, SOLUTION INTRAVENOUS at 11:03

## 2023-10-12 RX ADMIN — NITROGLYCERIN 0.4 MG: 0.4 TABLET, ORALLY DISINTEGRATING SUBLINGUAL at 14:05

## 2023-10-12 RX ADMIN — MORPHINE SULFATE 4 MG: 4 INJECTION, SOLUTION INTRAMUSCULAR; INTRAVENOUS at 14:07

## 2023-10-12 RX ADMIN — IOPAMIDOL 90 ML: 755 INJECTION, SOLUTION INTRAVENOUS at 16:14

## 2023-10-12 ASSESSMENT — PATIENT HEALTH QUESTIONNAIRE - PHQ9
SUM OF ALL RESPONSES TO PHQ QUESTIONS 1-9: 0
1. LITTLE INTEREST OR PLEASURE IN DOING THINGS: 0
SUM OF ALL RESPONSES TO PHQ QUESTIONS 1-9: 0
SUM OF ALL RESPONSES TO PHQ9 QUESTIONS 1 & 2: 0
2. FEELING DOWN, DEPRESSED OR HOPELESS: 0

## 2023-10-12 ASSESSMENT — PAIN SCALES - GENERAL
PAINLEVEL_OUTOF10: 0
PAINLEVEL_OUTOF10: 7

## 2023-10-12 ASSESSMENT — PAIN DESCRIPTION - LOCATION: LOCATION: CHEST

## 2023-10-12 ASSESSMENT — PAIN DESCRIPTION - ORIENTATION: ORIENTATION: MID

## 2023-10-12 ASSESSMENT — LIFESTYLE VARIABLES
HOW MANY STANDARD DRINKS CONTAINING ALCOHOL DO YOU HAVE ON A TYPICAL DAY: PATIENT DOES NOT DRINK
HOW OFTEN DO YOU HAVE A DRINK CONTAINING ALCOHOL: NEVER

## 2023-10-12 NOTE — ED NOTES
[facility]     Kari Cochran RN  10/12/23 [id number]

## 2023-10-12 NOTE — PROGRESS NOTES
Pt with increasing CP. Dr. Miguel Ángel Ocasio notified. Verbal order for additional dose of sublingual nitro.  Administered at this time

## 2023-10-12 NOTE — H&P
[address]  [phone]  23  Referring:     REASON FOR CONSULT/CHIEF COMPLAINT/HPI     Reason for visit/ Chief complaint  Dyspnea on Exertion   HPI Rosaline Rosario is a 59 y.o. female patient here by referral from Betzaida Alexander NP to establish care. She is a former patient of Dr Sravan Baxter. PMH of non-obst CAD, HTN and hyperlipidemia . She continues to uses her CPAP. She follows with Dr Amandeep Davenport for asthma. She has a lot of family members with very high cholesterol. She has had high cholesterol  since her 20s. She just started Repatha due to not responding to statins in the past.     A few months ago (Dec '22) she was having stroke like symptoms; her BP was up to 180/ . She was only taking 1/2 dose of amlodipine. TIA was suspected. She smoked 0.25ppd, x 7 yrs, quit 88. Today she denies CP, does have tightness with activity. Her L arm will get pain if she is really overexerting herself. It has not gotten worse or changed recently. She has SOB all the time since having COVID and has lung damage. She denies orthopnea, palpitations or dizziness. She coughs up phlegm every once in a while. Patient is adherent with medications and is tolerating them well without side effects     HISTORY/ALLERGIES/ROS     MedHx:  has a past medical history of Arthritis, Complex tear of lateral meniscus of right knee as current injury, Complex tear of medial meniscus of right knee as current injury, Coronary artery disease involving native coronary artery of native heart without angina pectoris, Helicobacter pylori antibody positive, Hyperlipidemia, Hypertension, essential, Mallet fracture, closed, initial encounter, Obstructive sleep apnea (adult) (pediatric), Patellofemoral syndrome, Pneumonia due to COVID-19 virus, Synovitis of knee, and Tear of medial cartilage or meniscus of knee, current. SurgHx:  has a past surgical history that includes Hysterectomy;   section;

## 2023-10-12 NOTE — PROCEDURES
Patient:  Villa Guaman   :   1960    PROCEDURAL SUMMARY  ~Consent:   Obtained written and verbal consent      Risks/benefits explained in detail  ~Procedure:    Left Heart Catheterization  ~Medications:    Procedural sedation with minimal conscious sedation  ~Complications:   None  ~Blood Loss:    <10cc  ~Specimens:    None obtained  ~Pre-sedation re-evaluation: Performed immediately prior to procedure. ~Performing Physician:          Stephen Padilla MD    ~Assistants:       None    INDICATIONS:  Pre-Procedure Diagnosis:  ACS <= 24 hrs, New Onset Angina <= 2 months, Worsening Angina, and Suspected CAD    Post-Procedure Diagnosis: same    PROCEDURES PERFORMED:   Left heart catheterization with  PCI and Intravascular ultrasound. PROCEDURE DETAILS/TECHNIQUE:  Local anesthetic was given and access was obtained in the right Radial Artery using a micropuncture technique and a (5/6) Fr Slender Terumo Sheath was placed without difficulty. Catheters were advanced over a 0.35 wire under fluoroscopic guidance  Left coronary angiography was done using a 5 Fr Joe L 3.5 diagnostic catheter. Right coronary angiography was done using a 5 Fr Joe R4 diagnostic catheter. Left ventriculography was done using a 5 Fr pigtail catheter. At the end of the procedure a TR band device was used for hemostasis.      ANGIOGRAM/CORONARY ARTERIOGRAM:     Left main artery Bifurcates into the left anterior descending artery and left circumflex artery nml   Left anterior descending artery Gives rise to 2 diagonal arteries Prox 20%   Diagonals  nml   Left circumflex artery Non-dominant vessel that gives rise to 3 obtuse marginal arteries Prox 20%   Obtuse Marginals  Mid 50%     Right coronary artery Dominant vessel that gives rise to the posterior descending artery and posterolateral branch Mid 70-80%   Posterior descending artery Posterior lateral branch  nml  nml       INTERVENTION:  Lesion 1, PCI of mid RCA:  ~Guide catheter: Accessing and selectively catheterizing with the 6Fr JR4  ~Coronary wire: Traversing the lesion with a edwige blue  ~Additional Equipment: IVUS  ~Pre-dilation Balloon: 3.0x25 balloon  ~Drug Eluting Stent: 3.5x38 xience, 4.0x33 xience  ~Post-dilation Balloon: 4.5x20 NC. Results of Intervention   Pre - 70-80% stenosis, JULIO 3 flow  Post - 0% stenosis, JULIO 3 flow    IVUS of RCA shows MLA of 3.16mm2  Post PCI IVUS shows stents well apposed and fully expanded. MEDICATION AND PROCEDURAL RECONCILIATION:  An independent trained observer pushed medications at my direction. We monitored the patient's level of consciousness and vital signs/physiologic status throughout the procedure duration (see start and stop times below). Sedation: 3 mg Versed, 100 mcg Fentanyl  Sedation start: 1451  Sedation stop: 1605  Contrast: 90 cc of Isovue   Flouro Time: 13.2 minutes of fluoroscopy     IMPRESSION/SUMMARY  ~Coronary Angiography w/ severe single vessel CAD  ~Successful complex angioplasty and stenting of RCA    RECOMMENDATION:  ~Aggressive medical treatment and risk factor modification  ~1. Post cath IVF. Bedrest.   2. Recommend beta blocker, cont Repatha, Patient intolerant of high potency statin, aspirin and plavix for 6-12 months. No ace/arb required given normal LVEF. 3. Referral to outpatient cardiac rehab phase II will be deferred until patient follow-up in office and then determine patient safety and appropriateness to proceed  4. Patient has been advised on the importance of regular exercise of at least 20-30 minutes daily. 5. Patient counseled about and offered assistance for smoking cessation   6. Monitor overnight, Follow up in 2 weeks with cardiology.      Shad Miller MD, MD   Interventional Cardiology  10/12/2023 4:09 PM

## 2023-10-12 NOTE — PROGRESS NOTES
PRE-PROCEDURE    DATE: 10/12/2023 ARRIVAL TO CATH LAB: 8:45 AM    ADMIT SOURCE: Outpatient    ID & ALLERGY BAND: On    CONSENT: Yes    NPO SINCE: Midnight    LABS/PREGNANCY TEST: N/A    PULSES:  Right Radial Artery 3+  Right DP 3+  Right PT 3+  Left DP 3+  Left PT 3+    IV SITE : Started in left arm.  with fluids infusing at kvo 8:45 AM     SURGERIES PLANNED: No    BLEEDING PROBLEMS: No    BLEEDING RISK: Low Risk <2%    COMPLIANCE: Yes      PATIENT HISTORY    CHIEF COMPLAINT: Chest Pain    EKG:  Yes    Pre CATH Rhythm: Normal Sinus Rhythm    STRESS TEST PREFORMED:  Yes FINDINGS:  Stress Nuclear: Date:  6/24/2021  Result:  Unavailable     EF: 69    CARDIAC CTA PREFORMED:  No    AGATSTON CORONARY CALCIUM SCORE:   Assessed: No    ECHO: DATE:  Yes. Most recent date: 5/12/2016      EF:  45-50    HYPERTENSION: Yes    DYSLIPIDEMIA: Yes    FAMILY HX OF CAD: Yes    PRIOR MI: No    PRIOR PCI: No    PRIOR CABG: No    CEREBRALVASCULAR DX: Yes    PERIPHERAL ARTERIAL DISEASE: No    CHRONIC LUNG DISEASE: Yes    TOBACCO: Former.    Quit Date: 90s    DIABETIC: No    CARDIAC ARREST: No    DIALYSIS: No    FRAILTY SCORE: 3 MANAGING WELL (medical problems are well controlled, not regularly active beyond routine walking)

## 2023-10-12 NOTE — PROGRESS NOTES
PRE-PROCEDURE     DATE: 10/12/2023      ARRIVAL TO CATH LAB: 1440     ADMIT SOURCE: ER     ID & ALLERGY BAND: On     CONSENT: Yes     NPO SINCE: Midnight     LABS/PREGNANCY TEST: N/A     PULSES:  Right Radial Artery 3+  Right DP 3+  Right PT 3+  Left DP 3+  Left PT 3+     IV SITE : Started in left arm.  with fluids infusing at kvo 8:45 AM      SURGERIES PLANNED: No     BLEEDING PROBLEMS: No     BLEEDING RISK: Low Risk <2%     COMPLIANCE: Yes        PATIENT HISTORY     CHIEF COMPLAINT: Chest Pain     EKG:  Yes     Pre CATH Rhythm: Normal Sinus Rhythm     STRESS TEST PREFORMED:  Yes FINDINGS:  Stress Nuclear: Date:  6/24/2021  Result:  Unavailable     EF: 69     CARDIAC CTA PREFORMED:  No     [place] CORONARY CALCIUM SCORE:   Assessed: No     ECHO: DATE:  Yes. Most recent date: 5/12/2016      EF:  45-50     HYPERTENSION: Yes     DYSLIPIDEMIA: Yes     FAMILY HX OF CAD: Yes     PRIOR MI: No     PRIOR PCI: No     PRIOR CABG: No     CEREBRALVASCULAR DX: Yes     PERIPHERAL ARTERIAL DISEASE: No     CHRONIC LUNG DISEASE: Yes     TOBACCO: Former.    Quit Date: 90s     DIABETIC: No     CARDIAC ARREST: No     DIALYSIS: No     FRAILTY SCORE: 3 MANAGING WELL (medical problems are well controlled, not regularly active beyond routine walking)

## 2023-10-12 NOTE — PRE SEDATION
Provider Last Rate Last Admin    iopamidol (ISOVUE-370) 76 % injection 75 mL  75 mL IntraVENous ONCE PRN PendRufina srivastava Showers, DO         Current Outpatient Medications   Medication Sig Dispense Refill    metoprolol tartrate (LOPRESSOR) 50 MG tablet Take 1 tablet by mouth 2 times daily TAKE 1 TABLET TWICE A DAY 90 tablet 1    isosorbide mononitrate (IMDUR) 30 MG extended release tablet Take 1 tablet by mouth daily 30 tablet 3    Coenzyme Q10 (CO Q 10) 100 MG CAPS Take by mouth      Misc.  Devices (CPAP MACHINE) MISC by Does not apply route      Evolocumab 140 MG/ML SOAJ Inject 140 mg into the skin every 14 days 6 Adjustable Dose Pre-filled Pen Syringe 2    amLODIPine (NORVASC) 10 MG tablet TAKE 1 TABLET DAILY 90 tablet 1    fluticasone (FLONASE) 50 MCG/ACT nasal spray 1 spray by Each Nostril route daily for 7 days (Patient taking differently: 1 spray by Each Nostril route as needed) 15.8 each 0    nitroGLYCERIN (NITROSTAT) 0.4 MG SL tablet Place 1 tablet under the tongue every 5 minutes as needed for Chest pain 25 tablet 3    pitavastatin (LIVALO) 2 MG TABS tablet Take 1 tablet by mouth nightly 90 tablet 2    montelukast (SINGULAIR) 10 MG tablet Take 1 tablet by mouth daily 90 tablet 3    fluticasone furoate-vilanterol (BREO ELLIPTA) 100-25 MCG/ACT inhaler Inhale 1 puff into the lungs daily 180 each 3    albuterol sulfate HFA (VENTOLIN HFA) 108 (90 Base) MCG/ACT inhaler Inhale 2 puffs into the lungs 4 times daily as needed for Wheezing 54 g 3    ipratropium-albuterol (DUONEB) 0.5-2.5 (3) MG/3ML SOLN nebulizer solution INHALE ONE VIAL VIA NEBULIZER BY MOUTH INTO THE LUNGS  EVERY FOUR HOURS 1080 mL 1    Spacer/Aero-Holding Chambers NAVIN 1 Device by Does not apply route daily as needed (shortness of breath) 1 each 0    Multiple Vitamins-Minerals (THERAPEUTIC MULTIVITAMIN-MINERALS) tablet Take 1 tablet by mouth daily      aspirin 81 MG EC tablet Take 1 tablet by mouth daily         Past Medical History:    Past Medical History:   Diagnosis Date    Arthritis     RT TOES; established with podiatry    Complex tear of lateral meniscus of right knee as current injury 2013    Complex tear of medial meniscus of right knee as current injury 2013    Coronary artery disease involving native coronary artery of native heart without angina pectoris 6419    Helicobacter pylori antibody positive 2015    Hyperlipidemia     DIET CONTROL    Hypertension, essential 2021    Mallet fracture, closed, initial encounter 2019    Obstructive sleep apnea (adult) (pediatric) 2020    Patellofemoral syndrome 2014    Pneumonia due to COVID-19 virus 2021    Synovitis of knee 2014    Tear of medial cartilage or meniscus of knee, current 2014       Surgical History:    Past Surgical History:   Procedure Laterality Date     SECTION      breech    COLONOSCOPY      one polyp removed    HYSTERECTOMY (CERVIX STATUS UNKNOWN)      endometriosis    KNEE ARTHROSCOPY Right 13    RIGHT KNEE ARTHROSCOPE, PARTIAL MEDIAL AND LATERAL MENISCECTOMY, SYNOVECTOMY, CHONDROPLASTY OF MEDIAL FEMORAL CONDYLE    KNEE ARTHROSCOPY Left 2014    LEFT KNEE ARTHROSCOPY WITH PARTIAL MEDIAL MENISCECTOMY,    KNEE ARTHROSCOPY Right 2018    ACTUAL PROCEDURE: RIGHT KNEE ARTHROSCOPY, PARTIAL MEDIAL MENISCECTOMY,CHONDROPLASTY, SYNOVECTOMY      KNEE SURGERY  13    RIGHT KNEE ARTHROSCOPE, PARTIAL MEDIAL AND LATERAL    KNEE SURGERY  14     RIGHT KNEE ARTHROSCOPY WITH PARTIAL LATERAL MENISCECTOMY,    TUBAL LIGATION               Pre-Sedation:  Pre-Sedation Documentation and Exam:  I have personally completed a history, physical exam & review of systems for this patient (see notes). Prior History of Anesthesia Complications:   none    Modified Mallampati:  II (soft palate, uvula, fauces visible)    ASA Classification:  Class 2 - A normal healthy patient with mild systemic disease    Elan Scale:   Activity:  2 -

## 2023-10-12 NOTE — PROGRESS NOTES
Pt taken to Cath lab via stretcher while on monitor. Pt. C/o CP 8/10, nausea, SOB onset while riding home post procedure. Dr. Claude Francisco to repeat catheterization.

## 2023-10-12 NOTE — H&P
[address] [place] [address]  [phone]      Chief Complaint   Patient presents with    Chest Pain     Chest pain, states had cardiac angiogram at this facility. Pt states was getting in the car to leave and got sudden severe chest pain and bilateral hand numbness. History of Present Illness:  Aziza Pate is a 59 y.o. patient who presented to the hospital with complaints of chest pain. She was discharged home from the cath lab today after elective LHC with FFR of the RCA. She got in her car and felt severe substernal chest tightness and pressure. She came immediately to the ER. Pain is improved after nitro in the ED. She still has mild chest pain. I have been asked to provide consultation regarding further management and testing. Past Medical History:   has a past medical history of Arthritis, Complex tear of lateral meniscus of right knee as current injury, Complex tear of medial meniscus of right knee as current injury, Coronary artery disease involving native coronary artery of native heart without angina pectoris, Helicobacter pylori antibody positive, Hyperlipidemia, Hypertension, essential, Mallet fracture, closed, initial encounter, Obstructive sleep apnea (adult) (pediatric), Patellofemoral syndrome, Pneumonia due to COVID-19 virus, Synovitis of knee, and Tear of medial cartilage or meniscus of knee, current. Surgical History:   has a past surgical history that includes Hysterectomy;  section; Colonoscopy (); Knee arthroscopy (Right, 13); knee surgery (13); Tubal ligation; knee surgery (14); Knee arthroscopy (Left, 2014); and Knee arthroscopy (Right, 2018). Social History:   reports that she quit smoking about 35 years ago. Her smoking use included cigarettes. She has a 1.75 pack-year smoking history. She has been exposed to tobacco smoke.  She has never used smokeless tobacco. She reports that she does not drink alcohol and does (BMI 35.0-39. 9) with comorbidity ([address])    Bruit of right carotid artery    SOB (shortness of breath)    Chest tightness         Plan:    I had the opportunity to review the clinical symptoms and presentation of Sue Page. Assessment/Plan:  Active Problems:  Unstable angina: severe typical angina. S/p LHC with FFR of significant RCA disease today. Concern for ischemia despite \"normal\" FFR. FFR was borderline and she may benefit from PCI. Refractory to 2 anti anginals. Troponin now 37, ecg appears abnormal, changed from pre cath ecg. Based on these findings I recommend left heart cath for definitive evaluation of coronary arteries. Risks, benefits, expectations, and alternative treatments were discussed. Questions appropriately answered. Sue Page agrees to proceed and verbalized understanding. I will address the patient's cardiac risk factors and adjusted pharmacologic treatment as needed. In addition, I have reinforced the need for patient directed risk factor modification. Tobacco use was discussed with the patient and educated on the negative effects. I have asked the patient to not utilize these agents. Thank you for allowing to us to participate in the care or Sue Page. Further evaluation will be based upon the patient's clinical course and testing results. All questions and concerns were addressed to the patient/family. Alternatives to my treatment were discussed. The note was completed using EMR. Every effort was made to ensure accuracy; however, inadvertent computerized transcription errors may be present.     Jade Lock MD,  10/12/2023 2:44 PM

## 2023-10-12 NOTE — ED NOTES
Spoke to cath lab RN- stated will be over to get patient for observation d/t her procedure this morning.       Crow Rothman RN  10/12/23 [id number]

## 2023-10-13 VITALS
WEIGHT: 212.2 LBS | HEART RATE: 78 BPM | SYSTOLIC BLOOD PRESSURE: 157 MMHG | OXYGEN SATURATION: 96 % | DIASTOLIC BLOOD PRESSURE: 65 MMHG | RESPIRATION RATE: 16 BRPM | TEMPERATURE: 98.2 F | BODY MASS INDEX: 35.31 KG/M2

## 2023-10-13 LAB
ANION GAP SERPL CALCULATED.3IONS-SCNC: 13 MMOL/L (ref 3–16)
BUN SERPL-MCNC: 9 MG/DL (ref 7–20)
CALCIUM SERPL-MCNC: 8.8 MG/DL (ref 8.3–10.6)
CHLORIDE SERPL-SCNC: 107 MMOL/L (ref 99–110)
CO2 SERPL-SCNC: 21 MMOL/L (ref 21–32)
CREAT SERPL-MCNC: 0.6 MG/DL (ref 0.6–1.2)
DEPRECATED RDW RBC AUTO: 14.3 % (ref 12.4–15.4)
EKG ATRIAL RATE: 76 BPM
EKG ATRIAL RATE: 91 BPM
EKG DIAGNOSIS: NORMAL
EKG DIAGNOSIS: NORMAL
EKG P AXIS: 55 DEGREES
EKG P AXIS: 56 DEGREES
EKG P-R INTERVAL: 144 MS
EKG P-R INTERVAL: 164 MS
EKG Q-T INTERVAL: 374 MS
EKG Q-T INTERVAL: 414 MS
EKG QRS DURATION: 82 MS
EKG QRS DURATION: 86 MS
EKG QTC CALCULATION (BAZETT): 460 MS
EKG QTC CALCULATION (BAZETT): 465 MS
EKG R AXIS: 13 DEGREES
EKG R AXIS: 37 DEGREES
EKG T AXIS: 55 DEGREES
EKG T AXIS: 71 DEGREES
EKG VENTRICULAR RATE: 76 BPM
EKG VENTRICULAR RATE: 91 BPM
GFR SERPLBLD CREATININE-BSD FMLA CKD-EPI: >60 ML/MIN/{1.73_M2}
GLUCOSE SERPL-MCNC: 129 MG/DL (ref 70–99)
HCT VFR BLD AUTO: 42.5 % (ref 36–48)
HGB BLD-MCNC: 13.9 G/DL (ref 12–16)
MAGNESIUM SERPL-MCNC: 2 MG/DL (ref 1.8–2.4)
MCH RBC QN AUTO: 27.6 PG (ref 26–34)
MCHC RBC AUTO-ENTMCNC: 32.6 G/DL (ref 31–36)
MCV RBC AUTO: 84.6 FL (ref 80–100)
PLATELET # BLD AUTO: 280 K/UL (ref 135–450)
PMV BLD AUTO: 7.1 FL (ref 5–10.5)
POTASSIUM SERPL-SCNC: 3.7 MMOL/L (ref 3.5–5.1)
RBC # BLD AUTO: 5.02 M/UL (ref 4–5.2)
SODIUM SERPL-SCNC: 141 MMOL/L (ref 136–145)
WBC # BLD AUTO: 13.7 K/UL (ref 4–11)

## 2023-10-13 PROCEDURE — 6370000000 HC RX 637 (ALT 250 FOR IP): Performed by: NURSE PRACTITIONER

## 2023-10-13 PROCEDURE — 2580000003 HC RX 258: Performed by: INTERNAL MEDICINE

## 2023-10-13 PROCEDURE — 83735 ASSAY OF MAGNESIUM: CPT

## 2023-10-13 PROCEDURE — G0378 HOSPITAL OBSERVATION PER HR: HCPCS

## 2023-10-13 PROCEDURE — 80048 BASIC METABOLIC PNL TOTAL CA: CPT

## 2023-10-13 PROCEDURE — 96361 HYDRATE IV INFUSION ADD-ON: CPT

## 2023-10-13 PROCEDURE — 93010 ELECTROCARDIOGRAM REPORT: CPT | Performed by: INTERNAL MEDICINE

## 2023-10-13 PROCEDURE — 85027 COMPLETE CBC AUTOMATED: CPT

## 2023-10-13 PROCEDURE — 99213 OFFICE O/P EST LOW 20 MIN: CPT | Performed by: NURSE PRACTITIONER

## 2023-10-13 PROCEDURE — 36415 COLL VENOUS BLD VENIPUNCTURE: CPT

## 2023-10-13 PROCEDURE — 6370000000 HC RX 637 (ALT 250 FOR IP): Performed by: INTERNAL MEDICINE

## 2023-10-13 PROCEDURE — 93005 ELECTROCARDIOGRAM TRACING: CPT | Performed by: INTERNAL MEDICINE

## 2023-10-13 RX ORDER — CALCIUM CARBONATE 500 MG/1
500 TABLET, CHEWABLE ORAL 3 TIMES DAILY PRN
Status: DISCONTINUED | OUTPATIENT
Start: 2023-10-13 | End: 2023-10-13 | Stop reason: HOSPADM

## 2023-10-13 RX ORDER — PITAVASTATIN CALCIUM 2.09 MG/1
4 TABLET, FILM COATED ORAL NIGHTLY
Qty: 90 TABLET | Refills: 2 | Status: SHIPPED | OUTPATIENT
Start: 2023-10-13 | End: 2023-10-19 | Stop reason: DRUGHIGH

## 2023-10-13 RX ORDER — CLOPIDOGREL BISULFATE 75 MG/1
75 TABLET ORAL DAILY
Qty: 30 TABLET | Refills: 3 | Status: SHIPPED | OUTPATIENT
Start: 2023-10-14 | End: 2023-10-19 | Stop reason: SDUPTHER

## 2023-10-13 RX ORDER — POTASSIUM CHLORIDE 20 MEQ/1
40 TABLET, EXTENDED RELEASE ORAL ONCE
Status: COMPLETED | OUTPATIENT
Start: 2023-10-13 | End: 2023-10-13

## 2023-10-13 RX ADMIN — SODIUM CHLORIDE, PRESERVATIVE FREE 10 ML: 5 INJECTION INTRAVENOUS at 09:00

## 2023-10-13 RX ADMIN — POTASSIUM CHLORIDE 40 MEQ: 1500 TABLET, EXTENDED RELEASE ORAL at 11:29

## 2023-10-13 RX ADMIN — CLOPIDOGREL BISULFATE 75 MG: 75 TABLET ORAL at 08:59

## 2023-10-13 RX ADMIN — METOPROLOL SUCCINATE 25 MG: 25 TABLET, EXTENDED RELEASE ORAL at 08:59

## 2023-10-13 RX ADMIN — ASPIRIN 81 MG: 81 TABLET, COATED ORAL at 08:59

## 2023-10-13 NOTE — DISCHARGE SUMMARY
given normal LVEF. 3. Referral to outpatient cardiac rehab phase II will be deferred until patient follow-up in office and then determine patient safety and appropriateness to proceed  4. Patient has been advised on the importance of regular exercise of at least 20-30 minutes daily. 5. Patient counseled about and offered assistance for smoking cessation   6. Monitor overnight, Follow up in 2 weeks with cardiology. Labs:   Lab Results   Component Value Date    CREATININE 0.6 10/13/2023    BUN 9 10/13/2023     10/13/2023    K 3.7 10/13/2023     10/13/2023    CO2 21 10/13/2023      Lab Results   Component Value Date    WBC 13.7 (H) 10/13/2023    HGB 13.9 10/13/2023    HCT 42.5 10/13/2023    MCV 84.6 10/13/2023     10/13/2023      Lab Results   Component Value Date    INR 0.96 10/12/2023    PROTIME 12.8 10/12/2023    No results found for: \"BNP\"  CARDIAC ENZYMES:No results for input(s): \"CKMB\", \"CKMBINDEX\", \"TROPONINI\" in the last 72 hours.     Invalid input(s): \"CKTOTAL;3\"  FASTING LIPID PANEL:  Lab Results   Component Value Date/Time    CHOL 159 10/05/2023 09:18 AM    HDL 50 10/05/2023 09:18 AM    TRIG 111 10/05/2023 09:18 AM       Disposition: home    Patient Instructions:        Medication List        START taking these medications      clopidogrel 75 MG tablet  Commonly known as: PLAVIX  Take 1 tablet by mouth daily  Start taking on: October 14, 2023            CHANGE how you take these medications      fluticasone 50 MCG/ACT nasal spray  Commonly known as: FLONASE  1 spray by Each Nostril route daily for 7 days  What changed:   when to take this  reasons to take this     Livalo 2 MG Tabs tablet  Generic drug: pitavastatin  Take 2 tablets by mouth nightly  What changed: how much to take            CONTINUE taking these medications      albuterol sulfate  (90 Base) MCG/ACT inhaler  Commonly known as: Ventolin HFA  Inhale 2 puffs into the lungs 4 times daily as needed for Wheezing amLODIPine 10 MG tablet  Commonly known as: NORVASC  TAKE 1 TABLET DAILY     aspirin 81 MG EC tablet     Co Q 10 100 MG Caps     CPAP Machine Misc     Evolocumab 140 MG/ML Soaj  Inject 140 mg into the skin every 14 days     fluticasone furoate-vilanterol 100-25 MCG/ACT inhaler  Commonly known as: Breo Ellipta  Inhale 1 puff into the lungs daily     ipratropium 0.5 mg-albuterol 2.5 mg 0.5-2.5 (3) MG/3ML Soln nebulizer solution  Commonly known as: DUONEB  INHALE ONE VIAL VIA NEBULIZER BY MOUTH INTO THE LUNGS  EVERY FOUR HOURS     isosorbide mononitrate 30 MG extended release tablet  Commonly known as: IMDUR  Take 1 tablet by mouth daily     metoprolol tartrate 50 MG tablet  Commonly known as: LOPRESSOR  Take 1 tablet by mouth 2 times daily TAKE 1 TABLET TWICE A DAY     montelukast 10 MG tablet  Commonly known as: SINGULAIR  Take 1 tablet by mouth daily     nitroGLYCERIN 0.4 MG SL tablet  Commonly known as: Nitrostat  Place 1 tablet under the tongue every 5 minutes as needed for Chest pain     Spacer/Aero-Holding Rogelio Perez  1 Device by Does not apply route daily as needed (shortness of breath)     therapeutic multivitamin-minerals tablet               Where to Get Your Medications        These medications were sent to [facility]/pharmacy #[id number] [address] [id number]      Phone: [phone]   clopidogrel 75 MG tablet  Livalo 2 MG Tabs tablet       The patient is on a beta-blocker  The patient is not on an ace-i/ARB; normal LV systolic function   The patient is on a statin  The patient is on antiplatelet therapy  The patient does not have history of AF, and is not on anticoagulation    1. Overall the patient is stable from CV standpoint  2. Most recent cardiac testing has been reviewed as above. Further testing is not required at this time. 3. The patient is not currently smoking. The risks related to smoking were reviewed with the patient.

## 2023-10-16 ENCOUNTER — TELEPHONE (OUTPATIENT)
Dept: INTERNAL MEDICINE CLINIC | Age: 63
End: 2023-10-16

## 2023-10-16 NOTE — TELEPHONE ENCOUNTER
Care Transitions Initial Follow Up Call    Outreach made within 2 business days of discharge: Yes    Patient: Corinn Moritz Patient : 1960   MRN: [id number]  Reason for Admission: There are no discharge diagnoses documented for the most recent discharge. Discharge Date: 10/13/23       Spoke with: Patient    Discharge department/facility: [facility]    TCM Interactive Patient Contact:  Was patient able to fill all prescriptions: Yes  Was patient instructed to bring all medications to the follow-up visit: Yes  Is patient taking all medications as directed in the discharge summary?  Yes  Does patient understand their discharge instructions: Yes  Does patient have questions or concerns that need addressed prior to 7-14 day follow up office visit: no    Scheduled appointment with PCP within 7-14 days    Follow Up  Future Appointments   Date Time Provider [address] [place] [address]   10/19/2023  9:15 AM Corrin Nyhan, APRN - CNP [facility]   2023  8:30 AM Celestina Ortiz MD PULM & [facility]   2023  9:30 AM Camila Vela MD [facility]   2024  9:00 AM BRISSA Gaona CNP [facility] SLEEP [facility]   2024  7:40 AM BRISSA Kirkpatrick CNP  Getachew Rothman

## 2023-10-16 NOTE — TELEPHONE ENCOUNTER
----- Message from James Monroe sent at 10/16/2023  9:18 AM EDT -----  Subject: Hospital Follow Up    QUESTIONS  What hospital was the Patient Discharged from? [facility]  Date of Discharge? 2023-10-13  Discharge Location? Home  Reason for hospitalization as patient stated? Chest Pain (Chest pain,   states had cardiac angiogram at this facility. Pt told to F/U with PCP as   soon as possible. Pt states the meds that are listed at the bottom may be   giving her a terrible headache all 3 or just 1 of them. What question does the patient have, if applicable? Pt's heart meds were   increase metoprolol tartrate (LOPRESSOR) 50 MG tablet and was also was put   on isosorbide mononitrate (IMDUR) 30 MG extended release tablet(for pain)   and clopidogrel (PLAVIX) 75 MG tablet.  ---------------------------------------------------------------------------  --------------  CALL BACK INFO  What is the best way for the office to contact you? OK to leave message on   voicemail,Do not leave any message, patient will call back for answer,OK   to respond with electronic message via [employer] portal (only for patients   who have registered [employer] account)  Preferred Call Back Phone Number? [phone]  ---------------------------------------------------------------------------  --------------  SCRIPT ANSWERS  Relationship to Patient?  Self

## 2023-10-17 ENCOUNTER — OFFICE VISIT (OUTPATIENT)
Dept: INTERNAL MEDICINE CLINIC | Age: 63
End: 2023-10-17

## 2023-10-17 VITALS
OXYGEN SATURATION: 94 % | WEIGHT: 212 LBS | HEART RATE: 92 BPM | DIASTOLIC BLOOD PRESSURE: 84 MMHG | BODY MASS INDEX: 35.32 KG/M2 | HEIGHT: 65 IN | SYSTOLIC BLOOD PRESSURE: 120 MMHG

## 2023-10-17 DIAGNOSIS — I21.4 NSTEMI (NON-ST ELEVATED MYOCARDIAL INFARCTION) (HCC): ICD-10-CM

## 2023-10-17 DIAGNOSIS — I10 HYPERTENSION, ESSENTIAL: ICD-10-CM

## 2023-10-17 DIAGNOSIS — E78.2 MIXED HYPERCHOLESTEROLEMIA AND HYPERTRIGLYCERIDEMIA: Chronic | ICD-10-CM

## 2023-10-17 DIAGNOSIS — E09.9 STEROID-INDUCED DIABETES MELLITUS, SUBSEQUENT ENCOUNTER (HCC): ICD-10-CM

## 2023-10-17 DIAGNOSIS — Z78.9 STATIN INTOLERANCE: ICD-10-CM

## 2023-10-17 DIAGNOSIS — I20.0 UNSTABLE ANGINA (HCC): ICD-10-CM

## 2023-10-17 DIAGNOSIS — T38.0X5D STEROID-INDUCED DIABETES MELLITUS, SUBSEQUENT ENCOUNTER (HCC): ICD-10-CM

## 2023-10-17 DIAGNOSIS — Z09 HOSPITAL DISCHARGE FOLLOW-UP: Primary | ICD-10-CM

## 2023-10-17 PROBLEM — T38.0X5A STEROID-INDUCED DIABETES (HCC): Status: ACTIVE | Noted: 2023-10-17

## 2023-10-17 PROBLEM — R06.02 SOB (SHORTNESS OF BREATH): Status: RESOLVED | Noted: 2023-09-27 | Resolved: 2023-10-17

## 2023-10-17 PROBLEM — R07.89 CHEST TIGHTNESS: Status: RESOLVED | Noted: 2023-09-27 | Resolved: 2023-10-17

## 2023-10-17 ASSESSMENT — ENCOUNTER SYMPTOMS
RESPIRATORY NEGATIVE: 1
GASTROINTESTINAL NEGATIVE: 1
SHORTNESS OF BREATH: 0

## 2023-10-17 NOTE — PROGRESS NOTES
chest pain or shortness of breath. She is compliant with her medication other than Imdur. She has follow-up with cardiology soon. Review of Systems:  Review of Systems   Constitutional: Negative. Respiratory: Negative. Negative for shortness of breath. Cardiovascular: Negative. Negative for chest pain. Gastrointestinal: Negative. Genitourinary: Negative. Musculoskeletal: Negative. Skin: Negative. Neurological:  Positive for headaches. Hematological: Negative. Psychiatric/Behavioral: Negative. Physical Exam:  Gen: NAD  Eyes: no icterus, no conjunctival erythema  CV: RRR, no mrgs  Resp: CTAB  Abd: soft, NTTP  Neuro: alert, oriented, answers questions appropriately  MSK: no peripheral edema  Skin: warm, dry  Psych: Normal mood, affect      Initial post-discharge communication occurred between medical assistant and patient on 10/16/23- see documentation in chart: telephone encounter.       Assessment/Plan:  Hannah Reese was seen today for follow-up from hospital.  Diagnoses and all orders for this visit:    Hospital discharge follow-up  -     [place] DISCHARGE MEDS RECONCILED W/ CURRENT OUTPATIENT MED LIST    NSTEMI (non-ST elevated myocardial infarction) [facility])  -Hospital and Cath Lab notes reviewed  -She denies any ongoing chest pain, shortness of breath  -She will see cardiology this week for follow-up  -Continue current regimen, Plavix, metoprolol, Repatha    Unstable angina ([address])  -New problem  -Hospital and cardiac cath notes reviewed  -She denies any ongoing chest pain or shortness of breath  -She did not tolerate Imdur due to headache  -Continue current regimen, Plavix, metoprolol, Repatha    Hypertension, essential  - Normotensive  - she has met [facility] standards.  - Continue current regimen, metoprolol, amlodipine    Mixed hypercholesterolemia and hypertriglyceridemia  - Chronic, markedly improved  -Cholesterol normal for the first time in her lifetime with Repatha  -Continue current

## 2023-10-19 ENCOUNTER — OFFICE VISIT (OUTPATIENT)
Dept: CARDIOLOGY CLINIC | Age: 63
End: 2023-10-19
Payer: COMMERCIAL

## 2023-10-19 VITALS
BODY MASS INDEX: 39.84 KG/M2 | SYSTOLIC BLOOD PRESSURE: 130 MMHG | WEIGHT: 211 LBS | HEIGHT: 61 IN | DIASTOLIC BLOOD PRESSURE: 62 MMHG | OXYGEN SATURATION: 96 % | HEART RATE: 81 BPM

## 2023-10-19 DIAGNOSIS — E78.49 FAMILIAL HYPERLIPIDEMIA, HIGH LDL: ICD-10-CM

## 2023-10-19 DIAGNOSIS — I25.10 CORONARY ARTERY DISEASE INVOLVING NATIVE CORONARY ARTERY OF NATIVE HEART WITHOUT ANGINA PECTORIS: Primary | ICD-10-CM

## 2023-10-19 DIAGNOSIS — I10 PRIMARY HYPERTENSION: ICD-10-CM

## 2023-10-19 PROCEDURE — 99214 OFFICE O/P EST MOD 30 MIN: CPT | Performed by: NURSE PRACTITIONER

## 2023-10-19 PROCEDURE — 3074F SYST BP LT 130 MM HG: CPT | Performed by: NURSE PRACTITIONER

## 2023-10-19 PROCEDURE — 3078F DIAST BP <80 MM HG: CPT | Performed by: NURSE PRACTITIONER

## 2023-10-19 RX ORDER — METOPROLOL TARTRATE 50 MG/1
50 TABLET, FILM COATED ORAL 2 TIMES DAILY
Qty: 180 TABLET | Refills: 3 | Status: SHIPPED | OUTPATIENT
Start: 2023-10-19

## 2023-10-19 RX ORDER — PITAVASTATIN CALCIUM 4.18 MG/1
4 TABLET, FILM COATED ORAL NIGHTLY
Qty: 90 TABLET | Refills: 3 | Status: SHIPPED | OUTPATIENT
Start: 2023-10-19

## 2023-10-19 RX ORDER — CLOPIDOGREL BISULFATE 75 MG/1
75 TABLET ORAL DAILY
Qty: 90 TABLET | Refills: 3 | Status: SHIPPED | OUTPATIENT
Start: 2023-10-19

## 2023-10-19 NOTE — PATIENT INSTRUCTIONS
Cardiac rehab phase II    Ok to resume your exercises at home starting at 1/2 intensity of what you had been doing  Ok to start walking    Increase Livalo to 4 mg daily     Recheck lipid profile after 6 weeks    Keep appt with Dr. Jaren Berkowitz 11/30

## 2023-10-19 NOTE — PROGRESS NOTES
nitroGLYCERIN (NITROSTAT) 0.4 MG SL tablet Place 1 tablet under the tongue every 5 minutes as needed for Chest pain 25 tablet 3    montelukast (SINGULAIR) 10 MG tablet Take 1 tablet by mouth daily 90 tablet 3    fluticasone furoate-vilanterol (BREO ELLIPTA) 100-25 MCG/ACT inhaler Inhale 1 puff into the lungs daily 180 each 3    albuterol sulfate HFA (VENTOLIN HFA) 108 (90 Base) MCG/ACT inhaler Inhale 2 puffs into the lungs 4 times daily as needed for Wheezing 54 g 3    ipratropium-albuterol (DUONEB) 0.5-2.5 (3) MG/3ML SOLN nebulizer solution INHALE ONE VIAL VIA NEBULIZER BY MOUTH INTO THE LUNGS  EVERY FOUR HOURS 1080 mL 1    Spacer/Aero-Holding Chambers NAVIN 1 Device by Does not apply route daily as needed (shortness of breath) 1 each 0    Multiple Vitamins-Minerals (THERAPEUTIC MULTIVITAMIN-MINERALS) tablet Take 1 tablet by mouth daily      aspirin 81 MG EC tablet Take 1 tablet by mouth daily       No current facility-administered medications for this visit. REVIEW OF SYSTEMS:   CONSTITUTIONAL: No major weight gain or loss, fatigue, weakness, night sweats or fever. There's been no change in energy level, sleep pattern, or activity level. HEENT: No new vision difficulties or ringing in the ears. RESPIRATORY: No new SOB, PND, orthopnea or cough. CARDIOVASCULAR: See HPI  GI: No nausea, vomiting, diarrhea, constipation, abdominal pain or changes in bowel habits. : No urinary frequency, urgency, incontinence hematuria or dysuria. SKIN: No cyanosis or skin lesions. MUSCULOSKELETAL: No new muscle or joint pain. NEUROLOGICAL: No syncope or TIA-like symptoms; + HA d/t nitrate.   PSYCHIATRIC: No anxiety, pain, insomnia or depression    Objective:   PHYSICAL EXAM:        Vitals:    10/19/23 0910 10/19/23 0942   BP: 130/70 130/62   Site: Left Upper Arm Left Upper Arm   Position: Sitting    Cuff Size: Large Adult Large Adult   Pulse: 81    SpO2: 96%    Weight: 95.7 kg (211 lb)    Height: 1.549 m (5' 1\")

## 2023-11-07 ENCOUNTER — HOSPITAL ENCOUNTER (OUTPATIENT)
Age: 63
Discharge: HOME | End: 2023-11-07
Payer: COMMERCIAL

## 2023-11-07 DIAGNOSIS — E78.49 FAMILIAL HYPERLIPIDEMIA, HIGH LDL: ICD-10-CM

## 2023-11-07 LAB
ALBUMIN SERPL-MCNC: 4.1 G/DL (ref 3.4–5)
ALP SERPL-CCNC: 97 U/L (ref 40–129)
ALT SERPL-CCNC: 18 U/L (ref 10–40)
AST SERPL-CCNC: 16 U/L (ref 15–37)
BILIRUB DIRECT SERPL-MCNC: <0.2 MG/DL (ref 0–0.3)
BILIRUB INDIRECT SERPL-MCNC: NORMAL MG/DL (ref 0–1)
BILIRUB SERPL-MCNC: 0.7 MG/DL (ref 0–1)
CHOLEST SERPL-MCNC: 122 MG/DL (ref 0–199)
HDLC SERPL-MCNC: 42 MG/DL (ref 40–60)
LDL CHOLESTEROL CALCULATED: 54 MG/DL
PROT SERPL-MCNC: 6.7 G/DL (ref 6.4–8.2)
TRIGL SERPL-MCNC: 129 MG/DL (ref 0–150)
VLDLC SERPL CALC-MCNC: 26 MG/DL

## 2023-11-07 PROCEDURE — 80061 LIPID PANEL: CPT

## 2023-11-07 PROCEDURE — 80076 HEPATIC FUNCTION PANEL: CPT

## 2023-11-07 PROCEDURE — 36415 COLL VENOUS BLD VENIPUNCTURE: CPT

## 2023-11-08 ENCOUNTER — TELEPHONE (OUTPATIENT)
Dept: CARDIOLOGY CLINIC | Age: 63
End: 2023-11-08

## 2023-11-08 NOTE — TELEPHONE ENCOUNTER
----- Message from BRISSA Medrano CNP sent at 11/7/2023  7:16 PM EST -----  Hot dog !!! Look how much better her lipids are. Recheck in six months

## 2023-11-08 NOTE — TELEPHONE ENCOUNTER
Attempted to contact pt concerning results. Pt did not answer and LMOM to call back at earliest convince.

## 2023-11-13 ENCOUNTER — TELEPHONE (OUTPATIENT)
Dept: CARDIAC REHAB | Age: 63
End: 2023-11-13

## 2023-11-13 NOTE — TELEPHONE ENCOUNTER
Pt. Called back requested we call her. Told about 80% covered of cardiac rehab till deductible met. Told will be billed on monthly basis. Verbalized understanding. Told 12 visits minimum. Told at end of year she would need to check with [employer] for coverage after 1st of year. Verbalized understanding. Will be coming for cardiac rehab evaluation appt. Tues.  09:00 am.

## 2023-11-14 ENCOUNTER — HOSPITAL ENCOUNTER (OUTPATIENT)
Dept: CARDIAC REHAB | Age: 63
Setting detail: THERAPIES SERIES
Discharge: HOME | End: 2023-11-14

## 2023-11-17 ENCOUNTER — OFFICE VISIT (OUTPATIENT)
Dept: PULMONOLOGY | Age: 63
End: 2023-11-17
Payer: COMMERCIAL

## 2023-11-17 VITALS
HEART RATE: 80 BPM | OXYGEN SATURATION: 95 % | HEIGHT: 61 IN | WEIGHT: 214 LBS | DIASTOLIC BLOOD PRESSURE: 70 MMHG | BODY MASS INDEX: 40.4 KG/M2 | SYSTOLIC BLOOD PRESSURE: 122 MMHG

## 2023-11-17 DIAGNOSIS — R06.02 POST-COVID CHRONIC SHORTNESS OF BREATH: Primary | ICD-10-CM

## 2023-11-17 DIAGNOSIS — U09.9 POST-COVID CHRONIC SHORTNESS OF BREATH: Primary | ICD-10-CM

## 2023-11-17 PROCEDURE — 3074F SYST BP LT 130 MM HG: CPT | Performed by: INTERNAL MEDICINE

## 2023-11-17 PROCEDURE — 3078F DIAST BP <80 MM HG: CPT | Performed by: INTERNAL MEDICINE

## 2023-11-17 PROCEDURE — 99214 OFFICE O/P EST MOD 30 MIN: CPT | Performed by: INTERNAL MEDICINE

## 2023-11-17 NOTE — PROGRESS NOTES
artery of native heart without angina pectoris 3/4/9329    Helicobacter pylori antibody positive 2015    Hyperlipidemia     DIET CONTROL    Hypertension, essential 2021    Mallet fracture, closed, initial encounter 2019    Obstructive sleep apnea (adult) (pediatric) 2020    Patellofemoral syndrome 2014    Pneumonia due to COVID-19 virus 2021    Synovitis of knee 2014    Tear of medial cartilage or meniscus of knee, current 2014       PAST SURGICAL HISTORY:   Past Surgical History:   Procedure Laterality Date     SECTION      breech    COLONOSCOPY      one polyp removed    HYSTERECTOMY (CERVIX STATUS UNKNOWN)      endometriosis    KNEE ARTHROSCOPY Right 13    RIGHT KNEE ARTHROSCOPE, PARTIAL MEDIAL AND LATERAL MENISCECTOMY, SYNOVECTOMY, CHONDROPLASTY OF MEDIAL FEMORAL CONDYLE    KNEE ARTHROSCOPY Left 2014    LEFT KNEE ARTHROSCOPY WITH PARTIAL MEDIAL MENISCECTOMY,    KNEE ARTHROSCOPY Right 2018    ACTUAL PROCEDURE: RIGHT KNEE ARTHROSCOPY, PARTIAL MEDIAL MENISCECTOMY,CHONDROPLASTY, SYNOVECTOMY      KNEE SURGERY  13    RIGHT KNEE ARTHROSCOPE, PARTIAL MEDIAL AND LATERAL    KNEE SURGERY  14     RIGHT KNEE ARTHROSCOPY WITH PARTIAL LATERAL MENISCECTOMY,    TUBAL LIGATION         SOCIAL HISTORY:   Social History     Tobacco Use    Smoking status: Former     Packs/day: 0.25     Years: 7.00     Additional pack years: 0.00     Total pack years: 1.75     Types: Cigarettes     Quit date: 1988     Years since quittin.1     Passive exposure: Past    Smokeless tobacco: Never    Tobacco comments:     quit    Vaping Use    Vaping Use: Never used   Substance Use Topics    Alcohol use: No    Drug use: No       FAMILY HISTORY:   Family History   Problem Relation Age of Onset    Heart Disease Mother     Stroke Mother         x2    High Blood Pressure Mother     Diabetes Mother     Heart Attack Mother 48    Diabetes Father     Cancer Brother

## 2023-11-22 PROBLEM — E78.49 FAMILIAL HYPERLIPIDEMIA, HIGH LDL: Status: ACTIVE | Noted: 2021-07-22

## 2023-11-30 ENCOUNTER — TELEPHONE (OUTPATIENT)
Dept: CARDIOLOGY CLINIC | Age: 63
End: 2023-11-30

## 2023-11-30 ENCOUNTER — OFFICE VISIT (OUTPATIENT)
Dept: CARDIOLOGY CLINIC | Age: 63
End: 2023-11-30

## 2023-11-30 VITALS
SYSTOLIC BLOOD PRESSURE: 102 MMHG | HEIGHT: 61 IN | DIASTOLIC BLOOD PRESSURE: 62 MMHG | BODY MASS INDEX: 40.61 KG/M2 | HEART RATE: 71 BPM | OXYGEN SATURATION: 95 % | WEIGHT: 215.1 LBS

## 2023-11-30 DIAGNOSIS — Z78.9 STATIN INTOLERANCE: ICD-10-CM

## 2023-11-30 DIAGNOSIS — R09.89 BRUIT OF RIGHT CAROTID ARTERY: ICD-10-CM

## 2023-11-30 DIAGNOSIS — E78.2 MIXED HYPERCHOLESTEROLEMIA AND HYPERTRIGLYCERIDEMIA: ICD-10-CM

## 2023-11-30 DIAGNOSIS — I25.10 CORONARY ARTERY DISEASE INVOLVING NATIVE CORONARY ARTERY OF NATIVE HEART WITHOUT ANGINA PECTORIS: Primary | ICD-10-CM

## 2023-11-30 DIAGNOSIS — E66.01 SEVERE OBESITY (BMI 35.0-39.9) WITH COMORBIDITY (HCC): ICD-10-CM

## 2023-11-30 DIAGNOSIS — J45.20 MILD INTERMITTENT ASTHMA WITHOUT COMPLICATION: ICD-10-CM

## 2023-11-30 DIAGNOSIS — R07.89 CHEST TIGHTNESS: ICD-10-CM

## 2023-11-30 DIAGNOSIS — G47.33 OBSTRUCTIVE SLEEP APNEA (ADULT) (PEDIATRIC): ICD-10-CM

## 2023-11-30 DIAGNOSIS — E78.49 FAMILIAL HYPERLIPIDEMIA, HIGH LDL: ICD-10-CM

## 2023-11-30 DIAGNOSIS — I10 PRIMARY HYPERTENSION: ICD-10-CM

## 2023-11-30 DIAGNOSIS — R06.02 SOB (SHORTNESS OF BREATH): ICD-10-CM

## 2023-11-30 NOTE — TELEPHONE ENCOUNTER
Last OV:10/19/2023  Ty Matamoros  Last Labs:-  Next OV:None recent  Last Refill: Clopidogrel-10/19/2023 Ty Matamoros

## 2024-01-11 ENCOUNTER — OFFICE VISIT (OUTPATIENT)
Dept: INTERNAL MEDICINE CLINIC | Age: 64
End: 2024-01-11
Payer: COMMERCIAL

## 2024-01-11 VITALS
DIASTOLIC BLOOD PRESSURE: 86 MMHG | WEIGHT: 216 LBS | HEIGHT: 61 IN | BODY MASS INDEX: 40.78 KG/M2 | HEART RATE: 82 BPM | TEMPERATURE: 97.1 F | SYSTOLIC BLOOD PRESSURE: 138 MMHG | OXYGEN SATURATION: 95 %

## 2024-01-11 DIAGNOSIS — E66.01 OBESITY, CLASS III, BMI 40-49.9 (MORBID OBESITY) (HCC): ICD-10-CM

## 2024-01-11 DIAGNOSIS — J84.112 ACUTE EXACERBATION OF IDIOPATHIC PULMONARY FIBROSIS (HCC): Primary | ICD-10-CM

## 2024-01-11 DIAGNOSIS — E09.9 STEROID-INDUCED DIABETES MELLITUS, SUBSEQUENT ENCOUNTER (HCC): ICD-10-CM

## 2024-01-11 DIAGNOSIS — T38.0X5D STEROID-INDUCED DIABETES MELLITUS, SUBSEQUENT ENCOUNTER (HCC): ICD-10-CM

## 2024-01-11 DIAGNOSIS — J84.9 INTERSTITIAL LUNG DISEASE (HCC): ICD-10-CM

## 2024-01-11 PROCEDURE — 3079F DIAST BP 80-89 MM HG: CPT | Performed by: NURSE PRACTITIONER

## 2024-01-11 PROCEDURE — 99213 OFFICE O/P EST LOW 20 MIN: CPT | Performed by: NURSE PRACTITIONER

## 2024-01-11 PROCEDURE — 3075F SYST BP GE 130 - 139MM HG: CPT | Performed by: NURSE PRACTITIONER

## 2024-01-11 RX ORDER — AZITHROMYCIN 250 MG/1
TABLET, FILM COATED ORAL
Qty: 1 PACKET | Refills: 0 | Status: SHIPPED | OUTPATIENT
Start: 2024-01-11 | End: 2024-01-16

## 2024-01-11 RX ORDER — GUAIFENESIN/DEXTROMETHORPHAN 100-10MG/5
5 SYRUP ORAL 3 TIMES DAILY PRN
Qty: 240 ML | Refills: 0 | Status: SHIPPED | OUTPATIENT
Start: 2024-01-11

## 2024-01-11 RX ORDER — METHYLPREDNISOLONE 4 MG/1
TABLET ORAL
Qty: 1 KIT | Refills: 0 | Status: SHIPPED | OUTPATIENT
Start: 2024-01-11 | End: 2024-01-17

## 2024-01-11 ASSESSMENT — PATIENT HEALTH QUESTIONNAIRE - PHQ9: DEPRESSION UNABLE TO ASSESS: URGENT/EMERGENT SITUATION

## 2024-01-11 NOTE — PROGRESS NOTES
SUBJECTIVE:    Patient ID: Karen Acosta is a 63 y.o. female.    CC: Cough and congestion    HPI: The patient presents to the office for an acute visit.    Patient reports lingering cough and congestion following COVID diagnosis.  Overall, she feels symptoms are worsening.  Possible low-grade temp.  Repeat home COVID testing is negative.      Current Outpatient Medications   Medication Sig Dispense Refill    methylPREDNISolone (MEDROL, SEVEN,) 4 MG tablet Take as directed 1 kit 0    azithromycin (ZITHROMAX) 250 MG tablet Take 2 tabs (500 mg) on Day 1, and take 1 tab (250 mg) on days 2 through 5. 1 packet 0    guaiFENesin-dextromethorphan (ROBITUSSIN DM) 100-10 MG/5ML syrup Take 5 mLs by mouth 3 times daily as needed for Cough 240 mL 0    pitavastatin (LIVALO) 4 MG TABS tablet Take 1 tablet by mouth nightly 90 tablet 3    metoprolol tartrate (LOPRESSOR) 50 MG tablet Take 1 tablet by mouth 2 times daily 180 tablet 3    clopidogrel (PLAVIX) 75 MG tablet Take 1 tablet by mouth daily 90 tablet 3    Coenzyme Q10 (CO Q 10) 100 MG CAPS Take by mouth      Misc. Devices (CPAP MACHINE) MISC by Does not apply route      Evolocumab 140 MG/ML SOAJ Inject 140 mg into the skin every 14 days 6 Adjustable Dose Pre-filled Pen Syringe 2    amLODIPine (NORVASC) 10 MG tablet TAKE 1 TABLET DAILY 90 tablet 1    nitroGLYCERIN (NITROSTAT) 0.4 MG SL tablet Place 1 tablet under the tongue every 5 minutes as needed for Chest pain 25 tablet 3    montelukast (SINGULAIR) 10 MG tablet Take 1 tablet by mouth daily 90 tablet 3    fluticasone furoate-vilanterol (BREO ELLIPTA) 100-25 MCG/ACT inhaler Inhale 1 puff into the lungs daily 180 each 3    albuterol sulfate HFA (VENTOLIN HFA) 108 (90 Base) MCG/ACT inhaler Inhale 2 puffs into the lungs 4 times daily as needed for Wheezing 54 g 3    ipratropium-albuterol (DUONEB) 0.5-2.5 (3) MG/3ML SOLN nebulizer solution INHALE ONE VIAL VIA NEBULIZER BY MOUTH INTO THE LUNGS  EVERY FOUR HOURS 1080 mL 1

## 2024-01-24 ASSESSMENT — ENCOUNTER SYMPTOMS
SINUS PRESSURE: 1
SINUS PAIN: 1
CHEST TIGHTNESS: 1
COUGH: 1
WHEEZING: 1

## 2024-01-30 DIAGNOSIS — I10 HYPERTENSION, ESSENTIAL: ICD-10-CM

## 2024-01-30 RX ORDER — AMLODIPINE BESYLATE 10 MG/1
TABLET ORAL
Qty: 90 TABLET | Refills: 1 | Status: SHIPPED | OUTPATIENT
Start: 2024-01-30

## 2024-02-07 RX ORDER — MONTELUKAST SODIUM 10 MG/1
10 TABLET ORAL DAILY
Qty: 90 TABLET | Refills: 3 | Status: SHIPPED | OUTPATIENT
Start: 2024-02-07

## 2024-02-21 ENCOUNTER — OFFICE VISIT (OUTPATIENT)
Dept: PULMONOLOGY | Age: 64
End: 2024-02-21
Payer: COMMERCIAL

## 2024-02-21 VITALS
BODY MASS INDEX: 41.19 KG/M2 | OXYGEN SATURATION: 98 % | DIASTOLIC BLOOD PRESSURE: 66 MMHG | SYSTOLIC BLOOD PRESSURE: 119 MMHG | WEIGHT: 218 LBS | HEART RATE: 64 BPM

## 2024-02-21 DIAGNOSIS — I25.10 CORONARY ARTERY DISEASE INVOLVING NATIVE CORONARY ARTERY OF NATIVE HEART WITHOUT ANGINA PECTORIS: ICD-10-CM

## 2024-02-21 DIAGNOSIS — E66.01 CLASS 3 SEVERE OBESITY DUE TO EXCESS CALORIES WITH BODY MASS INDEX (BMI) OF 40.0 TO 44.9 IN ADULT, UNSPECIFIED WHETHER SERIOUS COMORBIDITY PRESENT (HCC): ICD-10-CM

## 2024-02-21 DIAGNOSIS — G47.33 OBSTRUCTIVE SLEEP APNEA (ADULT) (PEDIATRIC): Primary | ICD-10-CM

## 2024-02-21 DIAGNOSIS — I10 HYPERTENSION, ESSENTIAL: ICD-10-CM

## 2024-02-21 PROCEDURE — 3078F DIAST BP <80 MM HG: CPT | Performed by: NURSE PRACTITIONER

## 2024-02-21 PROCEDURE — 3074F SYST BP LT 130 MM HG: CPT | Performed by: NURSE PRACTITIONER

## 2024-02-21 PROCEDURE — 99214 OFFICE O/P EST MOD 30 MIN: CPT | Performed by: NURSE PRACTITIONER

## 2024-02-21 ASSESSMENT — SLEEP AND FATIGUE QUESTIONNAIRES
HOW LIKELY ARE YOU TO NOD OFF OR FALL ASLEEP WHILE SITTING AND READING: 0
HOW LIKELY ARE YOU TO NOD OFF OR FALL ASLEEP WHILE SITTING INACTIVE IN A PUBLIC PLACE: 0
HOW LIKELY ARE YOU TO NOD OFF OR FALL ASLEEP WHILE SITTING QUIETLY AFTER LUNCH WITHOUT ALCOHOL: 0
HOW LIKELY ARE YOU TO NOD OFF OR FALL ASLEEP IN A CAR, WHILE STOPPED FOR A FEW MINUTES IN TRAFFIC: 0
ESS TOTAL SCORE: 2
HOW LIKELY ARE YOU TO NOD OFF OR FALL ASLEEP WHILE WATCHING TV: 0
HOW LIKELY ARE YOU TO NOD OFF OR FALL ASLEEP WHILE SITTING AND TALKING TO SOMEONE: 0
HOW LIKELY ARE YOU TO NOD OFF OR FALL ASLEEP WHILE LYING DOWN TO REST IN THE AFTERNOON WHEN CIRCUMSTANCES PERMIT: 2
HOW LIKELY ARE YOU TO NOD OFF OR FALL ASLEEP WHEN YOU ARE A PASSENGER IN A CAR FOR AN HOUR WITHOUT A BREAK: 0

## 2024-02-21 NOTE — PROGRESS NOTES
Diagnosis: [x] BRAD (G47.33) [] CSA (G47.31) [] Apnea (G47.30)   Length of Need: [x] 18 Months [] 99 Months [] Other:   Machine (VICKY!): [] Respironics Dream Station   2   Auto [] ResMed AirSense     Auto 11 [] Other:     []  CPAP () [] Bilevel ()   Mode: [] Auto [] Spontaneous    Mode: [] Auto [] Spontaneous             Comfort Settings:      Humidifier: [] Heated ()        [x] Water chamber replacement ()/ 1 per 6 months        Mask:   [x] Nasal () /1 per 3 months [] Full Face () /1 per 3 months   [x] Patient choice -Size and fit mask [] Patient Choice - Size and fit mask   [] Dispense: [] Dispense:   [x] Headgear () / 1 per 3 months [] Headgear () / 1 per 3 months   [x] Replacement Nasal Cushion ()/2 per month [] Interface Replacement ()/1 per month   [] Replacement Nasal Pillows ()/2 per month         Tubing: [x] Heated ()/1 per 3 months    [] Standard ()/1 per 3 months [] Other:           Filters: [x] Non-disposable ()/1 per 6 months     [x] Ultra-Fine, Disposable ()/2 per month        Miscellaneous: [] Chin Strap ()/ 1 per 6 months [] O2 bleed-in:        LPM   [] Oxymetry on CPAP/Bilevel [x]  Other: Modem: ()         Start Order Date: 24    MEDICAL JUSTIFICATION:  I, the undersigned, certify that the above prescribed supplies are medically necessary for this patient’s wellbeing.  In my opinion, the supplies are both reasonable and necessary in reference to accepted standards of medicalpractice in treatment of this patient’s condition.    Aracely Mckeon CNP    NPI: [id number]     Order Signed Date: 24    Karen Acosta  1960  [address] [place] [address]  [phone] (home)   [phone] (mobile)      Insurance Info (confirm with patient if correct):  Payer/Plan Subscr  Sex Relation Sub. Ins. ID Effective Group Num

## 2024-02-21 NOTE — PROGRESS NOTES
Vishal Membreno CNP  Aracely Mckeon CNP [place]  [address]  [address]  [place]  [address]  P- [phone]   [place]  [address]  [address]  [place] [address]  F- [phone]     [facility] [place] [facility]  [address]  [address]  [facility] [address]  Dept: [phone]  Dept Fax: [phone]  Loc: [phone]      Assessment/Plan:      1. Obstructive sleep apnea (adult) (pediatric)  Assessment & Plan:  Chronic - Stable: Reviewed and analyzed results of physiologic download from patient's machine and reviewed with patients. Supplies and parts as needed for the machine. These are medically necessary. Limit caffeine use after 3 PM. Based on the analyzed data, we will continue with current settings. Continues to do well with her machine. She is compliant and getting good symptom control. Stable on the current settings. Discussed adjusting humidity settings on the machine. Showed her how to adjust them during the visit. She will return in one year for follow up. Instructed her to return sooner or contact the office if experiences new or worsening of symptoms. Encouraged her to continue to use her machine each night. Encouraged the patient to contact the office with any questions or concerns.  2. Coronary artery disease involving native coronary artery of native heart without angina pectoris  Assessment & Plan:   Chronic- Stable.  Discussed the importance of treating sleep apnea as part of the management of this disorder.  Cont any meds per PCP and other physicians.    3. Hypertension, essential  Assessment & Plan:   Chronic- Stable.  Discussed the importance of treating sleep apnea as part of the management of this disorder.  Cont any meds per PCP and other physicians.    4. Class 3 severe obesity due to excess calories with body mass index (BMI) of 40.0 to 44.9 in adult, unspecified whether serious comorbidity

## 2024-02-21 NOTE — ASSESSMENT & PLAN NOTE
Chronic - Stable: Reviewed and analyzed results of physiologic download from patient's machine and reviewed with patients. Supplies and parts as needed for the machine. These are medically necessary. Limit caffeine use after 3 PM. Based on the analyzed data, we will continue with current settings. Continues to do well with her machine. She is compliant and getting good symptom control. Stable on the current settings. Discussed adjusting humidity settings on the machine. Showed her how to adjust them during the visit. She will return in one year for follow up. Instructed her to return sooner or contact the office if experiences new or worsening of symptoms. Encouraged her to continue to use her machine each night. Encouraged the patient to contact the office with any questions or concerns.

## 2024-03-11 DIAGNOSIS — J45.20 MILD INTERMITTENT ASTHMA WITHOUT COMPLICATION: Primary | ICD-10-CM

## 2024-03-11 RX ORDER — ALBUTEROL SULFATE 90 UG/1
AEROSOL, METERED RESPIRATORY (INHALATION)
Qty: 25.5 G | Refills: 3 | Status: SHIPPED | OUTPATIENT
Start: 2024-03-11

## 2024-03-11 RX ORDER — FLUTICASONE FUROATE AND VILANTEROL TRIFENATATE 100; 25 UG/1; UG/1
POWDER RESPIRATORY (INHALATION)
Qty: 60 EACH | Refills: 3 | Status: SHIPPED | OUTPATIENT
Start: 2024-03-11

## 2024-03-11 NOTE — TELEPHONE ENCOUNTER
Last appointment:  11/17/2023    Next appointment:  5/17/2024    Last refill:       albuterol sulfate HFA (VENTOLIN HFA) 108 (90 Base) MCG/ACT inhaler [[id number]]    Order Details  Dose: 2 puff Route: Inhalation Frequency: 4 TIMES DAILY PRN for Wheezing   Dispense Quantity: 54 g Refills: 3          Sig: Inhale 2 puffs into the lungs 4 times daily as needed for Wheezing         Start Date: 02/14/23       fluticasone furoate-vilanterol (BREO ELLIPTA) 100-25 MCG/ACT inhaler [[id number]]    Order Details  Dose: 1 puff Route: Inhalation Frequency: DAILY   Dispense Quantity: 180 each Refills: 3          Sig: Inhale 1 puff into the lungs daily         Start Date: 02/14/23

## 2024-04-05 ENCOUNTER — OFFICE VISIT (OUTPATIENT)
Dept: INTERNAL MEDICINE CLINIC | Age: 64
End: 2024-04-05
Payer: COMMERCIAL

## 2024-04-05 VITALS
HEART RATE: 76 BPM | DIASTOLIC BLOOD PRESSURE: 80 MMHG | HEIGHT: 61 IN | WEIGHT: 217.8 LBS | SYSTOLIC BLOOD PRESSURE: 110 MMHG | OXYGEN SATURATION: 96 % | BODY MASS INDEX: 41.12 KG/M2

## 2024-04-05 DIAGNOSIS — I25.10 CORONARY ARTERY DISEASE INVOLVING NATIVE CORONARY ARTERY OF NATIVE HEART WITHOUT ANGINA PECTORIS: ICD-10-CM

## 2024-04-05 DIAGNOSIS — K21.9 GASTROESOPHAGEAL REFLUX DISEASE WITHOUT ESOPHAGITIS: ICD-10-CM

## 2024-04-05 DIAGNOSIS — I10 HYPERTENSION, ESSENTIAL: Primary | ICD-10-CM

## 2024-04-05 DIAGNOSIS — J84.9 INTERSTITIAL LUNG DISEASE (HCC): ICD-10-CM

## 2024-04-05 DIAGNOSIS — E78.2 MIXED HYPERCHOLESTEROLEMIA AND HYPERTRIGLYCERIDEMIA: ICD-10-CM

## 2024-04-05 DIAGNOSIS — R73.01 IMPAIRED FASTING GLUCOSE: ICD-10-CM

## 2024-04-05 PROCEDURE — 3074F SYST BP LT 130 MM HG: CPT | Performed by: NURSE PRACTITIONER

## 2024-04-05 PROCEDURE — 99214 OFFICE O/P EST MOD 30 MIN: CPT | Performed by: NURSE PRACTITIONER

## 2024-04-05 PROCEDURE — 3079F DIAST BP 80-89 MM HG: CPT | Performed by: NURSE PRACTITIONER

## 2024-04-05 SDOH — ECONOMIC STABILITY: FOOD INSECURITY: WITHIN THE PAST 12 MONTHS, YOU WORRIED THAT YOUR FOOD WOULD RUN OUT BEFORE YOU GOT MONEY TO BUY MORE.: NEVER TRUE

## 2024-04-05 SDOH — ECONOMIC STABILITY: FOOD INSECURITY: WITHIN THE PAST 12 MONTHS, THE FOOD YOU BOUGHT JUST DIDN'T LAST AND YOU DIDN'T HAVE MONEY TO GET MORE.: NEVER TRUE

## 2024-04-05 SDOH — ECONOMIC STABILITY: INCOME INSECURITY: HOW HARD IS IT FOR YOU TO PAY FOR THE VERY BASICS LIKE FOOD, HOUSING, MEDICAL CARE, AND HEATING?: NOT HARD AT ALL

## 2024-04-05 ASSESSMENT — PATIENT HEALTH QUESTIONNAIRE - PHQ9
SUM OF ALL RESPONSES TO PHQ QUESTIONS 1-9: 0
1. LITTLE INTEREST OR PLEASURE IN DOING THINGS: NOT AT ALL
2. FEELING DOWN, DEPRESSED OR HOPELESS: NOT AT ALL
SUM OF ALL RESPONSES TO PHQ QUESTIONS 1-9: 0
SUM OF ALL RESPONSES TO PHQ9 QUESTIONS 1 & 2: 0

## 2024-04-05 ASSESSMENT — ENCOUNTER SYMPTOMS
RESPIRATORY NEGATIVE: 1
GASTROINTESTINAL NEGATIVE: 1

## 2024-04-05 NOTE — PROGRESS NOTES
Systems   Constitutional: Negative.    Respiratory: Negative.     Cardiovascular: Negative.    Gastrointestinal: Negative.    Genitourinary: Negative.    Musculoskeletal: Negative.    Neurological: Negative.    Psychiatric/Behavioral: Negative.     All other systems reviewed and are negative.        OBJECTIVE:  Physical Exam  Constitutional:       Appearance: Normal appearance.   HENT:      Head: Normocephalic and atraumatic.   Eyes:      Conjunctiva/sclera: Conjunctivae normal.      Pupils: Pupils are equal, round, and reactive to light.   Cardiovascular:      Rate and Rhythm: Normal rate and regular rhythm.   Pulmonary:      Effort: Pulmonary effort is normal. No respiratory distress.   Abdominal:      General: There is no distension.      Palpations: Abdomen is soft.      Tenderness: There is no abdominal tenderness. There is no guarding.      Hernia: No hernia is present.   Musculoskeletal:         General: Normal range of motion.   Skin:     General: Skin is warm and dry.   Neurological:      General: No focal deficit present.      Mental Status: She is alert and oriented to person, place, and time.   Psychiatric:         Mood and Affect: Mood normal.         Behavior: Behavior normal.        /80   Pulse 76   Ht 1.549 m (5' 1\")   Wt 98.8 kg (217 lb 12.8 oz)   SpO2 96%   BMI 41.15 kg/m²          4/5/2024     7:43 AM 10/12/2023     1:17 PM 1/3/2023     3:23 PM 3/14/2022     2:15 PM 1/22/2021    10:24 AM 2/6/2019    11:28 AM 6/4/2018     8:54 AM   PHQ Scores   PHQ2 Score 0 0 0 0 0 0 0   PHQ9 Score 0 0 0 0 0 0 0     Interpretation of Total Score Depression Severity: 1-4 = Minimal depression, 5-9 = Mild depression, 10-14 = Moderate depression, 15-19 = Moderately severe depression, 20-27 =Severe depression        Assessment/Plan:  Karen was seen today for follow-up.  Diagnoses and all orders for this visit:    Hypertension, essential  - Normotensive  - she has met [facility] standards.  - Continue current

## 2024-04-16 RX ORDER — EVOLOCUMAB 140 MG/ML
INJECTION, SOLUTION SUBCUTANEOUS
Qty: 16 ML | Refills: 1 | Status: SHIPPED | OUTPATIENT
Start: 2024-04-16

## 2024-04-16 NOTE — TELEPHONE ENCOUNTER
Requested Prescriptions     Pending Prescriptions Disp Refills    REPATHA SURECLICK 140 MG/ML SOAJ [Pharmacy Med Name: REPATHA SRCLK 140MG/ML PF AUTO INJ] 16 mL      Sig: INJECT 1 PEN UNDER THE SKIN EVERY 14 DAYS          [facility]      Last OV:  11/30/2023 WAK    Next OV: 12/17/2024 WAK     Last Labs: 11/07/2023 LIPID    Last Filled: 08/28/2023NPTS

## 2024-04-22 ENCOUNTER — OFFICE VISIT (OUTPATIENT)
Dept: INTERNAL MEDICINE CLINIC | Age: 64
End: 2024-04-22
Payer: COMMERCIAL

## 2024-04-22 VITALS
SYSTOLIC BLOOD PRESSURE: 120 MMHG | OXYGEN SATURATION: 97 % | WEIGHT: 217 LBS | HEART RATE: 85 BPM | HEIGHT: 61 IN | DIASTOLIC BLOOD PRESSURE: 80 MMHG | BODY MASS INDEX: 40.97 KG/M2

## 2024-04-22 DIAGNOSIS — J84.112 ACUTE EXACERBATION OF IDIOPATHIC PULMONARY FIBROSIS (HCC): Primary | ICD-10-CM

## 2024-04-22 PROCEDURE — 99213 OFFICE O/P EST LOW 20 MIN: CPT | Performed by: NURSE PRACTITIONER

## 2024-04-22 PROCEDURE — 3079F DIAST BP 80-89 MM HG: CPT | Performed by: NURSE PRACTITIONER

## 2024-04-22 PROCEDURE — 3074F SYST BP LT 130 MM HG: CPT | Performed by: NURSE PRACTITIONER

## 2024-04-22 RX ORDER — PREDNISONE 20 MG/1
20 TABLET ORAL 2 TIMES DAILY
Qty: 10 TABLET | Refills: 0 | Status: SHIPPED | OUTPATIENT
Start: 2024-04-22 | End: 2024-04-23 | Stop reason: SDUPTHER

## 2024-04-22 RX ORDER — AZITHROMYCIN 250 MG/1
TABLET, FILM COATED ORAL
Qty: 1 PACKET | Refills: 0 | Status: SHIPPED | OUTPATIENT
Start: 2024-04-22 | End: 2024-04-23 | Stop reason: SDUPTHER

## 2024-04-23 DIAGNOSIS — J84.112 ACUTE EXACERBATION OF IDIOPATHIC PULMONARY FIBROSIS (HCC): ICD-10-CM

## 2024-04-23 RX ORDER — AZITHROMYCIN 250 MG/1
TABLET, FILM COATED ORAL
Qty: 1 PACKET | Refills: 0 | Status: SHIPPED | OUTPATIENT
Start: 2024-04-23 | End: 2024-04-28

## 2024-04-23 RX ORDER — PREDNISONE 20 MG/1
20 TABLET ORAL 2 TIMES DAILY
Qty: 10 TABLET | Refills: 0 | Status: SHIPPED | OUTPATIENT
Start: 2024-04-23 | End: 2024-04-28

## 2024-04-23 RX ORDER — PREDNISONE 20 MG/1
20 TABLET ORAL 2 TIMES DAILY
Qty: 10 TABLET | Refills: 0 | OUTPATIENT
Start: 2024-04-23 | End: 2024-04-28

## 2024-04-23 RX ORDER — AZITHROMYCIN 250 MG/1
TABLET, FILM COATED ORAL
Qty: 1 PACKET | Refills: 0 | OUTPATIENT
Start: 2024-04-23 | End: 2024-04-28

## 2024-04-30 ASSESSMENT — ENCOUNTER SYMPTOMS
CHEST TIGHTNESS: 1
COUGH: 1
WHEEZING: 1
SORE THROAT: 1

## 2024-04-30 NOTE — PROGRESS NOTES
viral illness.  Symptoms seem to have started in the upper respiratory system.  Unfortunately, she is not experiencing lower respiratory symptoms concerning for exacerbation of pulmonary fibrosis  - She will be placed on antibiotic, steroid.  She will continue inhaler therapy.  She has been advised to follow-up here or with her pulmonologist if symptoms worsen or fail to improve  -     azithromycin (ZITHROMAX) 250 MG tablet; Take 2 tabs (500 mg) on Day 1, and take 1 tab (250 mg) on days 2 through 5.  -     predniSONE (DELTASONE) 20 MG tablet; Take 1 tablet by mouth 2 times daily for 5 days        Kasi Navarro, BRISSA - CNP

## 2024-05-13 ENCOUNTER — HOSPITAL ENCOUNTER (OUTPATIENT)
Dept: PULMONOLOGY | Age: 64
Discharge: HOME | End: 2024-05-13
Attending: INTERNAL MEDICINE
Payer: COMMERCIAL

## 2024-05-13 VITALS — HEART RATE: 77 BPM | RESPIRATION RATE: 16 BRPM | OXYGEN SATURATION: 97 %

## 2024-05-13 DIAGNOSIS — R06.02 POST-COVID CHRONIC SHORTNESS OF BREATH: ICD-10-CM

## 2024-05-13 DIAGNOSIS — U09.9 POST-COVID CHRONIC SHORTNESS OF BREATH: ICD-10-CM

## 2024-05-13 LAB
DLCO %PRED: 61 %
DLCO PRED: NORMAL
DLCO/VA %PRED: NORMAL
DLCO/VA PRED: NORMAL
DLCO/VA: NORMAL
DLCO: NORMAL
EXPIRATORY TIME-POST: NORMAL
EXPIRATORY TIME: NORMAL
FEF 25-75 %CHNG: NORMAL
FEF 25-75 POST %PRED: NORMAL
FEF 25-75% %PRED-PRE: NORMAL
FEF 25-75% PRED: NORMAL
FEF 25-75-POST: NORMAL
FEF 25-75-PRE: NORMAL
FEV1 %PRED-POST: 79 %
FEV1 %PRED-PRE: 71 %
FEV1 PRED: NORMAL
FEV1-POST: NORMAL
FEV1-PRE: NORMAL
FEV1/FVC %PRED-POST: NORMAL
FEV1/FVC %PRED-PRE: NORMAL
FEV1/FVC PRED: NORMAL
FEV1/FVC-POST: 112 %
FEV1/FVC-PRE: 114 %
FVC %PRED-POST: NORMAL
FVC %PRED-PRE: NORMAL
FVC PRED: NORMAL
FVC-POST: NORMAL
FVC-PRE: NORMAL
GAW %PRED: NORMAL
GAW PRED: NORMAL
GAW: NORMAL
IC PRE %PRED: NORMAL
IC PRED: NORMAL
IC: NORMAL
MEP: NORMAL
MIP: NORMAL
MVV %PRED-PRE: NORMAL
MVV PRED: NORMAL
MVV-PRE: NORMAL
PEF %PRED-POST: NORMAL
PEF %PRED-PRE: NORMAL
PEF PRED: NORMAL
PEF%CHNG: NORMAL
PEF-POST: NORMAL
PEF-PRE: NORMAL
RAW %PRED: NORMAL
RAW PRED: NORMAL
RAW: NORMAL
RV PRE %PRED: NORMAL
RV PRED: NORMAL
RV: NORMAL
SVC %PRED: NORMAL
SVC PRED: NORMAL
SVC: NORMAL
TLC PRE %PRED: 78 %
TLC PRED: NORMAL
TLC: NORMAL
VA %PRED: NORMAL
VA PRED: NORMAL
VA: NORMAL
VTG %PRED: NORMAL
VTG PRED: NORMAL
VTG: NORMAL

## 2024-05-13 PROCEDURE — 94060 EVALUATION OF WHEEZING: CPT

## 2024-05-13 PROCEDURE — 6370000000 HC RX 637 (ALT 250 FOR IP): Performed by: INTERNAL MEDICINE

## 2024-05-13 PROCEDURE — 94760 N-INVAS EAR/PLS OXIMETRY 1: CPT

## 2024-05-13 PROCEDURE — 94729 DIFFUSING CAPACITY: CPT

## 2024-05-13 PROCEDURE — 94726 PLETHYSMOGRAPHY LUNG VOLUMES: CPT

## 2024-05-13 RX ORDER — ALBUTEROL SULFATE 90 UG/1
4 AEROSOL, METERED RESPIRATORY (INHALATION) ONCE
Status: COMPLETED | OUTPATIENT
Start: 2024-05-13 | End: 2024-05-13

## 2024-05-13 RX ADMIN — Medication 4 PUFF: at 15:25

## 2024-05-13 ASSESSMENT — PULMONARY FUNCTION TESTS
FEV1/FVC_PRE: 114
FEV1/FVC_POST: 112
FEV1_PERCENT_PREDICTED_POST: 79
FEV1_PERCENT_PREDICTED_PRE: 71

## 2024-05-14 ENCOUNTER — OFFICE VISIT (OUTPATIENT)
Dept: PULMONOLOGY | Age: 64
End: 2024-05-14
Payer: COMMERCIAL

## 2024-05-14 ENCOUNTER — TELEPHONE (OUTPATIENT)
Dept: PULMONOLOGY | Age: 64
End: 2024-05-14

## 2024-05-14 VITALS
OXYGEN SATURATION: 95 % | SYSTOLIC BLOOD PRESSURE: 120 MMHG | DIASTOLIC BLOOD PRESSURE: 78 MMHG | BODY MASS INDEX: 40.97 KG/M2 | WEIGHT: 217 LBS | HEART RATE: 82 BPM | HEIGHT: 61 IN

## 2024-05-14 DIAGNOSIS — G47.33 OBSTRUCTIVE SLEEP APNEA (ADULT) (PEDIATRIC): ICD-10-CM

## 2024-05-14 DIAGNOSIS — J84.10 PULMONARY FIBROSIS (HCC): ICD-10-CM

## 2024-05-14 DIAGNOSIS — U09.9 POST-COVID CHRONIC SHORTNESS OF BREATH: ICD-10-CM

## 2024-05-14 DIAGNOSIS — R06.02 POST-COVID CHRONIC SHORTNESS OF BREATH: ICD-10-CM

## 2024-05-14 DIAGNOSIS — I10 HYPERTENSION, ESSENTIAL: ICD-10-CM

## 2024-05-14 DIAGNOSIS — J45.20 MILD INTERMITTENT ASTHMA WITHOUT COMPLICATION: Primary | ICD-10-CM

## 2024-05-14 PROCEDURE — 94726 PLETHYSMOGRAPHY LUNG VOLUMES: CPT | Performed by: INTERNAL MEDICINE

## 2024-05-14 PROCEDURE — 3078F DIAST BP <80 MM HG: CPT | Performed by: INTERNAL MEDICINE

## 2024-05-14 PROCEDURE — 3074F SYST BP LT 130 MM HG: CPT | Performed by: INTERNAL MEDICINE

## 2024-05-14 PROCEDURE — 99214 OFFICE O/P EST MOD 30 MIN: CPT | Performed by: INTERNAL MEDICINE

## 2024-05-14 PROCEDURE — 94060 EVALUATION OF WHEEZING: CPT | Performed by: INTERNAL MEDICINE

## 2024-05-14 PROCEDURE — 94729 DIFFUSING CAPACITY: CPT | Performed by: INTERNAL MEDICINE

## 2024-05-14 RX ORDER — AZELASTINE HYDROCHLORIDE, FLUTICASONE PROPIONATE 137; 50 UG/1; UG/1
1 SPRAY, METERED NASAL 2 TIMES DAILY
Qty: 23 G | Refills: 2 | Status: SHIPPED | OUTPATIENT
Start: 2024-05-14

## 2024-05-14 RX ORDER — FEXOFENADINE HCL 180 MG/1
180 TABLET ORAL DAILY
Qty: 5 TABLET | Refills: 0 | Status: SHIPPED | OUTPATIENT
Start: 2024-05-14 | End: 2024-05-19

## 2024-05-14 NOTE — PROGRESS NOTES
personally reviewed this CAT scan and compared with her previous chest x-rays.  Overall the extent of pulmonary fibrosis appears stable.  -Complete PFT in 2022 showed reduced total lung capacity as well as diffusion capacity.  -PFT repeated in 2024 shows improvement in total lung capacity, FVC as well as diffusion capacity.  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  -Patient could not attend pulmonary rehabilitation program due to lack of insurance coverage.    2.  Mild intermittent asthma  -Stable  -She will continue with Breo Ellipta and albuterol inhaler as before.  -I offered her to upgrade the Breo Ellipta 200/25 mcg 1 puff daily as she is still requiring at least once or twice a day albuterol.  -Currently she wants to wait.  -I will start her on Dymista nasal spray 1 squirt in each nostril once or twice a day for next 5 days to reduce her postnasal drip.  -Will also give her a 5-day course of Allegra 180 mg.  -Advised her to use nebulized albuterol if her symptoms are more pronounced.  -Also continue with montelukast 10 mg p.o. nightly.    3. Class 1 obesity due to excess calories without serious comorbidity with body mass index (BMI) of 32.0 to 32.9 in adult  -I discussed weight loss as a treatment strategy in achieving the healthcare goals.    If patient loses even 10 to 15% of current body weight, it will be beneficial in improving the overall health.     4. BRAD on CPAP  -Patient to continue follow-up with Dr. Potter.      Return in about 6 months (around 11/14/2024).         Rufino Torres MD  Pulmonary Critical Care and Sleep Medicine  5/14/2024, 9:11 AM    This note was completed using dragon medical speech recognition software. Grammatical errors, random word insertions, pronoun errors and incomplete sentences are occasional consequences of this technology due to software limitations. If there are questions or concerns about the content of this note of information contained within the body

## 2024-05-14 NOTE — PROCEDURES
Pulmonary Function Testing      Patient name:  Karen Acosta      Unit #:   [id number]   Date of test: 5/13/2024  Date of interpretation:   5/14/2024    Ms. Karen Acosta is a 64 y.o. year-old non smoker. The spirometry data were acceptable and reproducible.     Spirometry:  Flow volume loops were normal. The FEV-1/FVC ratio was normal. The  post-bronchodilator FEV-1 was 2.02 liters (79% of predicted), which was moderately decreased. The FVC was 2.34 liters (70% of predicted), which was normal. Response to inhaled bronchodilators (albuterol) was significant.    Lung volumes:  Lung volumes were tested by plethysmography. The total lung capacity was 3.95 liters (78% of predicted), which was normal. The residual volume was 1.51 liters (78% of predicted), which was normal. The ratio of residual volume to total lung capacity (RV/TLC) was 100, which was normal. Specific airway resistance was normal.    Diffusion capacity was found to be decreased.       Interpretation:  Mild restrictive lung disease with good bronchodilator response and reduced diffusion capacity.      Brandi Vazquez MD  [facility] Pulmonary and Critical Care   [address] , [address], [place] [address]

## 2024-05-14 NOTE — TELEPHONE ENCOUNTER
Submitted PA for Azelastine-Fluticasone 137-50MCG/ACT suspension   [facility] Key: [id number]  STATUS: APPROVED 4/14/24-5/14/25    Follow up done daily; if no decision with in three days we will refax.  If another three days goes by with no decision will call the insurance for status.

## 2024-07-15 ENCOUNTER — TELEPHONE (OUTPATIENT)
Dept: CARDIOLOGY CLINIC | Age: 64
End: 2024-07-15

## 2024-07-15 NOTE — TELEPHONE ENCOUNTER
Pt needs to have an MRI and she is calling to make sure her stents are MRI compatible.    Please advise.

## 2024-07-29 DIAGNOSIS — I10 HYPERTENSION, ESSENTIAL: ICD-10-CM

## 2024-07-29 RX ORDER — AMLODIPINE BESYLATE 10 MG/1
TABLET ORAL
Qty: 90 TABLET | Refills: 3 | Status: SHIPPED | OUTPATIENT
Start: 2024-07-29

## 2024-08-01 ENCOUNTER — TELEPHONE (OUTPATIENT)
Dept: CARDIOLOGY CLINIC | Age: 64
End: 2024-08-01

## 2024-08-01 NOTE — TELEPHONE ENCOUNTER
CARDIAC CLEARANCE     What type of procedure are you having?  Right Foot Surgery    Which physician is performing your procedure?  Dr. Hakan German    When is your procedure scheduled for?  09/09    Where are you having this procedure?  [facility]    Are you taking Blood Thinners?  Yes   If so what? (Name/dose/frequesncy)  Plavix 75mg, Aspirin 81 mg     Does the surgeon want you to stop your blood thinner?  If so for how long?  Yes - 2 days prior    Phone Number and Contact Name for Physicians office:  [phone]  Fax number to send information:  [phone]

## 2024-08-23 RX ORDER — ALBUTEROL SULFATE 2.5 MG/3ML
2.5 SOLUTION RESPIRATORY (INHALATION) EVERY 4 HOURS PRN
Qty: 1080 ML | Refills: 3 | Status: SHIPPED | OUTPATIENT
Start: 2024-08-23

## 2024-09-09 ENCOUNTER — HOSPITAL ENCOUNTER (OUTPATIENT)
Age: 64
Setting detail: OUTPATIENT SURGERY
Discharge: HOME | End: 2024-09-09
Attending: PODIATRIST | Admitting: PODIATRIST
Payer: COMMERCIAL

## 2024-09-09 ENCOUNTER — ANESTHESIA EVENT (OUTPATIENT)
Dept: OPERATING ROOM | Age: 64
End: 2024-09-09
Payer: COMMERCIAL

## 2024-09-09 ENCOUNTER — ANESTHESIA (OUTPATIENT)
Dept: OPERATING ROOM | Age: 64
End: 2024-09-09
Payer: COMMERCIAL

## 2024-09-09 VITALS
BODY MASS INDEX: 35.82 KG/M2 | HEART RATE: 60 BPM | HEIGHT: 65 IN | OXYGEN SATURATION: 95 % | SYSTOLIC BLOOD PRESSURE: 123 MMHG | WEIGHT: 215 LBS | DIASTOLIC BLOOD PRESSURE: 76 MMHG | TEMPERATURE: 97 F | RESPIRATION RATE: 15 BRPM

## 2024-09-09 DIAGNOSIS — G89.18 POST-OP PAIN: Primary | ICD-10-CM

## 2024-09-09 PROCEDURE — 6360000002 HC RX W HCPCS: Performed by: PODIATRIST

## 2024-09-09 PROCEDURE — 3600000004 HC SURGERY LEVEL 4 BASE: Performed by: PODIATRIST

## 2024-09-09 PROCEDURE — 7100000010 HC PHASE II RECOVERY - FIRST 15 MIN: Performed by: PODIATRIST

## 2024-09-09 PROCEDURE — 2500000003 HC RX 250 WO HCPCS: Performed by: PODIATRIST

## 2024-09-09 PROCEDURE — 7100000000 HC PACU RECOVERY - FIRST 15 MIN: Performed by: PODIATRIST

## 2024-09-09 PROCEDURE — 2580000003 HC RX 258: Performed by: NURSE ANESTHETIST, CERTIFIED REGISTERED

## 2024-09-09 PROCEDURE — 3700000001 HC ADD 15 MINUTES (ANESTHESIA): Performed by: PODIATRIST

## 2024-09-09 PROCEDURE — 2720000010 HC SURG SUPPLY STERILE: Performed by: PODIATRIST

## 2024-09-09 PROCEDURE — 7100000011 HC PHASE II RECOVERY - ADDTL 15 MIN: Performed by: PODIATRIST

## 2024-09-09 PROCEDURE — 6360000002 HC RX W HCPCS: Performed by: NURSE ANESTHETIST, CERTIFIED REGISTERED

## 2024-09-09 PROCEDURE — 3600000014 HC SURGERY LEVEL 4 ADDTL 15MIN: Performed by: PODIATRIST

## 2024-09-09 PROCEDURE — C1762 CONN TISS, HUMAN(INC FASCIA): HCPCS | Performed by: PODIATRIST

## 2024-09-09 PROCEDURE — 2580000003 HC RX 258: Performed by: PODIATRIST

## 2024-09-09 PROCEDURE — 7100000001 HC PACU RECOVERY - ADDTL 15 MIN: Performed by: PODIATRIST

## 2024-09-09 PROCEDURE — 3700000000 HC ANESTHESIA ATTENDED CARE: Performed by: PODIATRIST

## 2024-09-09 PROCEDURE — 2709999900 HC NON-CHARGEABLE SUPPLY: Performed by: PODIATRIST

## 2024-09-09 DEVICE — GRAFT HUM TISS AMBIENT 2 CC FLOWABLE PLCNTA TISS VIAFLOW: Type: IMPLANTABLE DEVICE | Site: FOOT | Status: FUNCTIONAL

## 2024-09-09 RX ORDER — PROPOFOL 10 MG/ML
INJECTION, EMULSION INTRAVENOUS CONTINUOUS PRN
Status: DISCONTINUED | OUTPATIENT
Start: 2024-09-09 | End: 2024-09-09 | Stop reason: SDUPTHER

## 2024-09-09 RX ORDER — DIPHENHYDRAMINE HYDROCHLORIDE 50 MG/ML
12.5 INJECTION INTRAMUSCULAR; INTRAVENOUS
Status: DISCONTINUED | OUTPATIENT
Start: 2024-09-09 | End: 2024-09-09 | Stop reason: HOSPADM

## 2024-09-09 RX ORDER — SODIUM CHLORIDE, SODIUM LACTATE, POTASSIUM CHLORIDE, CALCIUM CHLORIDE 600; 310; 30; 20 MG/100ML; MG/100ML; MG/100ML; MG/100ML
INJECTION, SOLUTION INTRAVENOUS CONTINUOUS
Status: DISCONTINUED | OUTPATIENT
Start: 2024-09-09 | End: 2024-09-09 | Stop reason: HOSPADM

## 2024-09-09 RX ORDER — SODIUM CHLORIDE 0.9 % (FLUSH) 0.9 %
5-40 SYRINGE (ML) INJECTION EVERY 12 HOURS SCHEDULED
Status: DISCONTINUED | OUTPATIENT
Start: 2024-09-09 | End: 2024-09-09 | Stop reason: HOSPADM

## 2024-09-09 RX ORDER — MEPERIDINE HYDROCHLORIDE 25 MG/ML
12.5 INJECTION INTRAMUSCULAR; INTRAVENOUS; SUBCUTANEOUS EVERY 5 MIN PRN
Status: DISCONTINUED | OUTPATIENT
Start: 2024-09-09 | End: 2024-09-09 | Stop reason: HOSPADM

## 2024-09-09 RX ORDER — CLOPIDOGREL BISULFATE 75 MG/1
75 TABLET ORAL DAILY
Qty: 90 TABLET | Refills: 0 | Status: SHIPPED | OUTPATIENT
Start: 2024-09-09

## 2024-09-09 RX ORDER — BUPIVACAINE HYDROCHLORIDE 5 MG/ML
INJECTION, SOLUTION EPIDURAL; INTRACAUDAL
Status: COMPLETED | OUTPATIENT
Start: 2024-09-09 | End: 2024-09-09

## 2024-09-09 RX ORDER — PROPOFOL 10 MG/ML
INJECTION, EMULSION INTRAVENOUS PRN
Status: DISCONTINUED | OUTPATIENT
Start: 2024-09-09 | End: 2024-09-09 | Stop reason: SDUPTHER

## 2024-09-09 RX ORDER — OXYCODONE AND ACETAMINOPHEN 5; 325 MG/1; MG/1
1 TABLET ORAL EVERY 6 HOURS PRN
Qty: 20 TABLET | Refills: 0 | Status: SHIPPED | OUTPATIENT
Start: 2024-09-09 | End: 2024-09-14

## 2024-09-09 RX ORDER — SODIUM CHLORIDE 9 MG/ML
INJECTION, SOLUTION INTRAVENOUS PRN
Status: DISCONTINUED | OUTPATIENT
Start: 2024-09-09 | End: 2024-09-09 | Stop reason: HOSPADM

## 2024-09-09 RX ORDER — LIDOCAINE HYDROCHLORIDE 10 MG/ML
0.5 INJECTION, SOLUTION EPIDURAL; INFILTRATION; INTRACAUDAL; PERINEURAL ONCE
Status: DISCONTINUED | OUTPATIENT
Start: 2024-09-09 | End: 2024-09-09 | Stop reason: HOSPADM

## 2024-09-09 RX ORDER — HYDROMORPHONE HYDROCHLORIDE 2 MG/ML
0.25 INJECTION, SOLUTION INTRAMUSCULAR; INTRAVENOUS; SUBCUTANEOUS EVERY 5 MIN PRN
Status: DISCONTINUED | OUTPATIENT
Start: 2024-09-09 | End: 2024-09-09 | Stop reason: HOSPADM

## 2024-09-09 RX ORDER — ONDANSETRON 2 MG/ML
4 INJECTION INTRAMUSCULAR; INTRAVENOUS
Status: DISCONTINUED | OUTPATIENT
Start: 2024-09-09 | End: 2024-09-09 | Stop reason: HOSPADM

## 2024-09-09 RX ORDER — ONDANSETRON 2 MG/ML
INJECTION INTRAMUSCULAR; INTRAVENOUS PRN
Status: DISCONTINUED | OUTPATIENT
Start: 2024-09-09 | End: 2024-09-09 | Stop reason: SDUPTHER

## 2024-09-09 RX ORDER — LIDOCAINE HYDROCHLORIDE 10 MG/ML
INJECTION, SOLUTION EPIDURAL; INFILTRATION; INTRACAUDAL; PERINEURAL
Status: COMPLETED | OUTPATIENT
Start: 2024-09-09 | End: 2024-09-09

## 2024-09-09 RX ORDER — NALOXONE HYDROCHLORIDE 0.4 MG/ML
INJECTION, SOLUTION INTRAMUSCULAR; INTRAVENOUS; SUBCUTANEOUS PRN
Status: DISCONTINUED | OUTPATIENT
Start: 2024-09-09 | End: 2024-09-09 | Stop reason: HOSPADM

## 2024-09-09 RX ORDER — OXYCODONE AND ACETAMINOPHEN 5; 325 MG/1; MG/1
1 TABLET ORAL EVERY 6 HOURS PRN
Qty: 20 TABLET | Refills: 0 | OUTPATIENT
Start: 2024-09-09 | End: 2024-09-09

## 2024-09-09 RX ORDER — HYDROMORPHONE HYDROCHLORIDE 2 MG/ML
0.5 INJECTION, SOLUTION INTRAMUSCULAR; INTRAVENOUS; SUBCUTANEOUS EVERY 5 MIN PRN
Status: DISCONTINUED | OUTPATIENT
Start: 2024-09-09 | End: 2024-09-09 | Stop reason: HOSPADM

## 2024-09-09 RX ORDER — SODIUM CHLORIDE 0.9 % (FLUSH) 0.9 %
5-40 SYRINGE (ML) INJECTION PRN
Status: DISCONTINUED | OUTPATIENT
Start: 2024-09-09 | End: 2024-09-09 | Stop reason: HOSPADM

## 2024-09-09 RX ORDER — SODIUM CHLORIDE, SODIUM LACTATE, POTASSIUM CHLORIDE, CALCIUM CHLORIDE 600; 310; 30; 20 MG/100ML; MG/100ML; MG/100ML; MG/100ML
INJECTION, SOLUTION INTRAVENOUS CONTINUOUS PRN
Status: DISCONTINUED | OUTPATIENT
Start: 2024-09-09 | End: 2024-09-09 | Stop reason: SDUPTHER

## 2024-09-09 RX ADMIN — ONDANSETRON 4 MG: 2 INJECTION INTRAMUSCULAR; INTRAVENOUS at 09:54

## 2024-09-09 RX ADMIN — PROPOFOL 150 MCG/KG/MIN: 10 INJECTION, EMULSION INTRAVENOUS at 09:38

## 2024-09-09 RX ADMIN — SODIUM CHLORIDE, POTASSIUM CHLORIDE, SODIUM LACTATE AND CALCIUM CHLORIDE: 600; 310; 30; 20 INJECTION, SOLUTION INTRAVENOUS at 08:39

## 2024-09-09 RX ADMIN — PROPOFOL 40 MG: 10 INJECTION, EMULSION INTRAVENOUS at 09:38

## 2024-09-09 RX ADMIN — SODIUM CHLORIDE, POTASSIUM CHLORIDE, SODIUM LACTATE AND CALCIUM CHLORIDE: 600; 310; 30; 20 INJECTION, SOLUTION INTRAVENOUS at 09:33

## 2024-09-09 RX ADMIN — CEFAZOLIN 2000 MG: 2 INJECTION, POWDER, FOR SOLUTION INTRAMUSCULAR; INTRAVENOUS at 09:38

## 2024-09-09 ASSESSMENT — PAIN - FUNCTIONAL ASSESSMENT
PAIN_FUNCTIONAL_ASSESSMENT: 0-10
PAIN_FUNCTIONAL_ASSESSMENT: PREVENTS OR INTERFERES SOME ACTIVE ACTIVITIES AND ADLS

## 2024-09-09 ASSESSMENT — PAIN DESCRIPTION - DESCRIPTORS: DESCRIPTORS: ACHING

## 2024-09-25 DIAGNOSIS — I25.10 CORONARY ARTERY DISEASE INVOLVING NATIVE CORONARY ARTERY OF NATIVE HEART WITHOUT ANGINA PECTORIS: ICD-10-CM

## 2024-09-26 RX ORDER — METOPROLOL TARTRATE 50 MG
50 TABLET ORAL 2 TIMES DAILY
Qty: 180 TABLET | Refills: 1 | Status: SHIPPED | OUTPATIENT
Start: 2024-09-26

## 2024-09-26 RX ORDER — PITAVASTATIN CALCIUM 4.18 MG/1
1 TABLET, FILM COATED ORAL NIGHTLY
Qty: 90 TABLET | Refills: 1 | Status: SHIPPED | OUTPATIENT
Start: 2024-09-26

## 2024-10-02 ENCOUNTER — OFFICE VISIT (OUTPATIENT)
Dept: INTERNAL MEDICINE CLINIC | Age: 64
End: 2024-10-02

## 2024-10-02 VITALS
SYSTOLIC BLOOD PRESSURE: 118 MMHG | HEIGHT: 65 IN | OXYGEN SATURATION: 96 % | WEIGHT: 215.2 LBS | DIASTOLIC BLOOD PRESSURE: 78 MMHG | HEART RATE: 66 BPM | BODY MASS INDEX: 35.85 KG/M2

## 2024-10-02 DIAGNOSIS — I10 HYPERTENSION, ESSENTIAL: Primary | ICD-10-CM

## 2024-10-02 DIAGNOSIS — E78.2 MIXED HYPERCHOLESTEROLEMIA AND HYPERTRIGLYCERIDEMIA: ICD-10-CM

## 2024-10-02 DIAGNOSIS — I10 HYPERTENSION, ESSENTIAL: ICD-10-CM

## 2024-10-02 DIAGNOSIS — J84.9 INTERSTITIAL LUNG DISEASE (HCC): ICD-10-CM

## 2024-10-02 DIAGNOSIS — E55.9 VITAMIN D DEFICIENCY: ICD-10-CM

## 2024-10-02 DIAGNOSIS — Z23 NEEDS FLU SHOT: ICD-10-CM

## 2024-10-02 DIAGNOSIS — I25.10 CORONARY ARTERY DISEASE INVOLVING NATIVE CORONARY ARTERY OF NATIVE HEART WITHOUT ANGINA PECTORIS: ICD-10-CM

## 2024-10-02 DIAGNOSIS — R73.01 IMPAIRED FASTING GLUCOSE: ICD-10-CM

## 2024-10-02 LAB
25(OH)D3 SERPL-MCNC: 30.6 NG/ML
ALBUMIN SERPL-MCNC: 4.3 G/DL (ref 3.4–5)
ALBUMIN/GLOB SERPL: 2.3 {RATIO} (ref 1.1–2.2)
ALP SERPL-CCNC: 93 U/L (ref 40–129)
ALT SERPL-CCNC: 27 U/L (ref 10–40)
ANION GAP SERPL CALCULATED.3IONS-SCNC: 11 MMOL/L (ref 3–16)
AST SERPL-CCNC: 25 U/L (ref 15–37)
BILIRUB SERPL-MCNC: 0.9 MG/DL (ref 0–1)
BUN SERPL-MCNC: 13 MG/DL (ref 7–20)
CALCIUM SERPL-MCNC: 9.3 MG/DL (ref 8.3–10.6)
CHLORIDE SERPL-SCNC: 108 MMOL/L (ref 99–110)
CHOLEST SERPL-MCNC: 143 MG/DL (ref 0–199)
CO2 SERPL-SCNC: 24 MMOL/L (ref 21–32)
CREAT SERPL-MCNC: 0.7 MG/DL (ref 0.6–1.2)
GFR SERPLBLD CREATININE-BSD FMLA CKD-EPI: >90 ML/MIN/{1.73_M2}
GLUCOSE SERPL-MCNC: 87 MG/DL (ref 70–99)
HDLC SERPL-MCNC: 43 MG/DL (ref 40–60)
LDLC SERPL CALC-MCNC: 78 MG/DL
POTASSIUM SERPL-SCNC: 4.1 MMOL/L (ref 3.5–5.1)
PROT SERPL-MCNC: 6.2 G/DL (ref 6.4–8.2)
SODIUM SERPL-SCNC: 143 MMOL/L (ref 136–145)
TRIGL SERPL-MCNC: 111 MG/DL (ref 0–150)
VLDLC SERPL CALC-MCNC: 22 MG/DL

## 2024-10-02 ASSESSMENT — ENCOUNTER SYMPTOMS
RESPIRATORY NEGATIVE: 1
GASTROINTESTINAL NEGATIVE: 1

## 2024-10-02 NOTE — PROGRESS NOTES
Future    Needs flu shot  -     Influenza, FLUCELVAX Trivalent, (age 6 mo+) IM, Preservative Free, 0.5mL        Kasi Navarro, APRN - CNP

## 2024-10-03 LAB
BASOPHILS # BLD: 0 K/UL (ref 0–0.2)
BASOPHILS NFR BLD: 0.3 %
DEPRECATED RDW RBC AUTO: 14.5 % (ref 12.4–15.4)
EOSINOPHIL # BLD: 0.1 K/UL (ref 0–0.6)
EOSINOPHIL NFR BLD: 2.5 %
EST. AVERAGE GLUCOSE BLD GHB EST-MCNC: 114 MG/DL
HBA1C MFR BLD: 5.6 %
HCT VFR BLD AUTO: 42.9 % (ref 36–48)
HGB BLD-MCNC: 14 G/DL (ref 12–16)
LYMPHOCYTES # BLD: 1.6 K/UL (ref 1–5.1)
LYMPHOCYTES NFR BLD: 27.2 %
MCH RBC QN AUTO: 29 PG (ref 26–34)
MCHC RBC AUTO-ENTMCNC: 32.7 G/DL (ref 31–36)
MCV RBC AUTO: 88.6 FL (ref 80–100)
MONOCYTES # BLD: 0.4 K/UL (ref 0–1.3)
MONOCYTES NFR BLD: 6.7 %
NEUTROPHILS # BLD: 3.7 K/UL (ref 1.7–7.7)
NEUTROPHILS NFR BLD: 63.3 %
PLATELET # BLD AUTO: 264 K/UL (ref 135–450)
PMV BLD AUTO: 8 FL (ref 5–10.5)
RBC # BLD AUTO: 4.84 M/UL (ref 4–5.2)
WBC # BLD AUTO: 5.9 K/UL (ref 4–11)

## 2024-10-09 ENCOUNTER — HOSPITAL ENCOUNTER (OUTPATIENT)
Age: 64
Discharge: HOME | End: 2024-10-09
Payer: COMMERCIAL

## 2024-10-09 ENCOUNTER — HOSPITAL ENCOUNTER (OUTPATIENT)
Dept: GENERAL RADIOLOGY | Age: 64
Discharge: HOME | End: 2024-10-09
Payer: COMMERCIAL

## 2024-10-09 ENCOUNTER — OFFICE VISIT (OUTPATIENT)
Dept: INTERNAL MEDICINE CLINIC | Age: 64
End: 2024-10-09
Payer: COMMERCIAL

## 2024-10-09 VITALS
SYSTOLIC BLOOD PRESSURE: 124 MMHG | HEART RATE: 83 BPM | HEIGHT: 65 IN | BODY MASS INDEX: 36.32 KG/M2 | DIASTOLIC BLOOD PRESSURE: 72 MMHG | WEIGHT: 218 LBS | OXYGEN SATURATION: 94 %

## 2024-10-09 DIAGNOSIS — J18.9 COMMUNITY ACQUIRED PNEUMONIA, UNSPECIFIED LATERALITY: Primary | ICD-10-CM

## 2024-10-09 DIAGNOSIS — J18.9 COMMUNITY ACQUIRED PNEUMONIA, UNSPECIFIED LATERALITY: ICD-10-CM

## 2024-10-09 PROCEDURE — 3074F SYST BP LT 130 MM HG: CPT

## 2024-10-09 PROCEDURE — 99213 OFFICE O/P EST LOW 20 MIN: CPT

## 2024-10-09 PROCEDURE — 71046 X-RAY EXAM CHEST 2 VIEWS: CPT

## 2024-10-09 PROCEDURE — 3078F DIAST BP <80 MM HG: CPT

## 2024-10-09 RX ORDER — CEFDINIR 300 MG/1
300 CAPSULE ORAL 2 TIMES DAILY
Qty: 10 CAPSULE | Refills: 0 | Status: SHIPPED | OUTPATIENT
Start: 2024-10-09 | End: 2024-10-14

## 2024-10-09 RX ORDER — AZITHROMYCIN 500 MG/1
500 TABLET, FILM COATED ORAL DAILY
Qty: 1 PACKET | Refills: 0 | Status: SHIPPED | OUTPATIENT
Start: 2024-10-09 | End: 2024-10-12

## 2024-10-09 NOTE — PROGRESS NOTES
442 09/15/2022 03:25 PM    TRIG 111 10/02/2024 08:07 AM    TRIG 111 10/05/2023 09:18 AM    TRIG 263 09/15/2022 03:25 PM    HDL 43 10/02/2024 08:07 AM    HDL 42 11/07/2023 07:39 AM    HDL 50 10/05/2023 09:18 AM           10/9/2024     4:15 PM 10/2/2024     7:31 AM 9/9/2024    11:00 AM   Vitals   SYSTOLIC 124 118 123   DIASTOLIC 72 78 76   Pulse 83 66 60   Temp   97 °F (36.1 °C)   Respirations   15   SpO2 94 % 96 % 95 %   Weight - Scale 218 lb 215 lb 3.2 oz    Height 5' 5\" 5' 5\"    Body Mass Index 36.28 kg/m2 35.81 kg/m2          Review of Systems   Constitutional:  Positive for activity change and chills. Negative for fatigue and fever.   HENT:  Positive for congestion. Negative for ear discharge, ear pain, hearing loss, postnasal drip, rhinorrhea, sore throat and trouble swallowing.    Eyes:  Negative for visual disturbance.   Respiratory:  Positive for cough and shortness of breath. Negative for chest tightness and wheezing.    Cardiovascular:  Negative for chest pain, palpitations and leg swelling.   Gastrointestinal:  Positive for nausea. Negative for abdominal pain, constipation, diarrhea and vomiting.   Genitourinary:  Negative for difficulty urinating, dysuria, flank pain, frequency, hematuria and urgency.   Musculoskeletal:  Negative for arthralgias, back pain, gait problem, myalgias and neck pain.   Skin:  Negative for rash and wound.   Neurological:  Negative for dizziness, tremors, speech difficulty, weakness, light-headedness, numbness and headaches.   Psychiatric/Behavioral:  Negative for behavioral problems and confusion.           Objective   Physical Exam  Constitutional:       General: She is not in acute distress.     Appearance: She is ill-appearing. She is not toxic-appearing or diaphoretic.   HENT:      Head: Normocephalic and atraumatic.   Cardiovascular:      Rate and Rhythm: Normal rate and regular rhythm.      Pulses: Normal pulses.      Heart sounds: Normal heart sounds. No murmur heard.

## 2024-10-10 ASSESSMENT — ENCOUNTER SYMPTOMS
TROUBLE SWALLOWING: 0
WHEEZING: 0
SORE THROAT: 0
DIARRHEA: 0
CONSTIPATION: 0
RHINORRHEA: 0
ABDOMINAL PAIN: 0
CHEST TIGHTNESS: 0
SHORTNESS OF BREATH: 1
BACK PAIN: 0
VOMITING: 0
NAUSEA: 1
COUGH: 1

## 2024-11-15 ENCOUNTER — OFFICE VISIT (OUTPATIENT)
Dept: PULMONOLOGY | Age: 64
End: 2024-11-15
Payer: COMMERCIAL

## 2024-11-15 VITALS
HEART RATE: 86 BPM | OXYGEN SATURATION: 95 % | BODY MASS INDEX: 35.65 KG/M2 | DIASTOLIC BLOOD PRESSURE: 74 MMHG | SYSTOLIC BLOOD PRESSURE: 128 MMHG | HEIGHT: 65 IN | WEIGHT: 214 LBS

## 2024-11-15 DIAGNOSIS — I10 HYPERTENSION, ESSENTIAL: ICD-10-CM

## 2024-11-15 DIAGNOSIS — G47.33 OBSTRUCTIVE SLEEP APNEA (ADULT) (PEDIATRIC): ICD-10-CM

## 2024-11-15 DIAGNOSIS — J45.20 MILD INTERMITTENT ASTHMA WITHOUT COMPLICATION: Primary | ICD-10-CM

## 2024-11-15 DIAGNOSIS — E66.813 CLASS 3 SEVERE OBESITY DUE TO EXCESS CALORIES WITH BODY MASS INDEX (BMI) OF 40.0 TO 44.9 IN ADULT, UNSPECIFIED WHETHER SERIOUS COMORBIDITY PRESENT: ICD-10-CM

## 2024-11-15 DIAGNOSIS — E66.01 CLASS 3 SEVERE OBESITY DUE TO EXCESS CALORIES WITH BODY MASS INDEX (BMI) OF 40.0 TO 44.9 IN ADULT, UNSPECIFIED WHETHER SERIOUS COMORBIDITY PRESENT: ICD-10-CM

## 2024-11-15 DIAGNOSIS — J84.10 PULMONARY FIBROSIS (HCC): ICD-10-CM

## 2024-11-15 DIAGNOSIS — U09.9 POST-COVID CHRONIC SHORTNESS OF BREATH: ICD-10-CM

## 2024-11-15 DIAGNOSIS — R06.02 POST-COVID CHRONIC SHORTNESS OF BREATH: ICD-10-CM

## 2024-11-15 PROCEDURE — 3078F DIAST BP <80 MM HG: CPT | Performed by: INTERNAL MEDICINE

## 2024-11-15 PROCEDURE — 3074F SYST BP LT 130 MM HG: CPT | Performed by: INTERNAL MEDICINE

## 2024-11-15 PROCEDURE — 99214 OFFICE O/P EST MOD 30 MIN: CPT | Performed by: INTERNAL MEDICINE

## 2024-11-15 RX ORDER — FLUTICASONE FUROATE AND VILANTEROL 200; 25 UG/1; UG/1
1 POWDER RESPIRATORY (INHALATION)
Qty: 60 EACH | Refills: 5 | Status: SHIPPED | OUTPATIENT
Start: 2024-11-15

## 2024-11-15 NOTE — PROGRESS NOTES
predicted), which was moderately decreased. The FVC was 2.34 liters (70% of predicted), which was normal. Response to inhaled bronchodilators (albuterol) was significant.   Lung volumes:  Lung volumes were tested by plethysmography. The total lung capacity was 3.95 liters (78% of predicted), which was normal. The residual volume was 1.51 liters (78% of predicted), which was normal. The ratio of residual volume to total lung capacity (RV/TLC) was 100, which was normal. Specific airway resistance was normal. Diffusion capacity was found to be decreased.      Interpretation:  Mild restrictive lung disease with good bronchodilator response and reduced diffusion capacity.             IMPRESSION AND RECOMMENDATIONS:     1. Pulmonary fibrosis (HCC)  -Clinically patient is stable.  -Patient had severe COVID-19 pneumonia in December 2021.  -She has been left with bilateral mild pulmonary fibrosis.  -This was seen in the latest high-resolution CAT scan.  I personally reviewed this CAT scan and compared with her previous chest x-rays.  Overall the extent of pulmonary fibrosis appears stable.  -Complete PFT in 2022 showed reduced total lung capacity as well as diffusion capacity.  -PFT repeated in 2024 shows improvement in total lung capacity, FVC as well as diffusion capacity.  -At this time I do not think that post-COVID pulmonary fibrosis is contributing in her day-to-day symptomatology.  -Patient could not attend pulmonary rehabilitation program due to lack of insurance coverage.  She will be changing to Medicare next year.  Will try sending the order again next year.    2.  Mild intermittent asthma  -Stable  -She continues to use Breo Ellipta 100/25 mcg 1 puff daily regularly but despite this needs albuterol frequently.  -Her breathing remains uncontrolled.  -I we will upgrade Breo Ellipta 200/25 mcg 1 puff daily as she is still requiring at least once or twice a day albuterol.  -Continue with Dymista nasal spray 1 squirt

## 2024-11-18 NOTE — TELEPHONE ENCOUNTER
Forwarding to cardiology  To see is they have better luck with prior auths or if they have samples or could suggest a different treatment for pt  ( per Hilton Hutchins) R heel 1.3x 1.3 partial thickness wound,  no drainage,  no surrounding erythema

## 2024-11-26 ENCOUNTER — HOSPITAL ENCOUNTER (OUTPATIENT)
Dept: WOMENS IMAGING | Age: 64
Discharge: HOME | End: 2024-11-26
Payer: COMMERCIAL

## 2024-11-26 VITALS — WEIGHT: 210 LBS | HEIGHT: 65 IN | BODY MASS INDEX: 34.99 KG/M2

## 2024-11-26 DIAGNOSIS — Z12.31 BREAST CANCER SCREENING BY MAMMOGRAM: ICD-10-CM

## 2024-11-26 PROCEDURE — 77063 BREAST TOMOSYNTHESIS BI: CPT

## 2024-11-27 NOTE — PROGRESS NOTES
[facility]      CONSULTATION  [phone]  24  Referring:     REASON FOR CONSULT/CHIEF COMPLAINT/HPI     Reason for visit/ Chief complaint  CAD  HLP   HPI Karen Acosta is a 64 y.o. female patient here for 12 month follow up.     She has familial hyperlipidemia and recently had an LDL > 300.  When I first met her she had severe SOTO so I referred her for a cath.    She had a Cath on 10/12/23 showed 70% LAD that was FFR negative. She returned after discharge on same day with chest pain and SOB. Cath was repeated and successful complex stenting to RCA. Carotids show diffuse plaque/calcifications but no stenosis. Echo normal except for mild-to-mod MR and pt is currently on Repatha. Given that degree of CAD and carotid disease, she should have LDL goal < 55.     Today she states she is doing well. She is exercising, goes to the [facility] and walks outside when warm and on treadmill when cold, does water aerobics. She eats healthy and has stayed away from sweets but still struggles to lose weight. She is currently doing weight watchers.     No complaints of CP, SOB, palpitations or dizziness at this time.     She is still taking her Plavix.    Patient is adherent with medications and is tolerating them well without side effects     HISTORY/ALLERGIES/ROS     MedHx:  has a past medical history of Arthritis, Complex tear of lateral meniscus of right knee as current injury, Complex tear of medial meniscus of right knee as current injury, Coronary artery disease involving native coronary artery of native heart without angina pectoris, Helicobacter pylori antibody positive, Hyperlipidemia, Hypertension, essential, Mallet fracture, closed, initial encounter, Obstructive sleep apnea (adult) (pediatric), Patellofemoral syndrome, Pneumonia due to COVID-19 virus, Synovitis of knee, and Tear of medial cartilage or meniscus of knee, current.  SurgHx:  has a past surgical history that includes Hysterectomy;

## 2024-12-05 RX ORDER — CLOPIDOGREL BISULFATE 75 MG/1
75 TABLET ORAL DAILY
Qty: 90 TABLET | Refills: 0 | Status: SHIPPED | OUTPATIENT
Start: 2024-12-05

## 2024-12-05 NOTE — TELEPHONE ENCOUNTER
Received refill request for  clopidogrel (PLAVIX) 75 MG tablet  from [facility] pharmacy.     Last OV: 11/30/23    Next OV: 12/17/24    Last Labs:     Last Filled: 9/9/24

## 2024-12-17 ENCOUNTER — OFFICE VISIT (OUTPATIENT)
Dept: CARDIOLOGY CLINIC | Age: 64
End: 2024-12-17
Payer: COMMERCIAL

## 2024-12-17 VITALS
OXYGEN SATURATION: 98 % | SYSTOLIC BLOOD PRESSURE: 126 MMHG | HEIGHT: 65 IN | BODY MASS INDEX: 35.99 KG/M2 | DIASTOLIC BLOOD PRESSURE: 70 MMHG | WEIGHT: 216 LBS | HEART RATE: 72 BPM

## 2024-12-17 DIAGNOSIS — J45.20 MILD INTERMITTENT ASTHMA WITHOUT COMPLICATION: ICD-10-CM

## 2024-12-17 DIAGNOSIS — E78.49 FAMILIAL HYPERLIPIDEMIA, HIGH LDL: ICD-10-CM

## 2024-12-17 DIAGNOSIS — I25.10 CORONARY ARTERY DISEASE INVOLVING NATIVE CORONARY ARTERY OF NATIVE HEART WITHOUT ANGINA PECTORIS: Primary | ICD-10-CM

## 2024-12-17 DIAGNOSIS — I10 PRIMARY HYPERTENSION: ICD-10-CM

## 2024-12-17 DIAGNOSIS — R09.89 BRUIT OF RIGHT CAROTID ARTERY: ICD-10-CM

## 2024-12-17 DIAGNOSIS — E66.09 CLASS 1 OBESITY DUE TO EXCESS CALORIES WITH SERIOUS COMORBIDITY AND BODY MASS INDEX (BMI) OF 34.0 TO 34.9 IN ADULT: Chronic | ICD-10-CM

## 2024-12-17 DIAGNOSIS — E78.2 MIXED HYPERCHOLESTEROLEMIA AND HYPERTRIGLYCERIDEMIA: ICD-10-CM

## 2024-12-17 DIAGNOSIS — G47.33 OBSTRUCTIVE SLEEP APNEA (ADULT) (PEDIATRIC): ICD-10-CM

## 2024-12-17 DIAGNOSIS — Z78.9 STATIN INTOLERANCE: ICD-10-CM

## 2024-12-17 DIAGNOSIS — E66.811 CLASS 1 OBESITY DUE TO EXCESS CALORIES WITH SERIOUS COMORBIDITY AND BODY MASS INDEX (BMI) OF 34.0 TO 34.9 IN ADULT: Chronic | ICD-10-CM

## 2024-12-17 PROCEDURE — 3078F DIAST BP <80 MM HG: CPT | Performed by: INTERNAL MEDICINE

## 2024-12-17 PROCEDURE — 99214 OFFICE O/P EST MOD 30 MIN: CPT | Performed by: INTERNAL MEDICINE

## 2024-12-17 PROCEDURE — 93000 ELECTROCARDIOGRAM COMPLETE: CPT | Performed by: INTERNAL MEDICINE

## 2024-12-17 PROCEDURE — 3074F SYST BP LT 130 MM HG: CPT | Performed by: INTERNAL MEDICINE

## 2024-12-17 NOTE — PATIENT INSTRUCTIONS
Follow up with Dr Wilson in 1 year    Discontinue Plavix    Call for any questions or concerns.

## 2024-12-18 DIAGNOSIS — J84.112 ACUTE EXACERBATION OF IDIOPATHIC PULMONARY FIBROSIS (HCC): ICD-10-CM

## 2024-12-23 RX ORDER — DEXTROMETHORPHAN HYDROBROMIDE, GUAIFENESIN 10; 100 MG/5ML; MG/5ML
5 LIQUID ORAL 3 TIMES DAILY PRN
Qty: 240 ML | Refills: 0 | Status: SHIPPED | OUTPATIENT
Start: 2024-12-23

## 2024-12-27 ENCOUNTER — OFFICE VISIT (OUTPATIENT)
Dept: INTERNAL MEDICINE CLINIC | Age: 64
End: 2024-12-27
Payer: COMMERCIAL

## 2024-12-27 VITALS
BODY MASS INDEX: 36.55 KG/M2 | OXYGEN SATURATION: 96 % | SYSTOLIC BLOOD PRESSURE: 118 MMHG | DIASTOLIC BLOOD PRESSURE: 78 MMHG | HEIGHT: 65 IN | TEMPERATURE: 98.2 F | WEIGHT: 219.4 LBS | HEART RATE: 70 BPM

## 2024-12-27 DIAGNOSIS — R09.81 NASAL CONGESTION: ICD-10-CM

## 2024-12-27 DIAGNOSIS — J06.9 URI, ACUTE: Primary | ICD-10-CM

## 2024-12-27 DIAGNOSIS — J45.20 MILD INTERMITTENT ASTHMA WITHOUT COMPLICATION: Chronic | ICD-10-CM

## 2024-12-27 PROCEDURE — 99213 OFFICE O/P EST LOW 20 MIN: CPT | Performed by: NURSE PRACTITIONER

## 2024-12-27 PROCEDURE — 3078F DIAST BP <80 MM HG: CPT | Performed by: NURSE PRACTITIONER

## 2024-12-27 PROCEDURE — 3074F SYST BP LT 130 MM HG: CPT | Performed by: NURSE PRACTITIONER

## 2024-12-27 RX ORDER — AZELASTINE HYDROCHLORIDE, FLUTICASONE PROPIONATE 137; 50 UG/1; UG/1
1 SPRAY, METERED NASAL 2 TIMES DAILY
Qty: 23 G | Refills: 2 | Status: SHIPPED | OUTPATIENT
Start: 2024-12-27

## 2024-12-27 ASSESSMENT — ENCOUNTER SYMPTOMS
TROUBLE SWALLOWING: 0
NAUSEA: 0
SINUS PRESSURE: 0
VOMITING: 0
WHEEZING: 0
RHINORRHEA: 1
DIARRHEA: 0
COUGH: 1
SORE THROAT: 0
ABDOMINAL PAIN: 0
SHORTNESS OF BREATH: 1

## 2024-12-27 NOTE — ASSESSMENT & PLAN NOTE
Acute, new problem  Symptoms x 4 days.  No fever or chills.  Lungs clear on exam.  Viral vs bacterial reviewed.  Most likely viral and no need for antibiotics at this time.  Symptomatic care reviewed.  Zyrtec 10 mg nightly.  Singulair 10 mg nightly  Azelastine-fluticasone 1 spray morning and night.  Continue chest congestion DM for cough  Tylenol/ibuprofen for pain or discomfort.  Patient is to call the office if symptoms worsen or fail to improve

## 2024-12-27 NOTE — PROGRESS NOTES
normal. There is no impacted cerumen.      Nose: Congestion and rhinorrhea present.      Mouth/Throat:      Mouth: Mucous membranes are moist.      Pharynx: Oropharynx is clear. Uvula midline. Postnasal drip present.   Eyes:      Conjunctiva/sclera: Conjunctivae normal.      Pupils: Pupils are equal, round, and reactive to light.   Cardiovascular:      Rate and Rhythm: Normal rate and regular rhythm.      Heart sounds: Normal heart sounds.   Pulmonary:      Effort: Pulmonary effort is normal. No respiratory distress.      Breath sounds: Normal breath sounds. No wheezing, rhonchi or rales.   Lymphadenopathy:      Cervical: No cervical adenopathy.   Skin:     General: Skin is warm and dry.   Neurological:      Mental Status: She is alert and oriented to person, place, and time.   Psychiatric:         Mood and Affect: Mood normal.         Behavior: Behavior normal.     All questions answered. Patient stated no further questions or concerns at this time.     Electronically signed by BRISSA Andrade NP on 12/27/2024 at 6:47 PM.

## 2024-12-29 PROBLEM — R05.1 ACUTE COUGH: Status: ACTIVE | Noted: 2024-12-29

## 2024-12-29 PROBLEM — R05.1 ACUTE COUGH: Status: RESOLVED | Noted: 2024-12-29 | Resolved: 2024-12-29

## 2025-02-03 RX ORDER — MONTELUKAST SODIUM 10 MG/1
10 TABLET ORAL DAILY
Qty: 90 TABLET | Refills: 3 | Status: SHIPPED | OUTPATIENT
Start: 2025-02-03

## 2025-02-21 ENCOUNTER — OFFICE VISIT (OUTPATIENT)
Dept: PULMONOLOGY | Age: 65
End: 2025-02-21
Payer: COMMERCIAL

## 2025-02-21 VITALS
WEIGHT: 222 LBS | SYSTOLIC BLOOD PRESSURE: 116 MMHG | OXYGEN SATURATION: 97 % | HEIGHT: 65 IN | HEART RATE: 86 BPM | DIASTOLIC BLOOD PRESSURE: 78 MMHG | BODY MASS INDEX: 36.99 KG/M2

## 2025-02-21 DIAGNOSIS — G47.33 OBSTRUCTIVE SLEEP APNEA (ADULT) (PEDIATRIC): Primary | Chronic | ICD-10-CM

## 2025-02-21 DIAGNOSIS — E66.01 CLASS 2 SEVERE OBESITY DUE TO EXCESS CALORIES WITH SERIOUS COMORBIDITY AND BODY MASS INDEX (BMI) OF 36.0 TO 36.9 IN ADULT: ICD-10-CM

## 2025-02-21 DIAGNOSIS — I10 PRIMARY HYPERTENSION: ICD-10-CM

## 2025-02-21 DIAGNOSIS — E66.812 CLASS 2 SEVERE OBESITY DUE TO EXCESS CALORIES WITH SERIOUS COMORBIDITY AND BODY MASS INDEX (BMI) OF 36.0 TO 36.9 IN ADULT: ICD-10-CM

## 2025-02-21 PROCEDURE — 3078F DIAST BP <80 MM HG: CPT | Performed by: NURSE PRACTITIONER

## 2025-02-21 PROCEDURE — G2211 COMPLEX E/M VISIT ADD ON: HCPCS | Performed by: NURSE PRACTITIONER

## 2025-02-21 PROCEDURE — 3074F SYST BP LT 130 MM HG: CPT | Performed by: NURSE PRACTITIONER

## 2025-02-21 PROCEDURE — 99214 OFFICE O/P EST MOD 30 MIN: CPT | Performed by: NURSE PRACTITIONER

## 2025-02-21 ASSESSMENT — SLEEP AND FATIGUE QUESTIONNAIRES
HOW LIKELY ARE YOU TO NOD OFF OR FALL ASLEEP WHILE SITTING QUIETLY AFTER LUNCH WITHOUT ALCOHOL: WOULD NEVER DOZE
ESS TOTAL SCORE: 2
HOW LIKELY ARE YOU TO NOD OFF OR FALL ASLEEP WHILE LYING DOWN TO REST IN THE AFTERNOON WHEN CIRCUMSTANCES PERMIT: SLIGHT CHANCE OF DOZING
HOW LIKELY ARE YOU TO NOD OFF OR FALL ASLEEP WHILE SITTING AND TALKING TO SOMEONE: WOULD NEVER DOZE
HOW LIKELY ARE YOU TO NOD OFF OR FALL ASLEEP IN A CAR, WHILE STOPPED FOR A FEW MINUTES IN TRAFFIC: WOULD NEVER DOZE
HOW LIKELY ARE YOU TO NOD OFF OR FALL ASLEEP WHEN YOU ARE A PASSENGER IN A CAR FOR AN HOUR WITHOUT A BREAK: WOULD NEVER DOZE
HOW LIKELY ARE YOU TO NOD OFF OR FALL ASLEEP WHILE SITTING INACTIVE IN A PUBLIC PLACE: WOULD NEVER DOZE
HOW LIKELY ARE YOU TO NOD OFF OR FALL ASLEEP WHILE WATCHING TV: SLIGHT CHANCE OF DOZING
HOW LIKELY ARE YOU TO NOD OFF OR FALL ASLEEP WHILE SITTING AND READING: WOULD NEVER DOZE

## 2025-02-21 NOTE — PROGRESS NOTES
Diagnosis: [x] BRAD (G47.33) [] CSA (G47.31) [] Apnea (G47.30)   Length of Need: [x] 15 Months [] 99 Months [] Other:   Machine (VICKY!): [] Respironics Dream Station      Auto [] ResMed AirSense     Auto [] Other:     []  CPAP () [] Bilevel ()   Mode: [] Auto [] Spontaneous    Mode: [] Auto [] Spontaneous            Comfort Settings:      Humidifier: [] Heated ()        [x] Water chamber replacement ()/ 1 per 6 months        Mask:   [x] Nasal () /1 per 3 months [] Full Face () /1 per 3 months   [x] Patient choice -Size and fit mask [] Patient Choice - Size and fit mask   [] Dispense: [] Dispense:   [x] Headgear () / 1 per 3 months [] Headgear () / 1 per 3 months   [x] Replacement Nasal Cushion ()/2 per month [] Interface Replacement ()/1 per month   [] Replacement Nasal Pillows ()/2 per month         Tubing: [x] Heated ()/1 per 3 months    [] Standard ()/1 per 3 months [] Other:           Filters: [x] Non-disposable ()/1 per 6 months     [x] Ultra-Fine, Disposable ()/2 per month        Miscellaneous: [x] Chin Strap ()/ 1 per 6 months [] O2 bleed-in:        LPM   [] Oxymetry on CPAP/Bilevel []  Other:         Start Order Date: 02/21/25    MEDICAL JUSTIFICATION:  I, the undersigned, certify that the above prescribed supplies are medically necessary for this patient’s wellbeing.  In my opinion, the supplies are both reasonable and necessary in reference to accepted standards of medicalpractice in treatment of this patient’s condition.    EKATERINA ALBERTS NP    NPI: [id number]       Order Signed Date: 02/21/25  [facility]  Pulmonary, Sleep, and Critical Care    Pulmonary, Sleep, and Critical Care  [facility] [address]                          [address] [place] [address] [address]  [place] [address]                                    [place] [address]  Phone: [phone]    Fax:

## 2025-02-21 NOTE — PROGRESS NOTES
Diagnosis: [x] BRAD (G47.33) [] CSA (G47.31) [] Apnea (G47.30)   Length of Need: [x] 15 Months [] 99 Months [] Other:   Machine (VICKY!): [] Respironics Dream Station      Auto [] ResMed AirSense     Auto [] Other:     []  CPAP () [] Bilevel ()   Mode: [] Auto [] Spontaneous    Mode: [] Auto [] Spontaneous             Comfort Settings:      Humidifier: [] Heated ()        [x] Water chamber replacement ()/ 1 per 6 months        Mask:   [] Nasal () /1 per 3 months [x] Full Face () /1 per 3 months   [] Patient choice -Size and fit mask [x] Patient Choice - Size and fit mask   [] Dispense: [] Dispense:   [] Headgear () / 1 per 3 months [x] Headgear () / 1 per 3 months   [] Replacement Nasal Cushion ()/2 per month [x] Interface Replacement ()/1 per month   [] Replacement Nasal Pillows ()/2 per month         Tubing: [x] Heated ()/1 per 3 months    [] Standard ()/1 per 3 months [] Other:           Filters: [x] Non-disposable ()/1 per 6 months     [x] Ultra-Fine, Disposable ()/2 per month        Miscellaneous: [] Chin Strap ()/ 1 per 6 months [] O2 bleed-in:        LPM   [] Oxymetry on CPAP/Bilevel []  Other:         Start Order Date: 02/21/25    MEDICAL JUSTIFICATION:  I, the undersigned, certify that the above prescribed supplies are medically necessary for this patient’s wellbeing.  In my opinion, the supplies are both reasonable and necessary in reference to accepted standards of medicalpractice in treatment of this patient’s condition.    EKATERINA ALBERTS NP    NPI: [id number]       Order Signed Date: 02/21/25  [facility]  Pulmonary, Sleep, and Critical Care    Pulmonary, Sleep, and Critical Care  [facility] [address]                          [address] [place] [address] [address]  [place] [address]                                    [place] [address]  Phone: [phone]    Fax:

## 2025-02-21 NOTE — ASSESSMENT & PLAN NOTE
Chronic-Stable: Reviewed and analyzed results of physiologic download from patient's machine and reviewed with patient.  Supplies and parts as needed for her machine.  These are medically necessary.  Limit caffeine use after 3pm. Based on the analyzed data will continue with current settings. Stable on her machine at current settings, getting benefit from the use, and having minimal side effects. Discussed adjusting the moisture settings for oral dryness.  Could consider a chinstrap with her nasal mask or switching to a fullface mask if no improvement.  Will see her back in 1 year.  Encouraged her to contact the office any questions or concerns

## 2025-02-21 NOTE — ASSESSMENT & PLAN NOTE
Chronic-not stable:  Discussed importance of treating obstructive sleep apnea and getting sufficient sleep to assist with weight control.  Discussed weight gain and/or weight loss may require adjustments to machine settings. Encouraged her to work on weight loss through diet and exercise.  She reports difficulty losing weight despite exercising and limiting calories to 1200 today.  She would like assistance with weight loss.  Will place referral to [facility] weight management.

## 2025-02-21 NOTE — PROGRESS NOTES
10 MG tablet TAKE 1 TABLET DAILY    aspirin 81 mg, Oral, DAILY,      Azelastine-Fluticasone 137-50 MCG/ACT SUSP 1 Squirt, Nasal, 2 times daily    CHEST CONGESTION RELIEF DM  MG/5ML SYRP 5 mLs, Oral, 3 TIMES DAILY PRN    Coenzyme Q10 (CO Q 10) 100 MG CAPS Oral    fluticasone furoate-vilanterol (BREO ELLIPTA) 200-25 MCG/ACT AEPB inhaler 1 puff, Inhalation, DAILY RESP    ipratropium-albuterol (DUONEB) 0.5-2.5 (3) MG/3ML SOLN nebulizer solution INHALE ONE VIAL VIA NEBULIZER BY MOUTH INTO THE LUNGS  EVERY FOUR HOURS    metoprolol tartrate (LOPRESSOR) 50 mg, Oral, 2 TIMES DAILY    Misc. Devices (CPAP MACHINE) MISC Does not apply    montelukast (SINGULAIR) 10 mg, Oral, DAILY    Multiple Vitamins-Minerals (THERAPEUTIC MULTIVITAMIN-MINERALS) tablet 1 tablet, Oral, DAILY    nitroGLYCERIN (NITROSTAT) 0.4 mg, SubLINGual, EVERY 5 MIN PRN    pitavastatin (LIVALO) 4 mg, Oral, NIGHTLY    REPATHA SURECLICK 140 MG/ML SOAJ INJECT 1 PEN UNDER THE SKIN EVERY 14 DAYS    Spacer/Aero-Holding Chambers NAVIN 1 Device, Does not apply, DAILY PRN        Objective   Vitals:  Weight BMI   Wt Readings from Last 3 Encounters:   02/21/25 100.7 kg (222 lb)   12/27/24 99.5 kg (219 lb 6.4 oz)   12/17/24 98 kg (216 lb)    Body mass index is 36.94 kg/m².     BP HR SaO2   BP Readings from Last 3 Encounters:   02/21/25 116/78   12/27/24 118/78   12/17/24 126/70    Pulse Readings from Last 3 Encounters:   02/21/25 86   12/27/24 70   12/17/24 72    SpO2 Readings from Last 3 Encounters:   02/21/25 97%   12/27/24 96%   12/17/24 98%         Electronically signed by BRISSA Metz on 2/21/2025 at 9:49 AM

## 2025-03-24 DIAGNOSIS — I25.10 CORONARY ARTERY DISEASE INVOLVING NATIVE CORONARY ARTERY OF NATIVE HEART WITHOUT ANGINA PECTORIS: ICD-10-CM

## 2025-03-24 NOTE — TELEPHONE ENCOUNTER
Requested Prescriptions     Pending Prescriptions Disp Refills    metoprolol tartrate (LOPRESSOR) 50 MG tablet [Pharmacy Med Name: METOPROLOL TARTRATE TABS 50MG] 180 tablet 3     Sig: TAKE 1 TABLET TWICE A DAY    pitavastatin (LIVALO) 4 MG TABS tablet [Pharmacy Med Name: PITAVASTATIN TABS 4MG] 90 tablet 3     Sig: TAKE 1 TABLET NIGHTLY      Last OV:  12/17/2024 WAK    Next OV: 12/10/2025 WAK    Last Labs: 10/02/2024 Lipid, 12/17/2024 EKG    Last Filled: 09/26/2024 NPTS

## 2025-03-25 RX ORDER — PITAVASTATIN CALCIUM 4.18 MG/1
1 TABLET, FILM COATED ORAL NIGHTLY
Qty: 90 TABLET | Refills: 3 | Status: SHIPPED | OUTPATIENT
Start: 2025-03-25

## 2025-03-25 RX ORDER — METOPROLOL TARTRATE 50 MG
50 TABLET ORAL 2 TIMES DAILY
Qty: 180 TABLET | Refills: 3 | Status: SHIPPED | OUTPATIENT
Start: 2025-03-25

## 2025-04-10 ENCOUNTER — OFFICE VISIT (OUTPATIENT)
Dept: INTERNAL MEDICINE CLINIC | Age: 65
End: 2025-04-10
Payer: COMMERCIAL

## 2025-04-10 VITALS
HEIGHT: 65 IN | WEIGHT: 213 LBS | HEART RATE: 71 BPM | OXYGEN SATURATION: 97 % | SYSTOLIC BLOOD PRESSURE: 120 MMHG | DIASTOLIC BLOOD PRESSURE: 80 MMHG | BODY MASS INDEX: 35.49 KG/M2

## 2025-04-10 DIAGNOSIS — R73.01 IMPAIRED FASTING GLUCOSE: ICD-10-CM

## 2025-04-10 DIAGNOSIS — I10 HYPERTENSION, ESSENTIAL: ICD-10-CM

## 2025-04-10 DIAGNOSIS — I25.10 CORONARY ARTERY DISEASE INVOLVING NATIVE CORONARY ARTERY OF NATIVE HEART WITHOUT ANGINA PECTORIS: ICD-10-CM

## 2025-04-10 DIAGNOSIS — E78.2 MIXED HYPERCHOLESTEROLEMIA AND HYPERTRIGLYCERIDEMIA: ICD-10-CM

## 2025-04-10 DIAGNOSIS — M25.551 BILATERAL HIP PAIN: Primary | ICD-10-CM

## 2025-04-10 DIAGNOSIS — R52 PAIN AGGRAVATED BY WALKING: ICD-10-CM

## 2025-04-10 DIAGNOSIS — M25.552 BILATERAL HIP PAIN: Primary | ICD-10-CM

## 2025-04-10 PROCEDURE — 99214 OFFICE O/P EST MOD 30 MIN: CPT | Performed by: NURSE PRACTITIONER

## 2025-04-10 PROCEDURE — 3079F DIAST BP 80-89 MM HG: CPT | Performed by: NURSE PRACTITIONER

## 2025-04-10 PROCEDURE — 3074F SYST BP LT 130 MM HG: CPT | Performed by: NURSE PRACTITIONER

## 2025-04-10 SDOH — ECONOMIC STABILITY: FOOD INSECURITY: WITHIN THE PAST 12 MONTHS, THE FOOD YOU BOUGHT JUST DIDN'T LAST AND YOU DIDN'T HAVE MONEY TO GET MORE.: NEVER TRUE

## 2025-04-10 SDOH — ECONOMIC STABILITY: FOOD INSECURITY: WITHIN THE PAST 12 MONTHS, YOU WORRIED THAT YOUR FOOD WOULD RUN OUT BEFORE YOU GOT MONEY TO BUY MORE.: NEVER TRUE

## 2025-04-10 ASSESSMENT — PATIENT HEALTH QUESTIONNAIRE - PHQ9
SUM OF ALL RESPONSES TO PHQ QUESTIONS 1-9: 0
1. LITTLE INTEREST OR PLEASURE IN DOING THINGS: NOT AT ALL
SUM OF ALL RESPONSES TO PHQ QUESTIONS 1-9: 0
2. FEELING DOWN, DEPRESSED OR HOPELESS: NOT AT ALL
SUM OF ALL RESPONSES TO PHQ QUESTIONS 1-9: 0
SUM OF ALL RESPONSES TO PHQ QUESTIONS 1-9: 0

## 2025-04-15 ASSESSMENT — ENCOUNTER SYMPTOMS
RESPIRATORY NEGATIVE: 1
GASTROINTESTINAL NEGATIVE: 1

## 2025-04-15 NOTE — PROGRESS NOTES
SUBJECTIVE:    Patient ID: Karen Acosta is a 65 y.o. female.    CC: Hypertension, dyslipidemia, coronary artery disease, prediabetes    HPI: The patient presents to the office today for routine follow-up of chronic medical conditions.    She has been experiencing some bilateral hip pain which is aggravated by walking.  She denies any catching or clicking.  There has been no known injury or trauma.  Anti-inflammatories seem to help.    She has a history of hypertension and dyslipidemia, coronary artery disease.  She denies any chest pain or shortness of breath.  She is compliant with her medication regimen.    History of prediabetes but this is improved with her last 2 A1c's being 5.6.  She is managing this with healthy diet and regular exercise.        Past Medical History:   Diagnosis Date    Arthritis     RT TOES; established with podiatry    Complex tear of lateral meniscus of right knee as current injury 9/19/2013    Complex tear of medial meniscus of right knee as current injury 9/19/2013    Coronary artery disease involving native coronary artery of native heart without angina pectoris 1/7/2021    Helicobacter pylori antibody positive 12/27/2015    Hyperlipidemia     DIET CONTROL    Hypertension, essential 1/7/2021    Mallet fracture, closed, initial encounter 8/29/2019    Obstructive sleep apnea (adult) (pediatric) 6/9/2020    Patellofemoral syndrome 2/17/2014    Pneumonia due to COVID-19 virus 12/13/2021    Synovitis of knee 1/9/2014    Tear of medial cartilage or meniscus of knee, current 9/8/2014        Current Outpatient Medications   Medication Sig Dispense Refill    metoprolol tartrate (LOPRESSOR) 50 MG tablet TAKE 1 TABLET TWICE A  tablet 3    pitavastatin (LIVALO) 4 MG TABS tablet TAKE 1 TABLET NIGHTLY 90 tablet 3    montelukast (SINGULAIR) 10 MG tablet TAKE 1 TABLET DAILY 90 tablet 3    Azelastine-Fluticasone 137-50 MCG/ACT SUSP 1 Squirt by Nasal route in the morning and at bedtime 23 g

## 2025-04-22 DIAGNOSIS — M25.552 BILATERAL HIP PAIN: Primary | ICD-10-CM

## 2025-04-22 DIAGNOSIS — R26.2 DIFFICULTY WALKING: ICD-10-CM

## 2025-04-22 DIAGNOSIS — R52 PAIN AGGRAVATED BY WALKING: ICD-10-CM

## 2025-04-22 DIAGNOSIS — M25.551 BILATERAL HIP PAIN: Primary | ICD-10-CM

## 2025-05-01 RX ORDER — EVOLOCUMAB 140 MG/ML
INJECTION, SOLUTION SUBCUTANEOUS
Qty: 16 ML | Refills: 1 | Status: SHIPPED | OUTPATIENT
Start: 2025-05-01

## 2025-05-01 NOTE — TELEPHONE ENCOUNTER
Received refill request for Repatha  from [facility] pharmacy.    Last ov:2024 MARITA    Last labs:10/02/2024     Last EK2024    Last Refill:2024      Next appointment:12/10/2025 MARITA

## 2025-05-19 ENCOUNTER — OFFICE VISIT (OUTPATIENT)
Dept: PULMONOLOGY | Age: 65
End: 2025-05-19
Payer: COMMERCIAL

## 2025-05-19 VITALS
OXYGEN SATURATION: 96 % | BODY MASS INDEX: 36.65 KG/M2 | HEIGHT: 65 IN | HEART RATE: 79 BPM | DIASTOLIC BLOOD PRESSURE: 72 MMHG | WEIGHT: 220 LBS | SYSTOLIC BLOOD PRESSURE: 130 MMHG

## 2025-05-19 DIAGNOSIS — J84.10 PULMONARY FIBROSIS (HCC): ICD-10-CM

## 2025-05-19 DIAGNOSIS — E66.01 CLASS 2 SEVERE OBESITY DUE TO EXCESS CALORIES WITH SERIOUS COMORBIDITY AND BODY MASS INDEX (BMI) OF 36.0 TO 36.9 IN ADULT (HCC): ICD-10-CM

## 2025-05-19 DIAGNOSIS — J45.20 MILD INTERMITTENT ASTHMA WITHOUT COMPLICATION: Primary | ICD-10-CM

## 2025-05-19 DIAGNOSIS — E66.812 CLASS 2 SEVERE OBESITY DUE TO EXCESS CALORIES WITH SERIOUS COMORBIDITY AND BODY MASS INDEX (BMI) OF 36.0 TO 36.9 IN ADULT (HCC): ICD-10-CM

## 2025-05-19 PROCEDURE — 3078F DIAST BP <80 MM HG: CPT | Performed by: INTERNAL MEDICINE

## 2025-05-19 PROCEDURE — 3075F SYST BP GE 130 - 139MM HG: CPT | Performed by: INTERNAL MEDICINE

## 2025-05-19 PROCEDURE — 99214 OFFICE O/P EST MOD 30 MIN: CPT | Performed by: INTERNAL MEDICINE

## 2025-05-19 PROCEDURE — G2211 COMPLEX E/M VISIT ADD ON: HCPCS | Performed by: INTERNAL MEDICINE

## 2025-05-19 RX ORDER — IPRATROPIUM BROMIDE AND ALBUTEROL SULFATE 2.5; .5 MG/3ML; MG/3ML
SOLUTION RESPIRATORY (INHALATION)
Qty: 1080 ML | Refills: 5 | Status: SHIPPED | OUTPATIENT
Start: 2025-05-19

## 2025-05-19 RX ORDER — FLUTICASONE FUROATE AND VILANTEROL 200; 25 UG/1; UG/1
1 POWDER RESPIRATORY (INHALATION)
Qty: 180 EACH | Refills: 3 | Status: SHIPPED | OUTPATIENT
Start: 2025-05-19

## 2025-05-19 NOTE — PROGRESS NOTES
the most part.  Weight has been stable.  Has not been able to get the CPAP therapy as of now. Patient was recently admitted to the hospital with COVID-19 pneumonia in 2021.  She was treated from 2021 until 2021 for Covid complicated by pneumonia and respiratory failure.  She was treated with Actemra, remdesivir, and IV steroids.  She was discharged on home oxygen. After discharge, the patient was weaned off oxygen.    Her breathing has continued to improve.  She is able to do more without any oxygen desaturation.  Denies any chest pain or chest tightness.  Intermittent dry cough present.  Has not started work yet.  Weight has been stable.  Does have obstructive sleep apnea but not yet using the CPAP therapy.  Following with Dr. Potter.      REVIEW OF SYSTEMS:   8 point review of systems negative except that mentioned in the HPI.    PAST MEDICAL HISTORY:   Past Medical History:   Diagnosis Date    Arthritis     RT TOES; established with podiatry    Complex tear of lateral meniscus of right knee as current injury 2013    Complex tear of medial meniscus of right knee as current injury 2013    Coronary artery disease involving native coronary artery of native heart without angina pectoris 2021    Helicobacter pylori antibody positive 2015    Hyperlipidemia     DIET CONTROL    Hypertension, essential 2021    Mallet fracture, closed, initial encounter 2019    Obstructive sleep apnea (adult) (pediatric) 2020    Patellofemoral syndrome 2014    Pneumonia due to COVID-19 virus 2021    Synovitis of knee 2014    Tear of medial cartilage or meniscus of knee, current 2014       PAST SURGICAL HISTORY:   Past Surgical History:   Procedure Laterality Date     SECTION      breech    COLONOSCOPY      one polyp removed    HYSTERECTOMY (CERVIX STATUS UNKNOWN)      endometriosis    KNEE ARTHROSCOPY Right 2013    RIGHT KNEE ARTHROSCOPE, PARTIAL

## 2025-06-04 DIAGNOSIS — U09.9 POST-COVID CHRONIC SHORTNESS OF BREATH: ICD-10-CM

## 2025-06-04 DIAGNOSIS — R06.02 POST-COVID CHRONIC SHORTNESS OF BREATH: ICD-10-CM

## 2025-06-04 DIAGNOSIS — J45.20 MILD INTERMITTENT ASTHMA WITHOUT COMPLICATION: Primary | ICD-10-CM

## 2025-06-09 ENCOUNTER — TELEPHONE (OUTPATIENT)
Age: 65
End: 2025-06-09

## 2025-06-09 DIAGNOSIS — J45.20 MILD INTERMITTENT ASTHMA WITHOUT COMPLICATION: ICD-10-CM

## 2025-06-09 RX ORDER — EVOLOCUMAB 140 MG/ML
140 INJECTION, SOLUTION SUBCUTANEOUS
Qty: 6 ADJUSTABLE DOSE PRE-FILLED PEN SYRINGE | Refills: 3 | Status: SHIPPED | OUTPATIENT
Start: 2025-06-09 | End: 2025-06-09 | Stop reason: SDUPTHER

## 2025-06-09 RX ORDER — ALBUTEROL SULFATE 90 UG/1
INHALANT RESPIRATORY (INHALATION)
Qty: 25.5 G | Refills: 5 | Status: SHIPPED | OUTPATIENT
Start: 2025-06-09

## 2025-06-09 RX ORDER — EVOLOCUMAB 140 MG/ML
140 INJECTION, SOLUTION SUBCUTANEOUS
Qty: 6 ADJUSTABLE DOSE PRE-FILLED PEN SYRINGE | Refills: 3 | Status: SHIPPED | OUTPATIENT
Start: 2025-06-09

## 2025-06-09 NOTE — TELEPHONE ENCOUNTER
Medication Refill    Medication needing refilled:  Evolocumab (REPATHA SURECLICK) SOAJ pen     Dosage of the medication:   140ml    How are you taking this medication (QD, BID, TID, QID, PRN):   2ml for 28 days    30 or 90 day supply called in:   6 Adjustable Dose Pre-filled Pen     When will you run out of your medication:    Which Pharmacy are we sending the medication to?:     [facility] ([facility]) #[id number] [address], [place]  [address]  Phone:  [phone]   Fax:   [phone]

## 2025-06-09 NOTE — TELEPHONE ENCOUNTER
Refill request   Evolocumab (REPATHA SURECLICK)     Last OV:12/17/24 WALUCIA    Last Lab:10/2/24 LIPID    Next Appt:12/10/25 MARITA

## 2025-06-09 NOTE — TELEPHONE ENCOUNTER
Ca called to request this med be sent to [facility].  All the info is listed below..  It was sent to [facility] please send to [facility].  Thank you      Medication Refill     Medication needing refilled:  Evolocumab (REPATHA SURECLICK) SOAJ pen      Dosage of the medication:   140ml     How are you taking this medication (QD, BID, TID, QID, PRN):   2ml for 28 days     30 or 90 day supply called in:   6 Adjustable Dose Pre-filled Pen      When will you run out of your medication:     Which Pharmacy are we sending the medication to?:     [facility] ([facility]) #[id number]  [address], [place]  [address]  Phone:  [phone]   Fax:   [phone]

## 2025-07-01 ENCOUNTER — TELEPHONE (OUTPATIENT)
Dept: CARDIOLOGY CLINIC | Age: 65
End: 2025-07-01

## 2025-07-01 NOTE — TELEPHONE ENCOUNTER
Spoke to [facility] and relayed pt able to tolerate Livalo and Pharmacy v/u and would send out medication.

## 2025-07-01 NOTE — TELEPHONE ENCOUNTER
Jaxson with [facility] calling, patient has statin allergy on chart. Calling to verify ok to fill for patient? Please advise        Call direct to [phone]  Ref# [id number]

## 2025-07-18 ENCOUNTER — OFFICE VISIT (OUTPATIENT)
Dept: BARIATRICS/WEIGHT MGMT | Age: 65
End: 2025-07-18
Payer: MEDICARE

## 2025-07-18 VITALS
SYSTOLIC BLOOD PRESSURE: 139 MMHG | OXYGEN SATURATION: 96 % | WEIGHT: 219.4 LBS | HEIGHT: 65 IN | DIASTOLIC BLOOD PRESSURE: 72 MMHG | BODY MASS INDEX: 36.55 KG/M2 | HEART RATE: 72 BPM | RESPIRATION RATE: 18 BRPM

## 2025-07-18 DIAGNOSIS — I25.10 CORONARY ARTERY DISEASE INVOLVING NATIVE CORONARY ARTERY OF NATIVE HEART WITHOUT ANGINA PECTORIS: Chronic | ICD-10-CM

## 2025-07-18 DIAGNOSIS — I10 ESSENTIAL HYPERTENSION: ICD-10-CM

## 2025-07-18 DIAGNOSIS — E78.00 PURE HYPERCHOLESTEROLEMIA: ICD-10-CM

## 2025-07-18 DIAGNOSIS — G47.33 OBSTRUCTIVE SLEEP APNEA: ICD-10-CM

## 2025-07-18 DIAGNOSIS — E66.01 SEVERE OBESITY (BMI 35.0-39.9) WITH COMORBIDITY (HCC): ICD-10-CM

## 2025-07-18 DIAGNOSIS — Z01.818 PREOPERATIVE CLEARANCE: Primary | ICD-10-CM

## 2025-07-18 DIAGNOSIS — K21.9 GASTROESOPHAGEAL REFLUX DISEASE WITHOUT ESOPHAGITIS: Chronic | ICD-10-CM

## 2025-07-18 DIAGNOSIS — E78.2 HYPERLIPEMIA, MIXED: ICD-10-CM

## 2025-07-18 PROCEDURE — 3017F COLORECTAL CA SCREEN DOC REV: CPT | Performed by: SURGERY

## 2025-07-18 PROCEDURE — 3075F SYST BP GE 130 - 139MM HG: CPT | Performed by: SURGERY

## 2025-07-18 PROCEDURE — 1036F TOBACCO NON-USER: CPT | Performed by: SURGERY

## 2025-07-18 PROCEDURE — G8417 CALC BMI ABV UP PARAM F/U: HCPCS | Performed by: SURGERY

## 2025-07-18 PROCEDURE — G8427 DOCREV CUR MEDS BY ELIG CLIN: HCPCS | Performed by: SURGERY

## 2025-07-18 PROCEDURE — 3078F DIAST BP <80 MM HG: CPT | Performed by: SURGERY

## 2025-07-18 PROCEDURE — G2211 COMPLEX E/M VISIT ADD ON: HCPCS | Performed by: SURGERY

## 2025-07-18 PROCEDURE — 99204 OFFICE O/P NEW MOD 45 MIN: CPT | Performed by: SURGERY

## 2025-07-18 NOTE — PROGRESS NOTES
Karen Acosta is a 65 y.o. female with a date of birth of 1960.    Vitals:    07/18/25 0753   BP: 139/72   Pulse: 72   Resp: 18   SpO2: 96%    BMI: Body mass index is 36.51 kg/m². Obesity Classification: Class II    Weight History:   Wt Readings from Last 3 Encounters:   07/18/25 99.5 kg (219 lb 6.4 oz)   05/19/25 99.8 kg (220 lb)   04/10/25 96.6 kg (213 lb)       Patient's lowest adult weight was 140 lbs at age 51.     Patient's highest adult weight was 212 lbs at age 65.     Patient has participated in the following weight loss programs: Weight Watcher [employer].   Patient has not participated in meal replacement/liquid diets.  Patient has not participated in weight loss medications.    Patient is not lactose intolerant.  Patient does not have Taoism/cultural food concerns. Patient does not have food allergies. Patient does tolerate artificial sweeteners.     24 hour recall/food frequency chart:  Breakfast: 2 eggs with 2 slices of salter and 2 pieces of bread  Snack: 1/2 cup of tuna with cheese or strawberries  Lunch: protein pizza or turkey sandwich with chips   Snack: none   Dinner: Spaghetti with meatballs and a small salad   Snack: Atkins michela. Milk shake smoothie     Drinks throughout the day: water, crystal light, coffee     Do you drink alcohol? Yes. How often/how much alcohol do you drink: 2 Glasses of wine per year.    Patient does not meet the criteria for binge eating disorder. Patient does not have grazing. Patient does not have night eating. Patient does not have a history of emotional eating or eating out of boredom.    Goals  Weight: 140-150 lbs.    Health Improvement: walking better     Assessment  Nutritional Needs:RMR=(9.99 x 99.5 kg) + (6.25 x 165.1 cm) - (4.92 x 65 y.o.) -161  = 1549 kcal x 1.3 (sedentary activity factor)= 2014 kcal - 1000 (for 2 lb weight loss/week)= 1014 kcal.    Plan  Plan/Recommendations: Start presurgical guidelines.    Additional Handouts: 9 in. 
or meniscus of knee, current 2014     Past Surgical History:   Procedure Laterality Date     SECTION      breech    COLONOSCOPY      one polyp removed    HYSTERECTOMY (CERVIX STATUS UNKNOWN)      endometriosis    KNEE ARTHROSCOPY Right 2013    RIGHT KNEE ARTHROSCOPE, PARTIAL MEDIAL AND LATERAL MENISCECTOMY, SYNOVECTOMY, CHONDROPLASTY OF MEDIAL FEMORAL CONDYLE    KNEE ARTHROSCOPY Left 2014    LEFT KNEE ARTHROSCOPY WITH PARTIAL MEDIAL MENISCECTOMY,    KNEE ARTHROSCOPY Right 2018    ACTUAL PROCEDURE: RIGHT KNEE ARTHROSCOPY, PARTIAL MEDIAL MENISCECTOMY,CHONDROPLASTY, SYNOVECTOMY      KNEE SURGERY  2013    RIGHT KNEE ARTHROSCOPE, PARTIAL MEDIAL AND LATERAL    KNEE SURGERY  2014     RIGHT KNEE ARTHROSCOPY WITH PARTIAL LATERAL MENISCECTOMY,    OVARY REMOVAL      PLANTAR FASCIA SURGERY Right 2024    ENDOSCOPIC PLANTAR FASCIAL RELEASE RIGHT FOOT performed by Hakan German DPM at [facility] OR    TUBAL LIGATION       Family History   Problem Relation Age of Onset    Elevated Lipids Mother     Heart Disease Mother     Stroke Mother         x2    High Blood Pressure Mother     Diabetes Mother     Heart Attack Mother 50    Coronary Art Dis Mother     Hypertension Father     Coronary Art Dis Father     Diabetes Father     Cancer Brother         colon     Social History     Tobacco Use    Smoking status: Former     Current packs/day: 0.00     Average packs/day: 0.3 packs/day for 7.0 years (1.8 ttl pk-yrs)     Types: Cigarettes     Start date: 1981     Quit date: 1988     Years since quittin.8     Passive exposure: Past    Smokeless tobacco: Never    Tobacco comments:     quit    Substance Use Topics    Alcohol use: No     I counseled the patient on the importance of not smoking and risks of ETOH.   Allergies   Allergen Reactions    Ampicillin      Not definitely allergic    Caffeine Palpitations and Other (See Comments)     Chest pain    Codeine Itching

## 2025-07-23 DIAGNOSIS — I10 HYPERTENSION, ESSENTIAL: ICD-10-CM

## 2025-07-23 RX ORDER — AMLODIPINE BESYLATE 10 MG/1
10 TABLET ORAL DAILY
Qty: 90 TABLET | Refills: 1 | Status: SHIPPED | OUTPATIENT
Start: 2025-07-23

## 2025-07-23 NOTE — TELEPHONE ENCOUNTER
Okay to fill.     Medication:   Requested Prescriptions     Pending Prescriptions Disp Refills    amLODIPine (NORVASC) 10 MG tablet [Pharmacy Med Name: AMLODIPINE BESYLATE TABS 10MG] 90 tablet 3     Sig: TAKE 1 TABLET DAILY        Last Filled:  07/29/24    Patient Phone Number: [phone] (home)     Last appt: 4/10/2025   Next appt: 10/10/2025    Last [place]AR[place]:       10/6/2022    12:48 PM   RX Monitoring   Periodic Controlled Substance Monitoring No signs of potential drug abuse or diversion identified.

## 2025-08-05 ENCOUNTER — HOSPITAL ENCOUNTER (OUTPATIENT)
Dept: CARDIAC REHAB | Age: 65
Setting detail: THERAPIES SERIES
Discharge: HOME | End: 2025-08-05
Payer: MEDICARE

## 2025-08-05 VITALS
HEIGHT: 65 IN | BODY MASS INDEX: 36.09 KG/M2 | DIASTOLIC BLOOD PRESSURE: 60 MMHG | WEIGHT: 216.6 LBS | SYSTOLIC BLOOD PRESSURE: 122 MMHG | RESPIRATION RATE: 16 BRPM | HEART RATE: 71 BPM

## 2025-08-05 PROCEDURE — G0238 OTH RESP PROC, INDIV: HCPCS

## 2025-08-05 ASSESSMENT — PATIENT HEALTH QUESTIONNAIRE - PHQ9
6. FEELING BAD ABOUT YOURSELF - OR THAT YOU ARE A FAILURE OR HAVE LET YOURSELF OR YOUR FAMILY DOWN: NOT AT ALL
4. FEELING TIRED OR HAVING LITTLE ENERGY: NOT AT ALL
1. LITTLE INTEREST OR PLEASURE IN DOING THINGS: NOT AT ALL
SUM OF ALL RESPONSES TO PHQ QUESTIONS 1-9: 0
7. TROUBLE CONCENTRATING ON THINGS, SUCH AS READING THE NEWSPAPER OR WATCHING TELEVISION: NOT AT ALL
10. IF YOU CHECKED OFF ANY PROBLEMS, HOW DIFFICULT HAVE THESE PROBLEMS MADE IT FOR YOU TO DO YOUR WORK, TAKE CARE OF THINGS AT HOME, OR GET ALONG WITH OTHER PEOPLE: NOT DIFFICULT AT ALL
3. TROUBLE FALLING OR STAYING ASLEEP: NOT AT ALL
SUM OF ALL RESPONSES TO PHQ QUESTIONS 1-9: 0
8. MOVING OR SPEAKING SO SLOWLY THAT OTHER PEOPLE COULD HAVE NOTICED. OR THE OPPOSITE, BEING SO FIGETY OR RESTLESS THAT YOU HAVE BEEN MOVING AROUND A LOT MORE THAN USUAL: NOT AT ALL
SUM OF ALL RESPONSES TO PHQ QUESTIONS 1-9: 0
2. FEELING DOWN, DEPRESSED OR HOPELESS: NOT AT ALL
5. POOR APPETITE OR OVEREATING: NOT AT ALL
9. THOUGHTS THAT YOU WOULD BE BETTER OFF DEAD, OR OF HURTING YOURSELF: NOT AT ALL
SUM OF ALL RESPONSES TO PHQ QUESTIONS 1-9: 0

## 2025-08-06 ENCOUNTER — HOSPITAL ENCOUNTER (OUTPATIENT)
Dept: CARDIAC REHAB | Age: 65
Setting detail: THERAPIES SERIES
Discharge: HOME | End: 2025-08-06
Payer: MEDICARE

## 2025-08-06 PROCEDURE — G0239 OTH RESP PROC, GROUP: HCPCS

## 2025-08-08 ENCOUNTER — HOSPITAL ENCOUNTER (OUTPATIENT)
Dept: CARDIAC REHAB | Age: 65
Setting detail: THERAPIES SERIES
End: 2025-08-08
Payer: MEDICARE

## 2025-08-11 ENCOUNTER — HOSPITAL ENCOUNTER (OUTPATIENT)
Dept: CARDIAC REHAB | Age: 65
Setting detail: THERAPIES SERIES
Discharge: HOME | End: 2025-08-11
Payer: MEDICARE

## 2025-08-11 PROCEDURE — G0239 OTH RESP PROC, GROUP: HCPCS

## 2025-08-13 ENCOUNTER — HOSPITAL ENCOUNTER (OUTPATIENT)
Dept: CARDIAC REHAB | Age: 65
Setting detail: THERAPIES SERIES
Discharge: HOME | End: 2025-08-13
Payer: MEDICARE

## 2025-08-13 PROCEDURE — G0239 OTH RESP PROC, GROUP: HCPCS

## 2025-08-15 ENCOUNTER — HOSPITAL ENCOUNTER (OUTPATIENT)
Dept: CARDIAC REHAB | Age: 65
Setting detail: THERAPIES SERIES
Discharge: HOME | End: 2025-08-15
Payer: MEDICARE

## 2025-08-15 PROCEDURE — G0239 OTH RESP PROC, GROUP: HCPCS

## 2025-08-17 PROBLEM — Z01.818 PREOPERATIVE CLEARANCE: Status: RESOLVED | Noted: 2025-07-18 | Resolved: 2025-08-17

## 2025-08-18 ENCOUNTER — HOSPITAL ENCOUNTER (OUTPATIENT)
Dept: CARDIAC REHAB | Age: 65
Setting detail: THERAPIES SERIES
Discharge: HOME | End: 2025-08-18
Payer: MEDICARE

## 2025-08-18 PROCEDURE — G0239 OTH RESP PROC, GROUP: HCPCS

## 2025-08-20 ENCOUNTER — HOSPITAL ENCOUNTER (OUTPATIENT)
Dept: CARDIAC REHAB | Age: 65
Setting detail: THERAPIES SERIES
Discharge: HOME | End: 2025-08-20
Payer: MEDICARE

## 2025-08-20 PROCEDURE — G0239 OTH RESP PROC, GROUP: HCPCS

## 2025-08-22 ENCOUNTER — HOSPITAL ENCOUNTER (OUTPATIENT)
Dept: CARDIAC REHAB | Age: 65
Setting detail: THERAPIES SERIES
End: 2025-08-22
Payer: MEDICARE

## 2025-08-25 ENCOUNTER — HOSPITAL ENCOUNTER (OUTPATIENT)
Dept: CARDIAC REHAB | Age: 65
Setting detail: THERAPIES SERIES
Discharge: HOME | End: 2025-08-25
Payer: MEDICARE

## 2025-08-25 PROCEDURE — G0239 OTH RESP PROC, GROUP: HCPCS

## 2025-08-27 ENCOUNTER — HOSPITAL ENCOUNTER (OUTPATIENT)
Dept: CARDIAC REHAB | Age: 65
Setting detail: THERAPIES SERIES
Discharge: HOME | End: 2025-08-27
Payer: MEDICARE

## 2025-08-27 PROCEDURE — G0239 OTH RESP PROC, GROUP: HCPCS

## 2025-08-29 ENCOUNTER — HOSPITAL ENCOUNTER (OUTPATIENT)
Dept: CARDIAC REHAB | Age: 65
Setting detail: THERAPIES SERIES
End: 2025-08-29
Payer: MEDICARE

## 2025-08-29 ENCOUNTER — TELEPHONE (OUTPATIENT)
Dept: CARDIOLOGY CLINIC | Age: 65
End: 2025-08-29

## 2025-09-03 ENCOUNTER — HOSPITAL ENCOUNTER (OUTPATIENT)
Dept: CARDIAC REHAB | Age: 65
Setting detail: THERAPIES SERIES
Discharge: HOME | End: 2025-09-03
Payer: MEDICARE

## 2025-09-03 PROCEDURE — G0239 OTH RESP PROC, GROUP: HCPCS

## 2025-09-05 ENCOUNTER — TELEPHONE (OUTPATIENT)
Dept: CARDIAC REHAB | Age: 65
End: 2025-09-05

## 2025-09-05 ENCOUNTER — TELEPHONE (OUTPATIENT)
Dept: CARDIOLOGY CLINIC | Age: 65
End: 2025-09-05

## (undated) DEVICE — GAUZE,SPONGE,4"X4",8PLY,STRL,LF,10/TRAY: Brand: MEDLINE

## (undated) DEVICE — BANDAGE COMPR W4INXL12FT E DISP ESMARCH EVEN

## (undated) DEVICE — CURITY NON-ADHERENT STRIPS: Brand: CURITY

## (undated) DEVICE — SUTURE ETHILON SZ 3-0 L18IN NONABSORBABLE BLK PS-2 L19MM 3/8 1669H

## (undated) DEVICE — EPF KIT: Brand: ENDOTRAC

## (undated) DEVICE — T-DRAPE,EXTREMITY,STERILE: Brand: MEDLINE

## (undated) DEVICE — HYPODERMIC SAFETY NEEDLE: Brand: MAGELLAN

## (undated) DEVICE — MEDICINE CUP, GRADUATED, STER: Brand: MEDLINE

## (undated) DEVICE — LOWER EXTREMITY: Brand: MEDLINE INDUSTRIES, INC.

## (undated) DEVICE — MASC TURNOVER KIT: Brand: MEDLINE INDUSTRIES, INC.

## (undated) DEVICE — BLADE ENDOTRAC HOOK

## (undated) DEVICE — APPLICATOR MEDICATED 26 CC SOLUTION HI LT ORNG CHLORAPREP

## (undated) DEVICE — APPLICATOR  COTTON-TIPPED 6 IN WOOD STRL

## (undated) DEVICE — SUTURE PROL SZ 4-0 L18IN NONABSORBABLE BLU L19MM FS-2 3/8 8683G

## (undated) DEVICE — ZIMMER® STERILE DISPOSABLE TOURNIQUET CUFF WITH PLC, DUAL PORT, SINGLE BLADDER, 18 IN. (46 CM)

## (undated) DEVICE — SYRINGE, LUER LOCK, 10ML: Brand: MEDLINE

## (undated) DEVICE — BANDAGE COBAN 4 IN COMPR W4INXL5YD FOAM COHESIVE QUIK STK SELF ADH SFT

## (undated) DEVICE — CURITY STRETCH BANDAGE: Brand: CURITY

## (undated) DEVICE — GLOVE ORTHO 7 1/2   MSG9475